# Patient Record
Sex: FEMALE | Race: WHITE | Employment: FULL TIME | URBAN - METROPOLITAN AREA
[De-identification: names, ages, dates, MRNs, and addresses within clinical notes are randomized per-mention and may not be internally consistent; named-entity substitution may affect disease eponyms.]

---

## 2019-05-11 ENCOUNTER — APPOINTMENT (OUTPATIENT)
Dept: GENERAL RADIOLOGY | Age: 64
DRG: 174 | End: 2019-05-11
Payer: MEDICAID

## 2019-05-11 ENCOUNTER — APPOINTMENT (OUTPATIENT)
Dept: CT IMAGING | Age: 64
DRG: 174 | End: 2019-05-11
Payer: MEDICAID

## 2019-05-11 ENCOUNTER — HOSPITAL ENCOUNTER (INPATIENT)
Age: 64
LOS: 3 days | Discharge: HOME | DRG: 174 | End: 2019-05-14
Admitting: INTERNAL MEDICINE
Payer: MEDICAID

## 2019-05-11 DIAGNOSIS — I21.02 ST ELEVATION MYOCARDIAL INFARCTION INVOLVING LEFT ANTERIOR DESCENDING (LAD) CORONARY ARTERY (HCC): Primary | ICD-10-CM

## 2019-05-11 DIAGNOSIS — I21.3 ST ELEVATION MYOCARDIAL INFARCTION (STEMI), UNSPECIFIED ARTERY (HCC): ICD-10-CM

## 2019-05-11 DIAGNOSIS — R07.9 ACUTE CHEST PAIN: ICD-10-CM

## 2019-05-11 PROBLEM — I10 ESSENTIAL (PRIMARY) HYPERTENSION: Status: ACTIVE | Noted: 2019-05-11

## 2019-05-11 PROBLEM — I47.29 NSVT (NONSUSTAINED VENTRICULAR TACHYCARDIA) (HCC): Status: ACTIVE | Noted: 2019-05-11

## 2019-05-11 PROBLEM — E66.01 MORBID OBESITY WITH BMI OF 50.0-59.9, ADULT (HCC): Status: ACTIVE | Noted: 2019-05-11

## 2019-05-11 PROBLEM — J81.0 FLASH PULMONARY EDEMA (HCC): Status: ACTIVE | Noted: 2019-05-11

## 2019-05-11 PROBLEM — K92.1 MELENA: Status: ACTIVE | Noted: 2019-05-11

## 2019-05-11 LAB
-: ABNORMAL
ABO/RH: NORMAL
ABSOLUTE EOS #: 0.23 K/UL (ref 0–0.44)
ABSOLUTE IMMATURE GRANULOCYTE: 0.11 K/UL (ref 0–0.3)
ABSOLUTE LYMPH #: 1.96 K/UL (ref 1.1–3.7)
ABSOLUTE MONO #: 1.03 K/UL (ref 0.1–1.2)
AMORPHOUS: ABNORMAL
ANION GAP SERPL CALCULATED.3IONS-SCNC: 14 MMOL/L (ref 9–17)
ANION GAP SERPL CALCULATED.3IONS-SCNC: 16 MMOL/L (ref 9–17)
ANTIBODY SCREEN: NEGATIVE
ARM BAND NUMBER: NORMAL
BACTERIA: ABNORMAL
BASOPHILS # BLD: 0 % (ref 0–2)
BASOPHILS ABSOLUTE: 0.05 K/UL (ref 0–0.2)
BILIRUBIN URINE: NEGATIVE
BUN BLDV-MCNC: 20 MG/DL (ref 8–23)
BUN BLDV-MCNC: 21 MG/DL (ref 8–23)
BUN/CREAT BLD: 20 (ref 9–20)
BUN/CREAT BLD: 21 (ref 9–20)
CALCIUM SERPL-MCNC: 8.5 MG/DL (ref 8.6–10.4)
CALCIUM SERPL-MCNC: 9 MG/DL (ref 8.6–10.4)
CASTS UA: ABNORMAL /LPF
CASTS UA: ABNORMAL /LPF
CHLORIDE BLD-SCNC: 101 MMOL/L (ref 98–107)
CHLORIDE BLD-SCNC: 102 MMOL/L (ref 98–107)
CO2: 21 MMOL/L (ref 20–31)
CO2: 23 MMOL/L (ref 20–31)
COLOR: YELLOW
COMMENT UA: ABNORMAL
CREAT SERPL-MCNC: 0.98 MG/DL (ref 0.5–0.9)
CREAT SERPL-MCNC: 1 MG/DL (ref 0.5–0.9)
CRYSTALS, UA: ABNORMAL /HPF
DIFFERENTIAL TYPE: ABNORMAL
EOSINOPHILS RELATIVE PERCENT: 2 % (ref 1–4)
EPITHELIAL CELLS UA: ABNORMAL /HPF (ref 0–5)
EXPIRATION DATE: NORMAL
GFR AFRICAN AMERICAN: >60 ML/MIN
GFR AFRICAN AMERICAN: >60 ML/MIN
GFR NON-AFRICAN AMERICAN: 56 ML/MIN
GFR NON-AFRICAN AMERICAN: 58 ML/MIN
GFR SERPL CREATININE-BSD FRML MDRD: ABNORMAL ML/MIN/{1.73_M2}
GLUCOSE BLD-MCNC: 144 MG/DL (ref 70–99)
GLUCOSE BLD-MCNC: 159 MG/DL (ref 70–99)
GLUCOSE URINE: NEGATIVE
HCT VFR BLD CALC: 43 % (ref 36.3–47.1)
HEMOGLOBIN: 13.8 G/DL (ref 11.9–15.1)
IMMATURE GRANULOCYTES: 1 %
KETONES, URINE: ABNORMAL
LEUKOCYTE ESTERASE, URINE: NEGATIVE
LYMPHOCYTES # BLD: 15 % (ref 24–43)
MAGNESIUM: 2.2 MG/DL (ref 1.6–2.6)
MCH RBC QN AUTO: 29.2 PG (ref 25.2–33.5)
MCHC RBC AUTO-ENTMCNC: 32.1 G/DL (ref 28.4–34.8)
MCV RBC AUTO: 90.9 FL (ref 82.6–102.9)
MONOCYTES # BLD: 8 % (ref 3–12)
MUCUS: ABNORMAL
MYOGLOBIN: 38 NG/ML (ref 25–58)
NITRITE, URINE: NEGATIVE
NRBC AUTOMATED: 0 PER 100 WBC
OTHER OBSERVATIONS UA: ABNORMAL
PDW BLD-RTO: 13.2 % (ref 11.8–14.4)
PH UA: 6.5 (ref 5–8)
PLATELET # BLD: 287 K/UL (ref 138–453)
PLATELET ESTIMATE: ABNORMAL
PMV BLD AUTO: 10.6 FL (ref 8.1–13.5)
POTASSIUM SERPL-SCNC: 3.7 MMOL/L (ref 3.7–5.3)
POTASSIUM SERPL-SCNC: 4 MMOL/L (ref 3.7–5.3)
PROTEIN UA: NEGATIVE
RBC # BLD: 4.73 M/UL (ref 3.95–5.11)
RBC # BLD: ABNORMAL 10*6/UL
RBC UA: ABNORMAL /HPF (ref 0–2)
RENAL EPITHELIAL, UA: ABNORMAL /HPF
SEG NEUTROPHILS: 74 % (ref 36–65)
SEGMENTED NEUTROPHILS ABSOLUTE COUNT: 10.06 K/UL (ref 1.5–8.1)
SODIUM BLD-SCNC: 138 MMOL/L (ref 135–144)
SODIUM BLD-SCNC: 139 MMOL/L (ref 135–144)
SPECIFIC GRAVITY UA: 1.02 (ref 1–1.03)
TRICHOMONAS: ABNORMAL
TROPONIN INTERP: ABNORMAL
TROPONIN INTERP: ABNORMAL
TROPONIN T: ABNORMAL NG/ML
TROPONIN T: ABNORMAL NG/ML
TROPONIN, HIGH SENSITIVITY: 1270 NG/L (ref 0–14)
TROPONIN, HIGH SENSITIVITY: 17 NG/L (ref 0–14)
TURBIDITY: CLEAR
URINE HGB: ABNORMAL
UROBILINOGEN, URINE: NORMAL
WBC # BLD: 13.4 K/UL (ref 3.5–11.3)
WBC # BLD: ABNORMAL 10*3/UL
WBC UA: ABNORMAL /HPF (ref 0–5)
YEAST: ABNORMAL

## 2019-05-11 PROCEDURE — 86850 RBC ANTIBODY SCREEN: CPT

## 2019-05-11 PROCEDURE — 2580000003 HC RX 258: Performed by: INTERNAL MEDICINE

## 2019-05-11 PROCEDURE — 2580000003 HC RX 258

## 2019-05-11 PROCEDURE — 36415 COLL VENOUS BLD VENIPUNCTURE: CPT

## 2019-05-11 PROCEDURE — 96365 THER/PROPH/DIAG IV INF INIT: CPT

## 2019-05-11 PROCEDURE — 83874 ASSAY OF MYOGLOBIN: CPT

## 2019-05-11 PROCEDURE — 027034Z DILATION OF CORONARY ARTERY, ONE ARTERY WITH DRUG-ELUTING INTRALUMINAL DEVICE, PERCUTANEOUS APPROACH: ICD-10-PCS | Performed by: INTERNAL MEDICINE

## 2019-05-11 PROCEDURE — 86901 BLOOD TYPING SEROLOGIC RH(D): CPT

## 2019-05-11 PROCEDURE — 6360000002 HC RX W HCPCS

## 2019-05-11 PROCEDURE — 6360000002 HC RX W HCPCS: Performed by: INTERNAL MEDICINE

## 2019-05-11 PROCEDURE — 83735 ASSAY OF MAGNESIUM: CPT

## 2019-05-11 PROCEDURE — 71250 CT THORAX DX C-: CPT

## 2019-05-11 PROCEDURE — 6370000000 HC RX 637 (ALT 250 FOR IP): Performed by: INTERNAL MEDICINE

## 2019-05-11 PROCEDURE — 94660 CPAP INITIATION&MGMT: CPT

## 2019-05-11 PROCEDURE — 99285 EMERGENCY DEPT VISIT HI MDM: CPT

## 2019-05-11 PROCEDURE — 96375 TX/PRO/DX INJ NEW DRUG ADDON: CPT

## 2019-05-11 PROCEDURE — 93458 L HRT ARTERY/VENTRICLE ANGIO: CPT | Performed by: INTERNAL MEDICINE

## 2019-05-11 PROCEDURE — 81001 URINALYSIS AUTO W/SCOPE: CPT

## 2019-05-11 PROCEDURE — 6360000004 HC RX CONTRAST MEDICATION

## 2019-05-11 PROCEDURE — B2111ZZ FLUOROSCOPY OF MULTIPLE CORONARY ARTERIES USING LOW OSMOLAR CONTRAST: ICD-10-PCS | Performed by: INTERNAL MEDICINE

## 2019-05-11 PROCEDURE — 86900 BLOOD TYPING SEROLOGIC ABO: CPT

## 2019-05-11 PROCEDURE — C9113 INJ PANTOPRAZOLE SODIUM, VIA: HCPCS | Performed by: INTERNAL MEDICINE

## 2019-05-11 PROCEDURE — 2500000003 HC RX 250 WO HCPCS

## 2019-05-11 PROCEDURE — 6370000000 HC RX 637 (ALT 250 FOR IP)

## 2019-05-11 PROCEDURE — 99223 1ST HOSP IP/OBS HIGH 75: CPT | Performed by: INTERNAL MEDICINE

## 2019-05-11 PROCEDURE — 94761 N-INVAS EAR/PLS OXIMETRY MLT: CPT

## 2019-05-11 PROCEDURE — 84484 ASSAY OF TROPONIN QUANT: CPT

## 2019-05-11 PROCEDURE — 51702 INSERT TEMP BLADDER CATH: CPT

## 2019-05-11 PROCEDURE — 2000000000 HC ICU R&B

## 2019-05-11 PROCEDURE — 83036 HEMOGLOBIN GLYCOSYLATED A1C: CPT

## 2019-05-11 PROCEDURE — 71045 X-RAY EXAM CHEST 1 VIEW: CPT

## 2019-05-11 PROCEDURE — 80048 BASIC METABOLIC PNL TOTAL CA: CPT

## 2019-05-11 PROCEDURE — C9113 INJ PANTOPRAZOLE SODIUM, VIA: HCPCS

## 2019-05-11 PROCEDURE — 85025 COMPLETE CBC W/AUTO DIFF WBC: CPT

## 2019-05-11 PROCEDURE — 96366 THER/PROPH/DIAG IV INF ADDON: CPT

## 2019-05-11 PROCEDURE — 96368 THER/DIAG CONCURRENT INF: CPT

## 2019-05-11 PROCEDURE — 92941 PRQ TRLML REVSC TOT OCCL AMI: CPT | Performed by: INTERNAL MEDICINE

## 2019-05-11 PROCEDURE — 93005 ELECTROCARDIOGRAM TRACING: CPT

## 2019-05-11 PROCEDURE — B2151ZZ FLUOROSCOPY OF LEFT HEART USING LOW OSMOLAR CONTRAST: ICD-10-PCS | Performed by: INTERNAL MEDICINE

## 2019-05-11 RX ORDER — FUROSEMIDE 10 MG/ML
40 INJECTION INTRAMUSCULAR; INTRAVENOUS 2 TIMES DAILY
Status: DISCONTINUED | OUTPATIENT
Start: 2019-05-11 | End: 2019-05-11

## 2019-05-11 RX ORDER — SODIUM CHLORIDE 0.9 % (FLUSH) 0.9 %
10 SYRINGE (ML) INJECTION PRN
Status: DISCONTINUED | OUTPATIENT
Start: 2019-05-11 | End: 2019-05-11 | Stop reason: SDUPTHER

## 2019-05-11 RX ORDER — ATORVASTATIN CALCIUM 40 MG/1
40 TABLET, FILM COATED ORAL NIGHTLY
Status: DISCONTINUED | OUTPATIENT
Start: 2019-05-11 | End: 2019-05-11 | Stop reason: SDUPTHER

## 2019-05-11 RX ORDER — POTASSIUM CHLORIDE 7.45 MG/ML
10 INJECTION INTRAVENOUS PRN
Status: DISCONTINUED | OUTPATIENT
Start: 2019-05-11 | End: 2019-05-14 | Stop reason: HOSPADM

## 2019-05-11 RX ORDER — SODIUM CHLORIDE 0.9 % (FLUSH) 0.9 %
10 SYRINGE (ML) INJECTION EVERY 12 HOURS SCHEDULED
Status: DISCONTINUED | OUTPATIENT
Start: 2019-05-11 | End: 2019-05-11 | Stop reason: SDUPTHER

## 2019-05-11 RX ORDER — SODIUM CHLORIDE 0.9 % (FLUSH) 0.9 %
10 SYRINGE (ML) INJECTION EVERY 12 HOURS SCHEDULED
Status: DISCONTINUED | OUTPATIENT
Start: 2019-05-11 | End: 2019-05-14 | Stop reason: HOSPADM

## 2019-05-11 RX ORDER — METOPROLOL SUCCINATE 25 MG/1
25 TABLET, EXTENDED RELEASE ORAL ONCE
Status: COMPLETED | OUTPATIENT
Start: 2019-05-11 | End: 2019-05-11

## 2019-05-11 RX ORDER — METOPROLOL SUCCINATE 25 MG/1
25 TABLET, EXTENDED RELEASE ORAL DAILY
Status: DISCONTINUED | OUTPATIENT
Start: 2019-05-11 | End: 2019-05-12

## 2019-05-11 RX ORDER — ONDANSETRON 2 MG/ML
4 INJECTION INTRAMUSCULAR; INTRAVENOUS EVERY 6 HOURS PRN
Status: DISCONTINUED | OUTPATIENT
Start: 2019-05-11 | End: 2019-05-11

## 2019-05-11 RX ORDER — ONDANSETRON 2 MG/ML
4 INJECTION INTRAMUSCULAR; INTRAVENOUS ONCE
Status: COMPLETED | OUTPATIENT
Start: 2019-05-11 | End: 2019-05-11

## 2019-05-11 RX ORDER — POTASSIUM CHLORIDE 20 MEQ/1
40 TABLET, EXTENDED RELEASE ORAL PRN
Status: DISCONTINUED | OUTPATIENT
Start: 2019-05-11 | End: 2019-05-14 | Stop reason: HOSPADM

## 2019-05-11 RX ORDER — NITROGLYCERIN 20 MG/100ML
5 INJECTION INTRAVENOUS CONTINUOUS
Status: DISCONTINUED | OUTPATIENT
Start: 2019-05-11 | End: 2019-05-14 | Stop reason: HOSPADM

## 2019-05-11 RX ORDER — ASPIRIN 81 MG/1
81 TABLET ORAL DAILY
Status: DISCONTINUED | OUTPATIENT
Start: 2019-05-12 | End: 2019-05-14 | Stop reason: HOSPADM

## 2019-05-11 RX ORDER — ASPIRIN 81 MG/1
81 TABLET, CHEWABLE ORAL DAILY
Status: DISCONTINUED | OUTPATIENT
Start: 2019-05-12 | End: 2019-05-11 | Stop reason: SDUPTHER

## 2019-05-11 RX ORDER — MORPHINE SULFATE 4 MG/ML
4 INJECTION, SOLUTION INTRAMUSCULAR; INTRAVENOUS ONCE
Status: COMPLETED | OUTPATIENT
Start: 2019-05-11 | End: 2019-05-11

## 2019-05-11 RX ORDER — ACETAMINOPHEN 325 MG/1
650 TABLET ORAL EVERY 4 HOURS PRN
Status: DISCONTINUED | OUTPATIENT
Start: 2019-05-11 | End: 2019-05-14 | Stop reason: HOSPADM

## 2019-05-11 RX ORDER — ASPIRIN 81 MG/1
324 TABLET, CHEWABLE ORAL ONCE
Status: COMPLETED | OUTPATIENT
Start: 2019-05-11 | End: 2019-05-11

## 2019-05-11 RX ORDER — METOPROLOL SUCCINATE 50 MG/1
50 TABLET, EXTENDED RELEASE ORAL DAILY
Status: DISCONTINUED | OUTPATIENT
Start: 2019-05-12 | End: 2019-05-12

## 2019-05-11 RX ORDER — ATORVASTATIN CALCIUM 80 MG/1
80 TABLET, FILM COATED ORAL NIGHTLY
Status: DISCONTINUED | OUTPATIENT
Start: 2019-05-11 | End: 2019-05-14 | Stop reason: HOSPADM

## 2019-05-11 RX ORDER — MAGNESIUM SULFATE 1 G/100ML
1 INJECTION INTRAVENOUS PRN
Status: DISCONTINUED | OUTPATIENT
Start: 2019-05-11 | End: 2019-05-14 | Stop reason: HOSPADM

## 2019-05-11 RX ORDER — SODIUM CHLORIDE 0.9 % (FLUSH) 0.9 %
10 SYRINGE (ML) INJECTION PRN
Status: DISCONTINUED | OUTPATIENT
Start: 2019-05-11 | End: 2019-05-14 | Stop reason: HOSPADM

## 2019-05-11 RX ORDER — ONDANSETRON 2 MG/ML
INJECTION INTRAMUSCULAR; INTRAVENOUS
Status: COMPLETED
Start: 2019-05-11 | End: 2019-05-11

## 2019-05-11 RX ORDER — FUROSEMIDE 10 MG/ML
40 INJECTION INTRAMUSCULAR; INTRAVENOUS ONCE
Status: COMPLETED | OUTPATIENT
Start: 2019-05-11 | End: 2019-05-11

## 2019-05-11 RX ORDER — ONDANSETRON 4 MG/1
4 TABLET, ORALLY DISINTEGRATING ORAL EVERY 6 HOURS PRN
Status: DISCONTINUED | OUTPATIENT
Start: 2019-05-11 | End: 2019-05-14 | Stop reason: HOSPADM

## 2019-05-11 RX ORDER — FUROSEMIDE 10 MG/ML
40 INJECTION INTRAMUSCULAR; INTRAVENOUS 2 TIMES DAILY
Status: DISCONTINUED | OUTPATIENT
Start: 2019-05-12 | End: 2019-05-12

## 2019-05-11 RX ORDER — RANITIDINE 150 MG/1
150 TABLET ORAL EVERY MORNING
Status: ON HOLD | COMMUNITY
End: 2019-05-14 | Stop reason: HOSPADM

## 2019-05-11 RX ORDER — 0.9 % SODIUM CHLORIDE 0.9 %
10 VIAL (ML) INJECTION DAILY
Status: DISCONTINUED | OUTPATIENT
Start: 2019-05-11 | End: 2019-05-13 | Stop reason: CLARIF

## 2019-05-11 RX ORDER — PANTOPRAZOLE SODIUM 40 MG/10ML
40 INJECTION, POWDER, LYOPHILIZED, FOR SOLUTION INTRAVENOUS 2 TIMES DAILY
Status: DISCONTINUED | OUTPATIENT
Start: 2019-05-11 | End: 2019-05-13 | Stop reason: CLARIF

## 2019-05-11 RX ORDER — ONDANSETRON 2 MG/ML
4 INJECTION INTRAMUSCULAR; INTRAVENOUS EVERY 6 HOURS PRN
Status: DISCONTINUED | OUTPATIENT
Start: 2019-05-11 | End: 2019-05-14 | Stop reason: HOSPADM

## 2019-05-11 RX ADMIN — SODIUM CHLORIDE 8 MG/HR: 9 INJECTION, SOLUTION INTRAVENOUS at 13:59

## 2019-05-11 RX ADMIN — ONDANSETRON HYDROCHLORIDE 4 MG: 2 INJECTION, SOLUTION INTRAVENOUS at 20:52

## 2019-05-11 RX ADMIN — METOPROLOL SUCCINATE 25 MG: 25 TABLET, FILM COATED, EXTENDED RELEASE ORAL at 18:31

## 2019-05-11 RX ADMIN — ATORVASTATIN CALCIUM 80 MG: 80 TABLET, FILM COATED ORAL at 20:10

## 2019-05-11 RX ADMIN — Medication 10 ML: at 20:53

## 2019-05-11 RX ADMIN — MORPHINE SULFATE 4 MG: 4 INJECTION INTRAVENOUS at 13:38

## 2019-05-11 RX ADMIN — ASPIRIN 81 MG 324 MG: 81 TABLET ORAL at 13:36

## 2019-05-11 RX ADMIN — DEXTROSE MONOHYDRATE: 50 INJECTION, SOLUTION INTRAVENOUS at 20:09

## 2019-05-11 RX ADMIN — FUROSEMIDE 40 MG: 10 INJECTION, SOLUTION INTRAMUSCULAR; INTRAVENOUS at 22:50

## 2019-05-11 RX ADMIN — NITROGLYCERIN 5 MCG/MIN: 20 INJECTION INTRAVENOUS at 13:37

## 2019-05-11 RX ADMIN — METOPROLOL SUCCINATE 25 MG: 25 TABLET, FILM COATED, EXTENDED RELEASE ORAL at 17:17

## 2019-05-11 RX ADMIN — AMIODARONE HYDROCHLORIDE 1 MG/MIN: 50 INJECTION, SOLUTION INTRAVENOUS at 20:16

## 2019-05-11 RX ADMIN — ONDANSETRON 4 MG: 2 INJECTION INTRAMUSCULAR; INTRAVENOUS at 13:50

## 2019-05-11 RX ADMIN — SODIUM CHLORIDE 80 MG: 9 INJECTION, SOLUTION INTRAVENOUS at 13:49

## 2019-05-11 RX ADMIN — FUROSEMIDE 40 MG: 10 INJECTION, SOLUTION INTRAMUSCULAR; INTRAVENOUS at 20:10

## 2019-05-11 RX ADMIN — PANTOPRAZOLE SODIUM 40 MG: 40 INJECTION, POWDER, FOR SOLUTION INTRAVENOUS at 20:10

## 2019-05-11 RX ADMIN — TICAGRELOR 90 MG: 90 TABLET ORAL at 20:11

## 2019-05-11 SDOH — HEALTH STABILITY: MENTAL HEALTH: HOW OFTEN DO YOU HAVE A DRINK CONTAINING ALCOHOL?: NEVER

## 2019-05-11 ASSESSMENT — PAIN DESCRIPTION - FREQUENCY
FREQUENCY: CONTINUOUS
FREQUENCY: CONTINUOUS

## 2019-05-11 ASSESSMENT — PAIN DESCRIPTION - LOCATION
LOCATION: BACK
LOCATION: BACK

## 2019-05-11 ASSESSMENT — ENCOUNTER SYMPTOMS
VOMITING: 1
CHEST TIGHTNESS: 1
ABDOMINAL DISTENTION: 1
SHORTNESS OF BREATH: 1
NAUSEA: 1
PHOTOPHOBIA: 0
BACK PAIN: 1

## 2019-05-11 ASSESSMENT — PAIN DESCRIPTION - DESCRIPTORS
DESCRIPTORS: ACHING
DESCRIPTORS: ACHING

## 2019-05-11 ASSESSMENT — PAIN SCALES - GENERAL
PAINLEVEL_OUTOF10: 5
PAINLEVEL_OUTOF10: 0
PAINLEVEL_OUTOF10: 5

## 2019-05-11 ASSESSMENT — PAIN DESCRIPTION - PAIN TYPE
TYPE: ACUTE PAIN
TYPE: ACUTE PAIN

## 2019-05-11 NOTE — H&P
[facility]    HISTORY AND PHYSICAL EXAMINATION            Date:   5/11/2019  Patient name:  Florentin Oakley  Date of admission:  5/11/2019  1:22 PM  MRN:   [id number]  Account:  [de-identified]  YOB: 1955  PCP:    No primary care provider on file. Room:   2027/2027-01  Code Status:    Full Code    Chief Complaint:     Chief Complaint   Patient presents with    Chest Pain    Back Pain       History Obtained From:     patient, electronic medical record, on bipap so history limited    History of Present Illness: The patient is a 60 y.o.   female who presents with Chest Pain and Back Pain   and she is admitted to the hospital for the management of  Stemi. She developed centralized cp with radiation to back along with n/v/diaphoresis. No sob. However, she is now on bipap for pulm edema and is having runs of nsvt        Past Medical History:     Past Medical History:   Diagnosis Date    Hypertension         Past Surgical History:     Past Surgical History:   Procedure Laterality Date    BLADDER SUSPENSION          Medications Prior to Admission:     Prior to Admission medications    Not on File        Allergies:     Patient has no known allergies. Social History:     Tobacco:    reports that she has quit smoking. She has never used smokeless tobacco.  Alcohol:      reports that she does not drink alcohol. Drug Use:  reports that she does not use drugs. Family History:     Family History   Problem Relation Age of Onset    Elevated Lipids Mother        Review of Systems:     Limited except as above    Physical Exam:   BP (!) 129/91   Pulse 94   Resp 20   Ht 5' 3\" (1.6 m)   Wt 300 lb (136.1 kg)   SpO2 99%   BMI 53.14 kg/m²   No data recorded. No results for input(s): POCGLU in the last 72 hours.   No intake or output data in the 24 hours ending 05/11/19 1821    General Appearance:  alert, well appearing, and in no RARE (A) None    Mucus, UA NOT REPORTED None    Trichomonas, UA NOT REPORTED None    Amorphous, UA NOT REPORTED None    Other Observations UA NOT REPORTED NOT REQ. Yeast, UA NOT REPORTED None   EKG 12 Lead    Collection Time: 05/11/19  4:59 PM   Result Value Ref Range    Ventricular Rate 91 BPM    Atrial Rate 91 BPM    P-R Interval 166 ms    QRS Duration 82 ms    Q-T Interval 380 ms    QTc Calculation (Bazett) 467 ms    P Axis 55 degrees    T Axis 57 degrees   Troponin    Collection Time: 05/11/19  5:16 PM   Result Value Ref Range    Troponin, High Sensitivity 1,270 (HH) 0 - 14 ng/L    Troponin T NOT REPORTED <0.03 ng/mL    Troponin Interp NOT REPORTED    Basic Metabolic Panel    Collection Time: 05/11/19  5:16 PM   Result Value Ref Range    Glucose 144 (H) 70 - 99 mg/dL    BUN 20 8 - 23 mg/dL    CREATININE 1.00 (H) 0.50 - 0.90 mg/dL    Bun/Cre Ratio 20 9 - 20    Calcium 8.5 (L) 8.6 - 10.4 mg/dL    Sodium 139 135 - 144 mmol/L    Potassium 4.0 3.7 - 5.3 mmol/L    Chloride 102 98 - 107 mmol/L    CO2 23 20 - 31 mmol/L    Anion Gap 14 9 - 17 mmol/L    GFR Non-African American 56 (L) >60 mL/min    GFR African American >60 >60 mL/min    GFR Comment          GFR Staging NOT REPORTED        Imaging/Diagnostics:    Cxr:  Impression   No acute cardiopulmonary process.          Assessment :      Primary Problem  STEMI (ST elevation myocardial infarction) [facility])    Active Hospital Problems    Diagnosis Date Noted    STEMI (ST elevation myocardial infarction) ([facility] [I21.3] 05/11/2019    Essential (primary) hypertension [I10] 05/11/2019    Morbid obesity with BMI of 50.0-59.9, adult ([facility] [E66.01, Z68.43] 05/11/2019    Melena [K92.1] 05/11/2019    Flash pulmonary edema ([facility] [J81.0] 05/11/2019    NSVT (nonsustained ventricular tachycardia) ([facility] [I47.2] 05/11/2019       Plan:     Patient status Admit as inpatient in the  Cardiovascular ICU    Urgent cardiac cath-stent to LAD done (100% stenosis->0%)  protonix drip for melena will be switched to ivp  Consider amio for nsvt  Check stool ob  Replace mg and k as needed  Check mg level  Diuresis  Check echo-make sure no valvular pathology that caused flash pulm edema  Low ef currently; will need repeat echo in future  Aspirin/brilinta: these are mandatory, despite the melena  D/w niece and nephew    Consultations:   IP CONSULT TO INTERNAL MEDICINE  IP CONSULT TO CARDIOLOGY  IP CONSULT  [address]     Patient is admitted as inpatient status because of co-morbidities listed above, severity of signs and symptoms as outlined, requirement for current medical therapies and most importantly because of direct risk to patient if care not provided in a hospital setting. Dina Bourgeois, DO  5/11/2019  6:21 PM    Copy sent to Dr. Song primary care provider on file.

## 2019-05-11 NOTE — CONSULTS
[facility] Cardiology Cardiology    Consult                        Today's Date: 5/11/2019  Patient Name: Tawanda Gandara  Date of admission: 5/11/2019  1:22 PM  Patient's age: 60 y.o., 1955  Admission Dx: No admission diagnoses are documented for this encounter. Reason for Consult:  Anterior STEMI    Requesting Physician: No admitting provider for patient encounter. CHIEF COMPLAINT:  Chest pain    History Obtained From:  patient    HISTORY OF PRESENT ILLNESS:      The patient is a 60 y.o.  female who is admitted to the hospital for chest pain  The patient presented with chest pain, radiating to her back, associated with SOB, started after she left her work, no dizziness or syncope. Past Medical History:   has a past medical history of Hypertension. Past Surgical History:   has no past surgical history on file. Home Medications:    Prior to Admission medications    Not on File       Allergies:  Patient has no known allergies. Social History:   reports that she has quit smoking. She has never used smokeless tobacco. She reports that she does not drink alcohol or use drugs. Family History: family history is not on file. No h/o sudden cardiac death. No for premature CAD    REVIEW OF SYSTEMS:    · Constitutional: there has been no unanticipated weight loss. There's been Yes change in energy level, Yes change in activity level. · Eyes: No visual changes or diplopia. No scleral icterus. · ENT: No Headaches, hearing loss or vertigo. No mouth sores or sore throat. · Cardiovascular: Yes chest pain, Yes dyspnea on exertion, No palpitations or No loss of consciousness. No cough, hemoptysis, No pleuritic pain, or phlebitis. · Respiratory: No cough or wheezing, no sputum production. No hematemesis. · Gastrointestinal: No abdominal pain, appetite loss, blood in stools. No change in bowel or bladder habits. · Genitourinary: No dysuria, trouble voiding, or hematuria.   · Musculoskeletal:  No gait disturbance, No weakness or joint complaints. · Integumentary: No rash or pruritis. · Neurological: No headache, diplopia, change in muscle strength, numbness or tingling. No change in gait, balance, coordination, mood, affect, memory, mentation, behavior. · Psychiatric: No anxiety, or depression. · Endocrine: No temperature intolerance. No excessive thirst, fluid intake, or urination. No tremor. · Hematologic/Lymphatic: No abnormal bruising or bleeding, blood clots or swollen lymph nodes. · Allergic/Immunologic: No nasal congestion or hives. PHYSICAL EXAM:      /68   Pulse 85   Resp 24   Ht 5' 3\" (1.6 m)   Wt 300 lb (136.1 kg)   SpO2 99%   BMI 53.14 kg/m²    Constitutional and General Appearance: alert, cooperative, no distress and appears stated age  HEENT: PERRL, no cervical lymphadenopathy. No masses palpable. Normal oral mucosa  Respiratory:  · Normal excursion and expansion without use of accessory muscles  · Resp Auscultation: Good respiratory effort. No for increased work of breathing. On auscultation: clear to auscultation bilaterally  Cardiovascular:  · The apical impulse is not displaced  · Heart tones are crisp and normal. regular S1 and S2.  · Jugular venous pulsation Normal  · The carotid upstroke is normal in amplitude and contour without delay or bruit  · Peripheral pulses are symmetrical and full  Abdomen:  · No masses or tenderness  · Bowel sounds present  Extremities:  ·  No Cyanosis or Clubbing  ·  Lower extremity edema: No  · Skin: Warm and dry  Neurological:  · Alert and oriented.   · Moves all extremities well  · No abnormalities of mood, affect, memory, mentation, or behavior are noted    DATA:    Diagnostics:    EKG: NSR, anterolateral STEMILabs:     CBC:   Recent Labs     05/11/19  1333   WBC 13.4*   HGB 13.8   HCT 43.0        BMP:   Recent Labs     05/11/19  1333      K 3.7   CO2 21   BUN 21   CREATININE 0.98*   LABGLOM 58*   GLUCOSE 159*     BNP: No

## 2019-05-11 NOTE — ED PROVIDER NOTES
[address] [place] [address] ED  eMERGENCY dEPARTMENT eNCOUnter      Pt Name: Tony Shannon  MRN: [id number]  [place] 1955  Date of evaluation: 5/11/2019  Provider: Riley Aldrich MD    [address] [place] [address]       Chief Complaint   Patient presents with    Chest Pain    Back Pain         HISTORY OF PRESENT ILLNESS  (Location/Symptom, Timing/Onset, Context/Setting, Quality, Duration, Modifying Factors, Severity.)   Tony Shannon is a 60 y.o. female who presents to the emergency department presents via life squad with the chief complaint of chest pain back pain shortness of breath with nausea. Patient states the symptoms began shortly after she got to work today. She states that the pains a 10 on a scale of 1-10 associated with sweating. She does give a history of dark tarry stools over the past several months. Has a history of hypertension for which she takes an unknown water pill and blood pressure med. She does not smoke or drink alcohol or do street drugs. Nursing Notes were reviewed. ALLERGIES     Patient has no known allergies. CURRENT MEDICATIONS       Previous Medications    No medications on file       PAST MEDICAL HISTORY         Diagnosis Date    Hypertension        SURGICAL HISTORY     No past surgical history on file. FAMILY HISTORY     No family history on file. No family status information on file. SOCIAL HISTORY      reports that she has quit smoking. She has never used smokeless tobacco. She reports that she does not drink alcohol or use drugs. REVIEW OF SYSTEMS    (2-9 systems for level 4, 10 or more for level 5)     Review of Systems   Constitutional: Positive for activity change, diaphoresis and fatigue. HENT: Positive for congestion. Eyes: Negative for photophobia and visual disturbance. Respiratory: Positive for chest tightness and shortness of breath. Cardiovascular: Positive for chest pain. Negative for palpitations and leg swelling.    Gastrointestinal: Positive for abdominal distention, nausea and vomiting. Genitourinary: Negative for frequency, pelvic pain and urgency. Musculoskeletal: Positive for back pain and myalgias. Skin: Positive for pallor. Neurological: Positive for dizziness, weakness and light-headedness. Psychiatric/Behavioral: Positive for agitation. Except as noted above the remainder of the review of systems was reviewed and negative. PHYSICAL EXAM    (up to 7 for level 4, 8 or more for level 5)     ED Triage Vitals   BP Temp Temp src Pulse Resp SpO2 Height Weight   05/11/19 1329 -- -- 05/11/19 1329 05/11/19 1330 05/11/19 1329 05/11/19 1329 05/11/19 1329   139/81   82 30 100 % 5' 3\" (1.6 m) 300 lb (136.1 kg)       Physical Exam   Constitutional: She is oriented to person, place, and time. She appears distressed. HENT:   Head: Normocephalic. Eyes: Pupils are equal, round, and reactive to light. Right eye exhibits no discharge. Left eye exhibits no discharge. No scleral icterus. Neck: Normal range of motion. Neck supple. Cardiovascular: Normal rate and regular rhythm. Exam reveals no friction rub. No murmur heard. Patient has diminished dorsal pedis pulse on the right radial pulses are symmetrical bilaterally. Pulmonary/Chest: No stridor. She is in respiratory distress. She has no wheezes. She has no rales. Abdominal: She exhibits distension. She exhibits no mass. There is no tenderness. There is no rebound and no guarding. Musculoskeletal: Normal range of motion. Neurological: She is alert and oriented to person, place, and time. She displays normal reflexes. No cranial nerve deficit. Coordination normal.   Skin: Skin is warm. She is diaphoretic. Psychiatric: She has a normal mood and affect. Her behavior is normal.   Nursing note and vitals reviewed.           DIAGNOSTIC RESULTS     EKG: All EKG's are interpreted by the Emergency Department Physician who either signs or Co-signs this chart in the absence of a cardiologist.    Urged EKG taken at the scene which we were not privy to until the patient arrived I show an acute anterior MI with reciprocal changes. The EKG at the subsequent to given nitroglycerin does show very minimal ST elevation in V23 and 4. RADIOLOGY:   Non-plain film images such as CT, Ultrasound and MRI are read by the radiologist. Plain radiographic images are visualized and preliminarily interpreted by the emergency physician with the below findings:      Xr Chest Portable    Result Date: 5/11/2019  EXAMINATION: ONE XRAY VIEW OF THE CHEST 5/11/2019 1:40 pm COMPARISON: None. HISTORY: ORDERING SYSTEM PROVIDED HISTORY: Chest Pain TECHNOLOGIST PROVIDED HISTORY: Chest Pain Ordering Physician Provided Reason for Exam: Port upright AP CXR - chest pain Acuity: Unknown Type of Exam: Unknown FINDINGS: AP portable view of the chest time stamped at 1332 hours demonstrates overlying cardiac monitoring electrodes. No cardiomegaly, vascular congestion, focal consolidation, effusion, or pneumothorax is noted. Osseous and mediastinal structures are age-appropriate. A granuloma is present in the right lower lung field. No acute cardiopulmonary process. Interpretation per the Radiologist below, if available at the time of this note:    XR CHEST PORTABLE   Final Result   No acute cardiopulmonary process.          CT ABDOMEN PELVIS WO CONTRAST    (Results Pending)   CT CHEST ABDOMEN PELVIS WO CONTRAST    (Results Pending)         ED BEDSIDE ULTRASOUND:   Performed by ED Physician - none    LABS:  Labs Reviewed   CBC WITH AUTO DIFFERENTIAL - Abnormal; Notable for the following components:       Result Value    WBC 13.4 (*)     Seg Neutrophils 74 (*)     Lymphocytes 15 (*)     Immature Granulocytes 1 (*)     Segs Absolute 10.06 (*)     All other components within normal limits   TROP/MYOGLOBIN - Abnormal; Notable for the following components:    Troponin, High Sensitivity 17 (*)     All other components within normal limits   BASIC METABOLIC PANEL - Abnormal; Notable for the following components:    Glucose 159 (*)     CREATININE 0.98 (*)     Bun/Cre Ratio 21 (*)     GFR Non- 58 (*)     All other components within normal limits   TYPE AND SCREEN       All other labs were within normal range or not returned as of this dictation. EMERGENCY DEPARTMENT COURSE and DIFFERENTIAL DIAGNOSIS/MDM:   Vitals:    Vitals:    05/11/19 1329 05/11/19 1330 05/11/19 1345   BP: 139/81 139/81 120/68   Pulse: 82 85 85   Resp:  30 24   SpO2: 100% 100% 99%   Weight: 300 lb (136.1 kg)     Height: 5' 3\" (1.6 m)       Patient is transferred to the cath lab but after CT of the chest abdomen and pelvis to rule out dissection of the aorta. This diminished in the time prior to the patient getting to the cath lab. The critical care time is 35 minutes case discussed with Dr. bland prior to getting the CT scan. She is currently on a nitroglycerin drip has been bolused with Protonix protonic strep and is getting morphine p.r.n. for pain. CONSULTS:  None    PROCEDURES:      FINAL IMPRESSION      1. Acute chest pain    2. ST elevation myocardial infarction (STEMI), unspecified artery ([facility] [address] [facility]          DISPOSITION/PLAN   DISPOSITION Decision To Admit 05/11/2019 02:16:08 PM      PATIENT REFERRED TO:   No follow-up provider specified. DISCHARGE MEDICATIONS:     New Prescriptions    No medications on file           (Please note that portions of this note were completed with a voice recognition program.  Efforts were made to edit the dictations but occasionally words are mis-transcribed.)    Dayan Cruz MD  Attending Emergency Physician          Dayan Cruz MD  05/11/19 3025  Addendum: Dr. Phani Miller informed me that the patient has developed pulmonary edema due to left ventricular dysfunction. She was placed on BiPAP and given 80 of Lasix. A critical-care consult is instituted Dr. Nini Urias is paged.   I did notify  Ti Santos covering for Dr. moscoso who I admitted the patient to.   Additional critical care time 30 minutes     Jairo Gould MD  05/11/19 0367

## 2019-05-11 NOTE — CONSULTS
Current Facility-Administered Medications   Medication Dose Route Frequency Provider Last Rate Last Dose    pantoprazole (PROTONIX) 80 mg in sodium chloride 0.9 % 100 mL infusion  8 mg/hr Intravenous Continuous Peña P Blood, DO 10 mL/hr at 05/11/19 1359 8 mg/hr at 05/11/19 1359    nitroGLYCERIN 50 mg in dextrose 5% 250 mL infusion  5 mcg/min Intravenous Continuous Peña P Blood, DO 3 mL/hr at 05/11/19 1410 10 mcg/min at 05/11/19 1410    ondansetron (ZOFRAN) injection 4 mg  4 mg Intravenous Once Tennille Gayle MD        sodium chloride flush 0.9 % injection 10 mL  10 mL Intravenous 2 times per day Kavita Zavala MD        sodium chloride flush 0.9 % injection 10 mL  10 mL Intravenous PRN Kavita Zavala MD        acetaminophen (TYLENOL) tablet 650 mg  650 mg Oral Q4H PRN Kavita Zavala MD        magnesium hydroxide (MILK OF MAGNESIA) 400 MG/5ML suspension 30 mL  30 mL Oral Daily PRN MD Chidi Ames Mittie More ON 5/12/2019] aspirin EC tablet 81 mg  81 mg Oral Daily Kavita Zavala MD        metoprolol succinate (TOPROL XL) extended release tablet 25 mg  25 mg Oral Daily Kavita Zavala MD        atorvastatin (LIPITOR) tablet 80 mg  80 mg Oral Nightly Kavita Zavala MD        ticagrelor (BRILINTA) tablet 90 mg  90 mg Oral BID Kavita Zavala MD        furosemide (LASIX) injection 40 mg  40 mg Intravenous Once MD Chidi Ames [START ON 5/12/2019] furosemide (LASIX) injection 40 mg  40 mg Intravenous BID Kavita Zavala MD        ondansetron (ZOFRAN-ODT) disintegrating tablet 4 mg  4 mg Oral Q6H PRN Kaitlynn Pichardo MD        Or    ondansetron [facility]) injection 4 mg  4 mg Intravenous Q6H PRN Kaitlynn Pichardo MD          PULMONARY  CONSULT NOTE      Date of Admission: 5/11/2019  1:22 PM    Reason for Consult: Acute Hypoxic resp failure, Pulm edema    Referring Physician: Dr Bourgeois  PCP: No primary care provider on file. History of Present Illness: Angeles Sportsman is a 60 y.o. White   female who presents with acute chest pain, noted to have STEMI; underwent cath and stent placement; while in cath developed hypoxia and noted to develop acute pulm edema, currently on BIPAP 14/10, FIO2 0.6- being weaned down    Problem:  Principal Problem:     PMH:   Past Medical History:   Diagnosis Date    Hypertension        PSH: No past surgical history on file. Allergies: No Known Allergies    Home Meds:  No medications prior to admission. Social History:   Social History     Socioeconomic History    Marital status: Not on file     Spouse name: Not on file    Number of children: Not on file    Years of education: Not on file    Highest education level: Not on file   Occupational History    Not on file   Social Needs    Financial resource strain: Not on file    Food insecurity:     Worry: Not on file     Inability: Not on file    Transportation needs:     Medical: Not on file     Non-medical: Not on file   Tobacco Use    Smoking status: Former Smoker    Smokeless tobacco: Never Used   Substance and Sexual Activity    Alcohol use: Never     Frequency: Never    Drug use: Never    Sexual activity: Not on file   Lifestyle    Physical activity:     Days per week: Not on file     Minutes per session: Not on file    Stress: Not on file   Relationships    Social connections:     Talks on phone: Not on file     Gets together: Not on file     Attends [employer] service: Not on file     Active member of club or organization: Not on file     Attends meetings of clubs or organizations: Not on file     Relationship status: Not on file    Intimate partner violence:     Fear of current or ex partner: Not on file     Emotionally abused: Not on file     Physically abused: Not on file     Forced sexual activity: Not on file   Other Topics Concern    Not on file   Social History Narrative    Not on file       Family History: No family history on file.     Review of Systems  Fever/ chills - no  Chest pain - ++, better  Cough - no  Expectoration / hemoptysis - no  shortness of breath - ++, better  Headache - no  Sinus drainage/ sore throat - no  abdominal pain - no  Swelling feet - no  Nausea/ vomiting/ diarrhea/ constipation - no    Physical Exam  Vital Signs: /68   Pulse 85   Resp 21   Ht 5' 3\" (1.6 m)   Wt 300 lb (136.1 kg)   SpO2 100%   BMI 53.14 kg/m²       Admission Weight: Weight: 300 lb (136.1 kg)    General Appearance: Healthy, alert, active, cooperative, on BIPAP  Head: Normocephalic, without obvious abnormality, atraumatic  Neck: no adenopathy, no JVD, supple, symmetrical, trachea midline\"thyroid not enlarged, symmetric, no tenderness/mass/nodule  Lungs: fair air entry bilaterally; breath sounds- vesicular; rhonchi- absent; rales/ crackles- absent  Heart: : regular rate and rhythm, S1, S2 normal, no murmur, click, rub or gallop  Abdomen: soft, non-tender; bowel sounds normal; no masses,  no organomegaly  Extremities: extremities normal, atraumatic, no cyanosis or edema  Skin: skin color, texture, turgor normal. No rashes or lesions  Neurologic: Grossly normal    [unfilled]    Recent labs, Imaging studies reviewed      Data ReviewCBC:   Recent Labs     05/11/19  1333   WBC 13.4*   RBC 4.73   HGB 13.8   HCT 43.0        BMP:   Recent Labs     05/11/19  1333   GLUCOSE 159*      K 3.7   BUN 21   CREATININE 0.98*   CALCIUM 9.0     ABGs: No results for input(s): PHART, PO2ART, NAH3GVZ, IWN8YLE, BEART, P8BHGNJN, RXE6FLT in the last 72 hours.    PT/INR:  No results found for: PTINR      ASSESSMENT / PLAN:    Acute hypoxic resp failure - NIPPV; wean O2, break from BPAP  Pulm edema - diuresis, CXR in AM  STEMI s/p cath - s/p cath and stent 5/11/19  HTN    Electronically signed by Altagracia Coelho on 05/11/19

## 2019-05-11 NOTE — ED NOTES
Patient brought in by EMS from work with heavy pressure in her chest rating pain a 5/10 on pain scale. Patient verbalizing pain radiating through to her back. Patient also exhibiting diaphoresis and SOB upon arrival. Patient states pain started approximately 45 min  Ago while at work. During examination patient became nauseous and vomited a large amount. Patient not familiar with her meds and states 3 med for high blood pressure and water pill. Patient was not given aspirin, due to patient had verbalized that she has been having dark tary stools. Dr Avtar Joshua at bedside and decision to give aspirin made all orders received.       Ronn Thomas, RN  05/11/19 3487

## 2019-05-12 ENCOUNTER — APPOINTMENT (OUTPATIENT)
Dept: GENERAL RADIOLOGY | Age: 64
DRG: 174 | End: 2019-05-12
Payer: MEDICAID

## 2019-05-12 LAB
ANION GAP SERPL CALCULATED.3IONS-SCNC: 12 MMOL/L (ref 9–17)
BUN BLDV-MCNC: 20 MG/DL (ref 8–23)
BUN/CREAT BLD: 17 (ref 9–20)
CALCIUM SERPL-MCNC: 8.4 MG/DL (ref 8.6–10.4)
CHLORIDE BLD-SCNC: 101 MMOL/L (ref 98–107)
CHOLESTEROL/HDL RATIO: 6.2
CHOLESTEROL: 248 MG/DL
CO2: 26 MMOL/L (ref 20–31)
CREAT SERPL-MCNC: 1.15 MG/DL (ref 0.5–0.9)
ESTIMATED AVERAGE GLUCOSE: 126 MG/DL
GFR AFRICAN AMERICAN: 58 ML/MIN
GFR NON-AFRICAN AMERICAN: 48 ML/MIN
GFR SERPL CREATININE-BSD FRML MDRD: ABNORMAL ML/MIN/{1.73_M2}
GFR SERPL CREATININE-BSD FRML MDRD: ABNORMAL ML/MIN/{1.73_M2}
GLUCOSE BLD-MCNC: 131 MG/DL (ref 70–99)
HBA1C MFR BLD: 6 % (ref 4–6)
HCT VFR BLD CALC: 41.4 % (ref 36.3–47.1)
HDLC SERPL-MCNC: 40 MG/DL
HEMOGLOBIN: 13.3 G/DL (ref 11.9–15.1)
LDL CHOLESTEROL: 187 MG/DL (ref 0–130)
MAGNESIUM: 2.3 MG/DL (ref 1.6–2.6)
MCH RBC QN AUTO: 29.3 PG (ref 25.2–33.5)
MCHC RBC AUTO-ENTMCNC: 32.1 G/DL (ref 28.4–34.8)
MCV RBC AUTO: 91.2 FL (ref 82.6–102.9)
NRBC AUTOMATED: 0 PER 100 WBC
PDW BLD-RTO: 13.4 % (ref 11.8–14.4)
PLATELET # BLD: 309 K/UL (ref 138–453)
PMV BLD AUTO: 10.6 FL (ref 8.1–13.5)
POTASSIUM SERPL-SCNC: 4 MMOL/L (ref 3.7–5.3)
RBC # BLD: 4.54 M/UL (ref 3.95–5.11)
SODIUM BLD-SCNC: 139 MMOL/L (ref 135–144)
TRIGL SERPL-MCNC: 104 MG/DL
VLDLC SERPL CALC-MCNC: ABNORMAL MG/DL (ref 1–30)
WBC # BLD: 13.6 K/UL (ref 3.5–11.3)

## 2019-05-12 PROCEDURE — 6370000000 HC RX 637 (ALT 250 FOR IP): Performed by: INTERNAL MEDICINE

## 2019-05-12 PROCEDURE — 6360000002 HC RX W HCPCS: Performed by: INTERNAL MEDICINE

## 2019-05-12 PROCEDURE — 94761 N-INVAS EAR/PLS OXIMETRY MLT: CPT

## 2019-05-12 PROCEDURE — 2000000000 HC ICU R&B

## 2019-05-12 PROCEDURE — 2580000003 HC RX 258: Performed by: INTERNAL MEDICINE

## 2019-05-12 PROCEDURE — 93005 ELECTROCARDIOGRAM TRACING: CPT

## 2019-05-12 PROCEDURE — C9113 INJ PANTOPRAZOLE SODIUM, VIA: HCPCS | Performed by: INTERNAL MEDICINE

## 2019-05-12 PROCEDURE — 36415 COLL VENOUS BLD VENIPUNCTURE: CPT

## 2019-05-12 PROCEDURE — 94660 CPAP INITIATION&MGMT: CPT

## 2019-05-12 PROCEDURE — 71045 X-RAY EXAM CHEST 1 VIEW: CPT

## 2019-05-12 PROCEDURE — 83735 ASSAY OF MAGNESIUM: CPT

## 2019-05-12 PROCEDURE — 80061 LIPID PANEL: CPT

## 2019-05-12 PROCEDURE — 85027 COMPLETE CBC AUTOMATED: CPT

## 2019-05-12 PROCEDURE — 99232 SBSQ HOSP IP/OBS MODERATE 35: CPT | Performed by: INTERNAL MEDICINE

## 2019-05-12 PROCEDURE — 2700000000 HC OXYGEN THERAPY PER DAY

## 2019-05-12 PROCEDURE — 80048 BASIC METABOLIC PNL TOTAL CA: CPT

## 2019-05-12 PROCEDURE — 51702 INSERT TEMP BLADDER CATH: CPT

## 2019-05-12 RX ORDER — CLOPIDOGREL BISULFATE 75 MG/1
75 TABLET ORAL DAILY
Status: DISCONTINUED | OUTPATIENT
Start: 2019-05-13 | End: 2019-05-14 | Stop reason: HOSPADM

## 2019-05-12 RX ORDER — FUROSEMIDE 10 MG/ML
40 INJECTION INTRAMUSCULAR; INTRAVENOUS DAILY
Status: DISCONTINUED | OUTPATIENT
Start: 2019-05-12 | End: 2019-05-13

## 2019-05-12 RX ORDER — CLOPIDOGREL BISULFATE 75 MG/1
300 TABLET ORAL DAILY
Status: DISCONTINUED | OUTPATIENT
Start: 2019-05-13 | End: 2019-05-12

## 2019-05-12 RX ORDER — METOPROLOL SUCCINATE 50 MG/1
100 TABLET, EXTENDED RELEASE ORAL DAILY
Status: DISCONTINUED | OUTPATIENT
Start: 2019-05-12 | End: 2019-05-14 | Stop reason: HOSPADM

## 2019-05-12 RX ORDER — CLOPIDOGREL BISULFATE 75 MG/1
300 TABLET ORAL ONCE
Status: COMPLETED | OUTPATIENT
Start: 2019-05-12 | End: 2019-05-12

## 2019-05-12 RX ORDER — CLOPIDOGREL BISULFATE 75 MG/1
75 TABLET ORAL DAILY
Status: DISCONTINUED | OUTPATIENT
Start: 2019-05-13 | End: 2019-05-12

## 2019-05-12 RX ORDER — METOPROLOL SUCCINATE 50 MG/1
100 TABLET, EXTENDED RELEASE ORAL DAILY
Status: DISCONTINUED | OUTPATIENT
Start: 2019-05-13 | End: 2019-05-12

## 2019-05-12 RX ADMIN — CLOPIDOGREL BISULFATE 300 MG: 75 TABLET ORAL at 10:12

## 2019-05-12 RX ADMIN — ATORVASTATIN CALCIUM 80 MG: 80 TABLET, FILM COATED ORAL at 20:07

## 2019-05-12 RX ADMIN — PANTOPRAZOLE SODIUM 40 MG: 40 INJECTION, POWDER, FOR SOLUTION INTRAVENOUS at 10:11

## 2019-05-12 RX ADMIN — METOPROLOL SUCCINATE 100 MG: 50 TABLET, FILM COATED, EXTENDED RELEASE ORAL at 10:12

## 2019-05-12 RX ADMIN — ASPIRIN 81 MG: 81 TABLET, COATED ORAL at 10:12

## 2019-05-12 RX ADMIN — Medication 10 ML: at 10:25

## 2019-05-12 RX ADMIN — Medication 10 ML: at 20:07

## 2019-05-12 RX ADMIN — PANTOPRAZOLE SODIUM 40 MG: 40 INJECTION, POWDER, FOR SOLUTION INTRAVENOUS at 20:08

## 2019-05-12 RX ADMIN — FUROSEMIDE 40 MG: 10 INJECTION, SOLUTION INTRAMUSCULAR; INTRAVENOUS at 10:12

## 2019-05-12 RX ADMIN — AMIODARONE HYDROCHLORIDE 0.5 MG/MIN: 50 INJECTION, SOLUTION INTRAVENOUS at 19:44

## 2019-05-12 RX ADMIN — AMIODARONE HYDROCHLORIDE 0.5 MG/MIN: 50 INJECTION, SOLUTION INTRAVENOUS at 04:00

## 2019-05-12 ASSESSMENT — PAIN SCALES - GENERAL
PAINLEVEL_OUTOF10: 0

## 2019-05-12 ASSESSMENT — PAIN - FUNCTIONAL ASSESSMENT
PAIN_FUNCTIONAL_ASSESSMENT: ACTIVITIES ARE NOT PREVENTED
PAIN_FUNCTIONAL_ASSESSMENT: ACTIVITIES ARE NOT PREVENTED

## 2019-05-12 ASSESSMENT — PAIN DESCRIPTION - PROGRESSION
CLINICAL_PROGRESSION: OTHER (COMMENT)
CLINICAL_PROGRESSION: RAPIDLY WORSENING
CLINICAL_PROGRESSION: OTHER (COMMENT)
CLINICAL_PROGRESSION: OTHER (COMMENT)

## 2019-05-12 ASSESSMENT — PAIN DESCRIPTION - LOCATION
LOCATION: BACK

## 2019-05-12 ASSESSMENT — PAIN DESCRIPTION - DESCRIPTORS
DESCRIPTORS: ACHING

## 2019-05-12 ASSESSMENT — PAIN DESCRIPTION - FREQUENCY
FREQUENCY: CONTINUOUS

## 2019-05-12 ASSESSMENT — PAIN DESCRIPTION - PAIN TYPE
TYPE: ACUTE PAIN
TYPE: ACUTE PAIN;SURGICAL PAIN

## 2019-05-12 NOTE — FLOWSHEET NOTE
05/11/19 1811   Provider Notification   Reason for Communication Critical Value (comment); Review case  (21 beat run VT)   Provider Name Dr Becky Garcia   Provider Notification Physician   Method of Communication Secure Message   Response See orders   Notification Time 1811   s/w Dr Becky Garcia re: 21 beat run VT. Pt asymptomatic & resting comfortably. Updated pt has received 25 mg PO toprol as ordered post cath. Order received to repeat that toprol dose and to call Dr David Mcmillan if ectopy continues.

## 2019-05-12 NOTE — PLAN OF CARE
[address]    Second Visit Note  For more detailed information please refer to the progress note of the day      5/12/2019    6:38 PM    Name:   Brenton Martinez  MRN:     [id number]     Yoana:      [de-identified]   Room:   2027/2027-01  IP Day:  1  Admit Date:  5/11/2019  1:22 PM    PCP:   No primary care provider on file. Code Status:  Full Code        Pt vitals were reviewed   New labs were reviewed   Patient was seen    Updated plan :     1. Off o2 and at rest o2 sats 92-93%  2. Feels better  3.  Cont diuresis        Nkechi Centeno Blood, DO  5/12/2019  6:38 PM

## 2019-05-12 NOTE — PROGRESS NOTES
PULMONARY PROGRESS NOTE:    Interval History: Acute Hypoxic resp failure, Pulm edema    Shortness of Breath: better  Cough: no  Sputum: no  Hemoptysis: no  Chest Pain: denies  Fever:   Swelling Feet: no  Headache: no  Nausea, Emesis, Abdominal Pain: no  Diarrhea:   Constipation:    Events since last visit: long runs V tach overnight - started on amiodarone gtt    PAST MEDICAL HISTORY:    Smoking:     PHYSICAL EXAMINATION:  afebrile  Amiodarone gtt  General : Awake, alert, oriented to time, place, and person  Neck - supple, no lymphadenopathy, JVD not raised  Heart - still with occ runs of v tach   Lungs - Air Entry- fair bilaterally; breath sounds : vesicular, 93% on 1.5 l nc  Abdomen - soft, no tenderness  Upper Extremities  - no cyanosis, mottling; edema : absent  Lower Extremities: no cyanosis, mottling; edema : absent    Current Laboratory, Radiologic, Microbiologic, and Diagnostic studies reviewed    ASSESSMENT / PLAN:  Acute hypoxic resp failure -  wean O2 as tolerated  BIPAP PRN  Pulm edema - diuresis   STEMI s/p cath - s/p cath and JOSH to LAD placed 5/11/19  HTN  CXR - mild interstitial edema    Plan of care discussed with Dr Adam Harrison, APRN - CNP     REASON FOR VISIT: Pulm edema, resp failure  Wean O2/ NIPPV prn  I examined the patient myself  The assessment and Plan in the note per my discussion with NP    Electronically signed by Aneta Parra on 05/12/19

## 2019-05-12 NOTE — PLAN OF CARE
Problem: Falls - Risk of:  Goal: Will remain free from falls  Description  Will remain free from falls  Outcome: Ongoing  Goal: Absence of physical injury  Description  Absence of physical injury  Outcome: Ongoing     Problem: Cardiac Output - Decreased:  Goal: Hemodynamic stability will improve  Description  Hemodynamic stability will improve  Outcome: Ongoing

## 2019-05-12 NOTE — PROGRESS NOTES
tenderness/mass/nodules  Lungs: clear to auscultation bilaterally  Heart: regular rate and rhythm, S1, S2 normal, no murmur, click, rub or gallop  Abdomen: soft, non-tender; bowel sounds normal; no masses,  no organomegaly  Extremities: extremities normal, atraumatic, no cyanosis or edema  Neurologic: Mental status: Alert, oriented, thought content appropriate      Cardiac Angiography:   Results for orders placed or performed during the hospital encounter of 05/11/19   Cardiac Catheterization    Procedure Summary      100% proximal LAD stenosis reduced to 0% JOSH. Mild disease of other arteries   Moderately reduced LV systolic function. LVEDP 37 mm Hg      Recommendations      DAPT. Risk factor modifications. BIPAP. IV lasix. Pulmonary consult. Assessment / Acute Cardiac Problems:   Patient Active Problem List:     STEMI (ST elevation myocardial infarction) ([facility] [address]     Essential (primary) hypertension     Morbid obesity with BMI of 50.0-59.9, adult ([facility]     Melena     Flash pulmonary edema (HCC)     NSVT (nonsustained ventricular tachycardia) ([facility])    - Anterolateral STEMI- s/p PCI to 100% LAD with JOSH  - Acute Sys HF, EF 35%  - Frequent NSVT- ? Due to reperfusion, improving with IV amio  - Pulm Edema  - HTN  - Mild MYRA with diuresis  - ? GI bleed/dark stools    Plan of Treatment:   - has no insurance  - will switch to plavix 300 mg po now, then 75 mg qd  - continue amio drip as she is still having NSVT on tele- will try to d/c tomorrow  - continue ASA, lipitor, toprol,  - change lasix to 40mg IV qd  - will add ACEi prior to d/c  - on PPI IV bid- per PCP    D/w patient and nurse    Thank you for allowing me to participate in the care of this patient, please do not hesitate to call if you have any questions. Everitt Holter, DO, Roberta Schilder, [address] [facility]  [facility]. [facility]  [phone]

## 2019-05-12 NOTE — FLOWSHEET NOTE
Patient was several long runs of VT, patient was asymptomatic and BP was stable. Patient denies chest pain or SOB at this time. Will start an Amiodorone gtt as per orders. Will continue too monitor the patient closely.

## 2019-05-12 NOTE — PROGRESS NOTES
Patient does not have insurance and goes to a free clinic on Thursday's if she needs anything. Patient is on a \"BP medication and Water Pill, but does not know the name of them.  has been consulted. Will continue too monitor.

## 2019-05-12 NOTE — PROGRESS NOTES
[facility]    Progress Note    5/12/2019    12:57 PM    Name:   Mildred Perez  MRN:     [id number]     [place]:      [de-identified]   Room:   2027/2027-01  IP Day:  1  Admit Date:  5/11/2019  1:22 PM    PCP:   No primary care provider on file. Code Status:  Full Code    Subjective:     C/C:   Chief Complaint   Patient presents with    Chest Pain    Back Pain     Interval History Status: improved. Off bipap since around 230 am  Denies cp/n/v  Reduced sob    Still having runs of nsvt    Brief History:     The patient is a 60 y.o.   female who presents with Chest Pain and Back Pain   and she is admitted to the hospital for the management of  Stemi. She developed centralized cp with radiation to back along with n/v/diaphoresis. No sob. However, she is now on bipap for pulm edema and is having runs of nsvt        Review of Systems:     Constitutional:  negative for chills, fevers, sweats  Respiratory:  negative for cough wheezing  Cardiovascular:  negative for chest pain, chest pressure/discomfort, palpitations  Gastrointestinal:  negative for abdominal pain, constipation, diarrhea, nausea, vomiting  Neurological:  negative for dizziness, headache    Medications:      Allergies:  No Known Allergies    Current Meds:   Scheduled Meds:    furosemide  40 mg Intravenous Daily    metoprolol succinate  100 mg Oral Daily    [START ON 5/13/2019] clopidogrel  75 mg Oral Daily    sodium chloride flush  10 mL Intravenous 2 times per day    aspirin  81 mg Oral Daily    atorvastatin  80 mg Oral Nightly    pantoprazole  40 mg Intravenous BID    And    sodium chloride (PF)  10 mL Intravenous Daily     Continuous Infusions:    nitroGLYCERIN 10 mcg/min (05/12/19 0600)    amiodarone 0.5 mg/min (05/12/19 0600)     PRN Meds: sodium chloride flush, acetaminophen, magnesium hydroxide, ondansetron **OR** ondansetron, magnesium sulfate, potassium chloride **OR** potassium alternative oral replacement **OR** potassium chloride    Data:     Past Medical History:   has a past medical history of Hypertension. Social History:   reports that she has quit smoking. She has never used smokeless tobacco. She reports that she does not drink alcohol or use drugs. Family History:   Family History   Problem Relation Age of Onset    Elevated Lipids Mother        Vitals:  /60   Pulse 67   Temp 96.8 °F (36 °C) (Temporal)   Resp 19   Ht 5' 3\" (1.6 m)   Wt 294 lb 1 oz (133.4 kg)   SpO2 98%   BMI 52.09 kg/m²   Temp (24hrs), Av.9 °F (36.6 °C), Min:96.8 °F (36 °C), Max:98.5 °F (36.9 °C)    No results for input(s): POCGLU in the last 72 hours. I/O (24Hr):     Intake/Output Summary (Last 24 hours) at 2019 1257  Last data filed at 2019 0600  Gross per 24 hour   Intake 698.55 ml   Output 3800 ml   Net -3101.45 ml       Labs:    Hematology:  Recent Labs     19   WBC 13.4* 13.6*   RBC 4.73 4.54   HGB 13.8 13.3   HCT 43.0 41.4   MCV 90.9 91.2   MCH 29.2 29.3   MCHC 32.1 32.1   RDW 13.2 13.4    309   MPV 10.6 10.6     Chemistry:  Recent Labs     19  1333 19  1716 199    139 139   K 3.7 4.0 4.0    102 101   CO2 21 23 26   GLUCOSE 159* 144* 131*   BUN 21 20 20   CREATININE 0.98* 1.00* 1.15*   MG  --  2.2 2.3   ANIONGAP 16 14 12   LABGLOM 58* 56* 48*   GFRAA >60 >60 58*   CALCIUM 9.0 8.5* 8.4*   TROPHS 17* 1,270*  --    MYOGLOBIN 38  --   --      Recent Labs     19  1333 19   LABA1C 6.0  --    CHOL  --  248*   HDL  --  40*   LDLCHOLESTEROL  --  187*   CHOLHDLRATIO  --  6.2*   TRIG  --  104   VLDL  --  NOT REPORTED         No results found for: SPECIAL  No results found for: CULTURE    No results found for: POCPH, PHART, PH, POCPCO2, PTV8NTQ, PCO2, POCPO2, PO2ART, PO2, POCHCO3, YAJ5KNQ, HCO3, NBEA, PBEA, BEART, BE, THGBART, THB, CMV7RNR, VSYB1FCS, G8EBUCAH, O2SAT, FIO2    Radiology:    Cxr:  Impression   Mild interstitial edema.          Physical Examination:        General appearance:  alert, cooperative and no distress  Mental Status:  oriented to person, place and time and normal affect  Lungs:  crackles bilaterally, normal effort  Heart:  regular rate and rhythm, no murmur  Abdomen:  soft, nontender, nondistended, normal bowel sounds, no masses, hepatomegaly, splenomegaly; obese  Extremities:  no redness, tenderness in the calves; positive edema  Skin:  no gross lesions, rashes, induration    Assessment:        Primary Problem  STEMI (ST elevation myocardial infarction) [facility])    Active Hospital Problems    Diagnosis Date Noted    STEMI (ST elevation myocardial infarction) ([facility] [I21.3] 05/11/2019    Essential (primary) hypertension [I10] 05/11/2019    Morbid obesity with BMI of 50.0-59.9, adult ([facility] [E66.01, Z68.43] 05/11/2019    Melena [K92.1] 05/11/2019    Flash pulmonary edema ([facility] [J81.0] 05/11/2019    NSVT (nonsustained ventricular tachycardia) ([facility]) [I47.2] 05/11/2019       Plan:        Cont amio drip  Cont diuresis  bipap prn-currently off and has good o2 sats    Peña Bourgeois, DO  5/12/2019  12:57 PM

## 2019-05-13 LAB
EKG ATRIAL RATE: 71 BPM
EKG ATRIAL RATE: 82 BPM
EKG ATRIAL RATE: 91 BPM
EKG P AXIS: 50 DEGREES
EKG P AXIS: 55 DEGREES
EKG P AXIS: 56 DEGREES
EKG P-R INTERVAL: 166 MS
EKG P-R INTERVAL: 180 MS
EKG P-R INTERVAL: 184 MS
EKG Q-T INTERVAL: 376 MS
EKG Q-T INTERVAL: 380 MS
EKG Q-T INTERVAL: 432 MS
EKG QRS DURATION: 76 MS
EKG QRS DURATION: 82 MS
EKG QRS DURATION: 94 MS
EKG QTC CALCULATION (BAZETT): 439 MS
EKG QTC CALCULATION (BAZETT): 467 MS
EKG QTC CALCULATION (BAZETT): 469 MS
EKG R AXIS: -9 DEGREES
EKG R AXIS: 9 DEGREES
EKG T AXIS: 47 DEGREES
EKG T AXIS: 48 DEGREES
EKG T AXIS: 57 DEGREES
EKG VENTRICULAR RATE: 71 BPM
EKG VENTRICULAR RATE: 82 BPM
EKG VENTRICULAR RATE: 91 BPM
HCT VFR BLD CALC: 38.7 % (ref 36.3–47.1)
HEMOGLOBIN: 12.3 G/DL (ref 11.9–15.1)
LV EF: 65 %
LVEF MODALITY: NORMAL
MCH RBC QN AUTO: 29.1 PG (ref 25.2–33.5)
MCHC RBC AUTO-ENTMCNC: 31.8 G/DL (ref 28.4–34.8)
MCV RBC AUTO: 91.5 FL (ref 82.6–102.9)
NRBC AUTOMATED: 0 PER 100 WBC
PDW BLD-RTO: 13.6 % (ref 11.8–14.4)
PLATELET # BLD: 255 K/UL (ref 138–453)
PMV BLD AUTO: 11.1 FL (ref 8.1–13.5)
RBC # BLD: 4.23 M/UL (ref 3.95–5.11)
WBC # BLD: 13 K/UL (ref 3.5–11.3)

## 2019-05-13 PROCEDURE — 2060000000 HC ICU INTERMEDIATE R&B

## 2019-05-13 PROCEDURE — C9113 INJ PANTOPRAZOLE SODIUM, VIA: HCPCS | Performed by: INTERNAL MEDICINE

## 2019-05-13 PROCEDURE — 36415 COLL VENOUS BLD VENIPUNCTURE: CPT

## 2019-05-13 PROCEDURE — 93306 TTE W/DOPPLER COMPLETE: CPT

## 2019-05-13 PROCEDURE — 85027 COMPLETE CBC AUTOMATED: CPT

## 2019-05-13 PROCEDURE — 6370000000 HC RX 637 (ALT 250 FOR IP): Performed by: INTERNAL MEDICINE

## 2019-05-13 PROCEDURE — 6360000002 HC RX W HCPCS: Performed by: INTERNAL MEDICINE

## 2019-05-13 PROCEDURE — 93005 ELECTROCARDIOGRAM TRACING: CPT

## 2019-05-13 PROCEDURE — 2580000003 HC RX 258: Performed by: INTERNAL MEDICINE

## 2019-05-13 PROCEDURE — 99232 SBSQ HOSP IP/OBS MODERATE 35: CPT | Performed by: INTERNAL MEDICINE

## 2019-05-13 RX ORDER — FUROSEMIDE 40 MG/1
40 TABLET ORAL DAILY
Status: DISCONTINUED | OUTPATIENT
Start: 2019-05-13 | End: 2019-05-14

## 2019-05-13 RX ORDER — LISINOPRIL 5 MG/1
5 TABLET ORAL DAILY
Status: DISCONTINUED | OUTPATIENT
Start: 2019-05-13 | End: 2019-05-14 | Stop reason: HOSPADM

## 2019-05-13 RX ORDER — PANTOPRAZOLE SODIUM 40 MG/1
40 TABLET, DELAYED RELEASE ORAL 2 TIMES DAILY
Status: DISCONTINUED | OUTPATIENT
Start: 2019-05-13 | End: 2019-05-14 | Stop reason: HOSPADM

## 2019-05-13 RX ORDER — AMIODARONE HYDROCHLORIDE 200 MG/1
200 TABLET ORAL 2 TIMES DAILY
Status: DISCONTINUED | OUTPATIENT
Start: 2019-05-13 | End: 2019-05-14

## 2019-05-13 RX ADMIN — Medication 10 ML: at 08:05

## 2019-05-13 RX ADMIN — ASPIRIN 81 MG: 81 TABLET, COATED ORAL at 08:05

## 2019-05-13 RX ADMIN — METOPROLOL SUCCINATE 100 MG: 50 TABLET, FILM COATED, EXTENDED RELEASE ORAL at 08:04

## 2019-05-13 RX ADMIN — FUROSEMIDE 40 MG: 10 INJECTION, SOLUTION INTRAMUSCULAR; INTRAVENOUS at 08:05

## 2019-05-13 RX ADMIN — LISINOPRIL 5 MG: 5 TABLET ORAL at 10:31

## 2019-05-13 RX ADMIN — CLOPIDOGREL BISULFATE 75 MG: 75 TABLET ORAL at 08:05

## 2019-05-13 RX ADMIN — AMIODARONE HYDROCHLORIDE 200 MG: 200 TABLET ORAL at 10:31

## 2019-05-13 RX ADMIN — ATORVASTATIN CALCIUM 80 MG: 80 TABLET, FILM COATED ORAL at 20:27

## 2019-05-13 RX ADMIN — Medication 10 ML: at 20:28

## 2019-05-13 RX ADMIN — PANTOPRAZOLE SODIUM 40 MG: 40 TABLET, DELAYED RELEASE ORAL at 20:27

## 2019-05-13 RX ADMIN — PANTOPRAZOLE SODIUM 40 MG: 40 INJECTION, POWDER, FOR SOLUTION INTRAVENOUS at 08:05

## 2019-05-13 RX ADMIN — AMIODARONE HYDROCHLORIDE 200 MG: 200 TABLET ORAL at 20:26

## 2019-05-13 RX ADMIN — Medication 10 ML: at 08:07

## 2019-05-13 ASSESSMENT — PAIN DESCRIPTION - PROGRESSION
CLINICAL_PROGRESSION: OTHER (COMMENT)

## 2019-05-13 ASSESSMENT — PAIN SCALES - GENERAL
PAINLEVEL_OUTOF10: 0

## 2019-05-13 NOTE — PROGRESS NOTES
[facility]   Progress Note                   Date:   5/13/2019  Patient name: Delores Thomas  Date of admission:  5/11/2019  1:22 PM  MRN:   [id number]  YOB: 1955  PCP: No primary care provider on file. Reason for Admission:  STEMI    Subjective:       Clinical Changes / Abnormalities: Feels good, no chest pain or SOB. Medications:   Scheduled Meds:   furosemide  40 mg Intravenous Daily    metoprolol succinate  100 mg Oral Daily    clopidogrel  75 mg Oral Daily    sodium chloride flush  10 mL Intravenous 2 times per day    aspirin  81 mg Oral Daily    atorvastatin  80 mg Oral Nightly    pantoprazole  40 mg Intravenous BID    And    sodium chloride (PF)  10 mL Intravenous Daily     Continuous Infusions:   nitroGLYCERIN 10 mcg/min (05/12/19 0600)    amiodarone 0.5 mg/min (05/13/19 0021)     CBC:   Recent Labs     05/11/19  1333 05/12/19  0449 05/13/19  0454   WBC 13.4* 13.6* 13.0*   HGB 13.8 13.3 12.3    309 255     BMP:    Recent Labs     05/11/19  1333 05/11/19  1716 05/12/19  0449    139 139   K 3.7 4.0 4.0    102 101   CO2 21 23 26   BUN 21 20 20   CREATININE 0.98* 1.00* 1.15*   GLUCOSE 159* 144* 131*     Hepatic: No results for input(s): AST, ALT, ALB, BILITOT, ALKPHOS in the last 72 hours. Troponin: No results for input(s): TROPONINI in the last 72 hours. BNP: No results for input(s): BNP in the last 72 hours. Lipids:   Recent Labs     05/12/19  0449   CHOL 248*   HDL 40*     INR: No results for input(s): INR in the last 72 hours. Objective:   Vitals: /75   Pulse 69   Temp 98 °F (36.7 °C) (Temporal)   Resp 25   Ht 5' 3\" (1.6 m)   Wt (!) 300 lb 3.2 oz (136.2 kg)   SpO2 95%   BMI 53.18 kg/m²   General appearance: alert and cooperative with exam  HEENT: Head: Normocephalic, no lesions, without obvious abnormality.   Neck: no adenopathy, no carotid bruit, no JVD, supple, symmetrical, trachea midline and thyroid not enlarged, symmetric, no tenderness/mass/nodules  Lungs: clear to auscultation bilaterally  Heart: regular rate and rhythm, S1, S2 normal, no murmur, click, rub or gallop  Abdomen: soft, non-tender; bowel sounds normal; no masses,  no organomegaly  Extremities: extremities normal, atraumatic, no cyanosis or edema    Cath 5/11/2019  The patient presented with anterior STEMI, referred for emergency PCI. LMCA mild disease  % proximal occlusion reduced to 0% JOSH. LCX small with mild disease  RCA large Dominant with mild disease. LVEF 35% with anteroapical hypokinesia   LVEDP 37 mm Hg. The patient was short of breath with picture of pulmonary edema. Placed on BIPAP with improvement in symptoms and saturation, received IV lasix and feels better. Will continue IV diuresis, close follow on I/O and chart and electrolytes, DAPT. ACEI and BB when blood pressure tolerates. Assessment / Acute Cardiac Problems/Plan   1. STEMI s/p JOSH of LAD. Feels good continue current medications. 2. Short runs of nonsustained VT,Probable reperfusion. Will Change to PO amiodarone. 3. Ischemic Cardiomyopathy with pulmonary edema. Feels much better. Will change to PO lasix and will add Lisinopril. Echo today with possible Life vest on discharge if EF below 35%. Ok to move to step down.        Erick Munguia MD  [facility]  [phone]
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
## 2019-05-13 NOTE — PROGRESS NOTES
ondansetron, magnesium sulfate, potassium chloride **OR** potassium alternative oral replacement **OR** potassium chloride    Data:     Past Medical History:   has a past medical history of Hypertension. Social History:   reports that she has quit smoking. She has never used smokeless tobacco. She reports that she does not drink alcohol or use drugs. Family History:   Family History   Problem Relation Age of Onset    Elevated Lipids Mother        Vitals:  /65   Pulse 65   Temp 97.9 °F (36.6 °C) (Temporal)   Resp 20   Ht 5' 3\" (1.6 m)   Wt (!) 300 lb 3.2 oz (136.2 kg)   SpO2 97%   BMI 53.18 kg/m²   Temp (24hrs), Av.4 °F (36.9 °C), Min:97.9 °F (36.6 °C), Max:98.8 °F (37.1 °C)    No results for input(s): POCGLU in the last 72 hours. I/O (24Hr):     Intake/Output Summary (Last 24 hours) at 2019 0833  Last data filed at 2019 0600  Gross per 24 hour   Intake 431 ml   Output 2075 ml   Net -1644 ml       Labs:    Hematology:  Recent Labs     19  1333 19  0449 19  0454   WBC 13.4* 13.6* 13.0*   RBC 4.73 4.54 4.23   HGB 13.8 13.3 12.3   HCT 43.0 41.4 38.7   MCV 90.9 91.2 91.5   MCH 29.2 29.3 29.1   MCHC 32.1 32.1 31.8   RDW 13.2 13.4 13.6    309 255   MPV 10.6 10.6 11.1     Chemistry:  Recent Labs     19  1333 19  1716 19  0449    139 139   K 3.7 4.0 4.0    102 101   CO2 21 23 26   GLUCOSE 159* 144* 131*   BUN 21 20 20   CREATININE 0.98* 1.00* 1.15*   MG  --  2.2 2.3   ANIONGAP 16 14 12   LABGLOM 58* 56* 48*   GFRAA >60 >60 58*   CALCIUM 9.0 8.5* 8.4*   TROPHS 17* 1,270*  --    MYOGLOBIN 38  --   --      Recent Labs     19  1333 19  0449   LABA1C 6.0  --    CHOL  --  248*   HDL  --  40*   LDLCHOLESTEROL  --  187*   CHOLHDLRATIO  --  6.2*   TRIG  --  104   VLDL  --  NOT REPORTED         No results found for: SPECIAL  No results found for: CULTURE    No results found for: POCPH, PHART, PH, POCPCO2, PKL6KFB, PCO2, POCPO2, PO2ART, PO2, POCHCO3, WJN5TDV, HCO3, NBEA, PBEA, BEART, BE, THGBART, THB, XOV8EFA, POXL3SFR, L3SETJCX, O2SAT, FIO2    Radiology:    No new radiology reports    Physical Examination:        General appearance:  alert, cooperative and no distress  Mental Status:  oriented to person, place and time and normal affect  Lungs:  clear to auscultation bilaterally, normal effort  Heart:  regular rate and rhythm, no murmur  Abdomen:  soft, nontender, nondistended, normal bowel sounds, no masses, hepatomegaly, splenomegaly  Extremities:  no edema, redness, tenderness in the calves  Skin:  no gross lesions, rashes, induration    Assessment:        Primary Problem  STEMI (ST elevation myocardial infarction) [facility])    Active Hospital Problems    Diagnosis Date Noted    STEMI (ST elevation myocardial infarction) ([facility] [I21.3] 05/11/2019    Essential (primary) hypertension [I10] 05/11/2019    Morbid obesity with BMI of 50.0-59.9, adult ([facility] [E66.01, Z68.43] 05/11/2019    Melena [K92.1] 05/11/2019    Flash pulmonary edema ([facility] [J81.0] 05/11/2019    NSVT (nonsustained ventricular tachycardia) ([facility] [I47.2] 05/11/2019       Plan:        Amiodarone drip per cardiology  Continue Plavix as ordered  Statin and beta blocker as ordered  GI and DVT prophylaxis  Increase activity  Remove Bae  Diuretics as ordered, follow labs    Alicia Benito DO  5/13/2019  8:33 AM

## 2019-05-13 NOTE — PROGRESS NOTES
Pulmonary Critical Care Progress Note    Patient seen for the follow up of STEMI (ST elevation myocardial infarction) ([address]     Subjective:    She denies chest pain. Mild occasional cough, mostly dry. Shortness of breath improved. She has good appetite      Examination:    Vitals: /68   Pulse 71   Temp 97.2 °F (36.2 °C) (Temporal)   Resp 23   Ht 5' 3\" (1.6 m)   Wt (!) 300 lb 3.2 oz (136.2 kg)   SpO2 91%   BMI 53.18 kg/m²   SpO2  Av.9 %  Min: 86 %  Max: 99 %  General appearance: alert and cooperative with exam  Neck: No JVD  Lungs: clear to auscultation bilaterally and diminished breath sounds bibasilar mil dcrackles  Heart: regular rate and rhythm, S1, S2 normal, no gallop  Abdomen: Soft, non tender, + BS  Extremities: no cyanosis or clubbing. 1 + edema    LABs:    CBC:   Recent Labs     19  0449 19  0454   WBC 13.6* 13.0*   HGB 13.3 12.3   HCT 41.4 38.7    255     BMP:   Recent Labs     19  1716 19  0449    139   K 4.0 4.0   CO2 23 26   BUN 20 20   CREATININE 1.00* 1.15*   LABGLOM 56* 48*   GLUCOSE 144* 131*     Radiology:    CXR   Mild interstitial edema.         Impression:  · Acute respiratory failure  · Pulmonary edema  · TASHA  · STEMI s/p Cath     Recommendations:  · O2 NC  · Home O2 evaluation   · Duoneb   · BIPAP PRN and for sleep  · Lasix PO   · Ouptatient sleep evaluation  · DVT prophylaxis    Nicole Wilkinson MD, [facility]  Pulmonary Critical Care and Sleep Medicine,  [facility]  Cell: [phone]  Office: [phone]

## 2019-05-13 NOTE — PROGRESS NOTES
PROTONIX 40 MG IV BID  changed from IV to PO per [facility] approved policy. Basic Criteria (please refer to hospital policy for details):  1. functioning GI tract  2. tolerating PO/NG routine medications  2. Antibiotics: Received at least 24 hours of IV, clinical stabilization, afebrile, normalizing WBC)    Thank you.   Yasmin Palmer 5/13/2019 1:46 PM

## 2019-05-13 NOTE — PLAN OF CARE
Problem: Falls - Risk of:  Goal: Will remain free from falls  Description  Will remain free from falls  5/13/2019 0112 by Kunal Henry RN  Outcome: Ongoing  5/12/2019 2136 by Pricilla Abrams RN  Outcome: Ongoing  Goal: Absence of physical injury  Description  Absence of physical injury  5/13/2019 0112 by Kunal Henry RN  Outcome: Ongoing  5/12/2019 2136 by Pricilla Abrams RN  Outcome: Ongoing     Problem: Cardiac Output - Decreased:  Goal: Hemodynamic stability will improve  Description  Hemodynamic stability will improve  5/13/2019 0112 by Kunal Henry RN  Outcome: Ongoing  5/12/2019 2136 by Pricilla Abrams RN  Outcome: Ongoing

## 2019-05-14 VITALS
BODY MASS INDEX: 51.91 KG/M2 | TEMPERATURE: 98.4 F | WEIGHT: 293 LBS | DIASTOLIC BLOOD PRESSURE: 51 MMHG | SYSTOLIC BLOOD PRESSURE: 107 MMHG | HEART RATE: 68 BPM | OXYGEN SATURATION: 96 % | HEIGHT: 63 IN | RESPIRATION RATE: 16 BRPM

## 2019-05-14 LAB
HCT VFR BLD CALC: 38.1 % (ref 36.3–47.1)
HEMOGLOBIN: 12.2 G/DL (ref 11.9–15.1)
MCH RBC QN AUTO: 29.4 PG (ref 25.2–33.5)
MCHC RBC AUTO-ENTMCNC: 32 G/DL (ref 28.4–34.8)
MCV RBC AUTO: 91.8 FL (ref 82.6–102.9)
NRBC AUTOMATED: 0 PER 100 WBC
PDW BLD-RTO: 13.5 % (ref 11.8–14.4)
PLATELET # BLD: 235 K/UL (ref 138–453)
PMV BLD AUTO: 10.7 FL (ref 8.1–13.5)
RBC # BLD: 4.15 M/UL (ref 3.95–5.11)
WBC # BLD: 12.6 K/UL (ref 3.5–11.3)

## 2019-05-14 PROCEDURE — 99232 SBSQ HOSP IP/OBS MODERATE 35: CPT | Performed by: INTERNAL MEDICINE

## 2019-05-14 PROCEDURE — 85027 COMPLETE CBC AUTOMATED: CPT

## 2019-05-14 PROCEDURE — 36415 COLL VENOUS BLD VENIPUNCTURE: CPT

## 2019-05-14 PROCEDURE — 2709999900 HC NON-CHARGEABLE SUPPLY

## 2019-05-14 PROCEDURE — 94618 PULMONARY STRESS TESTING: CPT

## 2019-05-14 PROCEDURE — C1725 CATH, TRANSLUMIN NON-LASER: HCPCS

## 2019-05-14 PROCEDURE — C1874 STENT, COATED/COV W/DEL SYS: HCPCS

## 2019-05-14 PROCEDURE — 6370000000 HC RX 637 (ALT 250 FOR IP): Performed by: INTERNAL MEDICINE

## 2019-05-14 PROCEDURE — C1769 GUIDE WIRE: HCPCS

## 2019-05-14 PROCEDURE — C1894 INTRO/SHEATH, NON-LASER: HCPCS

## 2019-05-14 PROCEDURE — C1887 CATHETER, GUIDING: HCPCS

## 2019-05-14 PROCEDURE — 2580000003 HC RX 258: Performed by: INTERNAL MEDICINE

## 2019-05-14 RX ORDER — LISINOPRIL 5 MG/1
5 TABLET ORAL DAILY
Qty: 30 TABLET | Refills: 3 | Status: SHIPPED | OUTPATIENT
Start: 2019-05-15 | End: 2019-10-28 | Stop reason: ALTCHOICE

## 2019-05-14 RX ORDER — FUROSEMIDE 20 MG/1
20 TABLET ORAL DAILY
Qty: 60 TABLET | Refills: 3 | Status: SHIPPED | OUTPATIENT
Start: 2019-05-15 | End: 2022-04-19

## 2019-05-14 RX ORDER — FUROSEMIDE 40 MG/1
40 TABLET ORAL DAILY
Qty: 60 TABLET | Refills: 3 | Status: SHIPPED | OUTPATIENT
Start: 2019-05-15 | End: 2019-05-14 | Stop reason: HOSPADM

## 2019-05-14 RX ORDER — AMIODARONE HYDROCHLORIDE 200 MG/1
200 TABLET ORAL 2 TIMES DAILY
Qty: 30 TABLET | Refills: 3 | Status: SHIPPED | OUTPATIENT
Start: 2019-05-14 | End: 2019-05-14 | Stop reason: HOSPADM

## 2019-05-14 RX ORDER — PANTOPRAZOLE SODIUM 40 MG/1
40 TABLET, DELAYED RELEASE ORAL 2 TIMES DAILY
Qty: 30 TABLET | Refills: 3 | Status: ON HOLD | OUTPATIENT
Start: 2019-05-14 | End: 2021-12-25 | Stop reason: SDUPTHER

## 2019-05-14 RX ORDER — AMIODARONE HYDROCHLORIDE 200 MG/1
200 TABLET ORAL DAILY
Qty: 30 TABLET | Refills: 3 | Status: SHIPPED | OUTPATIENT
Start: 2019-05-15 | End: 2021-05-09

## 2019-05-14 RX ORDER — METOPROLOL SUCCINATE 100 MG/1
100 TABLET, EXTENDED RELEASE ORAL DAILY
Qty: 30 TABLET | Refills: 3 | Status: SHIPPED | OUTPATIENT
Start: 2019-05-15

## 2019-05-14 RX ORDER — AMIODARONE HYDROCHLORIDE 200 MG/1
200 TABLET ORAL DAILY
Status: DISCONTINUED | OUTPATIENT
Start: 2019-05-15 | End: 2019-05-14 | Stop reason: HOSPADM

## 2019-05-14 RX ORDER — ASPIRIN 81 MG/1
81 TABLET ORAL DAILY
Qty: 30 TABLET | Refills: 3 | Status: SHIPPED | OUTPATIENT
Start: 2019-05-15

## 2019-05-14 RX ORDER — ATORVASTATIN CALCIUM 80 MG/1
80 TABLET, FILM COATED ORAL NIGHTLY
Qty: 30 TABLET | Refills: 3 | Status: SHIPPED | OUTPATIENT
Start: 2019-05-14

## 2019-05-14 RX ORDER — FUROSEMIDE 20 MG/1
20 TABLET ORAL DAILY
Status: DISCONTINUED | OUTPATIENT
Start: 2019-05-15 | End: 2019-05-14 | Stop reason: HOSPADM

## 2019-05-14 RX ORDER — CLOPIDOGREL BISULFATE 75 MG/1
75 TABLET ORAL DAILY
Qty: 30 TABLET | Refills: 3 | Status: ON HOLD | OUTPATIENT
Start: 2019-05-15 | End: 2021-12-25 | Stop reason: HOSPADM

## 2019-05-14 RX ADMIN — METOPROLOL SUCCINATE 100 MG: 50 TABLET, FILM COATED, EXTENDED RELEASE ORAL at 09:06

## 2019-05-14 RX ADMIN — LISINOPRIL 5 MG: 5 TABLET ORAL at 09:06

## 2019-05-14 RX ADMIN — AMIODARONE HYDROCHLORIDE 200 MG: 200 TABLET ORAL at 09:06

## 2019-05-14 RX ADMIN — ASPIRIN 81 MG: 81 TABLET, COATED ORAL at 09:06

## 2019-05-14 RX ADMIN — PANTOPRAZOLE SODIUM 40 MG: 40 TABLET, DELAYED RELEASE ORAL at 09:07

## 2019-05-14 RX ADMIN — FUROSEMIDE 40 MG: 40 TABLET ORAL at 09:07

## 2019-05-14 RX ADMIN — CLOPIDOGREL BISULFATE 75 MG: 75 TABLET ORAL at 09:06

## 2019-05-14 RX ADMIN — Medication 10 ML: at 09:09

## 2019-05-14 ASSESSMENT — PAIN SCALES - GENERAL
PAINLEVEL_OUTOF10: 0

## 2019-05-14 NOTE — PROGRESS NOTES
Pulmonary Critical Care Progress Note    Patient seen for the follow up of STEMI (ST elevation myocardial infarction) ([address]     Subjective:    She denies chest pain. Mild occasional cough, mostly dry. Shortness of breath improved. She has good appetite  . She is on RA. She passed O2 evaluation on ambulation     Examination:    Vitals: BP (!) 107/51   Pulse 68   Temp 98.4 °F (36.9 °C) (Temporal)   Resp 16   Ht 5' 3\" (1.6 m)   Wt 299 lb 11.2 oz (135.9 kg)   SpO2 96%   BMI 53.09 kg/m²   SpO2  Av.3 %  Min: 92 %  Max: 99 %  General appearance: alert and cooperative with exam  Neck: No JVD  Lungs: clear to auscultation bilaterally and diminished breath sounds bibasilar no crackles  Heart: regular rate and rhythm, S1, S2 normal, no gallop  Abdomen: Soft, non tender, + BS  Extremities: no cyanosis or clubbing. 1 + edema    LABs:    CBC:   Recent Labs     19  0454 19  0411   WBC 13.0* 12.6*   HGB 12.3 12.2   HCT 38.7 38.1    235     BMP:   Recent Labs     19  1716 19  0449    139   K 4.0 4.0   CO2 23 26   BUN 20 20   CREATININE 1.00* 1.15*   LABGLOM 56* 48*   GLUCOSE 144* 131*     Radiology:    CXR   Mild interstitial edema.         Impression:    · S/p Acute respiratory failure  · S/p Pulmonary edema  · TASHA  · STEMI s/p Cath     Recommendations:    · Discontinue O2 NC  · Duoneb   · BIPAP PRN and for sleep  · Lasix PO   · Ouptatient sleep evaluation  · Ok to discharge follow up outpatient     Henrik Arroyo MD, [facility]  Pulmonary Critical Care and Sleep Medicine,  [facility]  Cell: [phone]  Office: [phone]

## 2019-05-14 NOTE — PLAN OF CARE
Problem: Falls - Risk of:  Goal: Will remain free from falls  Description  Will remain free from falls  Outcome: Ongoing     Problem: Falls - Risk of:  Goal: Absence of physical injury  Description  Absence of physical injury  Outcome: Ongoing     Problem: Cardiac Output - Decreased:  Goal: Hemodynamic stability will improve  Description  Hemodynamic stability will improve  Outcome: Ongoing

## 2019-05-14 NOTE — CARE COORDINATION
New PCP appt scheduled with Dr. Kerrie Barry 5/20 at 12:20 pm.  Pt informed and added to dc instructions.
rehab.   Order entered for above.            Electronically signed by Bob Higgins RN on 5/13/19 at 5:25 PM

## 2019-05-14 NOTE — PROGRESS NOTES
Patient given discharge instructions verbal and written. Red folder includes discharge instructions with new medications and side effect information. Patient has all new medications per OP pharmacy meds to beds program. Patient verbalizes understanding. Patient discharged with all personal belongings with sister to home. See discharge event for time of discharge.

## 2019-05-14 NOTE — PROGRESS NOTES
[facility]   Progress Note                   Date:   5/14/2019  Patient name: Yulisa Cote  Date of admission:  5/11/2019  1:22 PM  MRN:   [id number]  YOB: 1955  PCP: No primary care provider on file. Reason for Admission: Anterolateral STEMI     Subjective:       Clinical Changes / Abnormalities: feeling much better, No cp, Breathing much improved. Tolerating ambulation. Medications:   Scheduled Meds:   amiodarone  200 mg Oral BID    furosemide  40 mg Oral Daily    lisinopril  5 mg Oral Daily    pantoprazole  40 mg Oral BID    metoprolol succinate  100 mg Oral Daily    clopidogrel  75 mg Oral Daily    sodium chloride flush  10 mL Intravenous 2 times per day    aspirin  81 mg Oral Daily    atorvastatin  80 mg Oral Nightly     Continuous Infusions:   nitroGLYCERIN 10 mcg/min (05/12/19 0600)     CBC:   Recent Labs     05/12/19  0449 05/13/19  0454 05/14/19  0411   WBC 13.6* 13.0* 12.6*   HGB 13.3 12.3 12.2    255 235     BMP:    Recent Labs     05/11/19  1333 05/11/19  1716 05/12/19  0449    139 139   K 3.7 4.0 4.0    102 101   CO2 21 23 26   BUN 21 20 20   CREATININE 0.98* 1.00* 1.15*   GLUCOSE 159* 144* 131*     Hepatic: No results for input(s): AST, ALT, ALB, BILITOT, ALKPHOS in the last 72 hours. Troponin:   Recent Labs     05/11/19  1333 05/11/19  1716   TROPHS 17* 1,270*     BNP: No results for input(s): BNP in the last 72 hours. Lipids:   Recent Labs     05/12/19  0449   CHOL 248*   HDL 40*     INR: No results for input(s): INR in the last 72 hours. Objective:   Vitals: BP (!) 110/59   Pulse 60   Temp 97.7 °F (36.5 °C) (Temporal)   Resp 16   Ht 5' 3\" (1.6 m)   Wt 299 lb 11.2 oz (135.9 kg)   SpO2 96%   BMI 53.09 kg/m²   General appearance: alert and cooperative with exam  HEENT: Head: Normocephalic, no lesions, without obvious abnormality.   Neck: no adenopathy, no carotid bruit, no JVD, supple, symmetrical, trachea midline and thyroid not enlarged, symmetric, no tenderness/mass/nodules  Lungs: clear to auscultation bilaterally  Heart: regular rate and rhythm, S1, S2 normal, no murmur, click, rub or gallop  Abdomen: soft, non-tender; bowel sounds normal; no masses,  no organomegaly  Extremities: extremities normal, atraumatic, no cyanosis or edema  Neurologic: Mental status: Alert, oriented, thought content appropriate      Cardiac Angiography:   Results for orders placed or performed during the hospital encounter of 05/11/19   Cardiac Catheterization    Procedure Summary      100% proximal LAD stenosis reduced to 0% JOSH. Mild disease of other arteries   Moderately reduced LV systolic function. LVEDP 37 mm Hg      Recommendations      DAPT. Risk factor modifications. BIPAP. IV lasix. Pulmonary consult. Echocardiogram 5/13/2019  Left ventricle is normal in size. Moderate left ventricular hypertrophy. Global left ventricular systolic function is hyperdynamic with an estimated  ejection fraction of > 65 % . No obvious wall motion abnormality seen. No significant valvular regurgitation or stenosis seen. No significant pericardial effusion is seen. Assessment / Acute Cardiac Problems:   Patient Active Problem List:     STEMI (ST elevation myocardial infarction) ([facility]     Essential (primary) hypertension     Morbid obesity with BMI of 50.0-59.9, adult ([facility]     Melena     Flash pulmonary edema (HCC)     NSVT (nonsustained ventricular tachycardia) ([facility])    - Anterolateral STEMI- s/p PCI to 100% LAD with JOSH  - Acute Sys HF, EF 35% on cath, echo shows hyperdynamic LV function with EF > 65%   - Frequent NSVT, likely reperfusion, improved with amiodarone   - HTN  - Mild MYRA with diuresis      Plan of Treatment:   Cont asa, plavix, lipitor, toprol and lisinopril  Decrease amio to 200 mg qd  Ok to d/c home today with OP follow up in 2-3 weeks.  Patient was counseled in detail regarding importance of medication adherence, diet, routine exercise and symptoms of CHF.      Kartik Esqueda MD   [facility]

## 2019-05-14 NOTE — PROGRESS NOTES
[facility]    Progress Note    5/14/2019    9:58 AM    Name:   Rufus Cleaning  MRN:     [id number]     [place]:      [de-identified]   Room:   2032/2032-01   Day:  3  Admit Date:  5/11/2019  1:22 PM    PCP:   No primary care provider on file. Code Status:  Full Code    Subjective:     C/C:   Chief Complaint   Patient presents with    Chest Pain    Back Pain     Interval History Status: improved. Patient resting comfortably. Denies any chest pain, shortness of breath, nausea or vomiting. Off of all drips and anxious for discharge. Brief History: This is a 70-year-old white female who presents with a complaint of chest and back pain. She is found have evidence of a STEMI and underwent emergent heart catheterization with stent to the LAD. She's been in the cardiac unit for postprocedure care, remains on amiodarone drips due to refractory V. tach    Review of Systems:     Constitutional:  negative for chills, fevers, sweats  Respiratory:  negative for cough, dyspnea on exertion, shortness of breath, wheezing  Cardiovascular:  negative for chest pain, chest pressure/discomfort, lower extremity edema, palpitations  Gastrointestinal:  negative for abdominal pain, constipation, diarrhea, nausea, vomiting  Neurological:  negative for dizziness, headache    Medications:      Allergies:  No Known Allergies    Current Meds:   Scheduled Meds:    amiodarone  200 mg Oral BID    furosemide  40 mg Oral Daily    lisinopril  5 mg Oral Daily    pantoprazole  40 mg Oral BID    metoprolol succinate  100 mg Oral Daily    clopidogrel  75 mg Oral Daily    sodium chloride flush  10 mL Intravenous 2 times per day    aspirin  81 mg Oral Daily    atorvastatin  80 mg Oral Nightly     Continuous Infusions:    nitroGLYCERIN 10 mcg/min (05/12/19 0600)     PRN Meds: sodium chloride flush, acetaminophen, magnesium hydroxide, ondansetron **OR** ondansetron, magnesium sulfate, potassium chloride **OR** potassium alternative oral replacement **OR** potassium chloride    Data:     Past Medical History:   has a past medical history of Hypertension. Social History:   reports that she has quit smoking. She has never used smokeless tobacco. She reports that she does not drink alcohol or use drugs. Family History:   Family History   Problem Relation Age of Onset    Elevated Lipids Mother        Vitals:  BP (!) 110/59   Pulse 60   Temp 97.7 °F (36.5 °C) (Temporal)   Resp 16   Ht 5' 3\" (1.6 m)   Wt 299 lb 11.2 oz (135.9 kg)   SpO2 96%   BMI 53.09 kg/m²   Temp (24hrs), Av.7 °F (36.5 °C), Min:97.2 °F (36.2 °C), Max:98.3 °F (36.8 °C)    No results for input(s): POCGLU in the last 72 hours. I/O (24Hr):     Intake/Output Summary (Last 24 hours) at 2019 09  Last data filed at 2019  Gross per 24 hour   Intake 20 ml   Output 1475 ml   Net -1455 ml       Labs:    Hematology:  Recent Labs     19  0449 19  0454 19  0411   WBC 13.6* 13.0* 12.6*   RBC 4.54 4.23 4.15   HGB 13.3 12.3 12.2   HCT 41.4 38.7 38.1   MCV 91.2 91.5 91.8   MCH 29.3 29.1 29.4   MCHC 32.1 31.8 32.0   RDW 13.4 13.6 13.5    255 235   MPV 10.6 11.1 10.7     Chemistry:  Recent Labs     19  1333 19  1716 19  0449    139 139   K 3.7 4.0 4.0    102 101   CO2 21 23 26   GLUCOSE 159* 144* 131*   BUN 21 20 20   CREATININE 0.98* 1.00* 1.15*   MG  --  2.2 2.3   ANIONGAP 16 14 12   LABGLOM 58* 56* 48*   GFRAA >60 >60 58*   CALCIUM 9.0 8.5* 8.4*   TROPHS 17* 1,270*  --    MYOGLOBIN 38  --   --      Recent Labs     19  1333 19  0449   LABA1C 6.0  --    CHOL  --  248*   HDL  --  40*   LDLCHOLESTEROL  --  187*   CHOLHDLRATIO  --  6.2*   TRIG  --  104   VLDL  --  NOT REPORTED         No results found for: SPECIAL  No results found for: CULTURE    No results found for: POCPH, PHART, PH, POCPCO2, ZLP9CWJ, PCO2, POCPO2, PO2ART, PO2, POCHCO3, CUB4YNF, HCO3, NBEA, PBEA, BEART, BE, THGBART, THB, CUP8FNY, PXQS4FCI, V9RGTJBS, O2SAT, FIO2    Radiology:    No new radiology report    Physical Examination:        General appearance:  alert, cooperative and no distress  Mental Status:  oriented to person, place and time and normal affect  Lungs:  clear to auscultation bilaterally, normal effort  Heart:  regular rate and rhythm, no murmur  Abdomen:  soft, nontender, nondistended, normal bowel sounds, no masses, hepatomegaly, splenomegaly  Extremities:  no edema, redness, tenderness in the calves  Skin:  no gross lesions, rashes, induration    Assessment:        Primary Problem  STEMI (ST elevation myocardial infarction) [facility])    Active Hospital Problems    Diagnosis Date Noted    [facility] [I21.3] 05/11/2019    Essential (primary) hypertension [I10] 05/11/2019    Morbid obesity with BMI of 50.0-59.9, adult ([facility] [E66.01, Z68.43] 05/11/2019    Melena [K92.1] 05/11/2019    Flash pulmonary edema ([facility] [J81.0] 05/11/2019    NSVT (nonsustained ventricular tachycardia) ([facility] [I47.2] 05/11/2019       Plan:        Continue present cardiac medications  Monitor and control blood pressure  Activity as tolerated  GI and DVT prophylaxis  Discharge home  Outpatient cardiac follow-up    Yissel Marquis DO  5/14/2019  9:58 AM

## 2019-05-14 NOTE — PROGRESS NOTES
CLINICAL PHARMACY NOTE: MEDS TO [address] Select Patient?: No  Total # of Prescriptions Filled: 8   The following medications were delivered to the patient:  · METOPROLOL ER 100MG  · CLOPIDOGREL 75MG  · ASPIRIN 81MG  · LISINOPRIL 5MG  · ATORVASTATIN 80MG  · AMIODARONE 200MG  · PANTOPRAZOLE 40MG  · FUROSEMIDE 40MG  Total # of Interventions Completed: 2  Time Spent (min): 45    Additional Documentation:    PT IS PART OF THE CATRACHITO PROGRAM. NO ISSUES FILLING MEDICATIONS.  TOTAL CO-PAY CAME OUT TO $40. PT PAID WITH CARD

## 2019-05-14 NOTE — PROGRESS NOTES
Dr. Mac Meyer rounded, updated him that patient does not qualify for home O2 per home O2 eli PANDA states Ok to discharge from pulmonary standpoint.

## 2019-05-14 NOTE — FLOWSHEET NOTE
05/14/19 1030   Provider Notification   Reason for Communication Review case   Provider Name Dr. Lefty Barber   Provider Notification Physician   Method of Communication Face to face   Response See orders   Notification Time 21    MD rounded OK to discharge from cardiology standpoint.  Med rec completed per MD.

## 2019-05-14 NOTE — PROGRESS NOTES
Home Oxygen Evaluation    Home Oxygen Evaluation completed. Resting SpO2 on room air = 95%    SpO2 on room air with exercise = 95%      Nocturnal Oximetry with patient on room air is recommended is SpO2 is between 89% and 95% (requires additional order).     Aishwarya Menon  2:20 PM

## 2019-05-14 NOTE — DISCHARGE SUMMARY
[facility]    Discharge Summary     Patient ID: Yulisa Cote  :  1955   MRN: [id number]     ACCOUNT:  [de-identified]   Patient's PCP: No primary care provider on file. Admit Date: 2019   Discharge Date: 2019     Length of Stay: 3  Code Status:  Full Code  Admitting Physician: Whit Peralta MD  Discharge Physician: Corby Gibson DO     Active Discharge Diagnoses:     Hospital Problem Lists:  Principal Problem:    STEMI (ST elevation myocardial infarction) ([facility]  Active Problems:    Essential (primary) hypertension    Morbid obesity with BMI of 50.0-59.9, adult ([facility]    Melena    Flash pulmonary edema (HCC)    NSVT (nonsustained ventricular tachycardia) ([facility]  Resolved Problems:    * No resolved hospital problems. *      Admission Condition:  fair     Discharged Condition: stable    Hospital Stay:     Hospital Course:  Yulisa Cote is a 60 y.o. female who was admitted for the management of   STEMI (ST elevation myocardial infarction) ([facility] , presented to ER with Chest Pain and Back Pain    This is a 60-year-old white female who presents with a complaint of chest pain as found have findings of a STEMI. She was seen urgently a heart cath and underwent stenting to the LAD. Post procedurally she had fasting ventricular tachycardia was placed on an amiodarone drip. Ultimately she was transitioned to oral amiodarone with control of her heart rate. She was started on statin therapy, Plavix, aspirin, metoprolol and lisinopril for her heart disease.       Significant therapeutic interventions: As above    Significant Diagnostic Studies:   Labs / Micro:  CBC:   Lab Results   Component Value Date    WBC 12.6 2019    RBC 4.15 2019    HGB 12.2 2019    HCT 38.1 2019    MCV 91.8 2019    MCH 29.4 2019    MCHC 32.0 2019    RDW 13.5 2019     2019     BMP:    Lab Results   Component Value Date    GLUCOSE 131 05/12/2019     05/12/2019    K 4.0 05/12/2019     05/12/2019    CO2 26 05/12/2019    ANIONGAP 12 05/12/2019    BUN 20 05/12/2019    CREATININE 1.15 05/12/2019    BUNCRER 17 05/12/2019    CALCIUM 8.4 05/12/2019    LABGLOM 48 05/12/2019    GFRAA 58 05/12/2019    GFR      05/12/2019    GFR NOT REPORTED 05/12/2019          Radiology:  Xr Chest Portable    Result Date: 5/12/2019  Mild interstitial edema. Xr Chest Portable    Result Date: 5/11/2019  No acute cardiopulmonary process. Ct Chest Abdomen Pelvis Wo Contrast    Result Date: 5/11/2019  1. Bilateral interstitial and ground-glass opacities in the lungs are noted. This may represent edema however an inflammatory process or atypical infection are also considerations. 2.  No acute abnormality identified in the abdomen or pelvis. Cholelithiasis. 3.  8.0 mm solid pulmonary nodule. Recommend a non-contrast Chest CT at 6-12 months, then consider an additional non-contrast Chest CT at 18-24 months. Consultations:    Consults:     Final Specialist Recommendations/Findings:   IP CONSULT TO INTERNAL MEDICINE  IP CONSULT TO CARDIOLOGY  IP CONSULT TO CRITICAL CARE  IP CONSULT TO CARDIAC REHAB  IP CONSULT TO CARDIOLOGY  IP CONSULT TO SOCIAL WORK  IP CONSULT TO SOCIAL WORK      The patient was seen and examined on day of discharge and this discharge summary is in conjunction with any daily progress note from day of discharge.     Discharge plan:     Disposition: Home    Physician Follow Up:   [facility]  [place] [address]  [phone]  Schedule an appointment as soon as possible for a visit  Hospital follow up    Robert Hopson MD  [address] [facility] [phone]    Go on 5/30/2019  Hospital follow up at 1:00pm       Requiring Further Evaluation/Follow Up POST HOSPITALIZATION/Incidental Findings: Outpatient sleep study at discretion of pulmonary    Diet: cardiac diet    Activity: As tolerated    Instructions to Patient: Take medications as prescribed. Follow up with cardiology as directed on May 30. Establish with a primary care provider choice in the next 30 days    Discharge Medications:      Medication List      START taking these medications    amiodarone 200 MG tablet  Commonly known as:  CORDARONE  Take 1 tablet by mouth 2 times daily     aspirin 81 MG EC tablet  Take 1 tablet by mouth daily  Start taking on:  5/15/2019     atorvastatin 80 MG tablet  Commonly known as:  LIPITOR  Take 1 tablet by mouth nightly     clopidogrel 75 MG tablet  Commonly known as:  PLAVIX  Take 1 tablet by mouth daily  Start taking on:  5/15/2019     furosemide 40 MG tablet  Commonly known as:  LASIX  Take 1 tablet by mouth daily  Start taking on:  5/15/2019     lisinopril 5 MG tablet  Commonly known as:  PRINIVIL;ZESTRIL  Take 1 tablet by mouth daily  Start taking on:  5/15/2019     metoprolol succinate 100 MG extended release tablet  Commonly known as:  TOPROL XL  Take 1 tablet by mouth daily  Start taking on:  5/15/2019     pantoprazole 40 MG tablet  Commonly known as:  PROTONIX  Take 1 tablet by mouth 2 times daily        STOP taking these medications    ZANTAC 150 MG tablet  Generic drug:  ranitidine           Where to Get Your Medications      Information about where to get these medications is not yet available    Ask your nurse or doctor about these medications  amiodarone 200 MG tablet  aspirin 81 MG EC tablet  atorvastatin 80 MG tablet  clopidogrel 75 MG tablet  furosemide 40 MG tablet  lisinopril 5 MG tablet  metoprolol succinate 100 MG extended release tablet  pantoprazole 40 MG tablet         Time Spent on discharge is  28 minutes in patient examination, evaluation, counseling as well as medication reconciliation, prescriptions for required medications, discharge plan and follow up.     Electronically signed by   Kavin Ma DO  5/14/2019  10:21 AM      Thank you Dr. Haritha Huggins primary care provider on file. for the opportunity to be involved in this patient's care.

## 2019-05-15 NOTE — PROGRESS NOTES
A referral to cardiac rehab has been sent with patient information.  All pertinent patient information has been sent to selected cardiac rehab program. Patient informed about the cardiac rehab program, and that they will be contacted by the referred program.

## 2019-05-22 ENCOUNTER — OFFICE VISIT (OUTPATIENT)
Dept: PRIMARY CARE CLINIC | Age: 64
End: 2019-05-22
Payer: MEDICAID

## 2019-05-22 VITALS
DIASTOLIC BLOOD PRESSURE: 70 MMHG | OXYGEN SATURATION: 98 % | WEIGHT: 291 LBS | HEART RATE: 55 BPM | HEIGHT: 64 IN | BODY MASS INDEX: 49.68 KG/M2 | SYSTOLIC BLOOD PRESSURE: 120 MMHG

## 2019-05-22 DIAGNOSIS — R73.03 PREDIABETES: ICD-10-CM

## 2019-05-22 DIAGNOSIS — E66.01 CLASS 3 SEVERE OBESITY WITH SERIOUS COMORBIDITY AND BODY MASS INDEX (BMI) OF 45.0 TO 49.9 IN ADULT, UNSPECIFIED OBESITY TYPE (HCC): ICD-10-CM

## 2019-05-22 DIAGNOSIS — Z95.820 S/P ANGIOPLASTY WITH STENT: ICD-10-CM

## 2019-05-22 DIAGNOSIS — I10 ESSENTIAL (PRIMARY) HYPERTENSION: ICD-10-CM

## 2019-05-22 DIAGNOSIS — Z76.89 ENCOUNTER TO ESTABLISH CARE WITH NEW DOCTOR: Primary | ICD-10-CM

## 2019-05-22 PROCEDURE — 99203 OFFICE O/P NEW LOW 30 MIN: CPT | Performed by: INTERNAL MEDICINE

## 2019-05-22 ASSESSMENT — PATIENT HEALTH QUESTIONNAIRE - PHQ9
SUM OF ALL RESPONSES TO PHQ QUESTIONS 1-9: 1
1. LITTLE INTEREST OR PLEASURE IN DOING THINGS: 0
SUM OF ALL RESPONSES TO PHQ9 QUESTIONS 1 & 2: 1
SUM OF ALL RESPONSES TO PHQ QUESTIONS 1-9: 1
2. FEELING DOWN, DEPRESSED OR HOPELESS: 1

## 2019-05-22 ASSESSMENT — ENCOUNTER SYMPTOMS
NAUSEA: 0
CONSTIPATION: 0
SHORTNESS OF BREATH: 0
ABDOMINAL PAIN: 0
WHEEZING: 0
COUGH: 0
VOMITING: 0
SINUS PRESSURE: 0
BACK PAIN: 0
SINUS PAIN: 0
DIARRHEA: 0
ABDOMINAL DISTENTION: 0

## 2019-05-22 NOTE — PROGRESS NOTES
[facility]  [address] [place] [address] Dr Slime Grover [address] [facility] [address]  Dept: [phone]  Dept Fax: [phone]    Mildred Perez is a 60 y.o. female who presents today for her medical conditions/complaints as noted below. Chief Complaint   Patient presents with    Establish Care       HPI:     This is a 71-year-old female who sits [facility]. She was at [address] [place] [address]. Yulisa's or NSTEMI and she had a stent placement. Next line she has not been on any medications since  after she did not have health insurance. She used to be on Lipitor and aspirin previously. She has history of essential hypertension but did not take medications for many years. Reviewed all the medications with the patient and currently her blood pressures at 120/70 and pulse of 55.     Health maintenance labs are pending and she won't be able to afford it as she does not have insurance at this time and she is Medicaid pending      Hemoglobin A1C (%)   Date Value   2019 6.0             ( goal A1C is < 7)   No results found for: LABMICR  LDL Cholesterol (mg/dL)   Date Value   2019 187 (H)       (goal LDL is <100)   BUN (mg/dL)   Date Value   2019 20     BP Readings from Last 3 Encounters:   19 120/70   19 (!) 107/51          (goal 120/80)    Past Medical History:   Diagnosis Date    Hyperlipidemia     Hypertension       Past Surgical History:   Procedure Laterality Date    BLADDER SUSPENSION      BLADDER SUSPENSION         Family History   Problem Relation Age of Onset    Elevated Lipids Mother     Cancer Mother     High Blood Pressure Mother     Cancer Maternal Grandmother        Social History     Tobacco Use    Smoking status: Former Smoker     Packs/day: 1.00     Years: 10.00     Pack years: 10.00     Types: Cigarettes     Last attempt to quit: 1989     Years since quittin.0    Smokeless tobacco: Never Used   Substance Use Topics    Alcohol use: Never     Frequency: Never      Current Outpatient Medications   Medication Sig Dispense Refill    aspirin 81 MG EC tablet Take 1 tablet by mouth daily 30 tablet 3    atorvastatin (LIPITOR) 80 MG tablet Take 1 tablet by mouth nightly 30 tablet 3    lisinopril (PRINIVIL;ZESTRIL) 5 MG tablet Take 1 tablet by mouth daily 30 tablet 3    metoprolol succinate (TOPROL XL) 100 MG extended release tablet Take 1 tablet by mouth daily 30 tablet 3    clopidogrel (PLAVIX) 75 MG tablet Take 1 tablet by mouth daily 30 tablet 3    pantoprazole (PROTONIX) 40 MG tablet Take 1 tablet by mouth 2 times daily 30 tablet 3    amiodarone (CORDARONE) 200 MG tablet Take 1 tablet by mouth daily 30 tablet 3    furosemide (LASIX) 20 MG tablet Take 1 tablet by mouth daily 60 tablet 3     No current facility-administered medications for this visit. No Known Allergies    Health Maintenance   Topic Date Due    TSH testing  1955    Hepatitis C screen  1955    HIV screen  02/24/1972    DTaP/Tdap/Td vaccine (1 - Tdap) 02/24/1976    Cervical cancer screen  02/24/1978    Breast cancer screen  02/24/2007    Shingles Vaccine (1 of 2) 02/24/2007    Colon cancer screen colonoscopy  02/24/2007    Flu vaccine (Season Ended) 09/01/2019    A1C test (Diabetic or Prediabetic)  05/11/2020    Potassium monitoring  05/12/2020    Creatinine monitoring  05/12/2020    Lipid screen  05/12/2024    Pneumococcal 0-64 years Vaccine  Aged Out       Subjective:      Review of Systems   Constitutional: Negative for activity change, appetite change, chills, fatigue and fever. HENT: Negative for congestion, ear pain, hearing loss, nosebleeds, sinus pressure, sinus pain and sneezing. Eyes: Negative for visual disturbance. Respiratory: Negative for cough, shortness of breath and wheezing. Cardiovascular: Negative for chest pain and palpitations.    Gastrointestinal: Negative for abdominal distention, abdominal pain, constipation, diarrhea, nausea and vomiting. Endocrine: Negative for cold intolerance, heat intolerance, polydipsia, polyphagia and polyuria. Genitourinary: Negative for difficulty urinating, menstrual problem, vaginal bleeding and vaginal discharge. Musculoskeletal: Negative for back pain and joint swelling. Skin: Negative for rash. Neurological: Negative for numbness and headaches. Psychiatric/Behavioral: Negative for sleep disturbance. The patient is not nervous/anxious. All other systems reviewed and are negative. Objective:     Physical Exam   Constitutional: She is oriented to person, place, and time. Vital signs are normal. She appears well-developed and well-nourished. She is active. No distress. HENT:   Head: Normocephalic and atraumatic. Right Ear: Hearing normal.   Left Ear: Hearing normal.   Mouth/Throat: Uvula is midline, oropharynx is clear and moist and mucous membranes are normal.   Eyes: Pupils are equal, round, and reactive to light. Conjunctivae are normal. No scleral icterus. Neck: Normal range of motion, full passive range of motion without pain and phonation normal. Neck supple. No JVD present. No thyroid mass and no thyromegaly present. Cardiovascular: Normal rate, regular rhythm, normal heart sounds and intact distal pulses. Exam reveals no decreased pulses. No murmur heard. Pulses:       Carotid pulses are 2+ on the right side, and 2+ on the left side. Radial pulses are 2+ on the right side, and 2+ on the left side. Pulmonary/Chest: Effort normal and breath sounds normal. No accessory muscle usage. No apnea. No respiratory distress. She has no wheezes. She has no rales. Abdominal: Soft. Bowel sounds are normal. She exhibits no distension. There is no tenderness. Musculoskeletal: Normal range of motion. She exhibits no edema or deformity. Lymphadenopathy:     She has no cervical adenopathy.    Neurological: She is alert and oriented to person, place, and time. She displays normal reflexes. Skin: Skin is warm and intact. Capillary refill takes less than 2 seconds. No rash noted. She is not diaphoretic. Psychiatric: She has a normal mood and affect. Her behavior is normal. Cognition and memory are normal.   Nursing note and vitals reviewed. /70   Pulse 55   Ht 5' 4\" (1.626 m)   Wt 291 lb (132 kg)   SpO2 98%   BMI 49.95 kg/m²     Assessment:          1. Encounter to establish care with new doctor    2. Essential (primary) hypertension  Blood pressure well controlled. Reviewed    3. S/P angioplasty with stent  To see Dr. Johnny Snow on the 30th of this month    4. Class 3 severe obesity with serious comorbidity and body mass index (BMI) of 45.0 to 49.9 in adult, unspecified obesity type ([address]  Advised to diet and exercise she is prediabetic as well with A1c of 6.    5.  Prediabetes  Advised to cut down carbs and exercise regularly        Diagnosis Orders   1. Encounter to establish care with new doctor     2. Essential (primary) hypertension     3. S/P angioplasty with stent     4. Class 3 severe obesity with serious comorbidity and body mass index (BMI) of 45.0 to 49.9 in adult, unspecified obesity type ([address]     5. Prediabetes             Plan:      Return for Routine follow-up. No orders of the defined types were placed in this encounter. No orders of the defined types were placed in this encounter. Patient given educational materials - see patient instructions. Discussed use, benefit, and side effects of prescribedmedications. All patient questions answered. Pt voiced understanding. Reviewed health maintenance. Instructed to continue current medications, diet and exercise. Patient agreed with treatment plan. Follow up as directed.     Of the given duration appointment visit, Dr. Alex Camacho MD  spent at least 50% of the face-to-face time in counseling, explanation of diagnosis, planning of further management, and answering all questions. Electronically signed by Ari Mao MD on 5/22/2019 at 2:47 PM      Please note that this chart was generated using voice recognition Dragon dictation software. Although every effort was made to ensure the accuracy of this automatedtranscription, some errors in transcription may have occurred.

## 2019-07-19 ENCOUNTER — TELEPHONE (OUTPATIENT)
Dept: PRIMARY CARE CLINIC | Age: 64
End: 2019-07-19

## 2019-08-16 ENCOUNTER — TELEPHONE (OUTPATIENT)
Dept: SURGERY | Age: 64
End: 2019-08-16

## 2019-08-19 NOTE — TELEPHONE ENCOUNTER
Attemped to reach patient to schedule screening colonoscopy visit with provider. Advised to contact the office to schedule at [phone]. Attempt 2.

## 2019-08-23 NOTE — TELEPHONE ENCOUNTER
Attempted to reach patient to schedule screening colonoscopy visit with provider. Advised to contact the office to schedule at [phone]. Attempt 3.

## 2019-10-28 ENCOUNTER — OFFICE VISIT (OUTPATIENT)
Dept: PRIMARY CARE CLINIC | Age: 64
End: 2019-10-28
Payer: MEDICARE

## 2019-10-28 VITALS
SYSTOLIC BLOOD PRESSURE: 128 MMHG | DIASTOLIC BLOOD PRESSURE: 78 MMHG | OXYGEN SATURATION: 99 % | RESPIRATION RATE: 16 BRPM | HEART RATE: 51 BPM | WEIGHT: 288.2 LBS | BODY MASS INDEX: 49.47 KG/M2

## 2019-10-28 DIAGNOSIS — Z12.31 ENCOUNTER FOR SCREENING MAMMOGRAM FOR BREAST CANCER: ICD-10-CM

## 2019-10-28 DIAGNOSIS — I10 ESSENTIAL (PRIMARY) HYPERTENSION: ICD-10-CM

## 2019-10-28 DIAGNOSIS — E66.01 MORBID OBESITY WITH BMI OF 50.0-59.9, ADULT (HCC): ICD-10-CM

## 2019-10-28 DIAGNOSIS — I89.0 LYMPHEDEMA IN ADULT PATIENT: ICD-10-CM

## 2019-10-28 DIAGNOSIS — Z79.899 ON AMIODARONE THERAPY: ICD-10-CM

## 2019-10-28 DIAGNOSIS — L03.115 CELLULITIS OF RIGHT LOWER EXTREMITY: Primary | ICD-10-CM

## 2019-10-28 DIAGNOSIS — Z12.11 COLON CANCER SCREENING: ICD-10-CM

## 2019-10-28 DIAGNOSIS — Z23 NEED FOR INFLUENZA VACCINATION: ICD-10-CM

## 2019-10-28 PROCEDURE — G8417 CALC BMI ABV UP PARAM F/U: HCPCS | Performed by: INTERNAL MEDICINE

## 2019-10-28 PROCEDURE — 3017F COLORECTAL CA SCREEN DOC REV: CPT | Performed by: INTERNAL MEDICINE

## 2019-10-28 PROCEDURE — 1036F TOBACCO NON-USER: CPT | Performed by: INTERNAL MEDICINE

## 2019-10-28 PROCEDURE — G8482 FLU IMMUNIZE ORDER/ADMIN: HCPCS | Performed by: INTERNAL MEDICINE

## 2019-10-28 PROCEDURE — G8427 DOCREV CUR MEDS BY ELIG CLIN: HCPCS | Performed by: INTERNAL MEDICINE

## 2019-10-28 PROCEDURE — 99214 OFFICE O/P EST MOD 30 MIN: CPT | Performed by: INTERNAL MEDICINE

## 2019-10-28 PROCEDURE — 90471 IMMUNIZATION ADMIN: CPT | Performed by: INTERNAL MEDICINE

## 2019-10-28 PROCEDURE — G8598 ASA/ANTIPLAT THER USED: HCPCS | Performed by: INTERNAL MEDICINE

## 2019-10-28 PROCEDURE — 90686 IIV4 VACC NO PRSV 0.5 ML IM: CPT | Performed by: INTERNAL MEDICINE

## 2019-10-28 RX ORDER — LOSARTAN POTASSIUM 25 MG/1
50 TABLET ORAL DAILY
COMMUNITY
End: 2022-01-03

## 2019-10-28 RX ORDER — CEPHALEXIN 500 MG/1
500 CAPSULE ORAL 3 TIMES DAILY
Qty: 30 CAPSULE | Refills: 0 | Status: SHIPPED | OUTPATIENT
Start: 2019-10-28 | End: 2019-11-07

## 2019-10-30 ASSESSMENT — ENCOUNTER SYMPTOMS
ABDOMINAL PAIN: 0
CONSTIPATION: 0
SHORTNESS OF BREATH: 0
COUGH: 0
SINUS PRESSURE: 0
VOMITING: 0
ABDOMINAL DISTENTION: 0
BACK PAIN: 0
NAUSEA: 0
SINUS PAIN: 0
DIARRHEA: 0
WHEEZING: 0

## 2020-02-19 ENCOUNTER — OFFICE VISIT (OUTPATIENT)
Dept: PRIMARY CARE CLINIC | Age: 65
End: 2020-02-19
Payer: MEDICAID

## 2020-02-19 VITALS
HEART RATE: 59 BPM | BODY MASS INDEX: 50.88 KG/M2 | RESPIRATION RATE: 16 BRPM | DIASTOLIC BLOOD PRESSURE: 78 MMHG | WEIGHT: 293 LBS | OXYGEN SATURATION: 99 % | SYSTOLIC BLOOD PRESSURE: 138 MMHG

## 2020-02-19 PROBLEM — Z95.5 S/P DRUG ELUTING CORONARY STENT PLACEMENT: Status: ACTIVE | Noted: 2020-02-19

## 2020-02-19 PROCEDURE — G8482 FLU IMMUNIZE ORDER/ADMIN: HCPCS | Performed by: INTERNAL MEDICINE

## 2020-02-19 PROCEDURE — 99396 PREV VISIT EST AGE 40-64: CPT | Performed by: INTERNAL MEDICINE

## 2020-02-19 ASSESSMENT — PATIENT HEALTH QUESTIONNAIRE - PHQ9
SUM OF ALL RESPONSES TO PHQ QUESTIONS 1-9: 0
2. FEELING DOWN, DEPRESSED OR HOPELESS: 0
SUM OF ALL RESPONSES TO PHQ QUESTIONS 1-9: 0
SUM OF ALL RESPONSES TO PHQ9 QUESTIONS 1 & 2: 0
1. LITTLE INTEREST OR PLEASURE IN DOING THINGS: 0

## 2020-02-24 ASSESSMENT — ENCOUNTER SYMPTOMS
VOMITING: 0
BACK PAIN: 0
ABDOMINAL DISTENTION: 0
DIARRHEA: 0
NAUSEA: 0
CONSTIPATION: 0
COUGH: 0
SINUS PAIN: 0
WHEEZING: 0
SINUS PRESSURE: 0
ABDOMINAL PAIN: 0
SHORTNESS OF BREATH: 0

## 2020-02-24 NOTE — PROGRESS NOTES
Used   Substance Use Topics    Alcohol use: Never     Frequency: Never      Current Outpatient Medications   Medication Sig Dispense Refill    losartan (COZAAR) 25 MG tablet Take 25 mg by mouth daily      aspirin 81 MG EC tablet Take 1 tablet by mouth daily 30 tablet 3    atorvastatin (LIPITOR) 80 MG tablet Take 1 tablet by mouth nightly 30 tablet 3    metoprolol succinate (TOPROL XL) 100 MG extended release tablet Take 1 tablet by mouth daily 30 tablet 3    clopidogrel (PLAVIX) 75 MG tablet Take 1 tablet by mouth daily 30 tablet 3    pantoprazole (PROTONIX) 40 MG tablet Take 1 tablet by mouth 2 times daily 30 tablet 3    amiodarone (CORDARONE) 200 MG tablet Take 1 tablet by mouth daily 30 tablet 3    furosemide (LASIX) 20 MG tablet Take 1 tablet by mouth daily (Patient taking differently: Take 40 mg by mouth daily ) 60 tablet 3     No current facility-administered medications for this visit. No Known Allergies    Health Maintenance   Topic Date Due    TSH testing  1955    Hepatitis C screen  1955    HIV screen  02/24/1972    Cervical cancer screen  02/24/1978    Breast cancer screen  02/24/2007    Shingles Vaccine (1 of 2) 02/24/2007    Colon cancer screen colonoscopy  02/24/2007    DTaP/Tdap/Td vaccine (1 - Tdap) 10/28/2020 (Originally 2/24/1968)    Pneumococcal 0-64 years Vaccine (1 of 1 - PPSV23) 10/28/2020 (Originally 2/24/1963)    A1C test (Diabetic or Prediabetic)  05/11/2020    Lipid screen  05/12/2020    Potassium monitoring  05/12/2020    Creatinine monitoring  05/12/2020    Flu vaccine  Completed    Hepatitis A vaccine  Aged Out    Hepatitis B vaccine  Aged Out    Hib vaccine  Aged Out    Meningococcal (ACWY) vaccine  Aged Out       Subjective:      Review of Systems   Constitutional: Negative for activity change, appetite change, chills, fatigue and fever.    HENT: Negative for congestion, ear pain, hearing loss, nosebleeds, sinus pressure, sinus pain and sneezing. Eyes: Negative for visual disturbance. Respiratory: Negative for cough, shortness of breath and wheezing. Cardiovascular: Negative for chest pain and palpitations. Gastrointestinal: Negative for abdominal distention, abdominal pain, constipation, diarrhea, nausea and vomiting. Endocrine: Negative for cold intolerance, heat intolerance, polydipsia, polyphagia and polyuria. Genitourinary: Negative for difficulty urinating, menstrual problem, vaginal bleeding and vaginal discharge. Musculoskeletal: Negative for back pain and joint swelling. Skin: Negative for rash. Neurological: Negative for numbness and headaches. Psychiatric/Behavioral: Negative for sleep disturbance. The patient is not nervous/anxious. All other systems reviewed and are negative. Objective:     Physical Exam  Vitals signs and nursing note reviewed. Constitutional:       General: She is not in acute distress. Appearance: She is well-developed. She is not diaphoretic. HENT:      Head: Normocephalic and atraumatic. Right Ear: Hearing normal.      Left Ear: Hearing normal.      Mouth/Throat:      Pharynx: Uvula midline. Eyes:      General: No scleral icterus. Conjunctiva/sclera: Conjunctivae normal.      Pupils: Pupils are equal, round, and reactive to light. Neck:      Musculoskeletal: Full passive range of motion without pain, normal range of motion and neck supple. Thyroid: No thyroid mass or thyromegaly. Vascular: No JVD. Trachea: Phonation normal.   Cardiovascular:      Rate and Rhythm: Normal rate and regular rhythm. Pulses: No decreased pulses. Carotid pulses are 2+ on the right side and 2+ on the left side. Radial pulses are 2+ on the right side and 2+ on the left side. Heart sounds: Normal heart sounds. No murmur. Pulmonary:      Effort: Pulmonary effort is normal. No accessory muscle usage or respiratory distress.       Breath sounds: Normal breath sounds. No wheezing or rales. Abdominal:      General: Bowel sounds are normal. There is no distension. Palpations: Abdomen is soft. Tenderness: There is no abdominal tenderness. Musculoskeletal: Normal range of motion. General: No deformity. Lymphadenopathy:      Cervical: No cervical adenopathy. Skin:     General: Skin is warm. Capillary Refill: Capillary refill takes less than 2 seconds. Findings: No rash. Neurological:      Mental Status: She is alert and oriented to person, place, and time. Deep Tendon Reflexes: Reflexes normal.   Psychiatric:         Behavior: Behavior normal.       /78   Pulse 59   Resp 16   Wt 296 lb 6.4 oz (134.4 kg)   SpO2 99%   BMI 50.88 kg/m²     Assessment:          1. Annual physical exam    - CBC Auto Differential; Future  - TSH with Reflex; Future  - Lipid Panel; Future  - Comprehensive Metabolic Panel; Future  - Vitamin B12 & Folate; Future  - Vitamin D 25 Hydroxy; Future  - Hemoglobin A1C; Future    2. Essential (primary) hypertension  Well-controlled    3. NSVT (nonsustained ventricular tachycardia) (HCC)  On amiodarone  Follow-up with cardiologist    4. S/P drug eluting coronary stent placement  Aspirin, beta-blocker and Plavix    5. Morbid obesity with BMI of 50.0-59.9, adult (HCC)  Diet and exercise advised    6. Colon cancer screening    - Abbi Christie MD[facility]            Diagnosis Orders   1. Annual physical exam  CBC Auto Differential    TSH with Reflex    Lipid Panel    Comprehensive Metabolic Panel    Vitamin B12 & Folate    Vitamin D 25 Hydroxy    Hemoglobin A1C   2. Essential (primary) hypertension     3. NSVT (nonsustained ventricular tachycardia) ([facility]     4. S/P drug eluting coronary stent placement     5. Morbid obesity with BMI of 50.0-59.9, adult ([facility]     6.  Colon cancer screening  Abbi Christie MD, [facility]           Plan:      Return in about 3

## 2020-02-26 ENCOUNTER — HOSPITAL ENCOUNTER (OUTPATIENT)
Age: 65
Discharge: HOME | End: 2020-02-26
Payer: MEDICAID

## 2020-02-26 ENCOUNTER — HOSPITAL ENCOUNTER (OUTPATIENT)
Dept: MAMMOGRAPHY | Age: 65
Discharge: HOME | End: 2020-02-28
Payer: MEDICAID

## 2020-02-26 LAB
ABSOLUTE EOS #: 0.15 K/UL (ref 0–0.44)
ABSOLUTE IMMATURE GRANULOCYTE: <0.03 K/UL (ref 0–0.3)
ABSOLUTE LYMPH #: 1.42 K/UL (ref 1.1–3.7)
ABSOLUTE MONO #: 0.6 K/UL (ref 0.1–1.2)
ALBUMIN SERPL-MCNC: 3.9 G/DL (ref 3.5–5.2)
ALBUMIN/GLOBULIN RATIO: 1.1 (ref 1–2.5)
ALP BLD-CCNC: 136 U/L (ref 35–104)
ALT SERPL-CCNC: 17 U/L (ref 5–33)
ANION GAP SERPL CALCULATED.3IONS-SCNC: 15 MMOL/L (ref 9–17)
AST SERPL-CCNC: 14 U/L
BASOPHILS # BLD: 1 % (ref 0–2)
BASOPHILS ABSOLUTE: 0.03 K/UL (ref 0–0.2)
BILIRUB SERPL-MCNC: 0.46 MG/DL (ref 0.3–1.2)
BUN BLDV-MCNC: 24 MG/DL (ref 8–23)
BUN/CREAT BLD: ABNORMAL (ref 9–20)
CALCIUM SERPL-MCNC: 8.8 MG/DL (ref 8.6–10.4)
CHLORIDE BLD-SCNC: 105 MMOL/L (ref 98–107)
CHOLESTEROL/HDL RATIO: 2.9
CHOLESTEROL: 119 MG/DL
CO2: 25 MMOL/L (ref 20–31)
CREAT SERPL-MCNC: 1.17 MG/DL (ref 0.5–0.9)
DIFFERENTIAL TYPE: ABNORMAL
EOSINOPHILS RELATIVE PERCENT: 2 % (ref 1–4)
ESTIMATED AVERAGE GLUCOSE: 137 MG/DL
FOLATE: 8.9 NG/ML
GFR AFRICAN AMERICAN: 57 ML/MIN
GFR NON-AFRICAN AMERICAN: 47 ML/MIN
GFR SERPL CREATININE-BSD FRML MDRD: ABNORMAL ML/MIN/{1.73_M2}
GFR SERPL CREATININE-BSD FRML MDRD: ABNORMAL ML/MIN/{1.73_M2}
GLUCOSE BLD-MCNC: 95 MG/DL (ref 70–99)
HBA1C MFR BLD: 6.4 % (ref 4–6)
HCT VFR BLD CALC: 40.5 % (ref 36.3–47.1)
HDLC SERPL-MCNC: 41 MG/DL
HEMOGLOBIN: 12.4 G/DL (ref 11.9–15.1)
IMMATURE GRANULOCYTES: 0 %
LDL CHOLESTEROL: 59 MG/DL (ref 0–130)
LYMPHOCYTES # BLD: 22 % (ref 24–43)
MCH RBC QN AUTO: 30.1 PG (ref 25.2–33.5)
MCHC RBC AUTO-ENTMCNC: 30.6 G/DL (ref 28.4–34.8)
MCV RBC AUTO: 98.3 FL (ref 82.6–102.9)
MONOCYTES # BLD: 9 % (ref 3–12)
NRBC AUTOMATED: 0 PER 100 WBC
PDW BLD-RTO: 13.9 % (ref 11.8–14.4)
PLATELET # BLD: 266 K/UL (ref 138–453)
PLATELET ESTIMATE: ABNORMAL
PMV BLD AUTO: 11.8 FL (ref 8.1–13.5)
POTASSIUM SERPL-SCNC: 4.3 MMOL/L (ref 3.7–5.3)
RBC # BLD: 4.12 M/UL (ref 3.95–5.11)
RBC # BLD: ABNORMAL 10*6/UL
SEG NEUTROPHILS: 66 % (ref 36–65)
SEGMENTED NEUTROPHILS ABSOLUTE COUNT: 4.3 K/UL (ref 1.5–8.1)
SODIUM BLD-SCNC: 145 MMOL/L (ref 135–144)
THYROXINE, FREE: 0.95 NG/DL (ref 0.93–1.7)
TOTAL PROTEIN: 7.4 G/DL (ref 6.4–8.3)
TRIGL SERPL-MCNC: 96 MG/DL
TSH SERPL DL<=0.05 MIU/L-ACNC: 20.5 MIU/L (ref 0.3–5)
VITAMIN B-12: 492 PG/ML (ref 232–1245)
VITAMIN D 25-HYDROXY: 16.2 NG/ML (ref 30–100)
VLDLC SERPL CALC-MCNC: NORMAL MG/DL (ref 1–30)
WBC # BLD: 6.5 K/UL (ref 3.5–11.3)
WBC # BLD: ABNORMAL 10*3/UL

## 2020-02-26 PROCEDURE — 82607 VITAMIN B-12: CPT

## 2020-02-26 PROCEDURE — 80053 COMPREHEN METABOLIC PANEL: CPT

## 2020-02-26 PROCEDURE — 84443 ASSAY THYROID STIM HORMONE: CPT

## 2020-02-26 PROCEDURE — 80061 LIPID PANEL: CPT

## 2020-02-26 PROCEDURE — 82746 ASSAY OF FOLIC ACID SERUM: CPT

## 2020-02-26 PROCEDURE — 84439 ASSAY OF FREE THYROXINE: CPT

## 2020-02-26 PROCEDURE — 82306 VITAMIN D 25 HYDROXY: CPT

## 2020-02-26 PROCEDURE — 85025 COMPLETE CBC W/AUTO DIFF WBC: CPT

## 2020-02-26 PROCEDURE — 83036 HEMOGLOBIN GLYCOSYLATED A1C: CPT

## 2020-02-26 PROCEDURE — 77063 BREAST TOMOSYNTHESIS BI: CPT

## 2020-02-26 PROCEDURE — 36415 COLL VENOUS BLD VENIPUNCTURE: CPT

## 2020-02-27 RX ORDER — LEVOTHYROXINE SODIUM 0.05 MG/1
50 TABLET ORAL DAILY
Qty: 60 TABLET | Refills: 0 | Status: SHIPPED | OUTPATIENT
Start: 2020-02-27 | End: 2020-05-05

## 2020-02-27 RX ORDER — METFORMIN HYDROCHLORIDE 500 MG/1
500 TABLET, EXTENDED RELEASE ORAL
Qty: 90 TABLET | Refills: 1 | Status: SHIPPED | OUTPATIENT
Start: 2020-02-27 | End: 2020-09-01

## 2020-02-27 RX ORDER — ERGOCALCIFEROL 1.25 MG/1
50000 CAPSULE ORAL WEEKLY
Qty: 12 CAPSULE | Refills: 1 | Status: SHIPPED | OUTPATIENT
Start: 2020-02-27 | End: 2020-06-01

## 2020-03-04 ENCOUNTER — OFFICE VISIT (OUTPATIENT)
Dept: SURGERY | Age: 65
End: 2020-03-04
Payer: MEDICAID

## 2020-03-04 VITALS
WEIGHT: 293 LBS | DIASTOLIC BLOOD PRESSURE: 79 MMHG | HEART RATE: 56 BPM | SYSTOLIC BLOOD PRESSURE: 145 MMHG | BODY MASS INDEX: 51.91 KG/M2 | HEIGHT: 63 IN | OXYGEN SATURATION: 97 %

## 2020-03-04 PROCEDURE — 99202 OFFICE O/P NEW SF 15 MIN: CPT | Performed by: SURGERY

## 2020-03-04 ASSESSMENT — ENCOUNTER SYMPTOMS
GASTROINTESTINAL NEGATIVE: 1
RESPIRATORY NEGATIVE: 1

## 2020-03-04 NOTE — H&P
[facility] [address] [facility] [address] [place], [address]        Casi Bah   63 y.o.  1955  [id number]     PCP: Kamran Del Valle MD  Referring Provider: Griffin Lombard, MD   Author:   Dr. Joanna Sanders MD  Reason for Visit:  Chief Complaint   Patient presents with    New Patient     Patient states she is here to discuss having a colonoscopy. HPI:  Casi Bah presents in evaluation for Screening Colonoscopy. Denies any colon-related problems. Review of Systems   Constitutional: Negative. Respiratory: Negative. Cardiovascular: Negative. Gastrointestinal: Negative. Musculoskeletal: Negative. Neurological: Negative.             Allergies:  No Known Allergies    Current Medications:  Current Outpatient Medications   Medication Sig Dispense Refill    losartan (COZAAR) 25 MG tablet Take 25 mg by mouth daily      aspirin 81 MG EC tablet Take 1 tablet by mouth daily 30 tablet 3    atorvastatin (LIPITOR) 80 MG tablet Take 1 tablet by mouth nightly 30 tablet 3    metoprolol succinate (TOPROL XL) 100 MG extended release tablet Take 1 tablet by mouth daily 30 tablet 3    clopidogrel (PLAVIX) 75 MG tablet Take 1 tablet by mouth daily 30 tablet 3    pantoprazole (PROTONIX) 40 MG tablet Take 1 tablet by mouth 2 times daily 30 tablet 3    amiodarone (CORDARONE) 200 MG tablet Take 1 tablet by mouth daily 30 tablet 3    furosemide (LASIX) 20 MG tablet Take 1 tablet by mouth daily (Patient taking differently: Take 40 mg by mouth daily ) 60 tablet 3    levothyroxine (SYNTHROID) 50 MCG tablet Take 1 tablet by mouth daily (Patient not taking: Reported on 3/4/2020) 60 tablet 0    vitamin D (ERGOCALCIFEROL) 1.25 MG (18093 UT) CAPS capsule Take 1 capsule by mouth once a week (Patient not taking: Reported on 3/4/2020) 12 capsule 1    metFORMIN (GLUCOPHAGE-XR) 500 MG extended release tablet Take 1 tablet by mouth daily (with breakfast) (Patient not taking: Reported on 3/4/2020) 90 tablet 1     No current facility-administered medications for this visit.         Problem List:  Patient Active Problem List   Diagnosis    STEMI (ST elevation myocardial infarction) ([facility]    Essential (primary) hypertension    Morbid obesity with BMI of 50.0-59.9, adult ([facility]    Melena    Flash pulmonary edema (HCC)    NSVT [facility]onsustained [facility] tachy[facility]) ([facility]    S/P drug eluting coronary stent placement        Histories:  Past Medical History:   Diagnosis Date    Hyperlipidemia     Hypertension       Family History   Problem Relation Age of Onset    Elevated Lipids Mother     Cancer Mother     High Blood Pressure Mother     Cancer Maternal Grandmother       Past Surgical History:   Procedure Laterality Date    BLADDER SUSPENSION      BLADDER SUSPENSION        Social History     Socioeconomic History    Marital status: Single     Spouse name: None    Number of children: None    Years of education: None    Highest education level: None   Occupational History    None   Social Needs    Financial resource strain: None    Food insecurity:     Worry: None     Inability: None    Transportation needs:     Medical: None     Non-medical: None   Tobacco Use    Smoking status: Former Smoker     Packs/day: 1.00     Years: 10.00     Pack years: 10.00     Types: Cigarettes     Last attempt to quit: 1989     Years since quittin.8    Smokeless tobacco: Never Used   Substance and Sexual Activity    Alcohol use: Never     Frequency: Never    Drug use: Never    Sexual activity: None   Lifestyle    Physical activity:     Days per week: None     Minutes per session: None    Stress: None   Relationships    Social connections:     Talks on phone: None     Gets together: None     Attends [employer] service: None     Active member of club or organization: None     Attends meetings of clubs or organizations: None     Relationship status: None    Intimate partner violence:     Fear of current or ex partner: None     Emotionally abused: None     Physically abused: None     Forced sexual activity: None   Other Topics Concern    None   Social History Narrative    None        Physical Examination:  BP (!) 145/79 (Site: Left Upper Arm, Position: Sitting, Cuff Size: Large Adult)   Pulse 56   Ht 5' 3\" (1.6 m)   Wt 295 lb (133.8 kg)   SpO2 97%   BMI 52.26 kg/m²     Neck: Supple  Respiratory:  Lungs are clear to auscultation  Cardiovascular:  Normal Rate and Rhythm  Gastrointestinal:    Abdomen:  Soft, non-tender, no masses   Rectum/Anus:  Deferred to procedure  Integumentary:  Warm and dry  Neurologic:  Alert     Impression and Plan:    Diagnoses:     Diagnosis Orders   1. Encounter for screening colonoscopy        Plan: Colonoscopy    Upon discussion of the indications and recommendations for screening colonoscopy, the patient at this time is unwilling to proceed. She may contact the office should she reconsider.     Electronically signed by Yokasta Hall MD on 3/4/20 at 1:30 PM

## 2020-03-10 ENCOUNTER — TELEPHONE (OUTPATIENT)
Dept: PRIMARY CARE CLINIC | Age: 65
End: 2020-03-10

## 2020-05-05 RX ORDER — LEVOTHYROXINE SODIUM 0.05 MG/1
TABLET ORAL
Qty: 60 TABLET | Refills: 0 | Status: SHIPPED | OUTPATIENT
Start: 2020-05-05 | End: 2020-05-28

## 2020-05-05 NOTE — TELEPHONE ENCOUNTER
LOV 2/19/2020     Next appt 5/20/2020    Health Maintenance   Topic Date Due    Hepatitis C screen  1955    HIV screen  02/24/1972    Cervical cancer screen  02/24/1978    Shingles Vaccine (1 of 2) 02/24/2007    Colon cancer screen colonoscopy  02/24/2007    DTaP/Tdap/Td vaccine (1 - Tdap) 10/28/2020 (Originally 2/24/1976)    Pneumococcal 0-64 years Vaccine (1 of 1 - PPSV23) 10/28/2020 (Originally 2/24/1963)    A1C test (Diabetic or Prediabetic)  02/26/2021    Lipid screen  02/26/2021    TSH testing  02/26/2021    Potassium monitoring  02/26/2021    Creatinine monitoring  02/26/2021    Breast cancer screen  02/26/2022    Flu vaccine  Completed    Hepatitis A vaccine  Aged Out    Hepatitis B vaccine  Aged Out    Hib vaccine  Aged Out    Meningococcal (ACWY) vaccine  Aged Out             (applicable per patient's age: Cancer Screenings, Depression Screening, Fall Risk Screening, Immunizations)    Hemoglobin A1C (%)   Date Value   02/26/2020 6.4 (H)   05/11/2019 6.0     LDL Cholesterol (mg/dL)   Date Value   02/26/2020 59     AST (U/L)   Date Value   02/26/2020 14     ALT (U/L)   Date Value   02/26/2020 17     BUN (mg/dL)   Date Value   02/26/2020 24 (H)      (goal A1C is < 7)   (goal LDL is <100) need 30-50% reduction from baseline     BP Readings from Last 3 Encounters:   03/04/20 (!) 145/79   02/19/20 138/78   10/28/19 128/78    (goal /80)      All Future Testing planned in [facility]:  Lab Frequency Next Occurrence   Cologuard (For External Results Only) Once 10/28/2020   TSH without Reflex Once 07/17/2020       Next Visit Date:  Future Appointments   Date Time Provider Martha Delgdao   5/20/2020  2:20 PM Jonny Donis MD [facility]            Patient Active Problem List:     STEMI (ST elevation myocardial infarction) ([facility]     Essential (primary) hypertension     Morbid obesity with BMI of 50.0-59.9, adult ([facility]     Melena     Flash pulmonary edema (HCC)     NSVT

## 2020-05-21 ENCOUNTER — TELEPHONE (OUTPATIENT)
Dept: PRIMARY CARE CLINIC | Age: 65
End: 2020-05-21

## 2020-05-28 RX ORDER — LEVOTHYROXINE SODIUM 0.05 MG/1
TABLET ORAL
Qty: 60 TABLET | Refills: 0 | Status: SHIPPED | OUTPATIENT
Start: 2020-05-28 | End: 2020-07-06

## 2020-06-01 RX ORDER — ERGOCALCIFEROL 1.25 MG/1
CAPSULE ORAL
Qty: 12 CAPSULE | Refills: 0 | Status: SHIPPED | OUTPATIENT
Start: 2020-06-01 | End: 2020-09-01

## 2020-07-06 RX ORDER — LEVOTHYROXINE SODIUM 0.05 MG/1
TABLET ORAL
Qty: 30 TABLET | Refills: 0 | Status: SHIPPED | OUTPATIENT
Start: 2020-07-06 | End: 2020-08-06

## 2020-08-06 RX ORDER — LEVOTHYROXINE SODIUM 0.05 MG/1
TABLET ORAL
Qty: 90 TABLET | Refills: 2 | Status: SHIPPED | OUTPATIENT
Start: 2020-08-06 | End: 2021-02-23

## 2020-08-06 NOTE — TELEPHONE ENCOUNTER
Last OV 2/19/2020      Health Maintenance   Topic Date Due    Hepatitis C screen  1955    HIV screen  02/24/1972    Cervical cancer screen  02/24/1978    Shingles Vaccine (1 of 2) 02/24/2007    Colon cancer screen colonoscopy  02/24/2007    DTaP/Tdap/Td vaccine (1 - Tdap) 10/28/2020 (Originally 2/24/1976)    Flu vaccine (1) 09/01/2020    A1C test (Diabetic or Prediabetic)  02/26/2021    Lipid screen  02/26/2021    TSH testing  02/26/2021    Potassium monitoring  02/26/2021    Creatinine monitoring  02/26/2021    Breast cancer screen  02/26/2022    Hepatitis A vaccine  Aged Out    Hepatitis B vaccine  Aged Out    Hib vaccine  Aged Out    Meningococcal (ACWY) vaccine  Aged Out    Pneumococcal 0-64 years Vaccine  Aged Out             (applicable per patient's age: Cancer Screenings, Depression Screening, Fall Risk Screening, Immunizations)    Hemoglobin A1C (%)   Date Value   02/26/2020 6.4 (H)   05/11/2019 6.0     LDL Cholesterol (mg/dL)   Date Value   02/26/2020 59     AST (U/L)   Date Value   02/26/2020 14     ALT (U/L)   Date Value   02/26/2020 17     BUN (mg/dL)   Date Value   02/26/2020 24 (H)      (goal A1C is < 7)   (goal LDL is <100) need 30-50% reduction from baseline     BP Readings from Last 3 Encounters:   03/04/20 (!) 145/79   02/19/20 138/78   10/28/19 128/78    (goal /80)      All Future Testing planned in [facility]:  Lab Frequency Next Occurrence   Cologuard (For External Results Only) Once 10/28/2020   TSH without Reflex Once 10/27/2020       Next Visit Date:  No future appointments.          Patient Active Problem List:     STEMI (ST elevation myocardial infarction) ([facility]     Essential (primary) hypertension     Morbid obesity with BMI of 50.0-59.9, adult ([facility]     Melena     Flash pulmonary edema (HCC)     NSVT (nonsustained ventricular tachycardia) (HCC)     S/P drug eluting coronary stent placement

## 2020-09-01 RX ORDER — METFORMIN HYDROCHLORIDE 500 MG/1
TABLET, EXTENDED RELEASE ORAL
Qty: 90 TABLET | Refills: 0 | Status: SHIPPED | OUTPATIENT
Start: 2020-09-01 | End: 2020-11-24

## 2020-09-01 RX ORDER — ERGOCALCIFEROL 1.25 MG/1
CAPSULE ORAL
Qty: 12 CAPSULE | Refills: 0 | Status: SHIPPED | OUTPATIENT
Start: 2020-09-01 | End: 2020-11-27

## 2020-09-28 ENCOUNTER — NURSE ONLY (OUTPATIENT)
Dept: PRIMARY CARE CLINIC | Age: 65
End: 2020-09-28
Payer: MEDICAID

## 2020-09-28 PROCEDURE — 90686 IIV4 VACC NO PRSV 0.5 ML IM: CPT | Performed by: INTERNAL MEDICINE

## 2020-09-28 PROCEDURE — 90471 IMMUNIZATION ADMIN: CPT | Performed by: INTERNAL MEDICINE

## 2020-09-28 NOTE — PROGRESS NOTES
Vaccine Information Sheet, \"Influenza - Inactivated\"  given to Rosemary Spatz, or parent/legal guardian of  Rosemary Spatz and verbalized understanding. Patient responses:    Have you ever had a reaction to a flu vaccine? No  Are you able to eat eggs without adverse effects? Yes  Do you have any current illness? No  Have you ever had Guillian [place] Syndrome? No    Flu vaccine given per order. Please see immunization tab.

## 2020-11-24 RX ORDER — METFORMIN HYDROCHLORIDE 500 MG/1
TABLET, EXTENDED RELEASE ORAL
Qty: 90 TABLET | Refills: 0 | Status: SHIPPED | OUTPATIENT
Start: 2020-11-24 | End: 2020-11-27

## 2020-11-27 RX ORDER — ERGOCALCIFEROL 1.25 MG/1
CAPSULE ORAL
Qty: 12 CAPSULE | Refills: 0 | Status: SHIPPED | OUTPATIENT
Start: 2020-11-27 | End: 2021-02-23

## 2020-11-27 RX ORDER — METFORMIN HYDROCHLORIDE 500 MG/1
TABLET, EXTENDED RELEASE ORAL
Qty: 90 TABLET | Refills: 0 | Status: SHIPPED | OUTPATIENT
Start: 2020-11-27 | End: 2021-03-30

## 2020-11-27 NOTE — TELEPHONE ENCOUNTER
Last OV 02/19/2020    Next OV LVM for pt to make appt    Health Maintenance   Topic Date Due    Hepatitis C screen  1955    HIV screen  02/24/1972    DTaP/Tdap/Td vaccine (1 - Tdap) 02/24/1976    Cervical cancer screen  02/24/1978    Shingles Vaccine (1 of 2) 02/24/2007    Colon cancer screen colonoscopy  02/24/2007    A1C test (Diabetic or Prediabetic)  02/26/2021    Lipid screen  02/26/2021    TSH testing  02/26/2021    Potassium monitoring  02/26/2021    Creatinine monitoring  02/26/2021    Breast cancer screen  02/26/2022    Flu vaccine  Completed    Hepatitis A vaccine  Aged Out    Hepatitis B vaccine  Aged Out    Hib vaccine  Aged Out    Meningococcal (ACWY) vaccine  Aged Out    Pneumococcal 0-64 years Vaccine  Aged Out             (applicable per patient's age: Cancer Screenings, Depression Screening, Fall Risk Screening, Immunizations)    Hemoglobin A1C (%)   Date Value   02/26/2020 6.4 (H)   05/11/2019 6.0     LDL Cholesterol (mg/dL)   Date Value   02/26/2020 59     AST (U/L)   Date Value   02/26/2020 14     ALT (U/L)   Date Value   02/26/2020 17     BUN (mg/dL)   Date Value   02/26/2020 24 (H)      (goal A1C is < 7)   (goal LDL is <100) need 30-50% reduction from baseline     BP Readings from Last 3 Encounters:   03/04/20 (!) 145/79   02/19/20 138/78   10/28/19 128/78    (goal /80)      All Future Testing planned in [facility]:  Lab Frequency Next Occurrence   TSH without Reflex Once 02/26/2021       Next Visit Date:  No future appointments.          Patient Active Problem List:     STEMI (ST elevation myocardial infarction) ([facility] [address]     Essential (primary) hypertension     Morbid obesity with BMI of 50.0-59.9, adult ([facility] [address]     Melena     Flash pulmonary edema (HCC)     NSVT (nonsustained ventricular tachycardia) (HCC)     S/P drug eluting coronary stent placement

## 2020-12-29 ENCOUNTER — OFFICE VISIT (OUTPATIENT)
Dept: PRIMARY CARE CLINIC | Age: 65
End: 2020-12-29
Payer: MEDICAID

## 2020-12-29 ENCOUNTER — HOSPITAL ENCOUNTER (OUTPATIENT)
Age: 65
Discharge: HOME | End: 2020-12-29
Payer: MEDICAID

## 2020-12-29 VITALS
TEMPERATURE: 97.4 F | HEIGHT: 63 IN | HEART RATE: 78 BPM | BODY MASS INDEX: 51.91 KG/M2 | DIASTOLIC BLOOD PRESSURE: 80 MMHG | OXYGEN SATURATION: 98 % | SYSTOLIC BLOOD PRESSURE: 128 MMHG | WEIGHT: 293 LBS

## 2020-12-29 DIAGNOSIS — I10 ESSENTIAL (PRIMARY) HYPERTENSION: ICD-10-CM

## 2020-12-29 DIAGNOSIS — E55.9 VITAMIN D DEFICIENCY: ICD-10-CM

## 2020-12-29 DIAGNOSIS — E03.8 HYPOTHYROIDISM DUE TO HASHIMOTO'S THYROIDITIS: ICD-10-CM

## 2020-12-29 DIAGNOSIS — E06.3 HYPOTHYROIDISM DUE TO HASHIMOTO'S THYROIDITIS: ICD-10-CM

## 2020-12-29 DIAGNOSIS — R73.03 PREDIABETES: ICD-10-CM

## 2020-12-29 DIAGNOSIS — I87.2 CHRONIC VENOUS INSUFFICIENCY: Primary | ICD-10-CM

## 2020-12-29 LAB
TSH SERPL DL<=0.05 MIU/L-ACNC: 3.29 MIU/L (ref 0.3–5)
VITAMIN D 25-HYDROXY: 20.3 NG/ML (ref 30–100)

## 2020-12-29 PROCEDURE — 82306 VITAMIN D 25 HYDROXY: CPT

## 2020-12-29 PROCEDURE — 36415 COLL VENOUS BLD VENIPUNCTURE: CPT

## 2020-12-29 PROCEDURE — 3017F COLORECTAL CA SCREEN DOC REV: CPT | Performed by: INTERNAL MEDICINE

## 2020-12-29 PROCEDURE — 1036F TOBACCO NON-USER: CPT | Performed by: INTERNAL MEDICINE

## 2020-12-29 PROCEDURE — 84443 ASSAY THYROID STIM HORMONE: CPT

## 2020-12-29 PROCEDURE — G8427 DOCREV CUR MEDS BY ELIG CLIN: HCPCS | Performed by: INTERNAL MEDICINE

## 2020-12-29 PROCEDURE — 83036 HEMOGLOBIN GLYCOSYLATED A1C: CPT

## 2020-12-29 PROCEDURE — G8482 FLU IMMUNIZE ORDER/ADMIN: HCPCS | Performed by: INTERNAL MEDICINE

## 2020-12-29 PROCEDURE — 99214 OFFICE O/P EST MOD 30 MIN: CPT | Performed by: INTERNAL MEDICINE

## 2020-12-29 PROCEDURE — G8417 CALC BMI ABV UP PARAM F/U: HCPCS | Performed by: INTERNAL MEDICINE

## 2020-12-30 LAB
ESTIMATED AVERAGE GLUCOSE: 134 MG/DL
HBA1C MFR BLD: 6.3 % (ref 4–6)

## 2021-01-02 ASSESSMENT — ENCOUNTER SYMPTOMS
SHORTNESS OF BREATH: 0
SINUS PRESSURE: 0
DIARRHEA: 0
VOMITING: 0
CONSTIPATION: 0
SINUS PAIN: 0
BACK PAIN: 0
NAUSEA: 0
COUGH: 0
ABDOMINAL DISTENTION: 0
ABDOMINAL PAIN: 0
WHEEZING: 0

## 2021-01-03 NOTE — PROGRESS NOTES
[facility] CARE  [facility] [address]  [address] [address]  Dept: [phone]  Dept Fax: [phone]    Laura Vela is a 63 y.o. female who presents today for her medical conditions/complaints as noted below. Chief Complaint   Patient presents with    Hypertension     f/u    Leg Swelling     both legs, worse on right, reddness & itching, \"water blisters\"       HPI:     This is a 72-year-old female who is here for regular follow-up. She has prediabetes, essential hypertension, coronary artery disease. Her blood pressure is well controlled with the current regimen and she has been having lower extremity swelling and she has been having water blisters. It has been more itchy recently. Her A1c today is at 6.3 which improved from 6.4. No other complaints or concerns.       Hemoglobin A1C (%)   Date Value   2020 6.3 (H)   2020 6.4 (H)   2019 6.0             ( goal A1C is < 7)   No results found for: LABMICR  LDL Cholesterol (mg/dL)   Date Value   2020 59   2019 187 (H)       (goal LDL is <100)   AST (U/L)   Date Value   2020 14     ALT (U/L)   Date Value   2020 17     BUN (mg/dL)   Date Value   2020 24 (H)     BP Readings from Last 3 Encounters:   20 128/80   20 (!) 145/79   20 138/78          (goal 120/80)    Past Medical History:   Diagnosis Date    Hyperlipidemia     Hypertension       Past Surgical History:   Procedure Laterality Date    BLADDER SUSPENSION      BLADDER SUSPENSION         Family History   Problem Relation Age of Onset    Elevated Lipids Mother     Cancer Mother     High Blood Pressure Mother     Cancer Maternal Grandmother        Social History     Tobacco Use    Smoking status: Former Smoker     Packs/day: 1.00     Years: 10.00     Pack years: 10.00     Types: Cigarettes     Quit date: 1989     Years since quittin.6    Smokeless tobacco: Never Used   Substance Use Topics    Alcohol use: Never     Frequency: Never      Current Outpatient Medications   Medication Sig Dispense Refill    diphenhydrAMINE HCl, TOPICAL, 2 % GEL Apply twice daily on affected area 57 g 0    vitamin D (ERGOCALCIFEROL) 1.25 MG (33781 UT) CAPS capsule TAKE 1 CAPSULE BY MOUTH ONE TIME A WEEK  12 capsule 0    metFORMIN (GLUCOPHAGE-XR) 500 MG extended release tablet TAKE 1 TABLET BY MOUTH ONE TIME A DAY with breakfast  90 tablet 0    levothyroxine (SYNTHROID) 50 MCG tablet TAKE 1 TABLET BY MOUTH ONE TIME A DAY  90 tablet 2    losartan (COZAAR) 25 MG tablet Take 25 mg by mouth daily      aspirin 81 MG EC tablet Take 1 tablet by mouth daily 30 tablet 3    atorvastatin (LIPITOR) 80 MG tablet Take 1 tablet by mouth nightly 30 tablet 3    metoprolol succinate (TOPROL XL) 100 MG extended release tablet Take 1 tablet by mouth daily 30 tablet 3    clopidogrel (PLAVIX) 75 MG tablet Take 1 tablet by mouth daily 30 tablet 3    pantoprazole (PROTONIX) 40 MG tablet Take 1 tablet by mouth 2 times daily 30 tablet 3    amiodarone (CORDARONE) 200 MG tablet Take 1 tablet by mouth daily 30 tablet 3    furosemide (LASIX) 20 MG tablet Take 1 tablet by mouth daily (Patient taking differently: Take 40 mg by mouth daily ) 60 tablet 3     No current facility-administered medications for this visit.       No Known Allergies    Health Maintenance   Topic Date Due    Hepatitis C screen  1955    Pneumococcal 0-64 years Vaccine (1 of 1 - PPSV23) 02/24/1963    HIV screen  02/24/1972    DTaP/Tdap/Td vaccine (1 - Tdap) 02/24/1976    Cervical cancer screen  02/24/1978    Shingles Vaccine (1 of 2) 02/24/2007    Colon cancer screen colonoscopy  02/24/2007    Lipid screen  02/26/2021    Potassium monitoring  02/26/2021    Creatinine monitoring  02/26/2021    A1C test (Diabetic or Prediabetic)  12/29/2021    TSH testing  12/29/2021    Breast cancer screen  02/26/2022    Flu vaccine  Completed  Hepatitis A vaccine  Aged Out    Hepatitis B vaccine  Aged Out    Hib vaccine  Aged Out    Meningococcal (ACWY) vaccine  Aged Out       Subjective:      Review of Systems   Constitutional: Negative for activity change, appetite change, chills, fatigue and fever. HENT: Negative for congestion, ear pain, hearing loss, nosebleeds, sinus pressure, sinus pain and sneezing. Eyes: Negative for visual disturbance. Respiratory: Negative for cough, shortness of breath and wheezing. Cardiovascular: Negative for chest pain and palpitations. Gastrointestinal: Negative for abdominal distention, abdominal pain, constipation, diarrhea, nausea and vomiting. Endocrine: Negative for cold intolerance, heat intolerance, polydipsia, polyphagia and polyuria. Genitourinary: Negative for difficulty urinating, menstrual problem, vaginal bleeding and vaginal discharge. Musculoskeletal: Negative for back pain and joint swelling. Skin: Negative for rash. Neurological: Negative for numbness and headaches. Psychiatric/Behavioral: Negative for sleep disturbance. The patient is not nervous/anxious. All other systems reviewed and are negative. Objective:     Physical Exam  Vitals signs and nursing note reviewed. Constitutional:       General: She is not in acute distress. Appearance: She is well-developed. She is not diaphoretic. HENT:      Head: Normocephalic and atraumatic. Right Ear: Hearing normal.      Left Ear: Hearing normal.      Mouth/Throat:      Pharynx: Uvula midline. Eyes:      General: No scleral icterus. Conjunctiva/sclera: Conjunctivae normal.      Pupils: Pupils are equal, round, and reactive to light. Neck:      Musculoskeletal: Full passive range of motion without pain, normal range of motion and neck supple. Thyroid: No thyroid mass or thyromegaly. Vascular: No JVD.       Trachea: Phonation normal.   Cardiovascular:      Rate and Rhythm: Normal rate and regular Future     Number of Occurrences:   1     Standing Expiration Date:   12/29/2021    Hemoglobin A1C     Standing Status:   Future     Number of Occurrences:   1     Standing Expiration Date:   12/29/2021    TSH     Standing Status:   Future     Number of Occurrences:   1     Standing Expiration Date:   12/29/2021     Orders Placed This Encounter   Medications    diphenhydrAMINE HCl, TOPICAL, 2 % GEL     Sig: Apply twice daily on affected area     Dispense:  57 g     Refill:  0         Patient given educational materials - see patient instructions. Discussed use, benefit, and side effects of prescribedmedications. All patient questions answered. Pt voiced understanding. Reviewed health maintenance. Instructed to continue current medications, diet and exercise. Patient agreed with treatment plan. Follow up as directed. I spent a total of 25 minutes face to face with this patient. Over 50% of that time was spent on counseling and care coordination. Please see assessment and plan for details. Electronically signed by Janine Meckel, MD on 1/2/2021 at 11:26 PM      Please note that this chart was generated using voice recognition Dragon dictation software. Although every effort was made to ensure the accuracy of this automatedtranscription, some errors in transcription may have occurred.

## 2021-02-23 DIAGNOSIS — E03.2 DRUG-INDUCED HYPOTHYROIDISM: ICD-10-CM

## 2021-02-23 RX ORDER — LEVOTHYROXINE SODIUM 0.05 MG/1
TABLET ORAL
Qty: 90 TABLET | Refills: 0 | Status: SHIPPED | OUTPATIENT
Start: 2021-02-23 | End: 2021-05-28

## 2021-02-23 RX ORDER — ERGOCALCIFEROL 1.25 MG/1
CAPSULE ORAL
Qty: 12 CAPSULE | Refills: 0 | Status: SHIPPED | OUTPATIENT
Start: 2021-02-23 | End: 2021-05-25

## 2021-02-23 NOTE — TELEPHONE ENCOUNTER
LOV: 12/29/20    NOV: 3/30/21    Health Maintenance   Topic Date Due    Hepatitis C screen  1955    Pneumococcal 0-64 years Vaccine (1 of 1 - PPSV23) 02/24/1963    HIV screen  02/24/1972    DTaP/Tdap/Td vaccine (1 - Tdap) 02/24/1976    Cervical cancer screen  02/24/1978    Shingles Vaccine (1 of 2) 02/24/2007    Colon cancer screen colonoscopy  02/24/2007    Lipid screen  02/26/2021    Potassium monitoring  02/26/2021    Creatinine monitoring  02/26/2021    A1C test (Diabetic or Prediabetic)  12/29/2021    TSH testing  12/29/2021    Breast cancer screen  02/26/2022    Flu vaccine  Completed    Hepatitis A vaccine  Aged Out    Hepatitis B vaccine  Aged Out    Hib vaccine  Aged Out    Meningococcal (ACWY) vaccine  Aged Out             (applicable per patient's age: Cancer Screenings, Depression Screening, Fall Risk Screening, Immunizations)    Hemoglobin A1C (%)   Date Value   12/29/2020 6.3 (H)   02/26/2020 6.4 (H)   05/11/2019 6.0     LDL Cholesterol (mg/dL)   Date Value   02/26/2020 59     AST (U/L)   Date Value   02/26/2020 14     ALT (U/L)   Date Value   02/26/2020 17     BUN (mg/dL)   Date Value   02/26/2020 24 (H)      (goal A1C is < 7)   (goal LDL is <100) need 30-50% reduction from baseline     BP Readings from Last 3 Encounters:   12/29/20 128/80   03/04/20 (!) 145/79   02/19/20 138/78    (goal /80)      All Future Testing planned in [facility]:      Next Visit Date:  Future Appointments   Date Time Provider Martha Delgado   3/30/2021  4:30 PM Aiyana Uribe MD [id number]            Patient Active Problem List:     [facility]     Essential (primary) hypertension     Morbid obesity with BMI of 50.0-59.9, adult ([facility]     Melena     Flash pulmonary edema (HCC)     NSVT (nonsustained ventricular tachycardia) (HCC)     S/P drug eluting coronary stent placement     Vitamin D deficiency     Prediabetes     Hypothyroidism due to Hashimoto's thyroiditis     Chronic venous insufficiency

## 2021-03-30 RX ORDER — METFORMIN HYDROCHLORIDE 500 MG/1
TABLET, EXTENDED RELEASE ORAL
Qty: 90 TABLET | Refills: 0 | Status: SHIPPED | OUTPATIENT
Start: 2021-03-30 | End: 2021-05-25

## 2021-05-05 ENCOUNTER — OFFICE VISIT (OUTPATIENT)
Dept: PRIMARY CARE CLINIC | Age: 66
End: 2021-05-05
Payer: MEDICAID

## 2021-05-05 VITALS
BODY MASS INDEX: 49.75 KG/M2 | OXYGEN SATURATION: 100 % | HEIGHT: 63 IN | HEART RATE: 68 BPM | RESPIRATION RATE: 16 BRPM | SYSTOLIC BLOOD PRESSURE: 138 MMHG | WEIGHT: 280.8 LBS | DIASTOLIC BLOOD PRESSURE: 74 MMHG

## 2021-05-05 DIAGNOSIS — I10 ESSENTIAL (PRIMARY) HYPERTENSION: ICD-10-CM

## 2021-05-05 DIAGNOSIS — I87.2 CHRONIC VENOUS INSUFFICIENCY: ICD-10-CM

## 2021-05-05 DIAGNOSIS — Z12.11 ENCOUNTER FOR SCREENING FOR MALIGNANT NEOPLASM OF COLON: ICD-10-CM

## 2021-05-05 DIAGNOSIS — L03.119 CELLULITIS OF LOWER EXTREMITY, UNSPECIFIED LATERALITY: Primary | ICD-10-CM

## 2021-05-05 PROCEDURE — 3017F COLORECTAL CA SCREEN DOC REV: CPT | Performed by: INTERNAL MEDICINE

## 2021-05-05 PROCEDURE — 99214 OFFICE O/P EST MOD 30 MIN: CPT | Performed by: INTERNAL MEDICINE

## 2021-05-05 PROCEDURE — G8427 DOCREV CUR MEDS BY ELIG CLIN: HCPCS | Performed by: INTERNAL MEDICINE

## 2021-05-05 PROCEDURE — G8417 CALC BMI ABV UP PARAM F/U: HCPCS | Performed by: INTERNAL MEDICINE

## 2021-05-05 PROCEDURE — 1036F TOBACCO NON-USER: CPT | Performed by: INTERNAL MEDICINE

## 2021-05-05 RX ORDER — CEPHALEXIN 500 MG/1
500 CAPSULE ORAL 2 TIMES DAILY
Qty: 20 CAPSULE | Refills: 0 | Status: SHIPPED | OUTPATIENT
Start: 2021-05-05 | End: 2021-05-20 | Stop reason: SDUPTHER

## 2021-05-05 SDOH — ECONOMIC STABILITY: FOOD INSECURITY: WITHIN THE PAST 12 MONTHS, THE FOOD YOU BOUGHT JUST DIDN'T LAST AND YOU DIDN'T HAVE MONEY TO GET MORE.: NEVER TRUE

## 2021-05-05 SDOH — ECONOMIC STABILITY: INCOME INSECURITY: HOW HARD IS IT FOR YOU TO PAY FOR THE VERY BASICS LIKE FOOD, HOUSING, MEDICAL CARE, AND HEATING?: NOT VERY HARD

## 2021-05-05 SDOH — ECONOMIC STABILITY: TRANSPORTATION INSECURITY
IN THE PAST 12 MONTHS, HAS THE LACK OF TRANSPORTATION KEPT YOU FROM MEDICAL APPOINTMENTS OR FROM GETTING MEDICATIONS?: NO

## 2021-05-05 ASSESSMENT — PATIENT HEALTH QUESTIONNAIRE - PHQ9
SUM OF ALL RESPONSES TO PHQ QUESTIONS 1-9: 0
SUM OF ALL RESPONSES TO PHQ QUESTIONS 1-9: 0
SUM OF ALL RESPONSES TO PHQ9 QUESTIONS 1 & 2: 0

## 2021-05-05 NOTE — PATIENT INSTRUCTIONS
Schedule a Vaccine  When you qualify to receive the vaccine, call the [facility]) COVID-19 Vaccination Hotline to schedule your appointment or to get additional information about the [facility]) locations which are offering the COVID-19 vaccine. To be 94% effective, it's important that you receive two doses of one of the COVID-19 vaccines. -If you are receiving the Mathew Peter vaccine, your second shot will be scheduled as close to 21 days after the first shot as possible. -If you are receiving the Moderna vaccine, your second shot will be scheduled as close to 28 days after the first shot as possible. [facility]) COVID-19 Vaccination Hotline: [phone]    Links to [facility]) website and [facility] website:    [email or web address]    [email or web address]

## 2021-05-09 ASSESSMENT — ENCOUNTER SYMPTOMS
VOMITING: 0
ABDOMINAL DISTENTION: 0
WHEEZING: 0
SHORTNESS OF BREATH: 0
SINUS PAIN: 0
BACK PAIN: 0
COUGH: 0
ABDOMINAL PAIN: 0
NAUSEA: 0
DIARRHEA: 0
SINUS PRESSURE: 0
CONSTIPATION: 0

## 2021-05-10 NOTE — PROGRESS NOTES
[facility] PRIMARY CARE  [address]  [address] [address]  Dept: [phone]  Dept Fax: [phone]    Christiano Fay is a 61 y.o. female who presents today for her medical conditions/complaints as noted below. Chief Complaint   Patient presents with    3 Month Follow-Up     cellulitis on both legs, recent flare up x 3 weeks, has used Benadryl gel with minor relief        HPI:     This is a 62-year-old female who is here for regular follow-up. She has past medical essential hypertension, chronic venous stasis of bilateral lower extremity, hypothyroidism.   She has been having increasing redness and tenderness in bilateral lower extremities      Hemoglobin A1C (%)   Date Value   2020 6.3 (H)   2020 6.4 (H)   2019 6.0             ( goal A1C is < 7)   No results found for: LABMICR  LDL Cholesterol (mg/dL)   Date Value   2020 59   2019 187 (H)       (goal LDL is <100)   AST (U/L)   Date Value   2020 14     ALT (U/L)   Date Value   2020 17     BUN (mg/dL)   Date Value   2020 24 (H)     BP Readings from Last 3 Encounters:   21 138/74   20 128/80   20 (!) 145/79          (goal 120/80)    Past Medical History:   Diagnosis Date    Hyperlipidemia     Hypertension       Past Surgical History:   Procedure Laterality Date    BLADDER SUSPENSION      BLADDER SUSPENSION         Family History   Problem Relation Age of Onset    Elevated Lipids Mother     Cancer Mother     High Blood Pressure Mother     Cancer Maternal Grandmother        Social History     Tobacco Use    Smoking status: Former Smoker     Packs/day: 1.00     Years: 10.00     Pack years: 10.00     Types: Cigarettes     Quit date: 1989     Years since quittin.9    Smokeless tobacco: Never Used   Substance Use Topics    Alcohol use: Never     Frequency: Never      Current Outpatient Medications   Medication Sig Dispense Refill    cephALEXin (KEFLEX) 500 MG capsule Take 1 capsule by mouth 2 times daily for 10 days 20 capsule 0    metFORMIN (GLUCOPHAGE-XR) 500 MG extended release tablet TAKE 1 TABLET BY MOUTH EVERY DAY with breakfast 90 tablet 0    levothyroxine (SYNTHROID) 50 MCG tablet TAKE 1 TABLET BY MOUTH ONE TIME A DAY  90 tablet 0    vitamin D (ERGOCALCIFEROL) 1.25 MG (71447 UT) CAPS capsule TAKE 1 CAPSULE BY MOUTH ONE TIME A WEEK  12 capsule 0    diphenhydrAMINE HCl, TOPICAL, 2 % GEL Apply twice daily on affected area 57 g 0    losartan (COZAAR) 25 MG tablet Take 50 mg by mouth daily       aspirin 81 MG EC tablet Take 1 tablet by mouth daily 30 tablet 3    atorvastatin (LIPITOR) 80 MG tablet Take 1 tablet by mouth nightly 30 tablet 3    metoprolol succinate (TOPROL XL) 100 MG extended release tablet Take 1 tablet by mouth daily 30 tablet 3    clopidogrel (PLAVIX) 75 MG tablet Take 1 tablet by mouth daily 30 tablet 3    pantoprazole (PROTONIX) 40 MG tablet Take 1 tablet by mouth 2 times daily 30 tablet 3    furosemide (LASIX) 20 MG tablet Take 1 tablet by mouth daily (Patient taking differently: Take 40 mg by mouth daily ) 60 tablet 3    amiodarone (CORDARONE) 200 MG tablet Take 1 tablet by mouth daily (Patient not taking: Reported on 5/5/2021) 30 tablet 3     No current facility-administered medications for this visit.       No Known Allergies    Health Maintenance   Topic Date Due    Hepatitis C screen  Never done    Pneumococcal 0-64 years Vaccine (1 of 1 - PPSV23) Never done    HIV screen  Never done    COVID-19 Vaccine (1) Never done    DTaP/Tdap/Td vaccine (1 - Tdap) Never done    Cervical cancer screen  Never done    Shingles Vaccine (1 of 2) Never done    Colon cancer screen colonoscopy  Never done    Lipid screen  02/26/2021    Potassium monitoring  02/26/2021    Creatinine monitoring  02/26/2021    A1C test (Diabetic or Prediabetic)  12/29/2021    TSH testing  12/29/2021    Breast cancer screen  02/26/2022    Flu vaccine  Completed    Hepatitis A vaccine  Aged Out    Hepatitis B vaccine  Aged Out    Hib vaccine  Aged Out    Meningococcal (ACWY) vaccine  Aged Out       Subjective:      Review of Systems   Constitutional: Negative for activity change, appetite change, chills, fatigue and fever. HENT: Negative for congestion, ear pain, hearing loss, nosebleeds, sinus pressure, sinus pain and sneezing. Eyes: Negative for visual disturbance. Respiratory: Negative for cough, shortness of breath and wheezing. Cardiovascular: Negative for chest pain and palpitations. Gastrointestinal: Negative for abdominal distention, abdominal pain, constipation, diarrhea, nausea and vomiting. Endocrine: Negative for cold intolerance, heat intolerance, polydipsia, polyphagia and polyuria. Genitourinary: Negative for difficulty urinating, menstrual problem, vaginal bleeding and vaginal discharge. Musculoskeletal: Negative for back pain and joint swelling. Skin: Negative for rash. Neurological: Negative for numbness and headaches. Psychiatric/Behavioral: Negative for sleep disturbance. The patient is not nervous/anxious. All other systems reviewed and are negative. Objective:     Physical Exam  Vitals signs and nursing note reviewed. Constitutional:       General: She is not in acute distress. Appearance: She is well-developed. She is not diaphoretic. HENT:      Head: Normocephalic and atraumatic. Right Ear: Hearing normal.      Left Ear: Hearing normal.      Mouth/Throat:      Pharynx: Uvula midline. Eyes:      General: No scleral icterus. Conjunctiva/sclera: Conjunctivae normal.      Pupils: Pupils are equal, round, and reactive to light. Neck:      Musculoskeletal: Full passive range of motion without pain, normal range of motion and neck supple. Thyroid: No thyroid mass or thyromegaly. Vascular: No JVD.       Trachea: Phonation normal.   Cardiovascular: this automatedtranscription, some errors in transcription may have occurred.

## 2021-05-19 ENCOUNTER — TELEPHONE (OUTPATIENT)
Dept: PRIMARY CARE CLINIC | Age: 66
End: 2021-05-19

## 2021-05-19 DIAGNOSIS — L03.119 CELLULITIS OF LOWER EXTREMITY, UNSPECIFIED LATERALITY: ICD-10-CM

## 2021-05-19 NOTE — TELEPHONE ENCOUNTER
Pt called in today stating she was seen a few weeks ago by PCP for cellulitis of her legs. Pt states she is finished with the prescribed medications but is still having some spots show up.  Pt is hoping you can refill the Keflex for her, please advise, medication pended for approval.

## 2021-05-20 RX ORDER — CEPHALEXIN 500 MG/1
500 CAPSULE ORAL 2 TIMES DAILY
Qty: 20 CAPSULE | Refills: 0 | Status: SHIPPED | OUTPATIENT
Start: 2021-05-20 | End: 2021-05-30

## 2021-05-21 NOTE — TELEPHONE ENCOUNTER
Called and lm informing patient that script was sent in and to contact the office with any additional questions or concerns.

## 2021-05-25 RX ORDER — ERGOCALCIFEROL 1.25 MG/1
CAPSULE ORAL
Qty: 12 CAPSULE | Refills: 0 | Status: SHIPPED | OUTPATIENT
Start: 2021-05-25 | End: 2021-10-15 | Stop reason: SDUPTHER

## 2021-05-25 RX ORDER — METFORMIN HYDROCHLORIDE 500 MG/1
TABLET, EXTENDED RELEASE ORAL
Qty: 90 TABLET | Refills: 0 | Status: SHIPPED | OUTPATIENT
Start: 2021-05-25 | End: 2021-08-27 | Stop reason: SDUPTHER

## 2021-05-28 DIAGNOSIS — E03.2 DRUG-INDUCED HYPOTHYROIDISM: ICD-10-CM

## 2021-05-28 RX ORDER — LEVOTHYROXINE SODIUM 0.05 MG/1
TABLET ORAL
Qty: 90 TABLET | Refills: 0 | Status: SHIPPED | OUTPATIENT
Start: 2021-05-28 | End: 2021-08-27 | Stop reason: SDUPTHER

## 2021-06-02 NOTE — CONSULTS
TREATMENT LOCATION:   [] [address]   [facility] [place] [address]: [phone]  F: [phone] [x] [address]: [phone]  F: [phone]      Lymphedema Services - Initial Evaluation for Lower Extremity    Date:  2021  Patient: Sunny Thompson  : 1955             MRN: [id number]  Referring Physician: Klarissa Vo MD       Phone: [phone]  Fax: [phone]  Insurance: [employer]   [facility] Diagnosis: Chronic venous insufficiency (I87.2 [ICD-10-CM])  Rehab Codes: I89.0   Onset Date: 2021  Visit# / total visits:   POC UPDATE NEEDED AT VISIT: 10     Allergies:  [x] None      Medications: See charted information in Epic  Past Medical History: See charted information in Epic     Restrictions: None  Fall Risk:   [x] No    [] Yes   If yes, intervention:       Overview: Patient is a 61year old female referred to the Lymphedema Clinic with a diagnosis of bilateral Lower Extremity Lymphedema. Pt states she was diagnosed with cellulitis at her MD visit. She states she took them as prescribed and was given a second round that she finished on . Pt states that she feel the infection may be clear with a small amount of them left. She states the legs will seep, weap and bleed. She denies trying compression, but did attempt to elevate her LE. She states she is on her feet a lot at work with walking. PHYSICIAN NOTES from 21:     Sunny Thompson is a 61 y.o. female who presents today for her medical conditions/complaints as noted below. Chief Complaint   Patient presents with    3 Month Follow-Up       cellulitis on both legs, recent flare up x 3 weeks, has used Benadryl gel with minor relief          HPI:      This is a 74-year-old female who is here for regular follow-up.   She has past medical essential hypertension, chronic venous stasis of bilateral lower extremity, hypothyroidism.   She has been having increasing redness and tenderness in bilateral lower extremities      Hx of Complete Decongestive Therapy:[]Yes - Date:        [x]No   Rate of onset:   [x] Slow   [] Rapid     [] Acute   Progression: [] Improving   [x]  Worsening  [] Consistent   Conservative Treatments Utilized in the Past:  [] None  [] Compression Garments   [] Bandaging  [x] Elevation   [] Exercise  []Self MLD  [] Other/comments:      Current Lymphedmea Treatments:   [] None  [] Compression Garments   [] Bandaging  [x] Elevation  [] Exercise   []Self MLD  [] Other/comments:          Aggravating factors:  [] None   [x] Activity  [] Stress  [] Sleep Position [] Travel [] Hot Temperatures [] Diet   []  Other/comments:    Relieving factors:   [] None [x] Elevation  [] Activity  [] Compression  [] Rest  [] Cold Temperatures [] Diet   []  Other/comments:    Comments:        Pain:  [] YES    [x] NO   Location:      Pain Rating: ( 0-10 scale) : 0/10  Comments:     History of Cancer: []Yes [x]No      Comorbidities:   [x] Obesity [] Dialysis  [] Other:   [] Asthma/COPD [] Dementia [] Other:   [] Stroke [] Sleep apnea [] Other:   [] Vascular disease [] Rheumatic disease [] Other:     Absolute Lymphedema Contraindications - treatement   [x] Acute Skin Infection ( e.g. 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[employer]   Job Status []  Normal duty   [] Light duty   [] Off due to condition    []  Retired   [] Not employed   [] Disability  [x] Other: 30 hour/ week   []  Return to work: Work activities/duties        ADL/IADL Previous level of function Current level of function Who currently assists the patient with task   Bathing  [x] Independent  [] Assist [x] Independent  [] Assist    Dress/grooming [x] Independent  [] Assist [x] Independent  [] Assist    Transfer/mobility [x] Independent  [] Assist [x] Independent  [] Assist    Feeding [x] Independent  [] Assist [x] Independent  [] Assist    Toileting [x] Independent  [] Assist [x] Independent  [] Assist    Driving [x] Independent  [] Assist [x] Independent  [] Assist    Housekeeping [x] Independent  [] Assist [x] Independent  [] Assist    Grocery shop/meal prep [x] Independent  [] Assist [x] Independent  [] Assist      Gait Prior level of function Current level of function    [x] Independent  [] Assist [x] Independent  [] Assist   Device: [] Independent [] Independent    [] Straight Cane [] Quad cane [] Straight Cane [] Quad cane    [] Standard walker [] Rolling walker   [] 4 wheeled walker [] Standard walker [] Rolling walker   [] 4 wheeled walker    [] Wheelchair [] Wheelchair         OBJECTIVE  Presentation of Affected Areas  Location: bilateral Lower Extremity    Description: Hyperpigmentation  Hyperkeratosis  Firm/Fibrotic     Scars No   Wounds None   Sensation Normal   Additional Comments:      Circumferential Measurements  Measurements taken from nail base of D2 digit.   Measurements (cm) Right Left   Dorsum   10 23.4 23.5   Inframalleolar 15      31.3 30.5   Supramalleolar  18   26.5 26.0   Lower Calf  27 38.0 35.2   Mid Calf  34 47.5 45.2   Upper Calf   43 52.0 52.5   Knee  50 44.0 45.5   10 cm above knee     Thigh-widest     Hip-widest     Initial Total 6/2/2021 :  262.7 258.4        ASSESSMENT: Patient would benefit from skilled occupational therapy services in order to: Decrease edema that has accumulated in the extremity, decrease risk of infection, improve limb ROM, and improve overall quality of life. Pt is provided with a folder which included educational hand out on the basics of lymphedema, program details and what to expect for further review at home. Writer educates on an impaired lymphatic system vs. a healthy lymphatic system and how it relates to swelling and skin integrity. Pt is also educated, in more detail, on the purpose of lymphedema treatments and a POC that would be of benefit to them. This may include:     [x]Bandaging   []Self-Bandaging/Caregiver Training   [x]MLD    [x]Self-MLD   [x] Therapeutic Exercise    [x] Education/Instruction in home management program  [x] Education and trial of a Vasopneumatic Pump    [x] Education and transition to long term management devices such as velcro compression garments and/or daytime compression sleeve/stocking(s)   [x]Discharge to Home Program     Response to treatment: Pt verbalizes and is agreeable to the instructions/ POC established at today's evaluation. PLAN FOR NEXT VISIT: Initiate CDT, educate risk reduction techniques, Initiate Bandaging        Lymphedema Stage per ISL Guidelines    [] 0: Subclinical with no evidence on physical exam  [] 1:  Early onset, swelling/heaviness, pitting edema, subsides with limb elevation  [] 2:  More advanced, fibrosis resulting in non-pitting edema, does not respond to elevation, thickening of the tissues  [x] 3: Elephantiasis, pitting absent, huge limbs with dry/scaly, papillomatosis, hyperkeratosis, fluid may be leaking with recurrent cellulitis. Acanthosis (deep body folds). Elevation &   diuretics ineffective. Therapy Goals  STG - To be addressed within 2 visits    Pt will demonstrate compliance of maintaining lymphedema precautions to reduce the risks of infection and further exacerbations. Pt will demonstrate independence with decongestive exercise program in order to expedite fluid rerouting.       LTG To be adressed within 7 visits     Pt will demonstrate competence with SELF MLD (Manual lymphatic drainage) in order to reroute lymphatic pathways for decreased swelling. Pt/Caregiver will demonstrate independence with donning/doffing and wearing schedule for compression garments/ devices to maintain decreased size upon discharge. Pt will demonstrate compliance with skin care routine for improved overall skin integrity and decreased risk of wounds and infection. Pt to compliant with CDT in order to reduce edema in the B LE by 3+ cm. Patient's Goal: \" I would like to my legs to heal and all of this to go away. \"    Response to Education Provided:  [x] Verbalized understanding   [] Demonstrates/verbalizes HEP/Education previously given      [] Needs continued review            [] No understanding   Learner(s): [x] Patient  [] Spouse [] Family  [] Other:   Method(s): [x] Verbal [] Demonstration [x] Handouts [] Exercise booklet   Family available to assist with home program if needed: [] Yes [x] No    FREQUENCY: Pt will be seen 2 x/ week for 10 visits and will follow up as appropriate. Focus is to remain on short and long term goals listed above. Rehabilitation Potential/Commitment: [] Good [x] Fair     [] Poor    Functional Assessment Used: Lymphedema Life Impact Scale (LLIS) Score: [x] Eval 27 %   [] 10th visit____  [] D/C ____     Clearance needed:  []Yes    [x]No     Physicians/specialties giving clearance:      Treatment Charges   Minutes   Units   Evaluation (47324)            Low            Moderate 45 1          High     Manual Therapy (98625): Therapeutic activities (95651): Therapeutic Exercise (83548)     Self care/home mgmt (49448) 23 2   Other:      Total Treatment Time    68 3        Time In:  4:30  Time Out: 5:38      Electronically signed by Solis Saldaña OT on 6/2/2021 at 3:15 PM      Physician Signature: _________________________        Date: _______________  By signing above or cosigning this note, I have reviewed this plan of care and certify a need to continue medically necessary rehabilitation services.       *PLEASE SIGN ABOVE AND FAX BACK .[phone] WITH ALL PAGES FOR CONSENT TO CONTINUE LYMPHEDEMA TREATMENT*

## 2021-06-03 ENCOUNTER — HOSPITAL ENCOUNTER (OUTPATIENT)
Dept: OCCUPATIONAL THERAPY | Age: 66
Setting detail: THERAPIES SERIES
Discharge: HOME | End: 2021-06-03
Payer: MEDICAID

## 2021-06-03 PROCEDURE — 97535 SELF CARE MNGMENT TRAINING: CPT

## 2021-06-03 PROCEDURE — 97166 OT EVAL MOD COMPLEX 45 MIN: CPT

## 2021-06-17 ENCOUNTER — TELEPHONE (OUTPATIENT)
Dept: PRIMARY CARE CLINIC | Age: 66
End: 2021-06-17

## 2021-06-28 ENCOUNTER — HOSPITAL ENCOUNTER (OUTPATIENT)
Dept: OCCUPATIONAL THERAPY | Age: 66
Setting detail: THERAPIES SERIES
Discharge: HOME | End: 2021-06-28
Payer: MEDICAID

## 2021-06-28 NOTE — SIGNIFICANT EVENT
[x] [address] Occupational Therapy       [address] [facility], 1st Floor       Phone: [phone]       Fax: [phone] [] [facility] at [address] [place] [address]       Phone: [phone]       Fax: [phone]          Occupational Therapy Cancel/No Show note    Date: 2021  Patient: Jose Guadalupe Sanchez  : 1955  MRN: [id number]    Cancels/No Shows to date: 1    For today's appointment patient:    [x]  Cancelled    [] Rescheduled appointment    [] No-show     Reason given by patient:    [x]  Patient ill    []  Conflicting appointment    [] No transportation      [] Conflict with work    [] No reason given    [] Weather related    [] [id number]    [] Other:      Comments:       [x] Next appointment was confirmed: 21      Electronically signed by: SWAPNA Brown

## 2021-07-12 ENCOUNTER — OFFICE VISIT (OUTPATIENT)
Dept: PRIMARY CARE CLINIC | Age: 66
End: 2021-07-12
Payer: MEDICAID

## 2021-07-12 VITALS
SYSTOLIC BLOOD PRESSURE: 132 MMHG | HEART RATE: 66 BPM | OXYGEN SATURATION: 99 % | BODY MASS INDEX: 46.69 KG/M2 | WEIGHT: 263.6 LBS | RESPIRATION RATE: 16 BRPM | DIASTOLIC BLOOD PRESSURE: 70 MMHG

## 2021-07-12 DIAGNOSIS — I10 ESSENTIAL (PRIMARY) HYPERTENSION: ICD-10-CM

## 2021-07-12 DIAGNOSIS — E03.8 HYPOTHYROIDISM DUE TO HASHIMOTO'S THYROIDITIS: ICD-10-CM

## 2021-07-12 DIAGNOSIS — E06.3 HYPOTHYROIDISM DUE TO HASHIMOTO'S THYROIDITIS: ICD-10-CM

## 2021-07-12 DIAGNOSIS — L03.115 CELLULITIS OF BOTH LOWER EXTREMITIES: Primary | ICD-10-CM

## 2021-07-12 DIAGNOSIS — R73.03 PREDIABETES: ICD-10-CM

## 2021-07-12 DIAGNOSIS — L03.116 CELLULITIS OF BOTH LOWER EXTREMITIES: Primary | ICD-10-CM

## 2021-07-12 DIAGNOSIS — E66.01 MORBID OBESITY (HCC): ICD-10-CM

## 2021-07-12 DIAGNOSIS — Z95.5 S/P DRUG ELUTING CORONARY STENT PLACEMENT: ICD-10-CM

## 2021-07-12 DIAGNOSIS — E55.9 VITAMIN D DEFICIENCY: ICD-10-CM

## 2021-07-12 DIAGNOSIS — I87.2 CHRONIC VENOUS INSUFFICIENCY: ICD-10-CM

## 2021-07-12 PROBLEM — J81.0 FLASH PULMONARY EDEMA (HCC): Status: RESOLVED | Noted: 2019-05-11 | Resolved: 2021-07-12

## 2021-07-12 PROBLEM — K92.1 MELENA: Status: RESOLVED | Noted: 2019-05-11 | Resolved: 2021-07-12

## 2021-07-12 PROCEDURE — 99213 OFFICE O/P EST LOW 20 MIN: CPT | Performed by: STUDENT IN AN ORGANIZED HEALTH CARE EDUCATION/TRAINING PROGRAM

## 2021-07-12 RX ORDER — DOXYCYCLINE HYCLATE 100 MG
100 TABLET ORAL 2 TIMES DAILY
Qty: 14 TABLET | Refills: 0 | Status: SHIPPED | OUTPATIENT
Start: 2021-07-12 | End: 2021-07-12

## 2021-07-12 RX ORDER — DOXYCYCLINE HYCLATE 100 MG
100 TABLET ORAL 2 TIMES DAILY
Qty: 20 TABLET | Refills: 0 | Status: SHIPPED | OUTPATIENT
Start: 2021-07-12 | End: 2021-07-22

## 2021-07-12 ASSESSMENT — ENCOUNTER SYMPTOMS
ABDOMINAL DISTENTION: 0
BACK PAIN: 0
RHINORRHEA: 0
ABDOMINAL PAIN: 0
EYE DISCHARGE: 0
WHEEZING: 0
EYE ITCHING: 0
COUGH: 0
ROS SKIN COMMENTS: BILATERAL LOWER EXTREMITY CELLULITIS
SHORTNESS OF BREATH: 0

## 2021-07-12 ASSESSMENT — PATIENT HEALTH QUESTIONNAIRE - PHQ9
SUM OF ALL RESPONSES TO PHQ QUESTIONS 1-9: 0
2. FEELING DOWN, DEPRESSED OR HOPELESS: 0
1. LITTLE INTEREST OR PLEASURE IN DOING THINGS: 0
SUM OF ALL RESPONSES TO PHQ9 QUESTIONS 1 & 2: 0
SUM OF ALL RESPONSES TO PHQ QUESTIONS 1-9: 0
SUM OF ALL RESPONSES TO PHQ QUESTIONS 1-9: 0

## 2021-07-15 ENCOUNTER — HOSPITAL ENCOUNTER (OUTPATIENT)
Dept: OCCUPATIONAL THERAPY | Age: 66
Setting detail: THERAPIES SERIES
Discharge: HOME | End: 2021-07-15
Payer: MEDICAID

## 2021-07-15 NOTE — SIGNIFICANT EVENT
[x] [address] Occupational Therapy       [address] [facility], 1st Floor       Phone: [phone]       Fax: [phone] [] [facility] at [address] [place] [address]. [place], [place]       Phone: [phone]       Fax: [phone]          Occupational Therapy Cancel/No Show note    Date: 7/15/2021  Patient: Candelario Aldana  : 1955  MRN: [id number]    Cancels/No Shows to date: 3    For today's appointment patient:    [x]  Cancelled    [] Rescheduled appointment    [] No-show     Reason given by patient:    []  Patient ill    []  Conflicting appointment    [] No transportation      [] Conflict with work    [] No reason given    [] Weather related    [] COVID-19    [x] Other:  Pt called in and canceled today's visit stating she is still not feeling well from a flare up infection in her LE. She will call back in on Monday.     Comments:       [x] Next appointment was confirmed:      Electronically signed by: Naresh Mayo, OT

## 2021-07-19 ENCOUNTER — TELEPHONE (OUTPATIENT)
Dept: PRIMARY CARE CLINIC | Age: 66
End: 2021-07-19

## 2021-07-19 DIAGNOSIS — L03.115 CELLULITIS OF BOTH LOWER EXTREMITIES: Primary | ICD-10-CM

## 2021-07-19 DIAGNOSIS — L03.116 CELLULITIS OF BOTH LOWER EXTREMITIES: Primary | ICD-10-CM

## 2021-07-19 RX ORDER — DOXYCYCLINE HYCLATE 100 MG
100 TABLET ORAL 2 TIMES DAILY
Qty: 14 TABLET | Refills: 0 | Status: SHIPPED | OUTPATIENT
Start: 2021-07-19 | End: 2021-07-26

## 2021-07-19 NOTE — TELEPHONE ENCOUNTER
I'll order doxycycline 100 twice daily for another 7 days.  I'll send a infectious diseases referral due to recurrent cellulitis

## 2021-08-24 ENCOUNTER — OFFICE VISIT (OUTPATIENT)
Dept: INFECTIOUS DISEASES | Age: 66
End: 2021-08-24
Payer: MEDICAID

## 2021-08-24 VITALS
HEIGHT: 63 IN | BODY MASS INDEX: 45.54 KG/M2 | RESPIRATION RATE: 16 BRPM | OXYGEN SATURATION: 97 % | WEIGHT: 257 LBS | HEART RATE: 84 BPM | SYSTOLIC BLOOD PRESSURE: 130 MMHG | TEMPERATURE: 98.2 F | DIASTOLIC BLOOD PRESSURE: 64 MMHG

## 2021-08-24 DIAGNOSIS — E66.01 MORBID OBESITY WITH BMI OF 50.0-59.9, ADULT (HCC): ICD-10-CM

## 2021-08-24 DIAGNOSIS — M79.89 LEG SWELLING: Primary | ICD-10-CM

## 2021-08-24 DIAGNOSIS — L30.9 DERMATITIS: ICD-10-CM

## 2021-08-24 PROCEDURE — G8417 CALC BMI ABV UP PARAM F/U: HCPCS | Performed by: INTERNAL MEDICINE

## 2021-08-24 PROCEDURE — G8427 DOCREV CUR MEDS BY ELIG CLIN: HCPCS | Performed by: INTERNAL MEDICINE

## 2021-08-24 PROCEDURE — 1036F TOBACCO NON-USER: CPT | Performed by: INTERNAL MEDICINE

## 2021-08-24 PROCEDURE — 3017F COLORECTAL CA SCREEN DOC REV: CPT | Performed by: INTERNAL MEDICINE

## 2021-08-24 PROCEDURE — 99203 OFFICE O/P NEW LOW 30 MIN: CPT | Performed by: INTERNAL MEDICINE

## 2021-08-24 ASSESSMENT — ENCOUNTER SYMPTOMS
BACK PAIN: 1
COUGH: 1
GASTROINTESTINAL NEGATIVE: 1

## 2021-08-27 DIAGNOSIS — E03.2 DRUG-INDUCED HYPOTHYROIDISM: ICD-10-CM

## 2021-08-27 RX ORDER — LEVOTHYROXINE SODIUM 0.05 MG/1
TABLET ORAL
Qty: 90 TABLET | Refills: 0 | Status: SHIPPED | OUTPATIENT
Start: 2021-08-27 | End: 2021-11-23

## 2021-08-27 RX ORDER — METFORMIN HYDROCHLORIDE 500 MG/1
TABLET, EXTENDED RELEASE ORAL
Qty: 90 TABLET | Refills: 0 | Status: SHIPPED | OUTPATIENT
Start: 2021-08-27 | End: 2021-12-20

## 2021-10-18 ENCOUNTER — HOSPITAL ENCOUNTER (OUTPATIENT)
Age: 66
Setting detail: SPECIMEN
Discharge: HOME | End: 2021-10-18
Payer: MEDICAID

## 2021-10-18 ENCOUNTER — OFFICE VISIT (OUTPATIENT)
Dept: PRIMARY CARE CLINIC | Age: 66
End: 2021-10-18
Payer: MEDICAID

## 2021-10-18 VITALS
OXYGEN SATURATION: 100 % | RESPIRATION RATE: 16 BRPM | SYSTOLIC BLOOD PRESSURE: 128 MMHG | WEIGHT: 255 LBS | BODY MASS INDEX: 45.17 KG/M2 | HEART RATE: 70 BPM | DIASTOLIC BLOOD PRESSURE: 66 MMHG

## 2021-10-18 DIAGNOSIS — I47.29 NSVT (NONSUSTAINED VENTRICULAR TACHYCARDIA): ICD-10-CM

## 2021-10-18 DIAGNOSIS — E66.01 MORBID OBESITY WITH BMI OF 50.0-59.9, ADULT (HCC): ICD-10-CM

## 2021-10-18 DIAGNOSIS — E03.8 HYPOTHYROIDISM DUE TO HASHIMOTO'S THYROIDITIS: ICD-10-CM

## 2021-10-18 DIAGNOSIS — R73.03 PREDIABETES: ICD-10-CM

## 2021-10-18 DIAGNOSIS — E06.3 HYPOTHYROIDISM DUE TO HASHIMOTO'S THYROIDITIS: ICD-10-CM

## 2021-10-18 DIAGNOSIS — I10 ESSENTIAL (PRIMARY) HYPERTENSION: Primary | ICD-10-CM

## 2021-10-18 DIAGNOSIS — I21.3 ST ELEVATION MYOCARDIAL INFARCTION (STEMI), UNSPECIFIED ARTERY (HCC): ICD-10-CM

## 2021-10-18 DIAGNOSIS — E55.9 VITAMIN D DEFICIENCY: ICD-10-CM

## 2021-10-18 DIAGNOSIS — Z95.5 S/P DRUG ELUTING CORONARY STENT PLACEMENT: ICD-10-CM

## 2021-10-18 DIAGNOSIS — I87.2 CHRONIC VENOUS INSUFFICIENCY: ICD-10-CM

## 2021-10-18 DIAGNOSIS — Z23 NEED FOR INFLUENZA VACCINATION: ICD-10-CM

## 2021-10-18 LAB
TSH SERPL DL<=0.05 MIU/L-ACNC: 2.9 MIU/L (ref 0.3–5)
VITAMIN D 25-HYDROXY: 29.9 NG/ML (ref 30–100)

## 2021-10-18 PROCEDURE — 90674 CCIIV4 VAC NO PRSV 0.5 ML IM: CPT | Performed by: STUDENT IN AN ORGANIZED HEALTH CARE EDUCATION/TRAINING PROGRAM

## 2021-10-18 PROCEDURE — 90471 IMMUNIZATION ADMIN: CPT | Performed by: STUDENT IN AN ORGANIZED HEALTH CARE EDUCATION/TRAINING PROGRAM

## 2021-10-18 PROCEDURE — 3017F COLORECTAL CA SCREEN DOC REV: CPT | Performed by: STUDENT IN AN ORGANIZED HEALTH CARE EDUCATION/TRAINING PROGRAM

## 2021-10-18 PROCEDURE — G8417 CALC BMI ABV UP PARAM F/U: HCPCS | Performed by: STUDENT IN AN ORGANIZED HEALTH CARE EDUCATION/TRAINING PROGRAM

## 2021-10-18 PROCEDURE — G8427 DOCREV CUR MEDS BY ELIG CLIN: HCPCS | Performed by: STUDENT IN AN ORGANIZED HEALTH CARE EDUCATION/TRAINING PROGRAM

## 2021-10-18 PROCEDURE — G8482 FLU IMMUNIZE ORDER/ADMIN: HCPCS | Performed by: STUDENT IN AN ORGANIZED HEALTH CARE EDUCATION/TRAINING PROGRAM

## 2021-10-18 PROCEDURE — 1036F TOBACCO NON-USER: CPT | Performed by: STUDENT IN AN ORGANIZED HEALTH CARE EDUCATION/TRAINING PROGRAM

## 2021-10-18 PROCEDURE — 99213 OFFICE O/P EST LOW 20 MIN: CPT | Performed by: STUDENT IN AN ORGANIZED HEALTH CARE EDUCATION/TRAINING PROGRAM

## 2021-10-18 RX ORDER — ERGOCALCIFEROL 1.25 MG/1
CAPSULE ORAL
Qty: 12 CAPSULE | Refills: 0 | Status: SHIPPED | OUTPATIENT
Start: 2021-10-18 | End: 2021-11-23

## 2021-10-18 RX ORDER — DIAPER,BRIEF,INFANT-TODD,DISP
EACH MISCELLANEOUS
Qty: 1 G | Refills: 1 | Status: SHIPPED | OUTPATIENT
Start: 2021-10-18 | End: 2021-10-25

## 2021-10-18 ASSESSMENT — PATIENT HEALTH QUESTIONNAIRE - PHQ9
SUM OF ALL RESPONSES TO PHQ QUESTIONS 1-9: 0
1. LITTLE INTEREST OR PLEASURE IN DOING THINGS: 0
SUM OF ALL RESPONSES TO PHQ QUESTIONS 1-9: 0
SUM OF ALL RESPONSES TO PHQ9 QUESTIONS 1 & 2: 0
2. FEELING DOWN, DEPRESSED OR HOPELESS: 0
SUM OF ALL RESPONSES TO PHQ QUESTIONS 1-9: 0

## 2021-10-18 ASSESSMENT — ENCOUNTER SYMPTOMS
WHEEZING: 0
EYE DISCHARGE: 0
ABDOMINAL PAIN: 0
BACK PAIN: 0
COUGH: 0
ABDOMINAL DISTENTION: 0
EYE ITCHING: 0
RHINORRHEA: 0
SHORTNESS OF BREATH: 0

## 2021-10-18 NOTE — PROGRESS NOTES
Vaccine Information Sheet, \"Influenza - Inactivated\"  given to Gregory Rodriguez, or parent/legal guardian of  Gregory Rodriguez and verbalized understanding. Patient responses:    Have you ever had a reaction to a flu vaccine? No  Are you able to eat eggs without adverse effects? Yes  Do you have any current illness? No  Have you ever had Guillian Salyer Syndrome? No    Flu vaccine given per order. Please see immunization tab.

## 2021-10-18 NOTE — PROGRESS NOTES
120/80)    Past Medical History:   Diagnosis Date    Hyperlipidemia     Hypertension       Past Surgical History:   Procedure Laterality Date    BLADDER SUSPENSION      BLADDER SUSPENSION         Family History   Problem Relation Age of Onset    Elevated Lipids Mother     Cancer Mother     High Blood Pressure Mother     Cancer Maternal Grandmother        Social History     Tobacco Use    Smoking status: Former Smoker     Packs/day: 1.00     Years: 10.00     Pack years: 10.00     Types: Cigarettes     Quit date: 1989     Years since quittin.4    Smokeless tobacco: Never Used   Substance Use Topics    Alcohol use: Never      Current Outpatient Medications   Medication Sig Dispense Refill    vitamin D (ERGOCALCIFEROL) 1.25 MG (72896 UT) CAPS capsule TAKE 1 CAPSULE BY MOUTH ONE TIME A WEEK 12 capsule 0    hydrocortisone (ALA-JOHN) 1 % cream Apply topically 2 times daily.  1 g 1    Handicap Placard MISC by Does not apply route For 1 year 1 each 0    levothyroxine (SYNTHROID) 50 MCG tablet TAKE 1 TABLET BY MOUTH EVERY DAY 90 tablet 0    metFORMIN (GLUCOPHAGE-XR) 500 MG extended release tablet TAKE 1 TABLET BY MOUTH EVERY DAY WITH BREAKFAST 90 tablet 0    diphenhydrAMINE HCl, TOPICAL, 2 % GEL Apply twice daily on affected area 57 g 0    losartan (COZAAR) 25 MG tablet Take 50 mg by mouth daily       aspirin 81 MG EC tablet Take 1 tablet by mouth daily 30 tablet 3    atorvastatin (LIPITOR) 80 MG tablet Take 1 tablet by mouth nightly 30 tablet 3    metoprolol succinate (TOPROL XL) 100 MG extended release tablet Take 1 tablet by mouth daily 30 tablet 3    clopidogrel (PLAVIX) 75 MG tablet Take 1 tablet by mouth daily 30 tablet 3    pantoprazole (PROTONIX) 40 MG tablet Take 1 tablet by mouth 2 times daily 30 tablet 3    furosemide (LASIX) 20 MG tablet Take 1 tablet by mouth daily (Patient taking differently: Take 40 mg by mouth daily ) 60 tablet 3     No current facility-administered medications for this visit. No Known Allergies    Health Maintenance   Topic Date Due    Hepatitis C screen  Never done    COVID-19 Vaccine (1) Never done    HIV screen  Never done    DTaP/Tdap/Td vaccine (1 - Tdap) Never done    Cervical cancer screen  Never done    Colon cancer screen colonoscopy  Never done    Shingles Vaccine (1 of 2) Never done    Lipid screen  02/26/2021    Potassium monitoring  02/26/2021    Creatinine monitoring  02/26/2021    A1C test (Diabetic or Prediabetic)  12/29/2021    TSH testing  12/29/2021    Breast cancer screen  02/26/2022    Flu vaccine  Completed    Hepatitis A vaccine  Aged Out    Hepatitis B vaccine  Aged Out    Hib vaccine  Aged Out    Meningococcal (ACWY) vaccine  Aged Out    Pneumococcal 0-64 years Vaccine  Aged Out       Subjective:      Review of Systems   Constitutional: Negative for chills, fatigue and fever. HENT: Negative for congestion, hearing loss and rhinorrhea. Eyes: Negative for discharge and itching. Respiratory: Negative for cough, shortness of breath and wheezing. Cardiovascular: Negative for chest pain, palpitations and leg swelling. Gastrointestinal: Negative for abdominal distention and abdominal pain. Endocrine: Negative for polydipsia and polyphagia. Genitourinary: Negative for difficulty urinating and dysuria. Musculoskeletal: Negative for arthralgias and back pain. Skin: Positive for rash. Negative for wound. Bilateral lower extremity redness present. No evidence of infection. Neurological: Negative for dizziness, seizures, light-headedness and headaches. Psychiatric/Behavioral: Negative for agitation and behavioral problems. Objective:     Physical Exam  Constitutional:       Appearance: She is well-developed. HENT:      Head: Normocephalic and atraumatic. Eyes:      Conjunctiva/sclera: Conjunctivae normal.      Pupils: Pupils are equal, round, and reactive to light.    Neck: Thyroid: No thyromegaly. Vascular: No JVD. Cardiovascular:      Rate and Rhythm: Normal rate and regular rhythm. Heart sounds: Normal heart sounds. No murmur heard. Pulmonary:      Effort: Pulmonary effort is normal.      Breath sounds: Normal breath sounds. No wheezing. Abdominal:      General: Bowel sounds are normal. There is no distension. Palpations: Abdomen is soft. Tenderness: There is no abdominal tenderness. There is no rebound. Musculoskeletal:         General: No tenderness. Normal range of motion. Cervical back: Normal range of motion and neck supple. Skin:     General: Skin is warm. Findings: Erythema present. Comments: Bilateral lower extremity redness. Rt>left   Neurological:      Mental Status: She is alert and oriented to person, place, and time. Cranial Nerves: No cranial nerve deficit. Psychiatric:         Behavior: Behavior normal.       /66   Pulse 70   Resp 16   Wt 255 lb (115.7 kg)   SpO2 100%   BMI 45.17 kg/m²     Assessment:        Dane Smart was seen today for 3 month follow-up. Diagnoses and all orders for this visit:    Essential (primary) hypertension    Need for influenza vaccination  -     INFLUENZA, MDCK QUADV, 2 YRS AND OLDER, IM, PF, PREFILL SYR OR SDV, 0.5ML (FLUCELVAX QUADV, PF)    Hypothyroidism due to Hashimoto's thyroiditis  -     TSH With Reflex Ft4; Future    Prediabetes  -     Hemoglobin A1C; Future  -     Handicap Placard MISC; by Does not apply route For 1 year    Vitamin D deficiency  -     Vitamin D 25 Hydroxy;  Future    Chronic venous insufficiency  -     hydrocortisone (ALA-JOHN) 1 % cream; Apply topically 2 times daily.  -     Handicap Placard MISC; by Does not apply route For 1 year    Morbid obesity with BMI of 50.0-59.9, adult (HCC)  -     Handicap Placard MISC; by Does not apply route For 1 year    S/P drug eluting coronary stent placement  Continue on aspirin Plavix and statin  NSVT (nonsustained of prescribedmedications. All patient questions answered. Pt voiced understanding. Reviewed health maintenance. Instructed to continue current medications, diet and exercise. Patient agreed with treatment plan. Follow up as directed. I spent a total of 20-29 minutes face to face with this patient. Over 50% of that time was spent on counseling and care coordination. Please see assessment and plan for details. Electronically signed by   Claudetta Rad, MD on 10/18/2021 at 2:24 PM  [facility]. Please note that this chart was generated using voice recognition Dragon dictation software. Although every effort was made to ensure the accuracy of this automatedtranscription, some errors in transcription may have occurred.

## 2021-10-19 LAB
ESTIMATED AVERAGE GLUCOSE: 105 MG/DL
HBA1C MFR BLD: 5.3 % (ref 4–6)

## 2021-10-19 NOTE — RESULT ENCOUNTER NOTE
Please notify patient that their lab results are normal except vitamin D levels which are still low. Advised her to continue taking same medications.

## 2021-12-22 ENCOUNTER — APPOINTMENT (OUTPATIENT)
Dept: CT IMAGING | Age: 66
DRG: 137 | End: 2021-12-22
Payer: MEDICAID

## 2021-12-22 ENCOUNTER — HOSPITAL ENCOUNTER (INPATIENT)
Age: 66
LOS: 3 days | Discharge: HOME | DRG: 137 | End: 2021-12-25
Attending: EMERGENCY MEDICINE
Payer: MEDICAID

## 2021-12-22 DIAGNOSIS — J12.82 PNEUMONIA DUE TO COVID-19 VIRUS: ICD-10-CM

## 2021-12-22 DIAGNOSIS — U07.1 PNEUMONIA DUE TO COVID-19 VIRUS: ICD-10-CM

## 2021-12-22 DIAGNOSIS — I26.99 BILATERAL PULMONARY EMBOLISM (HCC): ICD-10-CM

## 2021-12-22 DIAGNOSIS — D50.0 BLOOD LOSS ANEMIA: ICD-10-CM

## 2021-12-22 DIAGNOSIS — U07.1 COVID: ICD-10-CM

## 2021-12-22 DIAGNOSIS — K92.2 GASTROINTESTINAL HEMORRHAGE, UNSPECIFIED GASTROINTESTINAL HEMORRHAGE TYPE: ICD-10-CM

## 2021-12-22 DIAGNOSIS — D64.9 SYMPTOMATIC ANEMIA: Primary | ICD-10-CM

## 2021-12-22 LAB
ABSOLUTE EOS #: 0.19 K/UL (ref 0–0.4)
ABSOLUTE IMMATURE GRANULOCYTE: ABNORMAL K/UL (ref 0–0.3)
ABSOLUTE LYMPH #: 1.46 K/UL (ref 1–4.8)
ABSOLUTE MONO #: 0.97 K/UL (ref 0.1–1.2)
ANION GAP SERPL CALCULATED.3IONS-SCNC: 12 MMOL/L (ref 9–17)
BASOPHILS # BLD: 0 % (ref 0–2)
BASOPHILS ABSOLUTE: 0 K/UL (ref 0–0.2)
BNP INTERPRETATION: ABNORMAL
BUN BLDV-MCNC: 22 MG/DL (ref 8–23)
BUN/CREAT BLD: ABNORMAL (ref 9–20)
CALCIUM SERPL-MCNC: 8.3 MG/DL (ref 8.6–10.4)
CHLORIDE BLD-SCNC: 104 MMOL/L (ref 98–107)
CO2: 25 MMOL/L (ref 20–31)
CREAT SERPL-MCNC: 1.32 MG/DL (ref 0.5–0.9)
D-DIMER QUANTITATIVE: 3.83 MG/L FEU
DIFFERENTIAL TYPE: ABNORMAL
EOSINOPHILS RELATIVE PERCENT: 2 % (ref 1–4)
GFR AFRICAN AMERICAN: 49 ML/MIN
GFR NON-AFRICAN AMERICAN: 41 ML/MIN
GFR SERPL CREATININE-BSD FRML MDRD: ABNORMAL ML/MIN/{1.73_M2}
GFR SERPL CREATININE-BSD FRML MDRD: ABNORMAL ML/MIN/{1.73_M2}
GLUCOSE BLD-MCNC: 90 MG/DL (ref 70–99)
HCT VFR BLD CALC: 26.1 % (ref 36–46)
HEMOGLOBIN: 6.6 G/DL (ref 12–16)
IMMATURE GRANULOCYTES: ABNORMAL %
LYMPHOCYTES # BLD: 15 % (ref 24–44)
MCH RBC QN AUTO: 18.5 PG (ref 26–34)
MCHC RBC AUTO-ENTMCNC: 25.3 G/DL (ref 31–37)
MCV RBC AUTO: 73.1 FL (ref 80–100)
MONOCYTES # BLD: 10 % (ref 2–11)
MORPHOLOGY: ABNORMAL
NRBC AUTOMATED: ABNORMAL PER 100 WBC
PDW BLD-RTO: 22.3 % (ref 12.5–15.4)
PLATELET # BLD: 488 K/UL (ref 140–450)
PLATELET ESTIMATE: ABNORMAL
PMV BLD AUTO: 10.1 FL (ref 8–14)
POTASSIUM SERPL-SCNC: 4.2 MMOL/L (ref 3.7–5.3)
PRO-BNP: 7985 PG/ML
RBC # BLD: 3.57 M/UL (ref 4–5.2)
RBC # BLD: ABNORMAL 10*6/UL
SARS-COV-2, RAPID: DETECTED
SEG NEUTROPHILS: 73 % (ref 36–66)
SEGMENTED NEUTROPHILS ABSOLUTE COUNT: 7.08 K/UL (ref 1.8–7.7)
SODIUM BLD-SCNC: 141 MMOL/L (ref 135–144)
SPECIMEN DESCRIPTION: ABNORMAL
TROPONIN INTERP: ABNORMAL
TROPONIN T: ABNORMAL NG/ML
TROPONIN, HIGH SENSITIVITY: 25 NG/L (ref 0–14)
WBC # BLD: 9.7 K/UL (ref 3.5–11)
WBC # BLD: ABNORMAL 10*3/UL

## 2021-12-22 PROCEDURE — 86901 BLOOD TYPING SEROLOGIC RH(D): CPT

## 2021-12-22 PROCEDURE — 6360000004 HC RX CONTRAST MEDICATION: Performed by: EMERGENCY MEDICINE

## 2021-12-22 PROCEDURE — 2060000000 HC ICU INTERMEDIATE R&B

## 2021-12-22 PROCEDURE — 87635 SARS-COV-2 COVID-19 AMP PRB: CPT

## 2021-12-22 PROCEDURE — 74177 CT ABD & PELVIS W/CONTRAST: CPT

## 2021-12-22 PROCEDURE — 86920 COMPATIBILITY TEST SPIN: CPT

## 2021-12-22 PROCEDURE — 86900 BLOOD TYPING SEROLOGIC ABO: CPT

## 2021-12-22 PROCEDURE — 84484 ASSAY OF TROPONIN QUANT: CPT

## 2021-12-22 PROCEDURE — 80048 BASIC METABOLIC PNL TOTAL CA: CPT

## 2021-12-22 PROCEDURE — 83880 ASSAY OF NATRIURETIC PEPTIDE: CPT

## 2021-12-22 PROCEDURE — 36415 COLL VENOUS BLD VENIPUNCTURE: CPT

## 2021-12-22 PROCEDURE — 85025 COMPLETE CBC W/AUTO DIFF WBC: CPT

## 2021-12-22 PROCEDURE — 71260 CT THORAX DX C+: CPT

## 2021-12-22 PROCEDURE — 93005 ELECTROCARDIOGRAM TRACING: CPT | Performed by: EMERGENCY MEDICINE

## 2021-12-22 PROCEDURE — 96374 THER/PROPH/DIAG INJ IV PUSH: CPT

## 2021-12-22 PROCEDURE — 85379 FIBRIN DEGRADATION QUANT: CPT

## 2021-12-22 PROCEDURE — 6370000000 HC RX 637 (ALT 250 FOR IP): Performed by: EMERGENCY MEDICINE

## 2021-12-22 PROCEDURE — 99291 CRITICAL CARE FIRST HOUR: CPT

## 2021-12-22 PROCEDURE — 6360000002 HC RX W HCPCS: Performed by: EMERGENCY MEDICINE

## 2021-12-22 PROCEDURE — 86850 RBC ANTIBODY SCREEN: CPT

## 2021-12-22 PROCEDURE — 2580000003 HC RX 258: Performed by: EMERGENCY MEDICINE

## 2021-12-22 RX ORDER — ASPIRIN 81 MG/1
324 TABLET, CHEWABLE ORAL ONCE
Status: COMPLETED | OUTPATIENT
Start: 2021-12-22 | End: 2021-12-22

## 2021-12-22 RX ORDER — FUROSEMIDE 10 MG/ML
40 INJECTION INTRAMUSCULAR; INTRAVENOUS ONCE
Status: COMPLETED | OUTPATIENT
Start: 2021-12-22 | End: 2021-12-22

## 2021-12-22 RX ORDER — SODIUM CHLORIDE 0.9 % (FLUSH) 0.9 %
10 SYRINGE (ML) INJECTION PRN
Status: DISCONTINUED | OUTPATIENT
Start: 2021-12-22 | End: 2021-12-25 | Stop reason: HOSPADM

## 2021-12-22 RX ORDER — 0.9 % SODIUM CHLORIDE 0.9 %
80 INTRAVENOUS SOLUTION INTRAVENOUS ONCE
Status: COMPLETED | OUTPATIENT
Start: 2021-12-22 | End: 2021-12-22

## 2021-12-22 RX ADMIN — SODIUM CHLORIDE 80 ML: 9 INJECTION, SOLUTION INTRAVENOUS at 20:06

## 2021-12-22 RX ADMIN — FUROSEMIDE 40 MG: 10 INJECTION, SOLUTION INTRAMUSCULAR; INTRAVENOUS at 16:47

## 2021-12-22 RX ADMIN — ASPIRIN 324 MG: 81 TABLET, CHEWABLE ORAL at 16:36

## 2021-12-22 RX ADMIN — IOPAMIDOL 75 ML: 755 INJECTION, SOLUTION INTRAVENOUS at 20:07

## 2021-12-22 RX ADMIN — SODIUM CHLORIDE, PRESERVATIVE FREE 10 ML: 5 INJECTION INTRAVENOUS at 20:05

## 2021-12-22 NOTE — Clinical Note
Patient Class: Inpatient [101]   REQUIRED: Diagnosis: Bilateral pulmonary embolism ([facility] [address] [257369]   Estimated Length of Stay: Estimated stay of more than 2 midnights   Admitting Provider: Tori Sanders [[id number]]   Preferred Department: [facility]   Telemetry/Cardiac Monitoring Required?: Yes

## 2021-12-22 NOTE — ED PROVIDER NOTES
[id number] [facility] ED  [address] [facility] [address] [facility] [address]  Phone: [phone]  Fax: Eliza Serna [phone]      Pt Name: Ratna Alcala  MRN: [id number]  [place] 1955  Date of evaluation: 12/22/2021    CHIEF COMPLAINT       Chief Complaint   Patient presents with    Shortness of [address] [place] [address]    Ratna Alcala is a 61 y.o. female who presents to the emergency department with worsening shortness of breath over the past few days. Sent by her cardiologist for rule out CHF versus Covid. Denies chest pain. Admits to lower extremity swelling that is getting a little bit worse. History of MI in 2019 last echocardiogram 65% ejection fraction. She denies any fevers or chills cough congestion. Nothing makes her symptoms better or exertion makes them worse. REVIEW OF SYSTEMS       Constitutional: No fevers or chills   HEENT: No sore throat, rhinorrhea, or earache   Eyes: No blurry vision or double vision no drainage   Cardiovascular: No chest pain or tachycardia   Respiratory: No wheezing positive shortness of breath no cough  Gastrointestinal: No nausea, vomiting, diarrhea, constipation, or abdominal pain   : No hematuria or dysuria   Musculoskeletal: positive lower extremity swelling no pain  Skin: No rash   Neurological: No focal neurologic complaints, paresthesias, weakness, or headache     PAST MEDICAL HISTORY    has a past medical history of Hyperlipidemia and Hypertension. SURGICAL HISTORY      has a past surgical history that includes bladder suspension and bladder suspension (2004).     CURRENT MEDICATIONS       Previous Medications    ASPIRIN 81 MG EC TABLET    Take 1 tablet by mouth daily    ATORVASTATIN (LIPITOR) 80 MG TABLET    Take 1 tablet by mouth nightly    CLOPIDOGREL (PLAVIX) 75 MG TABLET    Take 1 tablet by mouth daily    DIPHENHYDRAMINE HCL, TOPICAL, 2 % GEL    Apply twice daily on affected area    FUROSEMIDE (LASIX) 20 MG TABLET    Take 1 tablet by mouth daily    HANDICAP PLACARD MISC    by Does not apply route For 1 year    LEVOTHYROXINE (SYNTHROID) 50 MCG TABLET    TAKE 1 TABLET BY MOUTH EVERY DAY    LOSARTAN (COZAAR) 25 MG TABLET    Take 50 mg by mouth daily     METFORMIN (GLUCOPHAGE-XR) 500 MG EXTENDED RELEASE TABLET    TAKE 1 TABLET BY MOUTH IN THE MORNING WITH breakfast    METOPROLOL SUCCINATE (TOPROL XL) 100 MG EXTENDED RELEASE TABLET    Take 1 tablet by mouth daily    PANTOPRAZOLE (PROTONIX) 40 MG TABLET    Take 1 tablet by mouth 2 times daily    VITAMIN D (ERGOCALCIFEROL) 1.25 MG (70441 UT) CAPS CAPSULE    TAKE 1 CAPSULE BY MOUTH ONE TIME A WEEK       ALLERGIES     has No Known Allergies. FAMILY HISTORY     She indicated that her mother is . She indicated that her father is alive. She indicated that the status of her maternal grandmother is unknown.     family history includes Cancer in her maternal grandmother and mother; Elevated Lipids in her mother; High Blood Pressure in her mother. SOCIAL HISTORY      reports that she quit smoking about 32 years ago. Her smoking use included cigarettes. She has a 10.00 pack-year smoking history. She has never used smokeless tobacco. She reports that she does not drink alcohol and does not use drugs. PHYSICAL EXAM       ED Triage Vitals [21 1553]   BP Temp Temp Source Pulse Resp SpO2 Height Weight   134/71 98.7 °F (37.1 °C) Oral 84 16 99 % 5' 3\" (1.6 m) 237 lb (107.5 kg)       Constitutional: Alert, oriented x3, nontoxic, answering questions appropriately, acting properly for age, positive conversational dyspnea  HEENT: Extraocular muscles intact,   Neck: Trachea midline   Cardiovascular: Regular rhythm and rate no murmurs   Respiratory: Diminished to auscultation bilaterally no wheezes, rhonchi, rales, no respiratory distress no tachypnea no retractions no hypoxia  Gastrointestinal: Soft, nontender, nondistended, diminished bowel sounds.   No rebound, rigidity, or guarding. Musculoskeletal: No extremity pain significant bilateral lower extremity edema no calf tenderness  Neurologic: No gross focal neurologic deficits  Skin: Warm and dry       DIFFERENTIAL DIAGNOSIS/ MDM:     IV Lasix, chest imaging. Labs. EKG. DIAGNOSTIC RESULTS     EKG: All EKG's are interpreted by the Emergency Department Physician who either signs or Co-signs this chart in the absence of a cardiologist.    1632 sinus rhythm rate 74  QRS 82  no acute ST or T wave changes    Not indicated unless otherwise documented above    LABS:  Results for orders placed or performed during the hospital encounter of 12/22/21   COVID-19, Rapid    Specimen: Nasopharyngeal Swab   Result Value Ref Range    Specimen Description . NASOPHARYNGEAL SWAB     SARS-CoV-2, Rapid DETECTED (A) Not Detected   CBC Auto Differential   Result Value Ref Range    WBC 9.7 3.5 - 11.0 k/uL    RBC 3.57 (L) 4.0 - 5.2 m/uL    Hemoglobin 6.6 (LL) 12.0 - 16.0 g/dL    Hematocrit 26.1 (L) 36 - 46 %    MCV 73.1 (L) 80 - 100 fL    MCH 18.5 (L) 26 - 34 pg    MCHC 25.3 (L) 31 - 37 g/dL    RDW 22.3 (H) 12.5 - 15.4 %    Platelets 550 (H) 247 - 450 k/uL    MPV 10.1 8.0 - 14.0 fL    NRBC Automated NOT REPORTED per 100 WBC    Differential Type NOT REPORTED     Immature Granulocytes NOT REPORTED 0 %    Absolute Immature Granulocyte NOT REPORTED 0.00 - 0.30 k/uL    WBC Morphology NOT REPORTED     RBC Morphology NOT REPORTED     Platelet Estimate NOT REPORTED     Seg Neutrophils 73 (H) 36 - 66 %    Lymphocytes 15 (L) 24 - 44 %    Monocytes 10 2 - 11 %    Eosinophils % 2 1 - 4 %    Basophils 0 0 - 2 %    Segs Absolute 7.08 1.8 - 7.7 k/uL    Absolute Lymph # 1.46 1.0 - 4.8 k/uL    Absolute Mono # 0.97 0.1 - 1.2 k/uL    Absolute Eos # 0.19 0.0 - 0.4 k/uL    Basophils Absolute 0.00 0.0 - 0.2 k/uL    Morphology ANISOCYTOSIS PRESENT     Morphology MICROCYTOSIS PRESENT     Morphology HYPOCHROMIA PRESENT     Morphology 1+ ELLIPTOCYTES     Morphology 1+ Resp 20   Ht 5' 3\" (1.6 m)   Wt 107.5 kg (237 lb)   SpO2 98%   BMI 41.98 kg/m²     5:55 PM hemoglobin 6.6 down from 12 last year. Slightly elevated creatinine and low GFR. Troponin of 25. Patient resting comfortably no acute distress. Patient states she has been having black stools. Offered rectal examination patient refused despite talking to her about helping us find the diagnosis. She is very reluctant to have anything medical done she is willing to obtain a CT of the chest as well as her abdomen and pelvis for further work-up and will consider admission. Patient with symptomatic anemia and she is also Covid positive. 7 PM CT chest abdomen pelvis pending. Care transferred to [address] [place] [address] Av      1. Symptomatic anemia    2. COVID          DISPOSITION/PLAN   DISPOSITION          CONDITION ON DISPOSITION: STABLE       PATIENT REFERRED TO:  No follow-up provider specified.     DISCHARGE MEDICATIONS:  New Prescriptions    No medications on file       (Please note that portions of this note were completed with a voice recognition program.  Efforts were made to edit the dictations but occasionally words are mis-transcribed.)    Jacob Deleon DO   Attending Emergency Physician      Jacob Deleon DO  12/22/21 8435

## 2021-12-22 NOTE — ED TRIAGE NOTES
Patient arrives with c/o SOB. States she saw her cardiologist and was sent here for a COVID test and EKG. States chest discomfort when coughing.

## 2021-12-23 PROBLEM — K27.4 GASTROINTESTINAL HEMORRHAGE ASSOCIATED WITH PEPTIC ULCER: Status: ACTIVE | Noted: 2021-12-23

## 2021-12-23 PROBLEM — E43 SEVERE MALNUTRITION (HCC): Status: ACTIVE | Noted: 2021-12-23

## 2021-12-23 PROBLEM — U07.1 COVID-19 VIRUS INFECTION: Status: ACTIVE | Noted: 2021-12-23

## 2021-12-23 PROBLEM — E11.8 CONTROLLED TYPE 2 DIABETES MELLITUS WITH COMPLICATION, WITHOUT LONG-TERM CURRENT USE OF INSULIN (HCC): Status: ACTIVE | Noted: 2021-12-23

## 2021-12-23 LAB
ABSOLUTE EOS #: 0.08 K/UL (ref 0–0.4)
ABSOLUTE IMMATURE GRANULOCYTE: 0 K/UL (ref 0–0.3)
ABSOLUTE LYMPH #: 1.25 K/UL (ref 1–4.8)
ABSOLUTE MONO #: 0.25 K/UL (ref 0.1–0.8)
ALBUMIN SERPL-MCNC: 2.9 G/DL (ref 3.5–5.2)
ALBUMIN/GLOBULIN RATIO: 0.9 (ref 1–2.5)
ALP BLD-CCNC: 166 U/L (ref 35–104)
ALT SERPL-CCNC: 11 U/L (ref 5–33)
ANION GAP SERPL CALCULATED.3IONS-SCNC: 17 MMOL/L (ref 9–17)
AST SERPL-CCNC: 16 U/L
BASOPHILS # BLD: 1 % (ref 0–2)
BASOPHILS ABSOLUTE: 0.08 K/UL (ref 0–0.2)
BILIRUB SERPL-MCNC: 0.65 MG/DL (ref 0.3–1.2)
BUN BLDV-MCNC: 22 MG/DL (ref 8–23)
BUN/CREAT BLD: ABNORMAL (ref 9–20)
C-REACTIVE PROTEIN: 3.7 MG/L (ref 0–5)
CALCIUM SERPL-MCNC: 8.2 MG/DL (ref 8.6–10.4)
CHLORIDE BLD-SCNC: 105 MMOL/L (ref 98–107)
CO2: 21 MMOL/L (ref 20–31)
CREAT SERPL-MCNC: 1.37 MG/DL (ref 0.5–0.9)
D-DIMER QUANTITATIVE: 2.37 MG/L FEU
DIFFERENTIAL TYPE: ABNORMAL
EKG ATRIAL RATE: 74 BPM
EKG P AXIS: 12 DEGREES
EKG P-R INTERVAL: 156 MS
EKG Q-T INTERVAL: 402 MS
EKG QRS DURATION: 82 MS
EKG QTC CALCULATION (BAZETT): 446 MS
EKG R AXIS: -8 DEGREES
EKG T AXIS: -3 DEGREES
EKG VENTRICULAR RATE: 74 BPM
EOSINOPHILS RELATIVE PERCENT: 1 % (ref 1–4)
ESTIMATED AVERAGE GLUCOSE: 103 MG/DL
FERRITIN: 18 UG/L (ref 13–150)
FIBRINOGEN: 234 MG/DL (ref 140–420)
GFR AFRICAN AMERICAN: 47 ML/MIN
GFR NON-AFRICAN AMERICAN: 39 ML/MIN
GFR SERPL CREATININE-BSD FRML MDRD: ABNORMAL ML/MIN/{1.73_M2}
GFR SERPL CREATININE-BSD FRML MDRD: ABNORMAL ML/MIN/{1.73_M2}
GLUCOSE BLD-MCNC: 109 MG/DL (ref 65–105)
GLUCOSE BLD-MCNC: 126 MG/DL (ref 65–105)
GLUCOSE BLD-MCNC: 65 MG/DL (ref 65–105)
GLUCOSE BLD-MCNC: 76 MG/DL (ref 65–105)
GLUCOSE BLD-MCNC: 77 MG/DL (ref 70–99)
HBA1C MFR BLD: 5.2 % (ref 4–6)
HCT VFR BLD CALC: 26.9 % (ref 36.3–47.1)
HCT VFR BLD CALC: 28.6 % (ref 36.3–47.1)
HEMOGLOBIN: 7.4 G/DL (ref 11.9–15.1)
HEMOGLOBIN: 7.7 G/DL (ref 11.9–15.1)
IMMATURE GRANULOCYTES: 0 %
INR BLD: 1.1
LACTATE DEHYDROGENASE: 278 U/L (ref 135–214)
LACTIC ACID, WHOLE BLOOD: 1.6 MMOL/L (ref 0.7–2.1)
LACTIC ACID: NORMAL MMOL/L
LYMPHOCYTES # BLD: 15 % (ref 24–44)
MCH RBC QN AUTO: 20.4 PG (ref 25.2–33.5)
MCHC RBC AUTO-ENTMCNC: 27.5 G/DL (ref 28.4–34.8)
MCV RBC AUTO: 74.3 FL (ref 82.6–102.9)
MONOCYTES # BLD: 3 % (ref 1–7)
MORPHOLOGY: ABNORMAL
NRBC AUTOMATED: 0 PER 100 WBC
PARTIAL THROMBOPLASTIN TIME: 23 SEC (ref 20.5–30.5)
PDW BLD-RTO: 22.3 % (ref 11.8–14.4)
PLATELET # BLD: 398 K/UL (ref 138–453)
PLATELET ESTIMATE: ABNORMAL
PMV BLD AUTO: 10.1 FL (ref 8.1–13.5)
POTASSIUM SERPL-SCNC: 3.6 MMOL/L (ref 3.7–5.3)
PROCALCITONIN: 0.13 NG/ML
PROTHROMBIN TIME: 11.5 SEC (ref 9.1–12.3)
RBC # BLD: 3.62 M/UL (ref 3.95–5.11)
RBC # BLD: ABNORMAL 10*6/UL
SEG NEUTROPHILS: 80 % (ref 36–66)
SEGMENTED NEUTROPHILS ABSOLUTE COUNT: 6.64 K/UL (ref 1.8–7.7)
SODIUM BLD-SCNC: 143 MMOL/L (ref 135–144)
TOTAL PROTEIN: 6.1 G/DL (ref 6.4–8.3)
TROPONIN INTERP: ABNORMAL
TROPONIN T: ABNORMAL NG/ML
TROPONIN, HIGH SENSITIVITY: 25 NG/L (ref 0–14)
VITAMIN D 25-HYDROXY: 27.9 NG/ML (ref 30–100)
WBC # BLD: 8.3 K/UL (ref 3.5–11.3)
WBC # BLD: ABNORMAL 10*3/UL

## 2021-12-23 PROCEDURE — 84145 PROCALCITONIN (PCT): CPT

## 2021-12-23 PROCEDURE — 99223 1ST HOSP IP/OBS HIGH 75: CPT | Performed by: INTERNAL MEDICINE

## 2021-12-23 PROCEDURE — P9016 RBC LEUKOCYTES REDUCED: HCPCS

## 2021-12-23 PROCEDURE — 6360000002 HC RX W HCPCS: Performed by: INTERNAL MEDICINE

## 2021-12-23 PROCEDURE — 80053 COMPREHEN METABOLIC PANEL: CPT

## 2021-12-23 PROCEDURE — 85379 FIBRIN DEGRADATION QUANT: CPT

## 2021-12-23 PROCEDURE — 86900 BLOOD TYPING SEROLOGIC ABO: CPT

## 2021-12-23 PROCEDURE — 2580000003 HC RX 258: Performed by: NURSE PRACTITIONER

## 2021-12-23 PROCEDURE — 85025 COMPLETE CBC W/AUTO DIFF WBC: CPT

## 2021-12-23 PROCEDURE — 85610 PROTHROMBIN TIME: CPT

## 2021-12-23 PROCEDURE — 36415 COLL VENOUS BLD VENIPUNCTURE: CPT

## 2021-12-23 PROCEDURE — 83036 HEMOGLOBIN GLYCOSYLATED A1C: CPT

## 2021-12-23 PROCEDURE — 85014 HEMATOCRIT: CPT

## 2021-12-23 PROCEDURE — 85018 HEMOGLOBIN: CPT

## 2021-12-23 PROCEDURE — C9113 INJ PANTOPRAZOLE SODIUM, VIA: HCPCS | Performed by: INTERNAL MEDICINE

## 2021-12-23 PROCEDURE — 85730 THROMBOPLASTIN TIME PARTIAL: CPT

## 2021-12-23 PROCEDURE — 84484 ASSAY OF TROPONIN QUANT: CPT

## 2021-12-23 PROCEDURE — 2060000000 HC ICU INTERMEDIATE R&B

## 2021-12-23 PROCEDURE — 85384 FIBRINOGEN ACTIVITY: CPT

## 2021-12-23 PROCEDURE — 82728 ASSAY OF FERRITIN: CPT

## 2021-12-23 PROCEDURE — 2580000003 HC RX 258: Performed by: INTERNAL MEDICINE

## 2021-12-23 PROCEDURE — 82306 VITAMIN D 25 HYDROXY: CPT

## 2021-12-23 PROCEDURE — 99222 1ST HOSP IP/OBS MODERATE 55: CPT | Performed by: INTERNAL MEDICINE

## 2021-12-23 PROCEDURE — 83615 LACTATE (LD) (LDH) ENZYME: CPT

## 2021-12-23 PROCEDURE — 99255 IP/OBS CONSLTJ NEW/EST HI 80: CPT | Performed by: SURGERY

## 2021-12-23 PROCEDURE — 6370000000 HC RX 637 (ALT 250 FOR IP): Performed by: NURSE PRACTITIONER

## 2021-12-23 PROCEDURE — 82947 ASSAY GLUCOSE BLOOD QUANT: CPT

## 2021-12-23 PROCEDURE — 83605 ASSAY OF LACTIC ACID: CPT

## 2021-12-23 PROCEDURE — 86140 C-REACTIVE PROTEIN: CPT

## 2021-12-23 RX ORDER — ONDANSETRON 2 MG/ML
4 INJECTION INTRAMUSCULAR; INTRAVENOUS EVERY 6 HOURS PRN
Status: DISCONTINUED | OUTPATIENT
Start: 2021-12-23 | End: 2021-12-25 | Stop reason: HOSPADM

## 2021-12-23 RX ORDER — SODIUM CHLORIDE 0.9 % (FLUSH) 0.9 %
10 SYRINGE (ML) INJECTION PRN
Status: DISCONTINUED | OUTPATIENT
Start: 2021-12-23 | End: 2021-12-25 | Stop reason: HOSPADM

## 2021-12-23 RX ORDER — GUAIFENESIN DEXTROMETHORPHAN HYDROBROMIDE ORAL SOLUTION 10; 100 MG/5ML; MG/5ML
10 SOLUTION ORAL EVERY 4 HOURS PRN
Status: DISCONTINUED | OUTPATIENT
Start: 2021-12-23 | End: 2021-12-25 | Stop reason: HOSPADM

## 2021-12-23 RX ORDER — PANTOPRAZOLE SODIUM 40 MG/1
40 TABLET, DELAYED RELEASE ORAL ONCE
Status: COMPLETED | OUTPATIENT
Start: 2021-12-24 | End: 2021-12-24

## 2021-12-23 RX ORDER — NICOTINE POLACRILEX 4 MG
15 LOZENGE BUCCAL PRN
Status: DISCONTINUED | OUTPATIENT
Start: 2021-12-23 | End: 2021-12-25 | Stop reason: HOSPADM

## 2021-12-23 RX ORDER — SODIUM CHLORIDE 9 MG/ML
INJECTION, SOLUTION INTRAVENOUS CONTINUOUS
Status: DISCONTINUED | OUTPATIENT
Start: 2021-12-23 | End: 2021-12-25 | Stop reason: HOSPADM

## 2021-12-23 RX ORDER — ONDANSETRON 4 MG/1
4 TABLET, ORALLY DISINTEGRATING ORAL EVERY 8 HOURS PRN
Status: DISCONTINUED | OUTPATIENT
Start: 2021-12-23 | End: 2021-12-25 | Stop reason: HOSPADM

## 2021-12-23 RX ORDER — SODIUM CHLORIDE 9 MG/ML
INJECTION, SOLUTION INTRAVENOUS PRN
Status: DISCONTINUED | OUTPATIENT
Start: 2021-12-23 | End: 2021-12-25 | Stop reason: HOSPADM

## 2021-12-23 RX ORDER — PANTOPRAZOLE SODIUM 40 MG/10ML
40 INJECTION, POWDER, LYOPHILIZED, FOR SOLUTION INTRAVENOUS 2 TIMES DAILY
Status: DISCONTINUED | OUTPATIENT
Start: 2021-12-23 | End: 2021-12-24

## 2021-12-23 RX ORDER — LEVOTHYROXINE SODIUM 0.05 MG/1
50 TABLET ORAL DAILY
Status: DISCONTINUED | OUTPATIENT
Start: 2021-12-23 | End: 2021-12-25 | Stop reason: HOSPADM

## 2021-12-23 RX ORDER — SODIUM CHLORIDE 9 MG/ML
10 INJECTION INTRAVENOUS 2 TIMES DAILY
Status: DISCONTINUED | OUTPATIENT
Start: 2021-12-23 | End: 2021-12-24

## 2021-12-23 RX ORDER — ALBUTEROL SULFATE 90 UG/1
2 AEROSOL, METERED RESPIRATORY (INHALATION) EVERY 6 HOURS PRN
Status: DISCONTINUED | OUTPATIENT
Start: 2021-12-23 | End: 2021-12-25 | Stop reason: HOSPADM

## 2021-12-23 RX ORDER — PANTOPRAZOLE SODIUM 40 MG/1
40 TABLET, DELAYED RELEASE ORAL 2 TIMES DAILY
Status: DISCONTINUED | OUTPATIENT
Start: 2021-12-23 | End: 2021-12-23

## 2021-12-23 RX ORDER — LOSARTAN POTASSIUM 50 MG/1
50 TABLET ORAL DAILY
Status: DISCONTINUED | OUTPATIENT
Start: 2021-12-23 | End: 2021-12-25 | Stop reason: HOSPADM

## 2021-12-23 RX ORDER — DEXTROSE MONOHYDRATE 25 G/50ML
12.5 INJECTION, SOLUTION INTRAVENOUS PRN
Status: DISCONTINUED | OUTPATIENT
Start: 2021-12-23 | End: 2021-12-25 | Stop reason: HOSPADM

## 2021-12-23 RX ORDER — SODIUM CHLORIDE 9 MG/ML
25 INJECTION, SOLUTION INTRAVENOUS PRN
Status: DISCONTINUED | OUTPATIENT
Start: 2021-12-23 | End: 2021-12-25 | Stop reason: HOSPADM

## 2021-12-23 RX ORDER — METOPROLOL SUCCINATE 100 MG/1
100 TABLET, EXTENDED RELEASE ORAL DAILY
Status: DISCONTINUED | OUTPATIENT
Start: 2021-12-23 | End: 2021-12-25 | Stop reason: HOSPADM

## 2021-12-23 RX ORDER — GUAIFENESIN DEXTROMETHORPHAN HYDROBROMIDE ORAL SOLUTION 10; 100 MG/5ML; MG/5ML
5 SOLUTION ORAL EVERY 4 HOURS PRN
Status: DISCONTINUED | OUTPATIENT
Start: 2021-12-23 | End: 2021-12-23

## 2021-12-23 RX ORDER — ACETAMINOPHEN 325 MG/1
650 TABLET ORAL EVERY 6 HOURS PRN
Status: DISCONTINUED | OUTPATIENT
Start: 2021-12-23 | End: 2021-12-25 | Stop reason: HOSPADM

## 2021-12-23 RX ORDER — SODIUM CHLORIDE 0.9 % (FLUSH) 0.9 %
5-40 SYRINGE (ML) INJECTION EVERY 12 HOURS SCHEDULED
Status: DISCONTINUED | OUTPATIENT
Start: 2021-12-23 | End: 2021-12-25 | Stop reason: HOSPADM

## 2021-12-23 RX ORDER — ACETAMINOPHEN 650 MG/1
650 SUPPOSITORY RECTAL EVERY 6 HOURS PRN
Status: DISCONTINUED | OUTPATIENT
Start: 2021-12-23 | End: 2021-12-25 | Stop reason: HOSPADM

## 2021-12-23 RX ORDER — ATORVASTATIN CALCIUM 80 MG/1
80 TABLET, FILM COATED ORAL NIGHTLY
Status: DISCONTINUED | OUTPATIENT
Start: 2021-12-23 | End: 2021-12-25 | Stop reason: HOSPADM

## 2021-12-23 RX ORDER — DEXTROSE MONOHYDRATE 50 MG/ML
100 INJECTION, SOLUTION INTRAVENOUS PRN
Status: DISCONTINUED | OUTPATIENT
Start: 2021-12-23 | End: 2021-12-25 | Stop reason: HOSPADM

## 2021-12-23 RX ORDER — VITAMIN B COMPLEX
2000 TABLET ORAL DAILY
Status: DISCONTINUED | OUTPATIENT
Start: 2021-12-23 | End: 2021-12-25 | Stop reason: HOSPADM

## 2021-12-23 RX ADMIN — PANTOPRAZOLE SODIUM 40 MG: 40 TABLET, DELAYED RELEASE ORAL at 09:00

## 2021-12-23 RX ADMIN — ATORVASTATIN CALCIUM 80 MG: 80 TABLET, FILM COATED ORAL at 23:11

## 2021-12-23 RX ADMIN — SODIUM CHLORIDE, PRESERVATIVE FREE 10 ML: 5 INJECTION INTRAVENOUS at 23:10

## 2021-12-23 RX ADMIN — SODIUM CHLORIDE: 9 INJECTION, SOLUTION INTRAVENOUS at 13:45

## 2021-12-23 RX ADMIN — LEVOTHYROXINE SODIUM 50 MCG: 50 TABLET ORAL at 09:00

## 2021-12-23 RX ADMIN — METOPROLOL SUCCINATE 100 MG: 100 TABLET, FILM COATED, EXTENDED RELEASE ORAL at 09:00

## 2021-12-23 RX ADMIN — Medication 2000 UNITS: at 09:00

## 2021-12-23 RX ADMIN — SODIUM CHLORIDE, PRESERVATIVE FREE 10 ML: 5 INJECTION INTRAVENOUS at 09:00

## 2021-12-23 ASSESSMENT — ENCOUNTER SYMPTOMS
SINUS PRESSURE: 0
BACK PAIN: 0
BLOOD IN STOOL: 1
COUGH: 0
ABDOMINAL DISTENTION: 0
VOMITING: 0
SHORTNESS OF BREATH: 1
NAUSEA: 0
CHEST TIGHTNESS: 0
SINUS PAIN: 0

## 2021-12-23 ASSESSMENT — PAIN SCALES - GENERAL: PAINLEVEL_OUTOF10: 0

## 2021-12-23 NOTE — CONSULTS
Division of Vascular Surgery               Called by ER physician regarding patient with small subsegmental PE with anemia and positive fecal occult blood. Imaging reviewed, overall small clot burden. Recommend holding off on anticoagulation at this time given patient with anemia and GI bleed. Obtain lower extremity venous duplex tomorrow, if imaging reveals popliteal or above DVT will recommend IVC filter otherwise if below knee tibial thrombus or none then recommend holding off on anticoagulation and IVC filter with repeat imaging at 2 weeks. Patient being transferred to tertiary hospital due to COVID positivity, will be available for any assistance.             Electronically signed by Jeanmarie Saavdera MD on 12/22/2021 at 10:04 PM

## 2021-12-23 NOTE — PROGRESS NOTES
Occupational [address]  Occupational Therapy Not Seen Note    DATE: 2021    NAME: Palma Long  MRN: [id number]   : 1955      Patient not seen this date for Occupational Therapy due to: Other: Pt has bilateral pulmonary emboi and reccomendation is to hold anticoagulation at this time d/t active GI bleeding. GI consulted. Per chart, plan is \"Follow-up lower extremity venous duplex -pending results, will determine whether IVC filter or observation is necessary\". Will await finalized POC prior to EOB/OOB with occupational therapy per protocol.     Next Scheduled Treatment: Check back as able      Electronically signed by Chelle Cash OT on 2021 at 8:04 AM

## 2021-12-23 NOTE — PLAN OF CARE
Nutrition Problem #1: Severe malnutrition,In context of chronic illness  Intervention: Food and/or Nutrient Delivery: Continue NPO (Start diet as able; recommend ensure enlive supplements TID as able)  Nutritional Goals: Start diet within 24-72 hours

## 2021-12-23 NOTE — PLAN OF CARE
GI plan of care  Patient was initially scheduled for endoscopy in the OR today to assess for source of melena and microcytic anemia, however due to multiple issues including current Covid isolation OR time was not available. Patient is hemodynamically stable. Plan is to continue with medical management with PPI drip for 72 hours and add Carafate 1 g p.o. 4 times daily to be crushed with a small amount of water to make slurry for administration. Recommend serial H&H's every 6 hours x24 hours transfuse as clinically indicated. GI will continue to follow.

## 2021-12-23 NOTE — CONSULTS
Division of Vascular Surgery        New Consult      Physician Requesting Consult:  Dr. Gadiel Apodaca    Reason for Consult:   B/l PE    Chief Complaint:     SOB    History of Present Illness:      Rosario Calvo is a 61 y.o. woman who presents with COVID+ pneumonia and bilateral pulmonary embolisms. Patient states that she has been feeling short of breath for the last several weeks and subsequently found that she was Covid positive. Patient additionally endorses bilateral lower extremity swelling although this may be only slightly worse than its usual.  Denies any bilateral calf pain. Denies fevers, chills, chest pain, nausea or vomiting. Patient additionally states that she has been having blood in her stool for the last 8 to 9 months. She states that she feels embarrassed because she did not see anyone sooner for that. Reports that maybe about 50% of that time she will notice blood in the stool. States that she has never had a colonoscopy in the past and does not perform FIT or Cologuard. Reports that her mother has endometrial cancer maternal grandmother had breast cancer as well as pancreatic cancer. Medical History:     Past Medical History:   Diagnosis Date    Hyperlipidemia     Hypertension        Surgical History:     Past Surgical History:   Procedure Laterality Date    BLADDER SUSPENSION      BLADDER SUSPENSION  2004       Family History:     Family History   Problem Relation Age of Onset    Elevated Lipids Mother     Cancer Mother     High Blood Pressure Mother     Cancer Maternal Grandmother        Allergies:       Patient has no known allergies.     Medications:      Current Facility-Administered Medications   Medication Dose Route Frequency Provider Last Rate Last Admin    atorvastatin (LIPITOR) tablet 80 mg  80 mg Oral Nightly JOSEPH Crowe CNP        levothyroxine (SYNTHROID) tablet 50 mcg  50 mcg Oral Daily JOSEPH Crowe CNP        losartan (COZAAR) tablet 50 mg  50 mg Oral Daily JOSEPH Santana CNP        metoprolol succinate (TOPROL XL) extended release tablet 100 mg  100 mg Oral Daily JOSEPH Satnana CNP        pantoprazole (PROTONIX) tablet 40 mg  40 mg Oral BID JOSEPH Santana CNP        glucose (GLUTOSE) 40 % oral gel 15 g  15 g Oral PRN JOSEPH Santana CNP        dextrose 50 % IV solution  12.5 g IntraVENous PRN JOSEPH Santana CNP        glucagon (rDNA) injection 1 mg  1 mg IntraMUSCular PRN JOSEPH Santana CNP        dextrose 5 % solution  100 mL/hr IntraVENous PRN JOSEPH Santana CNP        0.9 % sodium chloride infusion   IntraVENous Continuous JOSEPH Santana CNP        sodium chloride flush 0.9 % injection 5-40 mL  5-40 mL IntraVENous 2 times per day JOSEPH Santana CNP        sodium chloride flush 0.9 % injection 10 mL  10 mL IntraVENous PRN JOSEPH Santana CNP        0.9 % sodium chloride infusion  25 mL IntraVENous PRN JOSEPH Santana CNP        ondansetron (ZOFRAN-ODT) disintegrating tablet 4 mg  4 mg Oral Q8H PRN JOSEPH Santana CNP        Or    ondansetron [facility]) injection 4 mg  4 mg IntraVENous Q6H PRN JOSEPH Santana CNP        magnesium hydroxide (MILK OF MAGNESIA) 400 MG/5ML suspension 30 mL  30 mL Oral Daily PRN JOSEPH Santana CNP        acetaminophen (TYLENOL) tablet 650 mg  650 mg Oral Q6H PRN JOSEPH Santana CNP        Or    acetaminophen (TYLENOL) suppository 650 mg  650 mg Rectal Q6H PRN JOSEPH Santana CNP        insulin lispro (HUMALOG) injection vial 0-6 Units  0-6 Units SubCUTAneous Q4H JOSEPH Santana CNP        dextromethorphan-guaiFENesin (ROBITUSSIN-DM)  MG/5ML liquid 5 mL  5 mL Oral Q4H PRN Lexis Nielsen, APRN - CNP        Vitamin D (CHOLECALCIFEROL) tablet 2,000 Units  2,000 Units Oral Daily Helga Dinesh, APRN - CNP        0.9 % sodium chloride infusion   IntraVENous PRN Benito Baumann MD        0.9 % sodium chloride infusion   IntraVENous PRN Benito Baumann MD        sodium chloride flush 0.9 % injection 10 mL  10 mL IntraVENous PRN Néstor Valencia, DO   10 mL at 12/22/21 2005       Social History:     Tobacco:    reports that she quit smoking about 32 years ago. Her smoking use included cigarettes. She has a 10.00 pack-year smoking history. She has never used smokeless tobacco.  Alcohol:      reports no history of alcohol use. Drug Use:  reports no history of drug use. Review of Systems:     Review of Systems   Constitutional: Positive for fatigue. Negative for chills and fever. HENT: Negative for congestion, sinus pressure and sinus pain. Respiratory: Positive for shortness of breath. Negative for cough and chest tightness. Cardiovascular: Positive for leg swelling. Negative for chest pain and palpitations. Gastrointestinal: Positive for blood in stool. Negative for abdominal distention, nausea and vomiting. Genitourinary: Negative for hematuria and urgency. Musculoskeletal: Negative for arthralgias and back pain. Neurological: Negative for dizziness, facial asymmetry and headaches. Hematological: Negative for adenopathy. Does not bruise/bleed easily. Psychiatric/Behavioral: Negative for agitation and confusion. Physical Exam:     Vitals:  /70   Pulse 106   Temp 98.7 °F (37.1 °C) (Axillary)   Resp 21   Ht 5' 3\" (1.6 m)   Wt 235 lb (106.6 kg)   SpO2 91%   BMI 41.63 kg/m²     Physical Exam  Constitutional:       Appearance: Normal appearance. She is obese. HENT:      Head: Normocephalic and atraumatic. Nose: No congestion or rhinorrhea. Mouth/Throat:      Mouth: Mucous membranes are moist.      Pharynx: Oropharynx is clear.    Eyes:      Extraocular Movements: Extraocular movements intact. Cardiovascular:      Rate and Rhythm: Normal rate and regular rhythm. Pulses: Normal pulses. Pulmonary:      Effort: No respiratory distress. Breath sounds: No wheezing. Abdominal:      Palpations: Abdomen is soft. Tenderness: There is no abdominal tenderness. There is no guarding or rebound. Musculoskeletal:         General: Swelling present. No tenderness. Cervical back: Neck supple. Right lower leg: Edema present. Left lower leg: Edema present. Skin:     General: Skin is warm. Capillary Refill: Capillary refill takes less than 2 seconds. Coloration: Skin is not jaundiced. Findings: No rash. Neurological:      General: No focal deficit present. Mental Status: She is alert and oriented to person, place, and time. Mental status is at baseline. Psychiatric:         Mood and Affect: Mood normal.         Behavior: Behavior normal.       Imaging/Labs:         DVT US: pending. Assessment and Plan:     Please additionally see Dr. Shelton Jose consult note on 12/22/2021 at 5236    72-year-old female with bilateral pulmonary embolisms, Covid positive pneumonia  · Continue medical management supportive care per primary team  · We will hold off on anticoagulation at this time as patient has anemia secondary to lower GI bleed with hemoglobin of 6.6 and was transfused 1 unit of PRBC  · Follow-up lower extremity venous duplex -pending results, will determine whether IVC filter or observation is necessary  · Recommend consultation to GI for GI bleed and need for colonoscopy    Electronically signed by Cristin Rubin DO on 12/23/21 at 5:37 AM EST      [address]  Office: [phone]  Cell: [phone]  Email: [email or web address]

## 2021-12-23 NOTE — PROGRESS NOTES
Comprehensive Nutrition Assessment    Type and Reason for Visit:  Initial,Positive Nutrition Screen (Wt loss, poor appetite)    Nutrition Recommendations/Plan:  -Continue NPO Status   -Start diet as able   -Recommend ensure enlive supplements TID as able   -Will monitor nutrition progression     Nutrition Assessment:  Pt admitted d/t a GI bleed and COVID-19. Pt currently in droplet plus precaution room. Writer attempted to call pt to interview - pt did not answer phone. Pt is currently NPO. Pt w/ 7.8% wt loss x 2 mo, significant. Pt meets the criteria for severe malnutrition. Will monitor for start of diet and add nutritional supplements as able.      Malnutrition Assessment:  Malnutrition Status:  Severe malnutrition    Context:  Chronic Illness     Findings of the 6 clinical characteristics of malnutrition:  Energy Intake:  7 - 75% or less estimated energy requirements for 1 month or longer  Weight Loss:  7 - 5% over 1 month     Body Fat Loss:  Unable to assess     Muscle Mass Loss:  Unable to assess    Fluid Accumulation:  1 - Mild Extremities,Generalized   Strength:  Not Performed    Estimated Daily Nutrient Needs:  Energy (kcal):  1.3-1.4 ~> 1073-9686 kcals/d; Weight Used for Energy Requirements:  Current     Protein (g):  1.5-1.7 gm/kg ~> 78-88 gms/d; Weight Used for Protein Requirements:  Ideal        Fluid (ml/day):  2800 mLs/d OR per MD discretion; Method Used for Fluid Requirements:   ([place])      Nutrition Related Findings:  active bowel sounds; K 3.6; synthroid      Wounds:  None       Current Nutrition Therapies:    Diet NPO    Anthropometric Measures:  · Height: 5' 3\" (160 cm)  · Current Body Weight: 235 lb (106.6 kg)   · Admission Body Weight: 237 lb (107.5 kg)    · Ideal Body Weight: 115 lbs; % Ideal Body Weight 204.3 %   · BMI: 41.6  · BMI Categories: Obese Class 3 (BMI 40.0 or greater)       Nutrition Diagnosis:   · Severe malnutrition,In context of chronic illness related to inadequate protein-energy intake (medical conditions) as evidenced by weight loss greater than or equal to 5% in 1 month,poor intake prior to admission,NPO or clear liquid status due to medical condition      Nutrition Interventions:   Food and/or Nutrient Delivery:  Continue NPO (Start diet as able; recommend ensure enlive supplements TID as able)  Nutrition Education/Counseling:  Education not indicated   Coordination of Nutrition Care:  Continue to monitor while inpatient    Goals: Set   Start diet within 24-72 hours       Nutrition Monitoring and Evaluation:   Food/Nutrient Intake Outcomes:  Diet Advancement/Tolerance  Physical Signs/Symptoms Outcomes:  Biochemical Data,Nutrition Focused Physical Findings,Weight,Skin,GI Status,Fluid Status or Edema     Discharge Planning:     Too soon to determine     Electronically signed by Venus Arias RD, LD on 12/23/21 at 2:42 PM EST    Contact: [phone]

## 2021-12-23 NOTE — PLAN OF CARE
consciousness  12/23/2021 1250 by Finesse Bennett RN  Outcome: Ongoing  12/23/2021 0538 by Dianna Mcnamara RN  Outcome: Ongoing  12/23/2021 0538 by Dianna Mcnamara RN  Outcome: Ongoing  Goal: Complications related to the disease process, condition or treatment will be avoided or minimized  12/23/2021 1250 by Finesse Bennett RN  Outcome: Ongoing  12/23/2021 0538 by Dianna Mcnamara RN  Outcome: Ongoing  12/23/2021 0538 by Dianna Mcnamara RN  Outcome: Ongoing     Problem: Isolation Precautions - Risk of Spread of Infection  Goal: Prevent transmission of infection  12/23/2021 1250 by Finesse Bennett RN  Outcome: Ongoing  12/23/2021 0538 by Dianna Mcnamara RN  Outcome: Ongoing  12/23/2021 0538 by Dianna Mcnamara RN  Outcome: Ongoing     Problem: Nutrition Deficits  Goal: Optimize nutritional status  12/23/2021 1250 by Finesse Bennett RN  Outcome: Ongoing  12/23/2021 0538 by Dianna Mcnamara RN  Outcome: Ongoing  12/23/2021 0538 by Dianna Mcnamara RN  Outcome: Ongoing     Problem: Risk for Fluid Volume Deficit  Goal: Maintain normal heart rhythm  12/23/2021 1250 by Finesse Bennett RN  Outcome: Ongoing  12/23/2021 0538 by Dianna Mcnamara RN  Outcome: Ongoing  12/23/2021 0538 by Dianna Mcnamara RN  Outcome: Ongoing  Goal: Maintain absence of muscle cramping  12/23/2021 1250 by Finesse Bennett RN  Outcome: Ongoing  12/23/2021 0538 by Dianna Mcnamara RN  Outcome: Ongoing  12/23/2021 0538 by Dianna Mcnamara RN  Outcome: Ongoing  Goal: Maintain normal serum potassium, sodium, calcium, phosphorus, and pH  12/23/2021 1250 by Finesse Bennett RN  Outcome: Ongoing  12/23/2021 0538 by Dianna Mcnamara RN  Outcome: Ongoing  12/23/2021 0538 by Dianna Mcnamara RN  Outcome: Ongoing     Problem: Loneliness or Risk for Loneliness  Goal: Demonstrate positive use of time alone when socialization is not possible  12/23/2021 1250 by Finesse Bennett RN  Outcome: Ongoing  12/23/2021 0538 by Dianna Mcnamara RN  Outcome: Ongoing  12/23/2021 0538 by Dianna Mcnamara, RN  Outcome: Ongoing     Problem: Fatigue  Goal: Verbalize increase energy and improved vitality  12/23/2021 1250 by Nini Zuleta RN  Outcome: Ongoing  12/23/2021 0538 by Anibal Vega RN  Outcome: Ongoing  12/23/2021 0538 by Anibal Vega RN  Outcome: Ongoing     Problem: Patient Education: Go to Patient Education Activity  Goal: Patient/Family Education  12/23/2021 1250 by Nini Zuleta RN  Outcome: Ongoing  12/23/2021 0538 by Anibal Vega RN  Outcome: Ongoing  12/23/2021 0538 by Anibal Vega RN  Outcome: Ongoing

## 2021-12-23 NOTE — CARE COORDINATION
Case Management Initial Discharge Plan  Beulah Cai             Met with:called patient to discuss discharge plans. Information verified: address, contacts, phone number, , insurance Yes  Insurance Provider: [facility]    Emergency Contact/Next of Kin name & number: sister Church at [phone]  Who are involved in patient's support system? Sister and niece    PCP: Dong Chapa MD  Date of last visit: 1 month ago      Discharge Planning    Living Arrangements:  [address] has 1 stories  2 stairs to climb to get into front door, Location of bedroom/bathroom in home main    Patient able to perform ADL's:Independent    Current Services (outpatient & in home) none  DME equipment: none  DME provider: n/a    Is patient receiving oral anticoagulation therapy? Yes    If indicated:   Physician managing anticoagulation treatment: cardiologist  Where does patient obtain lab work for ATC treatment? [place] [facility]      Potential Assistance Needed:       Patient agreeable to home care: doesn't know  Freedom of choice provided:  no    Prior SNF/Rehab Placement and Facility: none  Agreeable to SNF/Rehab: doesn't know  [place] of choice provided: no     Evaluation: no    Expected Discharge date:       Patient expects to be discharged to: If home: is the family and/or caregiver wiling & able to provide support at home? none  Who will be providing this support? Sister and niece    Follow Up Appointment: Best Day/ Time:      Transportation provider: Jhon or Sanford Kanwal, her sister  Transportation arrangements needed for discharge: No    Readmission Risk              Risk of Unplanned Readmission:  14             Does patient have a readmission risk score greater than 14?: No  If yes, follow-up appointment must be made within 7 days of discharge.      Goals of Care:       Educated pt on transitional options, provided freedom of choice and are agreeable with plan      Discharge Plan: Home independently with sister. Pt on room air. Sitting up in bed, able to speak full sentences.            Electronically signed by Jesus Person RN on 12/23/21 at 8:56 AM EST

## 2021-12-23 NOTE — PLAN OF CARE
Problem: Falls - Risk of:  Goal: Will remain free from falls  Description: Will remain free from falls  Outcome: Ongoing  Goal: Absence of physical injury  Description: Absence of physical injury  Outcome: Ongoing     Problem: Falls - Risk of:  Goal: Will remain free from falls  Description: Will remain free from falls  12/23/2021 0538 by Martha Park RN  Outcome: Ongoing  12/23/2021 0536 by Martha Park RN  Outcome: Ongoing  Goal: Absence of physical injury  Description: Absence of physical injury  12/23/2021 0538 by Martha Park RN  Outcome: Ongoing  12/23/2021 0536 by Martha Park RN  Outcome: Ongoing     Problem: Airway Clearance - Ineffective  Goal: Achieve or maintain patent airway  Outcome: Ongoing     Problem: Gas Exchange - Impaired  Goal: Absence of hypoxia  Outcome: Ongoing  Goal: Promote optimal lung function  Outcome: Ongoing     Problem: Breathing Pattern - Ineffective  Goal: Ability to achieve and maintain a regular respiratory rate  Outcome: Ongoing     Problem:  Body Temperature -  Risk of, Imbalanced  Goal: Ability to maintain a body temperature within defined limits  Outcome: Ongoing  Goal: Will regain or maintain usual level of consciousness  Outcome: Ongoing  Goal: Complications related to the disease process, condition or treatment will be avoided or minimized  Outcome: Ongoing     Problem: Isolation Precautions - Risk of Spread of Infection  Goal: Prevent transmission of infection  Outcome: Ongoing     Problem: Nutrition Deficits  Goal: Optimize nutritional status  Outcome: Ongoing     Problem: Risk for Fluid Volume Deficit  Goal: Maintain normal heart rhythm  Outcome: Ongoing  Goal: Maintain absence of muscle cramping  Outcome: Ongoing  Goal: Maintain normal serum potassium, sodium, calcium, phosphorus, and pH  Outcome: Ongoing     Problem: Loneliness or Risk for Loneliness  Goal: Demonstrate positive use of time alone when socialization is not possible  Outcome: Ongoing     Problem: Fatigue  Goal: Verbalize increase energy and improved vitality  Outcome: Ongoing     Problem: Patient Education: Go to Patient Education Activity  Goal: Patient/Family Education  Outcome: Ongoing

## 2021-12-23 NOTE — ED PROVIDER NOTES
ADDENDUM:      Care of this patient was assumed from Dr. Toño Lares. The patient was seen for Shortness of Breath  . The patient's initial evaluation and plan have been discussed with the prior provider who initially evaluated the patient. Nursing Notes, Past Medical Hx, Past Surgical Hx, Social Hx, Allergies, and Family Hx were all reviewed. Diagnostic Results       RADIOLOGY:   Non-plain film images such as CT, Ultrasound and MRI are read by the radiologist. [facility] radiographic images are visualized and the radiologist interpretations are reviewed as follows:     CT ABDOMEN PELVIS W IV CONTRAST Additional Contrast? None    Result Date: 12/22/2021  EXAMINATION: CT OF THE ABDOMEN AND PELVIS WITH CONTRAST 12/22/2021 5:12 pm TECHNIQUE: CT of the abdomen and pelvis was performed with the administration of intravenous contrast. Multiplanar reformatted images are provided for review. Dose modulation, iterative reconstruction, and/or weight based adjustment of the mA/kV was utilized to reduce the radiation dose to as low as reasonably achievable. COMPARISON: Abdominal CT study from May 11, 2019. HISTORY: ORDERING SYSTEM PROVIDED HISTORY: gi bleed TECHNOLOGIST PROVIDED HISTORY: gi bleed Decision Support Exception - unselect if not a suspected or confirmed emergency medical condition->Emergency Medical Condition (MA) Reason for Exam: dyspnea, elevated dimer, anemia, ? GI bleed FINDINGS: Lower Chest: Heart size is normal.  Scattered areas of ground-glass opacity are present within the visualized lower lobes, new from prior and possibly representing an active viral process. No pleural effusion. Organs: The liver, spleen, pancreas, kidneys, and adrenal glands appear normal. The gallbladder is mildly distended containing a 3.1 cm stone, similar in appearance to prior allowing for the differences in imaging technique. No surrounding inflammatory fat stranding or fluid. No biliary or pancreatic ductal dilation. GI/Bowel: Assessment is slightly limited due to motion artifact. The stomach and the small and large bowel loops appear normal in caliber, contour, and morphology, without acute or significant abnormality. No dilated loops or areas of bowel wall thickening. No areas of active bleed are identified. Peritoneum/Retroperitoneum: There is no free fluid or extraluminal gas. No enlarged or suspicious mesenteric or retroperitoneal lymphadenopathy. The abdominal aorta and iliac arteries are patent and of normal caliber. Pelvis: No pelvic free fluid or enlarged or suspicious pelvic or inguinal lymphadenopathy. No appreciable uterine or adnexal abnormality. The urinary bladder and the pelvic bowel loops are unremarkable. Bones/Soft Tissues: Degenerative changes are present throughout the lumbar spine. No acute fracture. No abdominal wall or inguinal hernia. Slightly limited study due to respiratory motion artifact. No acute or significant intestinal abnormality. Cholelithiasis, without evidence of acute cholecystitis. Mild diffuse ground-glass opacity at the visualized lung bases. The finding is nonspecific although may represent active COVID-19 viral pneumonia in the appropriate setting. CT CHEST PULMONARY EMBOLISM W CONTRAST    Result Date: 12/22/2021  EXAMINATION: CTA OF THE CHEST 12/22/2021 3:12 pm TECHNIQUE: CTA of the chest was performed after the administration of intravenous contrast.  Multiplanar reformatted images are provided for review. MIP images are provided for review. Dose modulation, iterative reconstruction, and/or weight based adjustment of the mA/kV was utilized to reduce the radiation dose to as low as reasonably achievable. COMPARISON: Chest CT, 05/11/2019.  HISTORY: ORDERING SYSTEM PROVIDED HISTORY: dyspnea elevated dimer anemia TECHNOLOGIST PROVIDED HISTORY: dyspnea elevated dimer anemia Decision Support Exception - unselect if not a suspected or confirmed emergency medical condition->Emergency Medical Condition (MA) Reason for Exam: dyspnea elevated dimer anemia FINDINGS: Pulmonary Arteries: There are filling defects seen within the pulmonary arteries in the right upper lobe, the right middle lobe, the right lower lobe, and the left lower lobe. Left upper lobe is spared. No evidence of right heart strain. Mediastinum: Visualized thyroid is unremarkable. Enlarged mediastinal and hilar lymph nodes are seen. For example, a right hilar lymph node measures 2.3 x 2.0 cm. A precarinal lymph node measures approximately 1.8 cm in short axis. Esophagus is unremarkable. Cardiac chambers unremarkable. Thoracic aorta normal in caliber without evidence of dissection. Lungs/pleura: There are subtle ground-glass infiltrates noted within the mid to lower lungs, greater in the periphery. Innumerable punctate tree-in-bud centrilobular micro nodules are detected within the upper lungs, similar when compared to the previous exam from 2019. Mild dependent atelectasis noted bilaterally. 8 mm nodule within the medial right lower lobe is unchanged since 2019 and considered benign. Upper Abdomen: Please see abdomen and pelvis report performed simultaneously. Soft Tissues/Bones: Visualized extra thoracic soft tissues unremarkable. No acute or suspicious bony abnormalities are identified. Positive for pulmonary embolism. No evidence of right heart strain. Mild patchy peripheral ground-glass infiltrates within the lower lungs bilaterally, compatible with COVID-19 pneumonia. Innumerable punctate centrilobular tree-in-bud micro nodules within the upper lungs bilaterally, similar when compared to the previous exam from May 2019, suggesting chronic bronchiolitis. Findings were discussed with Dr. Rupa Vasques at 8:59 pm on 12/22/2021.      LABS:   Results for orders placed or performed during the hospital encounter of 12/22/21   COVID-19, Rapid    Specimen: Nasopharyngeal Swab   Result Value Ref Range    Specimen Description . NASOPHARYNGEAL SWAB     SARS-CoV-2, Rapid DETECTED (A) Not Detected   CBC Auto Differential   Result Value Ref Range    WBC 9.7 3.5 - 11.0 k/uL    RBC 3.57 (L) 4.0 - 5.2 m/uL    Hemoglobin 6.6 (LL) 12.0 - 16.0 g/dL    Hematocrit 26.1 (L) 36 - 46 %    MCV 73.1 (L) 80 - 100 fL    MCH 18.5 (L) 26 - 34 pg    MCHC 25.3 (L) 31 - 37 g/dL    RDW 22.3 (H) 12.5 - 15.4 %    Platelets 729 (H) 957 - 450 k/uL    MPV 10.1 8.0 - 14.0 fL    NRBC Automated NOT REPORTED per 100 WBC    Differential Type NOT REPORTED     Immature Granulocytes NOT REPORTED 0 %    Absolute Immature Granulocyte NOT REPORTED 0.00 - 0.30 k/uL    WBC Morphology NOT REPORTED     RBC Morphology NOT REPORTED     Platelet Estimate NOT REPORTED     Seg Neutrophils 73 (H) 36 - 66 %    Lymphocytes 15 (L) 24 - 44 %    Monocytes 10 2 - 11 %    Eosinophils % 2 1 - 4 %    Basophils 0 0 - 2 %    Segs Absolute 7.08 1.8 - 7.7 k/uL    Absolute Lymph # 1.46 1.0 - 4.8 k/uL    Absolute Mono # 0.97 0.1 - 1.2 k/uL    Absolute Eos # 0.19 0.0 - 0.4 k/uL    Basophils Absolute 0.00 0.0 - 0.2 k/uL    Morphology ANISOCYTOSIS PRESENT     Morphology MICROCYTOSIS PRESENT     Morphology HYPOCHROMIA PRESENT     Morphology 1+ ELLIPTOCYTES     Morphology 1+ TEARDROPS     Morphology 1+ POLYCHROMASIA     Morphology BASOPHILIC STIPPLING PRESENT    Basic Metabolic Panel   Result Value Ref Range    Glucose 90 70 - 99 mg/dL    BUN 22 8 - 23 mg/dL    CREATININE 1.32 (H) 0.50 - 0.90 mg/dL    Bun/Cre Ratio NOT REPORTED 9 - 20    Calcium 8.3 (L) 8.6 - 10.4 mg/dL    Sodium 141 135 - 144 mmol/L    Potassium 4.2 3.7 - 5.3 mmol/L    Chloride 104 98 - 107 mmol/L    CO2 25 20 - 31 mmol/L    Anion Gap 12 9 - 17 mmol/L    GFR Non-African American 41 (L) >60 mL/min    GFR  49 (L) >60 mL/min    GFR Comment          GFR Staging NOT REPORTED    Troponin   Result Value Ref Range    Troponin, High Sensitivity 25 (H) 0 - 14 ng/L    Troponin T NOT REPORTED <0.03 ng/mL    Troponin Interp NOT REPORTED    D-Dimer, Quantitative   Result Value Ref Range    D-Dimer, Quant 3.83 mg/L FEU   Brain Natriuretic Peptide   Result Value Ref Range    Pro-BNP 7,985 (H) <300 pg/mL    BNP Interpretation NOT REPORTED    EKG 12 Lead   Result Value Ref Range    Ventricular Rate 74 BPM    Atrial Rate 74 BPM    P-R Interval 156 ms    QRS Duration 82 ms    Q-T Interval 402 ms    QTc Calculation (Bazett) 446 ms    P Axis 12 degrees    R Axis -8 degrees    T Axis -3 degrees   TYPE AND SCREEN   Result Value Ref Range    Expiration Date 12/25/2021,2359     Arm Band Number [id number]     ABO/Rh O POSITIVE     Antibody Screen NEGATIVE     Unit Number [id number]     Product Code Leukocyte Reduced Red Cell     Unit Divison 00     Dispense Status ISSUED     Transfusion Status OK TO TRANSFUSE     Crossmatch Result COMPATIBLE        RECENT VITALS:  BP: (!) 119/58, Temp: 98.6 °F (37 °C), Pulse: 75, Resp: 18     ED Course     The patient was given the following medications:  Orders Placed This Encounter   Medications    furosemide (LASIX) injection 40 mg    aspirin chewable tablet 324 mg    0.9 % sodium chloride bolus    iopamidol (ISOVUE-370) 76 % injection 75 mL    sodium chloride flush 0.9 % injection 10 mL    0.9 % sodium chloride infusion    0.9 % sodium chloride infusion     During the emergency department course, patient was given normal saline infusion and 1 unit of packed RBCs is being transfused. Patient will benefit from hospitalization. Medical Decision Making      59-year-old female patient presents to the emergency department for evaluation of shortness of breath. Upon reevaluation, patient is resting comfortably and does not appear to be in any pain or distress. She is afebrile and vital signs are stable. Pulse oximetry is 95% on room air. Her lungs are clear to auscultation, rhythm is regular without murmurs. Abdomen is soft and nontender with active bowel sounds.   Extremities without any edema or calf tenderness. Neurologically no focal deficits. I reviewed all her lab results, CAT scan of the abdomen and pelvis as well as CAT scan of the chest.  Patient has Covid pneumonia as well as bilateral pulmonary embolism. She has symptomatic anemia with hemoglobin of 6.6 g.  Rectal examination was performed which is Hemoccult positive. I discussed the case with Dr. Smith Shah, vascular surgeon who recommended to hold anticoagulation for now, get the venous Doppler study in the morning and patient will need Medusa filter placement if she has a large deep venous thrombosis. If she has a small DVT below the knee, she will not need anticoagulation. I discussed the case with Amadou Moore CNP covering for hospitalist group at [facility] and she kindly accepted the patient to be admitted on the stepdown unit. Transfer arrangements were made. CRITICAL CARE:  The high probability of sudden, clinically significant deterioration in the patient's  condition required the highest level of my preparedness to intervene urgently. The services I provided to this patient were to treat and/or prevent clinically significant  deterioration. Services included, but were not necessarily limited to, the following: chart data  review, reviewing nursing notes and/or old charts, documentation time, consultant  collaboration regarding findings and treatment options, medication orders and management,  direct patient care, vital sign assessments and ordering, interpreting and reviewing diagnostic  studies/lab tests. Aggregate critical care time includes only time during which I was engaged in work directly  related to the patient's care, as described above, whether at the bedside or elsewhere in the  Emergency Department. It did not include time spent performing other reported procedures or  the services of residents, students, nurses, nurse practitioners or physician assistants.   Critical Care: 30 minutes    Disposition FINAL IMPRESSION      1. Symptomatic anemia    2. COVID    3. Bilateral pulmonary embolism ([address]    4. Pneumonia due to COVID-19 virus    5. Gastrointestinal hemorrhage, unspecified gastrointestinal hemorrhage type    6. Blood loss anemia          DISPOSITION/PLAN   DISPOSITION Admitted 12/22/2021 11:40:08 PM      PATIENT REFERRED TO:  No follow-up provider specified. DISCHARGE MEDICATIONS:  New Prescriptions    No medications on file             (Please note that portions of this note were completed with a voice recognition program.  Efforts were made to edit the dictations but occasionally words are mis-transcribed.)    Madi Beckwith MD,, MD, F.A.C.E.P.   Attending Emergency Medicine Physician               Madi Beckwith MD  12/23/21 0193

## 2021-12-23 NOTE — PROGRESS NOTES
Physical Therapy        Physical Therapy Cancel Note      DATE: 2021    NAME: Diamond Hinkle  MRN: [id number]   : 1955      Patient not seen this date for Physical Therapy due to: Other: \"Follow-up lower extremity venous duplex -pending results, will determine whether IVC filter or observation is necessary\", pt with Bilateral PE and unable to anticoagulate at this time due to hgb 6.6 and GI bleed. PT will check back following finalized POC.     Electronically signed by Phyllis Alva PT on 2021 at 8:03 AM

## 2021-12-23 NOTE — CONSULTS
[address]    GASTROENTEROLOGY CONSULT    Patient:   Nancy Benites   :    1955   Facility:   [facility]   Date:    2021  Admission Dx:  Bilateral pulmonary embolism ([address] [I26.99]  Blood loss anemia [D50.0]  Symptomatic anemia [D64.9]  Gastrointestinal hemorrhage, unspecified gastrointestinal hemorrhage type [K92.2]  COVID [U07.1]  Pneumonia due to COVID-19 virus [U07.1, J12.82]  Requesting physician: Alecia Ramirez DO  Reason for consult:  Melena, positive FOBT.   CC : Shortness of breath. SUBJECTIVE     HISTORY OF PRESENT ILLNESS  This is a 61 y.o.  female who was admitted 2021 with Bilateral pulmonary embolism (HCC) [I26.99]  Blood loss anemia [D50.0]  Symptomatic anemia [D64.9]  Gastrointestinal hemorrhage, unspecified gastrointestinal hemorrhage type [K92.2]  COVID [U07.1]  Pneumonia due to COVID-19 virus [U07.1, J12.82]. We have been asked to see the patient in consultation by Alecia Ramirez DO for evaluation of GI bleed. Patient has a past medical history significant for hypertension, hypothyroidism, coronary artery disease status post PCI in May 2019 on DAPT admitted with worsening shortness of breath, found to have Covid 19 infection and bilateral small subsegmental PE. We have been asked to evaluate for melena and positive FOBT. On evaluation, she admits to black tarry stools for the last 6 months did not seek any medical attention for it. Although she denies abdominal pain nausea vomiting fresh blood in the stool/hematochezia. Did not have significant intake of NSAIDs recently. Patient has PCI to LAD in May 2019 and has been taking aspirin and clopidogrel since then. Labs reviewed hemoglobin 6.6 status post 1 PRBC, repeat hemoglobin is 7.4. Summary of imaging completed at this time:    CT chest showed Positive for pulmonary embolism. No evidence of right heart strain.  Mild patchy peripheral ground-glass infiltrates within the lower lungs bilaterally, compatible with COVID-19 pneumonia. Innumerable punctate centrilobular tree-in-bud micro nodules within the upper lungs bilaterally, similar when compared to the previous exam from May 2019, suggesting chronic bronchiolitis. CT abdomen and pelvis showed Slightly limited study due to respiratory motion artifact. No acute or significant intestinal abnormality. Cholelithiasis, without evidence of acute cholecystitis. Mild diffuse ground-glass opacity at the visualized lung bases. The finding is nonspecific although may represent active COVID-19 viral pneumonia in the appropriate setting. Summary of labs completed at this time:  Sodium 143 potassium 3.6 chloride 105. BUN 22 creatinine 1.37. CRP 3.7, .  proBNP 7985. Albumin 2.9 alk phosphatase 166 ALT 11 AST 16 bilirubin 0.65. Previous GI history:   No history of EGD or colonoscopy in the past.  Did not complain of any epigastric or abdominal pain. OBJECTIVE:     PAST MEDICAL/SURGICAL HISTORY  Past Medical History:   Diagnosis Date    Hyperlipidemia     Hypertension      Past Surgical History:   Procedure Laterality Date    BLADDER SUSPENSION      BLADDER SUSPENSION  2004       ALLERGIES:  No Known Allergies    HOME MEDICATIONS:  Prior to Admission medications    Medication Sig Start Date End Date Taking?  Authorizing Provider   levothyroxine (SYNTHROID) 50 MCG tablet TAKE 1 TABLET BY MOUTH EVERY DAY 11/23/21  Yes Britney Lane MD   diphenhydrAMINE HCl, TOPICAL, 2 % GEL Apply twice daily on affected area 12/29/20  Yes Kimberly Osorio MD   furosemide (LASIX) 20 MG tablet Take 1 tablet by mouth daily  Patient taking differently: Take 40 mg by mouth daily  5/15/19  Yes Marylene Pfeiffer, MD   metFORMIN (GLUCOPHAGE-XR) 500 MG extended release tablet TAKE 1 TABLET BY MOUTH IN THE MORNING WITH breakfast 12/20/21   Britney Lane MD   vitamin D (ERGOCALCIFEROL) 1.25 MG (14055 UT) CAPS capsule TAKE 1 CAPSULE BY MOUTH ONE TIME A WEEK 11/23/21   Candelaria Roberts MD   Narayan Galarza MISC by Does not apply route For 1 year 10/18/21   Bailey Bradshaw MD   losartan (COZAAR) 25 MG tablet Take 50 mg by mouth daily     Historical Provider, MD   aspirin 81 MG EC tablet Take 1 tablet by mouth daily 5/15/19   France John, DO   atorvastatin (LIPITOR) 80 MG tablet Take 1 tablet by mouth nightly 5/14/19   Philemon Clap Orlop, DO   metoprolol succinate (TOPROL XL) 100 MG extended release tablet Take 1 tablet by mouth daily 5/15/19   France John, DO   clopidogrel (PLAVIX) 75 MG tablet Take 1 tablet by mouth daily 5/15/19   Philemon Clap Orlop, DO   pantoprazole (PROTONIX) 40 MG tablet Take 1 tablet by mouth 2 times daily 5/14/19   France John, DO       CURRENT MEDICATIONS:  Scheduled Meds:   atorvastatin  80 mg Oral Nightly    levothyroxine  50 mcg Oral Daily    [Held by provider] losartan  50 mg Oral Daily    metoprolol succinate  100 mg Oral Daily    pantoprazole  40 mg Oral BID    sodium chloride flush  5-40 mL IntraVENous 2 times per day    insulin lispro  0-6 Units SubCUTAneous Q4H    Vitamin D  2,000 Units Oral Daily     Continuous Infusions:   dextrose      sodium chloride      sodium chloride      sodium chloride      sodium chloride       PRN Meds:glucose, dextrose, glucagon (rDNA), dextrose, sodium chloride flush, sodium chloride, ondansetron **OR** ondansetron, magnesium hydroxide, acetaminophen **OR** acetaminophen, dextromethorphan-guaiFENesin, sodium chloride, sodium chloride, sodium chloride flush    SOCIAL HISTORY:     Tobacco:   reports that she quit smoking about 32 years ago. Her smoking use included cigarettes. She has a 10.00 pack-year smoking history. She has never used smokeless tobacco.  Alcohol:   reports no history of alcohol use. Illicit drugs:  reports no history of drug use.     FAMILY HISTORY:     Family History   Problem Relation Age of Onset    Elevated Lipids Mother     Cancer Mother     High Blood Pressure Mother     Cancer Maternal Grandmother        REVIEW OF SYSTEMS:    Constitutional: Feeling weak tired and short of breath. HEENT:  No headache, otalgia, itchy eyes, nasal discharge or sore throat. Cardiac:  No chest pain, dyspnea, orthopnea or PND. Chest:   Cough and shortness of breath. Abdomen:  No abdominal pain, nausea or vomiting. Neuro:  No focal weakness, abnormal movements or seizure like activity. Skin:   No rashes, no itching. :   No hematuria, no pyuria, no dysuria, no flank pain. Extremities:  No swelling or joint pains. ROS was otherwise negative except as mentioned in the [address] PHYSICAL EXAM:    BP (!) 122/59   Pulse 88   Temp 97.7 °F (36.5 °C) (Axillary)   Resp 22   Ht 5' 3\" (1.6 m)   Wt 235 lb (106.6 kg)   SpO2 94%   BMI 41.63 kg/m²     GENERAL:  Well developed, Well nourished, No apparent distress  HEAD:   Normocephalic, Atraumatic  EENT:   EOMI, Sclera not icteric, Oropharynx moist   NECK:  Supple, Trachea midline  LUNGS: CTA Bilaterally  HEART:  RRR, No murmur  ABDOMEN:  Soft, Nontender, Nondistended, BS WNL  EXT:   No clubbing. No cyanosis. No edema. SKIN:  No rashes. No jaundice. No stigmata of liver disease.     MUSC/SKEL:  Adequate muscle bulk for patient's age, No significant synovitis, No deformities  NEURO:  A&O x Three, CN II- XII grossly intact      LABS AND IMAGING:     CBC  Recent Labs     12/22/21  1648 12/23/21  0709   WBC 9.7 8.3   HGB 6.6* 7.4*   HCT 26.1* 26.9*   MCV 73.1* 74.3*   MCH 18.5* 20.4*   MCHC 25.3* 27.5*   * 398       IMMATURE PLTs  No results found for: PLTFLUORE    BMP  Recent Labs     12/22/21  1648 12/23/21  0709    143   K 4.2 3.6*    105   CO2 25 21   BUN 22 22   CREATININE 1.32* 1.37*   GLUCOSE 90 77   CALCIUM 8.3* 8.2*       LFTS  Recent Labs     12/23/21  0709   ALKPHOS 166*   ALT 11   AST 16   PROT 6.1*   BILITOT 0.65   LABALBU 2.9*       AMYLASE/LIPASE/AMMONIA  No results for input(s): AMYLASE, LIPASE, AMMONIA in the last 72 hours. PT/INR  Recent Labs     12/23/21  0709   PROTIME 11.5   INR 1.1       ANEMIA STUDIES  Recent Labs     12/23/21  6026   FERRITIN 18       IMAGING  CT ABDOMEN PELVIS W IV CONTRAST Additional Contrast? None    Result Date: 12/22/2021  EXAMINATION: CT OF THE ABDOMEN AND PELVIS WITH CONTRAST 12/22/2021 5:12 pm TECHNIQUE: CT of the abdomen and pelvis was performed with the administration of intravenous contrast. Multiplanar reformatted images are provided for review. Dose modulation, iterative reconstruction, and/or weight based adjustment of the mA/kV was utilized to reduce the radiation dose to as low as reasonably achievable. COMPARISON: Abdominal CT study from May 11, 2019. HISTORY: ORDERING SYSTEM PROVIDED HISTORY: gi bleed TECHNOLOGIST PROVIDED HISTORY: gi bleed Decision Support Exception - unselect if not a suspected or confirmed emergency medical condition->Emergency Medical Condition (MA) Reason for Exam: dyspnea, elevated dimer, anemia, ? GI bleed FINDINGS: Lower Chest: Heart size is normal.  Scattered areas of ground-glass opacity are present within the visualized lower lobes, new from prior and possibly representing an active viral process. No pleural effusion. Organs: The liver, spleen, pancreas, kidneys, and adrenal glands appear normal. The gallbladder is mildly distended containing a 3.1 cm stone, similar in appearance to prior allowing for the differences in imaging technique. No surrounding inflammatory fat stranding or fluid. No biliary or pancreatic ductal dilation. GI/Bowel: Assessment is slightly limited due to motion artifact. The stomach and the small and large bowel loops appear normal in caliber, contour, and morphology, without acute or significant abnormality. No dilated loops or areas of bowel wall thickening. No areas of active bleed are identified.  Peritoneum/Retroperitoneum: There is no free fluid or extraluminal gas.  No enlarged or suspicious mesenteric or retroperitoneal lymphadenopathy. The abdominal aorta and iliac arteries are patent and of normal caliber. Pelvis: No pelvic free fluid or enlarged or suspicious pelvic or inguinal lymphadenopathy. No appreciable uterine or adnexal abnormality. The urinary bladder and the pelvic bowel loops are unremarkable. Bones/Soft Tissues: Degenerative changes are present throughout the lumbar spine. No acute fracture. No abdominal wall or inguinal hernia. Slightly limited study due to respiratory motion artifact. No acute or significant intestinal abnormality. Cholelithiasis, without evidence of acute cholecystitis. Mild diffuse ground-glass opacity at the visualized lung bases. The finding is nonspecific although may represent active COVID-19 viral pneumonia in the appropriate setting. CT CHEST PULMONARY EMBOLISM W CONTRAST    Result Date: 12/22/2021  EXAMINATION: CTA OF THE CHEST 12/22/2021 3:12 pm TECHNIQUE: CTA of the chest was performed after the administration of intravenous contrast.  Multiplanar reformatted images are provided for review. MIP images are provided for review. Dose modulation, iterative reconstruction, and/or weight based adjustment of the mA/kV was utilized to reduce the radiation dose to as low as reasonably achievable. COMPARISON: Chest CT, 05/11/2019. HISTORY: ORDERING SYSTEM PROVIDED HISTORY: dyspnea elevated dimer anemia TECHNOLOGIST PROVIDED HISTORY: dyspnea elevated dimer anemia Decision Support Exception - unselect if not a suspected or confirmed emergency medical condition->Emergency Medical Condition (MA) Reason for Exam: dyspnea elevated dimer anemia FINDINGS: Pulmonary Arteries: There are filling defects seen within the pulmonary arteries in the right upper lobe, the right middle lobe, the right lower lobe, and the left lower lobe. Left upper lobe is spared. No evidence of right heart strain.  Mediastinum: Visualized thyroid is unremarkable. Enlarged mediastinal and hilar lymph nodes are seen. For example, a right hilar lymph node measures 2.3 x 2.0 cm. A precarinal lymph node measures approximately 1.8 cm in short axis. Esophagus is unremarkable. Cardiac chambers unremarkable. Thoracic aorta normal in caliber without evidence of dissection. Lungs/pleura: There are subtle ground-glass infiltrates noted within the mid to lower lungs, greater in the periphery. Innumerable punctate tree-in-bud centrilobular micro nodules are detected within the upper lungs, similar when compared to the previous exam from 2019. Mild dependent atelectasis noted bilaterally. 8 mm nodule within the medial right lower lobe is unchanged since 2019 and considered benign. Upper Abdomen: Please see abdomen and pelvis report performed simultaneously. Soft Tissues/Bones: Visualized extra thoracic soft tissues unremarkable. No acute or suspicious bony abnormalities are identified. Positive for pulmonary embolism. No evidence of right heart strain. Mild patchy peripheral ground-glass infiltrates within the lower lungs bilaterally, compatible with COVID-19 pneumonia. Innumerable punctate centrilobular tree-in-bud micro nodules within the upper lungs bilaterally, similar when compared to the previous exam from May 2019, suggesting chronic bronchiolitis. Findings were discussed with Dr. Peraza Tar Heel at 8:59 pm on 12/22/2021. IMPRESSION:     1. Melena. GBS: 9 points  2. Acute blood loss anemia-status post 1 PRBC  3.  Bilateral subsegmental pulmonary embolism. 4.  COVID-19 infection  5. Coronary artery disease status post PCI on DAPT  6. Essential hypertension  7. Hypothyroidism    Old records, labs and imaging reviewed. PLAN   1. No hematochezia is noted by the patient. She has been having melena for the last 6 months. FOBT was positive. Patient has been taking dual antiplatelet since my 3136.   Given significant drop of hemoglobin 6.6, baseline hemoglobin was around 12 in 2020, Mobeetie Blatchford bleeding score of 9 points, we will start on IV Protonix drip. Patient will need EGD in the OR given the risk of aerosolization due to COVID-19 infection. She is already beyond 1 year of DAPT for PCI and if okay with cardiology to discontinue clopidogrel and continue with aspirin for secondary prophylaxis, but this need to be discussed with cardiology. 2.  Overall clot burden for subsegmental PE is low and vascular surgery is on board. Recommend bilateral lower extremity venous duplex, report awaited. Will hold heparin for now. 3.  Rest of the care per primary team.    This plan will be discussed with Dr. America Sinclair for final approval.  Thank you for allowing us to participate in the care of your patient. Jonathan Aguirre MD  Internal Medicine Resident, PGY-1  [address]  12/23/2021,12:57 PM    Please note that this note was generated using a voice recognition dictation software. Although every effort was made to ensure the accuracy of this automated transcription, some errors in transcription may have occurred.

## 2021-12-23 NOTE — H&P
[facility]  Office: [address], DO, Doroteo Gibson, DO, Jud Rangel, DO, Laura Prieto Blood, DO, Tracie España MD, Pascale Eller MD, Jun Yeh MD, Nelson Govea MD, Karlee Otto MD, Yandy Peñaloza MD, Tahmina Burnham MD, Dee Jewell, DO, Angie Tobin, DO, Coy Ozuna MD,  Kartik Martines DO, Junie Saldivar MD, Tramaine Roberto MD, Kia Taylor MD, Duncan Suarez MD, Betsey Fitch MD, Peyton Green MD, Crys Choi MD, Tyler Elena CNP, [facility], CNP, Savita Champion, CNP, [facility] , CNS, Elif Desai, CNP, Nayeli Pichardo, CNP, [place], CNP, Thiago Pearl, CNP, Dianna Martin, CNP, Juanita Cartwright PA-C, June Granger, DNP, Darío Cabrera, DNP, Gabino Horn, CNP, Mark Anthony Maki, CNP, Melisa Calloway, CNP, Rigoberto Martínez, CNP, Isis Beebe, CNP, Evangelina Hood, CNP         [address]    HISTORY AND PHYSICAL EXAMINATION            Date:   12/23/2021  Patient name:  Ronel Dominguez  Date of admission:  12/22/2021  3:45 PM  MRN:   [id number]  Account:  [de-identified]  YOB: 1955  PCP:    Evert Councilman, MD  Room:   2005/2005-01  Code Status:    Full Code    Chief Complaint:     Chief Complaint   Patient presents with    Shortness of Breath     History Obtained From:     patient, electronic medical record    History of Present Illness:     Ronel Dominguez is a 61 y.o.  female who initially presented to [facility] emergency department for evaluation of shortness of breath. She reports that she has had melena for about 6 months and has noted that her breathing has gotten worse over the last week. She notes that she has had multiple exposure to COVID-19 infected coworkers at her job over the last couple of weeks. She reports that one of her coworkers actually passed away from Covid this past week.   Her comorbidities include history of hypertension, hyperlipidemia, type 2 diabetes mellitus, congestive heart failure, and coronary artery disease status post PCI to the LAD in May 2019. In the emergency department, patient was noted to have a significantly low hemoglobin level of 6.6. She received 1 unit of PRBCs and repeat hemoglobin of 7.4. CT imaging of the chest did reveal bilateral pulmonary emboli and findings consistent with COVID-19 pneumonia. Vascular surgery was contacted and is recommending holding off on anticoagulation as the patient currently is with melena and has several subsegmental pulmonary emboli with low clot burden. She is on aspirin and Plavix. She was transferred to [facility] for further evaluation and treatment of the above. Past Medical History:     Past Medical History:   Diagnosis Date    Hyperlipidemia     Hypertension         Past Surgical History:     Past Surgical History:   Procedure Laterality Date    BLADDER SUSPENSION      BLADDER SUSPENSION  2004        Medications Prior to Admission:     Prior to Admission medications    Medication Sig Start Date End Date Taking?  Authorizing Provider   levothyroxine (SYNTHROID) 50 MCG tablet TAKE 1 TABLET BY MOUTH EVERY DAY 11/23/21  Yes Anam Palacios MD   diphenhydrAMINE HCl, TOPICAL, 2 % GEL Apply twice daily on affected area 12/29/20  Yes Susie Mike MD   furosemide (LASIX) 20 MG tablet Take 1 tablet by mouth daily  Patient taking differently: Take 40 mg by mouth daily  5/15/19  Yes Mariaa Monteiro MD   metFORMIN (GLUCOPHAGE-XR) 500 MG extended release tablet TAKE 1 TABLET BY MOUTH IN THE MORNING WITH breakfast 12/20/21   Candelaria Roberts MD   vitamin D (ERGOCALCIFEROL) 1.25 MG (60147 UT) CAPS capsule TAKE 1 CAPSULE BY MOUTH ONE TIME A WEEK 11/23/21   Anam Palacios MD   Handicap [facility] MISC by Does not apply route For 1 year 10/18/21   Anam Palacios MD   losartan (COZAAR) 25 MG tablet Take 50 mg by mouth daily     Historical Provider, MD   aspirin 81 MG EC tablet Take 1 joints  NEUROLOGICAL:  negative for headaches, dizziness, lightheadedness, numbness, pain, tingling extremities  BEHAVIOR/PSYCH:  negative for depression, anxiety    Physical Exam:   BP (!) 122/59   Pulse 88   Temp 97.7 °F (36.5 °C) (Axillary)   Resp 22   Ht 5' 3\" (1.6 m)   Wt 235 lb (106.6 kg)   SpO2 94%   BMI 41.63 kg/m²   Temp (24hrs), Av.4 °F (36.9 °C), Min:97.7 °F (36.5 °C), Max:98.9 °F (37.2 °C)    Recent Labs     21  0756   POCGLU 76       Intake/Output Summary (Last 24 hours) at 2021 1256  Last data filed at 2021 0341  Gross per 24 hour   Intake 300 ml   Output --   Net 300 ml       General Appearance: alert, well appearing, and in no acute distress, morbidly obese  female sitting up in bed  Mental status: oriented to person, place, and time  Head: normocephalic, atraumatic  Eye: no icterus, redness, pupils equal and reactive, extraocular eye movements intact, conjunctiva clear  Ear: normal external ear, no discharge, hearing intact  Nose: no drainage noted  Mouth: mucous membranes moist, face covering mouth and nose  Neck: supple, no carotid bruits, thyroid not palpable  Lungs: Bilateral equal air entry, clear to ausculation, no wheezing, rales or rhonchi, normal effort  Cardiovascular: normal rate, regular rhythm, no murmur, gallop, rub  Abdomen: Soft, nontender, nondistended, normal bowel sounds, no hepatomegaly or splenomegaly  Neurologic: There are no new focal motor or sensory deficits, normal muscle tone and bulk, no abnormal sensation, normal speech, cranial nerves II through XII grossly intact  Skin: No gross lesions, rashes, bruising or bleeding on exposed skin area  Extremities: peripheral pulses palpable, no pedal edema or calf pain with palpation  Psych: normal affect    Investigations:      Laboratory Testing:  Recent Results (from the past 24 hour(s))   EKG 12 Lead    Collection Time: 21  4:32 PM   Result Value Ref Range    Ventricular Rate 74 BPM Atrial Rate 74 BPM    P-R Interval 156 ms    QRS Duration 82 ms    Q-T Interval 402 ms    QTc Calculation (Bazett) 446 ms    P Axis 12 degrees    R Axis -8 degrees    T Axis -3 degrees   COVID-19, Rapid    Collection Time: 12/22/21  4:38 PM    Specimen: Nasopharyngeal Swab   Result Value Ref Range    Specimen Description . NASOPHARYNGEAL SWAB     SARS-CoV-2, Rapid DETECTED (A) Not Detected   CBC Auto Differential    Collection Time: 12/22/21  4:48 PM   Result Value Ref Range    WBC 9.7 3.5 - 11.0 k/uL    RBC 3.57 (L) 4.0 - 5.2 m/uL    Hemoglobin 6.6 (LL) 12.0 - 16.0 g/dL    Hematocrit 26.1 (L) 36 - 46 %    MCV 73.1 (L) 80 - 100 fL    MCH 18.5 (L) 26 - 34 pg    MCHC 25.3 (L) 31 - 37 g/dL    RDW 22.3 (H) 12.5 - 15.4 %    Platelets 295 (H) 240 - 450 k/uL    MPV 10.1 8.0 - 14.0 fL    NRBC Automated NOT REPORTED per 100 WBC    Differential Type NOT REPORTED     Immature Granulocytes NOT REPORTED 0 %    Absolute Immature Granulocyte NOT REPORTED 0.00 - 0.30 k/uL    WBC Morphology NOT REPORTED     RBC Morphology NOT REPORTED     Platelet Estimate NOT REPORTED     Seg Neutrophils 73 (H) 36 - 66 %    Lymphocytes 15 (L) 24 - 44 %    Monocytes 10 2 - 11 %    Eosinophils % 2 1 - 4 %    Basophils 0 0 - 2 %    Segs Absolute 7.08 1.8 - 7.7 k/uL    Absolute Lymph # 1.46 1.0 - 4.8 k/uL    Absolute Mono # 0.97 0.1 - 1.2 k/uL    Absolute Eos # 0.19 0.0 - 0.4 k/uL    Basophils Absolute 0.00 0.0 - 0.2 k/uL    Morphology ANISOCYTOSIS PRESENT     Morphology MICROCYTOSIS PRESENT     Morphology HYPOCHROMIA PRESENT     Morphology 1+ ELLIPTOCYTES     Morphology 1+ TEARDROPS     Morphology 1+ POLYCHROMASIA     Morphology BASOPHILIC STIPPLING PRESENT    Basic Metabolic Panel    Collection Time: 12/22/21  4:48 PM   Result Value Ref Range    Glucose 90 70 - 99 mg/dL    BUN 22 8 - 23 mg/dL    CREATININE 1.32 (H) 0.50 - 0.90 mg/dL    Bun/Cre Ratio NOT REPORTED 9 - 20    Calcium 8.3 (L) 8.6 - 10.4 mg/dL    Sodium 141 135 - 144 mmol/L    Potassium 4.2 3.7 - 5.3 mmol/L    Chloride 104 98 - 107 mmol/L    CO2 25 20 - 31 mmol/L    Anion Gap 12 9 - 17 mmol/L    GFR Non-African American 41 (L) >60 mL/min    GFR  49 (L) >60 mL/min    GFR Comment          GFR Staging NOT REPORTED    Troponin    Collection Time: 12/22/21  4:48 PM   Result Value Ref Range    Troponin, High Sensitivity 25 (H) 0 - 14 ng/L    Troponin T NOT REPORTED <0.03 ng/mL    Troponin Interp NOT REPORTED    D-Dimer, Quantitative    Collection Time: 12/22/21  4:48 PM   Result Value Ref Range    D-Dimer, Quant 3.83 mg/L FEU   Brain Natriuretic Peptide    Collection Time: 12/22/21  4:48 PM   Result Value Ref Range    Pro-BNP 7,985 (H) <300 pg/mL    BNP Interpretation NOT REPORTED    TYPE AND SCREEN    Collection Time: 12/22/21 11:25 PM   Result Value Ref Range    Expiration Date 12/25/2021,2359     Arm Band Number [id number]     ABO/Rh O POSITIVE     Antibody Screen NEGATIVE     Unit Number [id number]     Product Code Leukocyte Reduced Red Cell     Unit Divison 00     Dispense Status ISSUED     Transfusion Status OK TO TRANSFUSE     Crossmatch Result COMPATIBLE    CBC Auto Differential    Collection Time: 12/23/21  7:09 AM   Result Value Ref Range    WBC 8.3 3.5 - 11.3 k/uL    RBC 3.62 (L) 3.95 - 5.11 m/uL    Hemoglobin 7.4 (L) 11.9 - 15.1 g/dL    Hematocrit 26.9 (L) 36.3 - 47.1 %    MCV 74.3 (L) 82.6 - 102.9 fL    MCH 20.4 (L) 25.2 - 33.5 pg    MCHC 27.5 (L) 28.4 - 34.8 g/dL    RDW 22.3 (H) 11.8 - 14.4 %    Platelets 891 895 - 749 k/uL    MPV 10.1 8.1 - 13.5 fL    NRBC Automated 0.0 0.0 per 100 WBC    Differential Type NOT REPORTED     WBC Morphology NOT REPORTED     RBC Morphology NOT REPORTED     Platelet Estimate NOT REPORTED     Immature Granulocytes 0 0 %    Seg Neutrophils 80 (H) 36 - 66 %    Lymphocytes 15 (L) 24 - 44 %    Monocytes 3 1 - 7 %    Eosinophils % 1 1 - 4 %    Basophils 1 0 - 2 %    Absolute Immature Granulocyte 0.00 0.00 - 0.30 k/uL    Segs Absolute 6.64 1.8 - 7.7 k/uL Absolute Lymph # 1.25 1.0 - 4.8 k/uL    Absolute Mono # 0.25 0.1 - 0.8 k/uL    Absolute Eos # 0.08 0.0 - 0.4 k/uL    Basophils Absolute 0.08 0.0 - 0.2 k/uL    Morphology MICROCYTOSIS PRESENT     Morphology ANISOCYTOSIS PRESENT     Morphology 1+ POLYCHROMASIA     Morphology HYPOCHROMIA PRESENT     Morphology 1+ TEARDROPS    Comprehensive Metabolic Panel w/ Reflex to MG    Collection Time: 12/23/21  7:09 AM   Result Value Ref Range    Glucose 77 70 - 99 mg/dL    BUN 22 8 - 23 mg/dL    CREATININE 1.37 (H) 0.50 - 0.90 mg/dL    Bun/Cre Ratio NOT REPORTED 9 - 20    Calcium 8.2 (L) 8.6 - 10.4 mg/dL    Sodium 143 135 - 144 mmol/L    Potassium 3.6 (L) 3.7 - 5.3 mmol/L    Chloride 105 98 - 107 mmol/L    CO2 21 20 - 31 mmol/L    Anion Gap 17 9 - 17 mmol/L    Alkaline Phosphatase 166 (H) 35 - 104 U/L    ALT 11 5 - 33 U/L    AST 16 <32 U/L    Total Bilirubin 0.65 0.3 - 1.2 mg/dL    Total Protein 6.1 (L) 6.4 - 8.3 g/dL    Albumin 2.9 (L) 3.5 - 5.2 g/dL    Albumin/Globulin Ratio 0.9 (L) 1.0 - 2.5    GFR Non- 39 (L) >60 mL/min    GFR  47 (L) >60 mL/min    GFR Comment          GFR Staging NOT REPORTED    Protime-INR    Collection Time: 12/23/21  7:09 AM   Result Value Ref Range    Protime 11.5 9.1 - 12.3 sec    INR 1.1    APTT    Collection Time: 12/23/21  7:09 AM   Result Value Ref Range    PTT 23.0 20.5 - 30.5 sec   Fibrinogen    Collection Time: 12/23/21  7:09 AM   Result Value Ref Range    Fibrinogen 234 140 - 420 mg/dL   C-Reactive Protein    Collection Time: 12/23/21  7:09 AM   Result Value Ref Range    CRP 3.7 0.0 - 5.0 mg/L   D-Dimer, Quantitative    Collection Time: 12/23/21  7:09 AM   Result Value Ref Range    D-Dimer, Quant 2.37 mg/L FEU   Troponin    Collection Time: 12/23/21  7:09 AM   Result Value Ref Range    Troponin, High Sensitivity 25 (H) 0 - 14 ng/L    Troponin T NOT REPORTED <0.03 ng/mL    Troponin Interp NOT REPORTED    Lactic Acid, Plasma    Collection Time: 12/23/21  7:09 AM Result Value Ref Range    Lactic Acid NOT REPORTED mmol/L    Lactic Acid, Whole Blood 1.6 0.7 - 2.1 mmol/L   Vitamin D 25 Hydroxy    Collection Time: 12/23/21  7:09 AM   Result Value Ref Range    Vit D, 25-Hydroxy 27.9 (L) 30.0 - 100.0 ng/mL   Ferritin    Collection Time: 12/23/21  7:09 AM   Result Value Ref Range    Ferritin 18 13 - 150 ug/L   Lactate Dehydrogenase    Collection Time: 12/23/21  7:09 AM   Result Value Ref Range     (H) 135 - 214 U/L   Procalcitonin    Collection Time: 12/23/21  7:09 AM   Result Value Ref Range    Procalcitonin 0.13 (H) <0.09 ng/mL   Hemoglobin A1C    Collection Time: 12/23/21  7:09 AM   Result Value Ref Range    Hemoglobin A1C 5.2 4.0 - 6.0 %    Estimated Avg Glucose 103 mg/dL   POC Glucose Fingerstick    Collection Time: 12/23/21  7:56 AM   Result Value Ref Range    POC Glucose 76 65 - 105 mg/dL       Imaging/Diagnostics:  CT ABDOMEN PELVIS W IV CONTRAST Additional Contrast? None    Result Date: 12/22/2021  Slightly limited study due to respiratory motion artifact. No acute or significant intestinal abnormality. Cholelithiasis, without evidence of acute cholecystitis. Mild diffuse ground-glass opacity at the visualized lung bases. The finding is nonspecific although may represent active COVID-19 viral pneumonia in the appropriate setting. CT CHEST PULMONARY EMBOLISM W CONTRAST    Result Date: 12/22/2021  Positive for pulmonary embolism. No evidence of right heart strain. Mild patchy peripheral ground-glass infiltrates within the lower lungs bilaterally, compatible with COVID-19 pneumonia. Innumerable punctate centrilobular tree-in-bud micro nodules within the upper lungs bilaterally, similar when compared to the previous exam from May 2019, suggesting chronic bronchiolitis. Findings were discussed with Dr. Douglas Zavala at 8:59 pm on 12/22/2021.        Assessment :      Hospital Problems           Last Modified POA    * (Principal) Bilateral pulmonary embolism ([address] hours.    33 Lubna Hickman DO  12/23/2021  12:56 PM    Copy sent to Dr. Edy Amador MD

## 2021-12-24 LAB
ABO/RH: NORMAL
ABSOLUTE EOS #: 0.26 K/UL (ref 0–0.4)
ABSOLUTE IMMATURE GRANULOCYTE: 0 K/UL (ref 0–0.3)
ABSOLUTE LYMPH #: 1.58 K/UL (ref 1–4.8)
ABSOLUTE MONO #: 0.7 K/UL (ref 0.1–0.8)
ANION GAP SERPL CALCULATED.3IONS-SCNC: 14 MMOL/L (ref 9–17)
ANTIBODY SCREEN: NEGATIVE
ARM BAND NUMBER: NORMAL
BASOPHILS # BLD: 0 % (ref 0–2)
BASOPHILS ABSOLUTE: 0 K/UL (ref 0–0.2)
BLD PROD TYP BPU: NORMAL
BUN BLDV-MCNC: 21 MG/DL (ref 8–23)
BUN/CREAT BLD: ABNORMAL (ref 9–20)
C-REACTIVE PROTEIN: 3.8 MG/L (ref 0–5)
CALCIUM SERPL-MCNC: 8 MG/DL (ref 8.6–10.4)
CHLORIDE BLD-SCNC: 102 MMOL/L (ref 98–107)
CO2: 20 MMOL/L (ref 20–31)
CREAT SERPL-MCNC: 1.32 MG/DL (ref 0.5–0.9)
CROSSMATCH RESULT: NORMAL
DIFFERENTIAL TYPE: ABNORMAL
DISPENSE STATUS BLOOD BANK: NORMAL
EOSINOPHILS RELATIVE PERCENT: 3 % (ref 1–4)
EXPIRATION DATE: NORMAL
GFR AFRICAN AMERICAN: 49 ML/MIN
GFR NON-AFRICAN AMERICAN: 41 ML/MIN
GFR SERPL CREATININE-BSD FRML MDRD: ABNORMAL ML/MIN/{1.73_M2}
GFR SERPL CREATININE-BSD FRML MDRD: ABNORMAL ML/MIN/{1.73_M2}
GLUCOSE BLD-MCNC: 104 MG/DL (ref 65–105)
GLUCOSE BLD-MCNC: 108 MG/DL (ref 65–105)
GLUCOSE BLD-MCNC: 90 MG/DL (ref 70–99)
GLUCOSE BLD-MCNC: 91 MG/DL (ref 65–105)
HCT VFR BLD CALC: 27.2 % (ref 36.3–47.1)
HCT VFR BLD CALC: 30.5 % (ref 36.3–47.1)
HCT VFR BLD CALC: 30.7 % (ref 36.3–47.1)
HEMOGLOBIN: 7.2 G/DL (ref 11.9–15.1)
HEMOGLOBIN: 7.9 G/DL (ref 11.9–15.1)
HEMOGLOBIN: 8 G/DL (ref 11.9–15.1)
IMMATURE GRANULOCYTES: 0 %
LYMPHOCYTES # BLD: 18 % (ref 24–44)
MCH RBC QN AUTO: 19.9 PG (ref 25.2–33.5)
MCHC RBC AUTO-ENTMCNC: 26.5 G/DL (ref 28.4–34.8)
MCV RBC AUTO: 75.1 FL (ref 82.6–102.9)
MONOCYTES # BLD: 8 % (ref 1–7)
MORPHOLOGY: ABNORMAL
NRBC AUTOMATED: 0.2 PER 100 WBC
NUCLEATED RED BLOOD CELLS: 1 PER 100 WBC
PDW BLD-RTO: 22.3 % (ref 11.8–14.4)
PLATELET # BLD: 428 K/UL (ref 138–453)
PLATELET ESTIMATE: ABNORMAL
PMV BLD AUTO: 10.1 FL (ref 8.1–13.5)
POTASSIUM SERPL-SCNC: 3.9 MMOL/L (ref 3.7–5.3)
RBC # BLD: 3.62 M/UL (ref 3.95–5.11)
RBC # BLD: ABNORMAL 10*6/UL
SEG NEUTROPHILS: 71 % (ref 36–66)
SEGMENTED NEUTROPHILS ABSOLUTE COUNT: 6.26 K/UL (ref 1.8–7.7)
SODIUM BLD-SCNC: 136 MMOL/L (ref 135–144)
TRANSFUSION STATUS: NORMAL
UNIT DIVISION: 0
UNIT NUMBER: NORMAL
WBC # BLD: 8.8 K/UL (ref 3.5–11.3)
WBC # BLD: ABNORMAL 10*3/UL

## 2021-12-24 PROCEDURE — 6370000000 HC RX 637 (ALT 250 FOR IP): Performed by: NURSE PRACTITIONER

## 2021-12-24 PROCEDURE — 2580000003 HC RX 258: Performed by: FAMILY MEDICINE

## 2021-12-24 PROCEDURE — 6370000000 HC RX 637 (ALT 250 FOR IP): Performed by: FAMILY MEDICINE

## 2021-12-24 PROCEDURE — 99232 SBSQ HOSP IP/OBS MODERATE 35: CPT | Performed by: FAMILY MEDICINE

## 2021-12-24 PROCEDURE — 99254 IP/OBS CNSLTJ NEW/EST MOD 60: CPT | Performed by: INTERNAL MEDICINE

## 2021-12-24 PROCEDURE — 86140 C-REACTIVE PROTEIN: CPT

## 2021-12-24 PROCEDURE — 99232 SBSQ HOSP IP/OBS MODERATE 35: CPT | Performed by: INTERNAL MEDICINE

## 2021-12-24 PROCEDURE — 36415 COLL VENOUS BLD VENIPUNCTURE: CPT

## 2021-12-24 PROCEDURE — 6360000002 HC RX W HCPCS: Performed by: INTERNAL MEDICINE

## 2021-12-24 PROCEDURE — 82947 ASSAY GLUCOSE BLOOD QUANT: CPT

## 2021-12-24 PROCEDURE — 85025 COMPLETE CBC W/AUTO DIFF WBC: CPT

## 2021-12-24 PROCEDURE — 2580000003 HC RX 258: Performed by: NURSE PRACTITIONER

## 2021-12-24 PROCEDURE — 2580000003 HC RX 258: Performed by: INTERNAL MEDICINE

## 2021-12-24 PROCEDURE — 6370000000 HC RX 637 (ALT 250 FOR IP): Performed by: INTERNAL MEDICINE

## 2021-12-24 PROCEDURE — 6360000002 HC RX W HCPCS: Performed by: FAMILY MEDICINE

## 2021-12-24 PROCEDURE — 80048 BASIC METABOLIC PNL TOTAL CA: CPT

## 2021-12-24 PROCEDURE — 85014 HEMATOCRIT: CPT

## 2021-12-24 PROCEDURE — XW033G6 INTRODUCTION OF REGN-COV2 MONOCLONAL ANTIBODY INTO PERIPHERAL VEIN, PERCUTANEOUS APPROACH, NEW TECHNOLOGY GROUP 6: ICD-10-PCS | Performed by: INTERNAL MEDICINE

## 2021-12-24 PROCEDURE — 85018 HEMOGLOBIN: CPT

## 2021-12-24 PROCEDURE — 2060000000 HC ICU INTERMEDIATE R&B

## 2021-12-24 RX ORDER — PANTOPRAZOLE SODIUM 40 MG/1
40 TABLET, DELAYED RELEASE ORAL
Status: DISCONTINUED | OUTPATIENT
Start: 2021-12-24 | End: 2021-12-25 | Stop reason: HOSPADM

## 2021-12-24 RX ORDER — LANOLIN ALCOHOL/MO/W.PET/CERES
325 CREAM (GRAM) TOPICAL 2 TIMES DAILY WITH MEALS
Status: DISCONTINUED | OUTPATIENT
Start: 2021-12-24 | End: 2021-12-25 | Stop reason: HOSPADM

## 2021-12-24 RX ORDER — MULTIVITAMIN WITH IRON
1 TABLET ORAL DAILY
Status: DISCONTINUED | OUTPATIENT
Start: 2021-12-24 | End: 2021-12-25 | Stop reason: HOSPADM

## 2021-12-24 RX ADMIN — Medication 2000 UNITS: at 10:03

## 2021-12-24 RX ADMIN — Medication: at 12:46

## 2021-12-24 RX ADMIN — LEVOTHYROXINE SODIUM 50 MCG: 50 TABLET ORAL at 10:03

## 2021-12-24 RX ADMIN — ATORVASTATIN CALCIUM 80 MG: 80 TABLET, FILM COATED ORAL at 21:34

## 2021-12-24 RX ADMIN — FERROUS SULFATE TAB EC 325 MG (65 MG FE EQUIVALENT) 325 MG: 325 (65 FE) TABLET DELAYED RESPONSE at 17:20

## 2021-12-24 RX ADMIN — PANTOPRAZOLE SODIUM 40 MG: 40 TABLET, DELAYED RELEASE ORAL at 03:50

## 2021-12-24 RX ADMIN — ALCOHOL 1 TABLET: 70.47 GEL TOPICAL at 14:07

## 2021-12-24 RX ADMIN — SODIUM CHLORIDE, PRESERVATIVE FREE 10 ML: 5 INJECTION INTRAVENOUS at 19:59

## 2021-12-24 RX ADMIN — METOPROLOL SUCCINATE 100 MG: 100 TABLET, FILM COATED, EXTENDED RELEASE ORAL at 10:03

## 2021-12-24 RX ADMIN — PANTOPRAZOLE SODIUM 40 MG: 40 TABLET, DELAYED RELEASE ORAL at 17:20

## 2021-12-24 RX ADMIN — SODIUM CHLORIDE: 9 INJECTION, SOLUTION INTRAVENOUS at 21:34

## 2021-12-24 RX ADMIN — PANTOPRAZOLE SODIUM 40 MG: 40 TABLET, DELAYED RELEASE ORAL at 12:46

## 2021-12-24 RX ADMIN — IRON SUCROSE 200 MG: 20 INJECTION, SOLUTION INTRAVENOUS at 14:07

## 2021-12-24 ASSESSMENT — ENCOUNTER SYMPTOMS
BLOOD IN STOOL: 0
VOMITING: 0
COLOR CHANGE: 0
COUGH: 0
SHORTNESS OF BREATH: 1
NAUSEA: 0
EYE REDNESS: 0
ABDOMINAL DISTENTION: 0
SHORTNESS OF BREATH: 0
CONSTIPATION: 0
WHEEZING: 0
CHEST TIGHTNESS: 0
COUGH: 1
ABDOMINAL PAIN: 0
RHINORRHEA: 0
DIARRHEA: 0

## 2021-12-24 NOTE — CONSULTS
[facility] -   Infectious diseases evaluation  admission date 12/22/2021    reason for consultation:   covid     Impression :   Current:  · covid illness and early pneumonia - no infl markers elevation, no hypoxemia  · Melena and anemia    Other:  ·   Discussion / summary of stay / plan of care   · Comes for a symptomatic anemia and melena  · SOB and no there covid signs -  · infl markers neg and CT very few small infil that could be covid  · 12/23 regenron   Recommendations   · 12/24 regeneron due to lack of inflammation and no o2 need and very subtle CT changes  · Watch condition and CRP ferritin for any progression despite the regeneron, which could necessitate more agressive tx   · GI on board- no plans for scope  · Ok for DC  otherwise  If condition remains stable, isolate only 10 days till 1/1/22    Infection Control Recommendations   · [place] Precautions  · Contact Isolation   · Airborne isolation  · Droplet Isolation      Antimicrobial Stewardship Recommendations   Off AB    Coordination ofOutpatient Care:   · Estimated Length of IV antimicrobials:  · Patient will need Midline / picc Catheter Insertion:   · Patient will need SNF:  · Patient will need outpatient wound care:     History of Present Illness:   Initial history:  Noemi Ko is a 61y.o.-year-old female w melena x 6 months, getting worse.   Exposed to covid patients recently  Was getting SOb x a week and increase in her LE edema -  Denies fever  Has a small old  Cough   no nausea, vomiting, diarrhea     transferred to [facility] for the covid concerns  All infl markers are neg and CT chest shows very subtle changes that might be covid pneumonia as below    Received regeneron 12/23 and tolerated      Interval changes  12/24/2021   Patient Vitals for the past 8 hrs:   BP Temp Temp src Pulse SpO2   12/24/21 0752 129/70 98.1 °F (36.7 °C) Oral 67 --   12/24/21 0330 114/63 97.7 °F (36.5 °C) Oral 71 92 % Summary of relevant labs:  Labs:  Procalcitonin0.13 High     High    CRP3.7   Ferritin 10  Fibrinogen 234    Micro:    Imaging:  CT + PE and very early covid infil      I have personally reviewed the past medical history, past surgical history, medications, social history, and family history, and I haveupdated the database accordingly. Allergies:   Patient has no known allergies. Review of Systems:     Review of Systems   Constitutional: Negative for activity change and fatigue. HENT: Negative for congestion. Eyes: Negative for redness. Respiratory: Positive for cough (on off x while) and shortness of breath. Cardiovascular: Negative for chest pain. Gastrointestinal: Negative for abdominal distention. Endocrine: Negative for polydipsia. Genitourinary: Negative for dysuria. Musculoskeletal: Negative for arthralgias. Skin: Negative for color change. Allergic/Immunologic: Negative for immunocompromised state. Neurological: Negative for dizziness. Hematological: Negative for adenopathy. Psychiatric/Behavioral: Negative for agitation. Physical Examination :       Physical Exam  Constitutional:       Appearance: Normal appearance. HENT:      Head: Normocephalic and atraumatic. Nose: Nose normal.      Mouth/Throat:      Mouth: Mucous membranes are moist.   Eyes:      General: No scleral icterus. Conjunctiva/sclera: Conjunctivae normal.   Cardiovascular:      Rate and Rhythm: Normal rate and regular rhythm. Heart sounds: Normal heart sounds. No murmur heard. Pulmonary:      Breath sounds: No stridor. Abdominal:      Palpations: There is no mass. Genitourinary:     Comments: No najera  Musculoskeletal:         General: No swelling or deformity. Cervical back: Neck supple. No rigidity. Skin:     General: Skin is dry. Coloration: Skin is not jaundiced. Neurological:      General: No focal deficit present.       Mental Status: She is alert and oriented to person, place, and time. Psychiatric:         Mood and Affect: Mood normal.         Thought Content:  Thought content normal.         Past Medical History:     Past Medical History:   Diagnosis Date    Hyperlipidemia     Hypertension        Past Surgical  History:     Past Surgical History:   Procedure Laterality Date    BLADDER SUSPENSION      BLADDER SUSPENSION         Medications:      atorvastatin  80 mg Oral Nightly    levothyroxine  50 mcg Oral Daily    [Held by provider] losartan  50 mg Oral Daily    metoprolol succinate  100 mg Oral Daily    sodium chloride flush  5-40 mL IntraVENous 2 times per day    insulin lispro  0-6 Units SubCUTAneous Q4H    Vitamin D  2,000 Units Oral Daily    pantoprazole  40 mg IntraVENous BID    And    sodium chloride (PF)  10 mL IntraVENous BID    casirivimab and imdevimab IVPB   IntraVENous Once       Social History:     Social History     Socioeconomic History    Marital status: Single     Spouse name: Not on file    Number of children: Not on file    Years of education: Not on file    Highest education level: Not on file   Occupational History    Not on file   Tobacco Use    Smoking status: Former Smoker     Packs/day: 1.00     Years: 10.00     Pack years: 10.00     Types: Cigarettes     Quit date: 1989     Years since quittin.6    Smokeless tobacco: Never Used   Vaping Use    Vaping Use: Never used   Substance and Sexual Activity    Alcohol use: Never    Drug use: Never    Sexual activity: Not on file   Other Topics Concern    Not on file   Social History Narrative    Not on file     Social Determinants of Health     Financial Resource Strain: Low Risk     Difficulty of Paying Living Expenses: Not very hard   Food Insecurity: No Food Insecurity    Worried About Running Out of Food in the Last Year: Never true    Haresh of Food in the Last Year: Never true   Transportation Needs: No Transportation Needs    Lack of Transportation (Medical): No    Lack of Transportation (Non-Medical): No   Physical Activity:     Days of Exercise per Week: Not on file    Minutes of Exercise per Session: Not on file   Stress:     Feeling of Stress : Not on file   Social Connections:     Frequency of Communication with Friends and Family: Not on file    Frequency of Social Gatherings with Friends and Family: Not on file    Attends Restoration Services: Not on file    Active Member of [address] [place] [address] [email or web address] or Organizations: Not on file    Attends Club or Organization Meetings: Not on file    Marital Status: Not on file   Intimate Partner Violence:     Fear of Current or Ex-Partner: Not on file    Emotionally Abused: Not on file    Physically Abused: Not on file    Sexually Abused: Not on file   Housing Stability:     Unable to Pay for Housing in the Last Year: Not on file    Number of [place] in the Last Year: Not on file    Unstable Housing in the Last Year: Not on file       Family History:     Family History   Problem Relation Age of Onset    Elevated Lipids Mother     Cancer Mother     High Blood Pressure Mother     Cancer Maternal Grandmother       Medical Decision Making:   I have independently reviewed/ordered the following labs:    CBC with Differential:   Recent Labs     12/22/21  1648 12/22/21  1648 12/23/21  0709 12/23/21  2216   WBC 9.7  --  8.3  --    HGB 6.6*   < > 7.4* 7.7*   HCT 26.1*   < > 26.9* 28.6*   *  --  398  --    LYMPHOPCT 15*  --  15*  --    MONOPCT 10  --  3  --     < > = values in this interval not displayed. BMP:  Recent Labs     12/22/21  1648 12/23/21  0709    143   K 4.2 3.6*    105   CO2 25 21   BUN 22 22   CREATININE 1.32* 1.37*     Hepatic Function Panel:   Recent Labs     12/23/21  0709   PROT 6.1*   LABALBU 2.9*   BILITOT 0.65   ALKPHOS 166*   ALT 11   AST 16     No results for input(s): RPR in the last 72 hours. No results for input(s): HIV in the last 72 hours.   No results for input(s): BC in the last 72 hours. Lab Results   Component Value Date    CREATININE 1.37 12/23/2021    GLUCOSE 77 12/23/2021       Detailed results: Thank you for allowing us to participate in the care of this patient. Please call with questions. This note is created with the assistance of a speech recognition program.  While intending to generate adocument that actually reflects the content of the visit, the document can still have some errors including those of syntax and sound a like substitutions which may escape proof reading. It such instances, actual meaningcan be extrapolated by contextual diversion.     Daniele Person MD  Office: [phone]  Perfect serve / office [phone]

## 2021-12-24 NOTE — PLAN OF CARE
Problem: Falls - Risk of:  Goal: Will remain free from falls  Description: Will remain free from falls  12/24/2021 1013 by Lee David RN  Outcome: Ongoing  12/23/2021 2143 by Adelina Rice RN  Outcome: Ongoing  Goal: Absence of physical injury  Description: Absence of physical injury  12/24/2021 1013 by Lee David RN  Outcome: Ongoing  12/23/2021 2143 by Adelina Rice RN  Outcome: Ongoing     Problem: Airway Clearance - Ineffective  Goal: Achieve or maintain patent airway  12/24/2021 1013 by Lee David RN  Outcome: Ongoing  12/23/2021 2143 by Adelina Rice RN  Outcome: Ongoing     Problem: Gas Exchange - Impaired  Goal: Absence of hypoxia  12/24/2021 1013 by Lee David RN  Outcome: Ongoing  12/23/2021 2143 by Adelina Rice RN  Outcome: Ongoing  Goal: Promote optimal lung function  12/24/2021 1013 by Lee David RN  Outcome: Ongoing  12/23/2021 2143 by Adelina Rice RN  Outcome: Ongoing     Problem: Breathing Pattern - Ineffective  Goal: Ability to achieve and maintain a regular respiratory rate  12/24/2021 1013 by Lee David RN  Outcome: Ongoing  12/23/2021 2143 by Adelina Rice RN  Outcome: Ongoing     Problem:  Body Temperature -  Risk of, Imbalanced  Goal: Ability to maintain a body temperature within defined limits  12/24/2021 1013 by Lee David RN  Outcome: Ongoing  12/23/2021 2143 by Adelina Rice RN  Outcome: Ongoing  Goal: Will regain or maintain usual level of consciousness  12/24/2021 1013 by Lee David RN  Outcome: Ongoing  12/23/2021 2143 by Adelina Rice RN  Outcome: Ongoing  Goal: Complications related to the disease process, condition or treatment will be avoided or minimized  12/24/2021 1013 by Lee David RN  Outcome: Ongoing  12/23/2021 2143 by Adelina Rice RN  Outcome: Ongoing     Problem: Isolation Precautions - Risk of Spread of Infection  Goal: Prevent transmission of infection  12/24/2021 1013 by Arielle Alaniz RN  Outcome: Ongoing  12/23/2021 2143 by Adelso Babb RN  Outcome: Ongoing     Problem: Nutrition Deficits  Goal: Optimize nutritional status  12/24/2021 1013 by Arielle Alaniz RN  Outcome: Ongoing  12/23/2021 2143 by Adelso aBbb RN  Outcome: Ongoing     Problem: Risk for Fluid Volume Deficit  Goal: Maintain normal heart rhythm  12/24/2021 1013 by Arielle Alaniz RN  Outcome: Ongoing  12/23/2021 2143 by Adelso Babb RN  Outcome: Ongoing  Goal: Maintain absence of muscle cramping  12/24/2021 1013 by Arielle Alaniz RN  Outcome: Ongoing  12/23/2021 2143 by Adelso Babb RN  Outcome: Ongoing  Goal: Maintain normal serum potassium, sodium, calcium, phosphorus, and pH  12/24/2021 1013 by Arielle Alaniz RN  Outcome: Ongoing  12/23/2021 2143 by Adelso Babb RN  Outcome: Ongoing     Problem: Loneliness or Risk for Loneliness  Goal: Demonstrate positive use of time alone when socialization is not possible  12/24/2021 1013 by Arielle Alaniz RN  Outcome: Ongoing  12/23/2021 2143 by Adelso Babb RN  Outcome: Ongoing     Problem: Fatigue  Goal: Verbalize increase energy and improved vitality  12/24/2021 1013 by Arielle Alaniz RN  Outcome: Ongoing  12/23/2021 2143 by Adelso Babb RN  Outcome: Ongoing     Problem: Patient Education: Go to Patient Education Activity  Goal: Patient/Family Education  12/24/2021 1013 by Arielle Alaniz RN  Outcome: Ongoing  12/23/2021 2143 by Adelso Babb RN  Outcome: Ongoing     Problem: Nutrition  Goal: Optimal nutrition therapy  12/24/2021 1013 by Arielle Alaniz RN  Outcome: Ongoing  12/23/2021 2143 by Adelso Babb RN  Outcome: Ongoing

## 2021-12-24 NOTE — PROGRESS NOTES
[facility] Progress Note    Rich Hernandez is a 61 y.o. female patient. Hospitalization Day:2      Chief consult reason:   Melena, positive FOBT. Subjective:  Patient seen and examined on bedside. No acute event overnight. Hemodynamically stable, saturating on room air. Labs reviewed and evaluated. Hemoglobin remained stable. Objective:   VITALS:  /70   Pulse 67   Temp 98.1 °F (36.7 °C) (Oral)   Resp 25   Ht 5' 3\" (1.6 m)   Wt 235 lb (106.6 kg)   SpO2 92%   BMI 41.63 kg/m²   TEMPERATURE:  Current - Temp: 98.1 °F (36.7 °C);  Max - Temp  Av.2 °F (36.8 °C)  Min: 97.7 °F (36.5 °C)  Max: 98.8 °F (37.1 °C)    Physical Assessment:  General appearance:  alert, cooperative and no distress  Mental Status:  oriented to person, place and time and normal affect  Lungs:  clear to auscultation bilaterally, normal effort  Heart:  regular rate and rhythm, no murmur  Abdomen:  soft, nontender, nondistended, normal bowel sounds, no masses  Extremities:  no edema, no redness, No clubbing  Skin:  warm, dry, no gross lesions or rashes    CURRENT MEDICATIONS:  Scheduled Meds:   pantoprazole  40 mg Oral BID AC    atorvastatin  80 mg Oral Nightly    levothyroxine  50 mcg Oral Daily    [Held by provider] losartan  50 mg Oral Daily    metoprolol succinate  100 mg Oral Daily    sodium chloride flush  5-40 mL IntraVENous 2 times per day    insulin lispro  0-6 Units SubCUTAneous Q4H    Vitamin D  2,000 Units Oral Daily    pantoprazole  40 mg IntraVENous BID    And    sodium chloride (PF)  10 mL IntraVENous BID    casirivimab and imdevimab IVPB   IntraVENous Once     Continuous Infusions:   dextrose      sodium chloride 75 mL/hr at 21 1345    sodium chloride      sodium chloride      sodium chloride       PRN Meds:glucose, dextrose, glucagon (rDNA), dextrose, sodium chloride flush, sodium chloride, ondansetron **OR** ondansetron, magnesium hydroxide, acetaminophen **OR** acid:  Recent Labs     12/23/21  0709   LACTACIDWB 1.6         Radiology Review:    CT ABDOMEN PELVIS W IV CONTRAST Additional Contrast? None    Result Date: 12/22/2021  EXAMINATION: CT OF THE ABDOMEN AND PELVIS WITH CONTRAST 12/22/2021 5:12 pm TECHNIQUE: CT of the abdomen and pelvis was performed with the administration of intravenous contrast. Multiplanar reformatted images are provided for review. Dose modulation, iterative reconstruction, and/or weight based adjustment of the mA/kV was utilized to reduce the radiation dose to as low as reasonably achievable. COMPARISON: Abdominal CT study from May 11, 2019. HISTORY: ORDERING SYSTEM PROVIDED HISTORY: gi bleed TECHNOLOGIST PROVIDED HISTORY: gi bleed Decision Support Exception - unselect if not a suspected or confirmed emergency medical condition->Emergency Medical Condition (MA) Reason for Exam: dyspnea, elevated dimer, anemia, ? GI bleed FINDINGS: Lower Chest: Heart size is normal.  Scattered areas of ground-glass opacity are present within the visualized lower lobes, new from prior and possibly representing an active viral process. No pleural effusion. Organs: The liver, spleen, pancreas, kidneys, and adrenal glands appear normal. The gallbladder is mildly distended containing a 3.1 cm stone, similar in appearance to prior allowing for the differences in imaging technique. No surrounding inflammatory fat stranding or fluid. No biliary or pancreatic ductal dilation. GI/Bowel: Assessment is slightly limited due to motion artifact. The stomach and the small and large bowel loops appear normal in caliber, contour, and morphology, without acute or significant abnormality. No dilated loops or areas of bowel wall thickening. No areas of active bleed are identified. Peritoneum/Retroperitoneum: There is no free fluid or extraluminal gas. No enlarged or suspicious mesenteric or retroperitoneal lymphadenopathy.  The abdominal aorta and iliac arteries are patent and of normal caliber. Pelvis: No pelvic free fluid or enlarged or suspicious pelvic or inguinal lymphadenopathy. No appreciable uterine or adnexal abnormality. The urinary bladder and the pelvic bowel loops are unremarkable. Bones/Soft Tissues: Degenerative changes are present throughout the lumbar spine. No acute fracture. No abdominal wall or inguinal hernia. Slightly limited study due to respiratory motion artifact. No acute or significant intestinal abnormality. Cholelithiasis, without evidence of acute cholecystitis. Mild diffuse ground-glass opacity at the visualized lung bases. The finding is nonspecific although may represent active COVID-19 viral pneumonia in the appropriate setting. CT CHEST PULMONARY EMBOLISM W CONTRAST    Result Date: 12/22/2021  EXAMINATION: CTA OF THE CHEST 12/22/2021 3:12 pm TECHNIQUE: CTA of the chest was performed after the administration of intravenous contrast.  Multiplanar reformatted images are provided for review. MIP images are provided for review. Dose modulation, iterative reconstruction, and/or weight based adjustment of the mA/kV was utilized to reduce the radiation dose to as low as reasonably achievable. COMPARISON: Chest CT, 05/11/2019. HISTORY: ORDERING SYSTEM PROVIDED HISTORY: dyspnea elevated dimer anemia TECHNOLOGIST PROVIDED HISTORY: dyspnea elevated dimer anemia Decision Support Exception - unselect if not a suspected or confirmed emergency medical condition->Emergency Medical Condition (MA) Reason for Exam: dyspnea elevated dimer anemia FINDINGS: Pulmonary Arteries: There are filling defects seen within the pulmonary arteries in the right upper lobe, the right middle lobe, the right lower lobe, and the left lower lobe. Left upper lobe is spared. No evidence of right heart strain. Mediastinum: Visualized thyroid is unremarkable. Enlarged mediastinal and hilar lymph nodes are seen. For example, a right hilar lymph node measures 2.3 x 2.0 cm.   A precarinal lymph node measures approximately 1.8 cm in short axis. Esophagus is unremarkable. Cardiac chambers unremarkable. Thoracic aorta normal in caliber without evidence of dissection. Lungs/pleura: There are subtle ground-glass infiltrates noted within the mid to lower lungs, greater in the periphery. Innumerable punctate tree-in-bud centrilobular micro nodules are detected within the upper lungs, similar when compared to the previous exam from 2019. Mild dependent atelectasis noted bilaterally. 8 mm nodule within the medial right lower lobe is unchanged since 2019 and considered benign. Upper Abdomen: Please see abdomen and pelvis report performed simultaneously. Soft Tissues/Bones: Visualized extra thoracic soft tissues unremarkable. No acute or suspicious bony abnormalities are identified. Positive for pulmonary embolism. No evidence of right heart strain. Mild patchy peripheral ground-glass infiltrates within the lower lungs bilaterally, compatible with COVID-19 pneumonia. Innumerable punctate centrilobular tree-in-bud micro nodules within the upper lungs bilaterally, similar when compared to the previous exam from May 2019, suggesting chronic bronchiolitis. Findings were discussed with Dr. Janna Tripathi at 8:59 pm on 12/22/2021. ENDOSCOPY     Principal Problem:    Bilateral pulmonary embolism (HCC)  Active Problems:    Gastrointestinal hemorrhage associated with peptic ulcer    COVID-19 virus infection    Controlled type 2 diabetes mellitus with complication, without long-term current use of insulin (HCC)    Essential (primary) hypertension    Obesity, morbid, BMI 40.0-49.9 (HCC)    S/P drug eluting coronary stent placement    Hypothyroidism due to Hashimoto's thyroiditis    Severe malnutrition (HCC)    Symptomatic anemia  Resolved Problems:    * No resolved hospital problems. *       GI Assessment:  1. Melena. GBS: 9 points. No hematochezia is noted by the patient.   She has been having melena for the last 6 months. FOBT was positive. Hemoglobin dropped to 6.6 status post 1 PRBC. Repeat H&H was 7.4-7.6-7.7. Patient has been taking dual antiplatelet since my 4847. She is already beyond 1 year of DAPT for PCI and if okay with cardiology to discontinue clopidogrel and continue with aspirin for secondary prophylaxis, but this need to be discussed with cardiology. 2.  Acute blood loss anemia-status post 1 PRBC  3.  Bilateral subsegmental pulmonary embolism. 4.  COVID-19 infection  5. Coronary artery disease status post PCI on DAPT  6. Essential hypertension  7. Hypothyroidism     Old records, labs and imaging reviewed. Plan of care:  1. Conservative management for melena. We will continue with Protonix drip for next 48 hours and will shift to p.o. Protonix twice daily. Patient will need to have outpatient follow-up and will need EGD once she is out of isolation. We will schedule an appointment. Avoid NSAIDs. Hemoglobin is now stabilized, recent Hgb is 7.7.  2.  Overall clot burden for subsegmental PE is low and vascular surgery is on board. Recommend bilateral lower extremity venous duplex, report awaited. Will hold heparin for now. 3.  Rest of the care per primary team.   GI will sign off. In case of any questions or concerns please do not hesitate to reach us.     This plan will be discussed with Dr. Sebas Piña for final approval.  Thank you for allowing us to participate in the care of your patient. Mirella Valiente MD  Internal Medicine Resident, PGY-1  [address]   12/24/2021,11:49 [address] Gastroenterology    Please note that this note was generated using a voice recognition dictation software. Although every effort was made to ensure the accuracy of this automated transcription, some errors in transcription may have occurred.

## 2021-12-24 NOTE — PROGRESS NOTES
[facility]  Office: [address], DO, Doroteo Gibson, DO, Jud Rangel, DO, Laura Prieto Blood, DO, Tracie España MD, Pascale Eller MD, Jun Yeh MD, Nelson Govea MD, Karlee Otto MD, Yandy Peñaloza MD, Tahmina Burnham MD, Dee Jewell, DO, Angie Tobin, DO, Coy Ozuna MD,  Kartik Martines, DO, Junie Saldivar MD, Tramaine Roberto MD, Kia Taylor MD, Duncan Suarez MD, Betsey Fitch MD, Peyton Green MD, Crys Choi MD, Tyler [facility], [facility], CNP, Savita Champion, CNP, [facility] , CNS, Elif Desai, CNP, Nayeli Pichardo, CNP, [place], CNP, Thiago Pearl, CNP, Dianna Martin, CNP, Juanita Cartwright PA-C, June Granger, DNP, Darío Cabrera, DNP, Gabino Horn, CNP, Mark Anthony Maki, CNP, Melisa Calloway, CNP, Rigoberto Martínez, CNP, Isis Beebe, CNP, Evangelina Hood, [facility]      Daily Progress Note     Admit Date: 12/22/2021  Bed/Room No.  2005/2005-01  Admitting Physician : Jun Yeh MD  Code Status :Full Code  Hospital Day:  LOS: 2 days   Chief Complaint:     Chief Complaint   Patient presents with    Shortness of Breath     Principal Problem:    Bilateral pulmonary embolism [facility])  Active Problems:    Gastrointestinal hemorrhage associated with peptic ulcer    COVID-19 virus infection    Controlled type 2 diabetes mellitus with complication, without long-term current use of insulin ([facility] [address]    Essential (primary) hypertension    Obesity, morbid, BMI 40.0-49.9 ([facility] [address]    S/P drug eluting coronary stent placement    Hypothyroidism due to Hashimoto's thyroiditis    Severe malnutrition ([facility] [address]    Symptomatic anemia  Resolved Problems:    * No resolved hospital problems. *    Subjective : Interval History/Significant events :  12/24/21    Patient denies any breathing difficulty at rest, but reports shortness of breath on exertion. She denies cough, expectoration. Patient continues to have dark stools.     Vitals - Stable BID  casirivimab and imdevimab IVPB, , IntraVENous, Once        Review of Systems   Review of Systems   Constitutional: Negative for appetite change, fatigue, fever and unexpected weight change. HENT: Negative for congestion, rhinorrhea and sneezing. Eyes: Negative for visual disturbance. Respiratory: Negative for cough, chest tightness, shortness of breath and wheezing. Cardiovascular: Negative for chest pain and palpitations. Gastrointestinal: Negative for abdominal pain, blood in stool, constipation, diarrhea, nausea and vomiting. Genitourinary: Negative for dysuria, enuresis, frequency and hematuria. Musculoskeletal: Negative for arthralgias and myalgias. Skin: Negative for rash. Neurological: Negative for dizziness, weakness, light-headedness and headaches. Hematological: Does not bruise/bleed easily. Psychiatric/Behavioral: Negative for dysphoric mood and sleep disturbance. Objective :      Current Vitals : Temp: 98.1 °F (36.7 °C),  Pulse: 67, Resp: 25, BP: 129/70, SpO2: 92 %  Last 24 Hrs Vitals   Patient Vitals for the past 24 hrs:   BP Temp Temp src Pulse Resp SpO2 Height   12/24/21 0752 129/70 98.1 °F (36.7 °C) Oral 67 -- -- --   12/24/21 0330 114/63 97.7 °F (36.5 °C) Oral 71 -- 92 % --   12/23/21 2330 (!) 117/56 98.4 °F (36.9 °C) Oral 73 25 97 % --   12/23/21 2030 (!) 114/59 98.2 °F (36.8 °C) Oral 75 21 92 % --   12/23/21 1705 -- 98.8 °F (37.1 °C) Oral -- -- -- --   12/23/21 1422 -- -- -- -- -- -- 5' 3\" (1.6 m)   12/23/21 1100 (!) 122/59 97.7 °F (36.5 °C) Axillary 88 22 94 % --   12/23/21 1000 120/64 -- -- 73 21 93 % --   12/23/21 0900 127/65 -- -- 80 21 93 % --     Intake / output   No intake/output data recorded. Physical Exam:  Physical Exam  Vitals and nursing note reviewed. Constitutional:       General: She is not in acute distress. Appearance: She is not diaphoretic. HENT:      Head: Normocephalic and atraumatic.       Nose:      Right Sinus: No maxillary sinus tenderness or frontal sinus tenderness. Left Sinus: No maxillary sinus tenderness or frontal sinus tenderness. Mouth/Throat:      Pharynx: No oropharyngeal exudate. Eyes:      General: No scleral icterus. Conjunctiva/sclera: Conjunctivae normal.      Pupils: Pupils are equal, round, and reactive to light. Neck:      Thyroid: No thyromegaly. Vascular: No JVD. Cardiovascular:      Rate and Rhythm: Normal rate and regular rhythm. Pulses:           Dorsalis pedis pulses are 2+ on the right side and 2+ on the left side. Heart sounds: Normal heart sounds. No murmur heard. Pulmonary:      Effort: Pulmonary effort is normal.      Breath sounds: Normal breath sounds. No wheezing or rales. Abdominal:      Palpations: Abdomen is soft. There is no mass. Tenderness: There is no abdominal tenderness. Musculoskeletal:      Cervical back: Full passive range of motion without pain and neck supple. Lymphadenopathy:      Head:      Right side of head: No submandibular adenopathy. Left side of head: No submandibular adenopathy. Cervical: No cervical adenopathy. Skin:     General: Skin is warm. Neurological:      Mental Status: She is alert and oriented to person, place, and time. Motor: No tremor. Psychiatric:         Behavior: Behavior is cooperative.            Laboratory findings:    Recent Labs     12/22/21  1648 12/23/21  0709 12/23/21  2216   WBC 9.7 8.3  --    HGB 6.6* 7.4* 7.7*   HCT 26.1* 26.9* 28.6*   * 398  --    INR  --  1.1  --      Recent Labs     12/22/21  1648 12/23/21  0709    143   K 4.2 3.6*    105   CO2 25 21   GLUCOSE 90 77   BUN 22 22   CREATININE 1.32* 1.37*   CALCIUM 8.3* 8.2*     Recent Labs     12/23/21  0709   PROT 6.1*   LABALBU 2.9*   LABA1C 5.2   AST 16   ALT 11   *   ALKPHOS 166*   BILITOT 0.65          Specific Gravity, UA   Date Value Ref Range Status   05/11/2019 1.016 1.005 - 1.030 Final     Protein, UA Date Value Ref Range Status   05/11/2019 NEGATIVE NEGATIVE Final     RBC, UA   Date Value Ref Range Status   05/11/2019 0 TO 2 0 - 2 /HPF Final     Bacteria, UA   Date Value Ref Range Status   05/11/2019 RARE (A) None Final     Nitrite, Urine   Date Value Ref Range Status   05/11/2019 NEGATIVE NEGATIVE Final     WBC, UA   Date Value Ref Range Status   05/11/2019 0 TO 2 0 - 5 /HPF Final     Leukocyte Esterase, Urine   Date Value Ref Range Status   05/11/2019 NEGATIVE NEGATIVE Final       Imaging / Clinical Data :-   CT ABDOMEN PELVIS W IV CONTRAST Additional Contrast? None    Result Date: 12/22/2021  Slightly limited study due to respiratory motion artifact. No acute or significant intestinal abnormality. Cholelithiasis, without evidence of acute cholecystitis. Mild diffuse ground-glass opacity at the visualized lung bases. The finding is nonspecific although may represent active COVID-19 viral pneumonia in the appropriate setting. CT CHEST PULMONARY EMBOLISM W CONTRAST    Result Date: 12/22/2021  Positive for pulmonary embolism. No evidence of right heart strain. Mild patchy peripheral ground-glass infiltrates within the lower lungs bilaterally, compatible with COVID-19 pneumonia. Innumerable punctate centrilobular tree-in-bud micro nodules within the upper lungs bilaterally, similar when compared to the previous exam from May 2019, suggesting chronic bronchiolitis. Findings were discussed with Dr. Mary Ervin at 8:59 pm on 12/22/2021. Clinical Course : stable  Assessment and Plan  :        1. Melena due to chronic GI loss-Protonix infusion. Monitor H&H every 6 hours. GI following. No plan for EGD at this time due to COVID-19 infection. 2. COVID-19 pneumonia treated with Regeneron 12/24/2021. Monitor SPO2  3. Subsegmental bilateral PE - no anticoagulation due to severe anemia -   4. CAD s/p PCI to LAD in 05/2019 -   5. CKD stage III -stable  6.  Essential hypertension -well-controlled  7. Hyperlipidemia -on statin  8. Morbid obesity -lifestyle changes  9. Unvaccinated for COVID-19      Continue to monitor vitals , Intake / output ,  Cell count , HGB , Kidney function, oxygenation  as indicated . Plan and updates discussed with patient ,  answers  explained to satisfaction.    Plan discussed with Staff clayton ALARCON     (Please note that portions of this note were completed with a voice recognition program. Efforts were made to edit the dictations but occasionally words are mis-transcribed.)      Telly Henley MD  12/24/2021

## 2021-12-24 NOTE — PROGRESS NOTES
Physical Therapy         Physical Therapy Cancel Note      DATE: 2021    NAME: Rich Hernandez  MRN: [id number]   : 1955      Patient not seen this date for Physical Therapy due to:    Discussed with nurse. Dr. Analy Carter recommends bilateral lower extremity venous dopplers. Will wait for results and anticoagulation.     Electronically signed by Dunia Fontanez PT on 2021 at 11:59 AM

## 2021-12-24 NOTE — PLAN OF CARE
covid very pneumonia and SOB x days, and very high risk pt  PE on CT w mild early covid patchy pneumonia  CRP ferritin are normal   and has melena        Plan;  regeneron x 1  Watch CRP course    Pearl Lopez MD. Infectious Diseases

## 2021-12-24 NOTE — PLAN OF CARE
Problem: Falls - Risk of:  Goal: Will remain free from falls  Description: Will remain free from falls  12/23/2021 2143 by Miladis Driscoll RN  Outcome: Ongoing  12/23/2021 1250 by Guzman Solis RN  Outcome: Ongoing  Goal: Absence of physical injury  Description: Absence of physical injury  12/23/2021 2143 by Miladis Driscoll RN  Outcome: Ongoing  12/23/2021 1250 by Guzman Solis RN  Outcome: Ongoing     Problem: Airway Clearance - Ineffective  Goal: Achieve or maintain patent airway  12/23/2021 2143 by Miladis Driscoll RN  Outcome: Ongoing  12/23/2021 1250 by Guzman Solis RN  Outcome: Ongoing     Problem: Gas Exchange - Impaired  Goal: Absence of hypoxia  12/23/2021 2143 by Miladis Driscoll RN  Outcome: Ongoing  12/23/2021 1250 by Guzman Solis RN  Outcome: Ongoing  Goal: Promote optimal lung function  12/23/2021 2143 by Miladis Driscoll RN  Outcome: Ongoing  12/23/2021 1250 by Guzman Solis RN  Outcome: Ongoing     Problem: Breathing Pattern - Ineffective  Goal: Ability to achieve and maintain a regular respiratory rate  12/23/2021 2143 by Miladis Driscoll RN  Outcome: Ongoing  12/23/2021 1250 by Guzman Solis RN  Outcome: Ongoing     Problem:  Body Temperature -  Risk of, Imbalanced  Goal: Ability to maintain a body temperature within defined limits  12/23/2021 2143 by Miladis Driscoll RN  Outcome: Ongoing  12/23/2021 1250 by Guzman Solis RN  Outcome: Ongoing  Goal: Will regain or maintain usual level of consciousness  12/23/2021 2143 by Miladis Driscoll RN  Outcome: Ongoing  12/23/2021 1250 by Guzman Solis RN  Outcome: Ongoing  Goal: Complications related to the disease process, condition or treatment will be avoided or minimized  12/23/2021 2143 by Miladis Driscoll RN  Outcome: Ongoing  12/23/2021 1250 by Guzman Solis RN  Outcome: Ongoing     Problem: Isolation Precautions - Risk of Spread of Infection  Goal: Prevent transmission of infection  12/23/2021 2143 by Verona Lynch RN  Outcome: Ongoing  12/23/2021 1250 by Ralph Osorio RN  Outcome: Ongoing     Problem: Nutrition Deficits  Goal: Optimize nutritional status  12/23/2021 2143 by Verona Lynch RN  Outcome: Ongoing  12/23/2021 1250 by Ralph Osorio RN  Outcome: Ongoing     Problem: Risk for Fluid Volume Deficit  Goal: Maintain normal heart rhythm  12/23/2021 2143 by Verona Lynch RN  Outcome: Ongoing  12/23/2021 1250 by Ralph Osorio RN  Outcome: Ongoing  Goal: Maintain absence of muscle cramping  12/23/2021 2143 by Verona Lynch RN  Outcome: Ongoing  12/23/2021 1250 by Ralph Osorio RN  Outcome: Ongoing  Goal: Maintain normal serum potassium, sodium, calcium, phosphorus, and pH  12/23/2021 2143 by Verona Lynch RN  Outcome: Ongoing  12/23/2021 1250 by Ralph Osorio RN  Outcome: Ongoing     Problem: Loneliness or Risk for Loneliness  Goal: Demonstrate positive use of time alone when socialization is not possible  12/23/2021 2143 by Verona Lynch RN  Outcome: Ongoing  12/23/2021 1250 by Ralph Osorio RN  Outcome: Ongoing     Problem: Fatigue  Goal: Verbalize increase energy and improved vitality  12/23/2021 2143 by Verona Lynch RN  Outcome: Ongoing  12/23/2021 1250 by Ralph Osorio RN  Outcome: Ongoing     Problem: Patient Education: Go to Patient Education Activity  Goal: Patient/Family Education  12/23/2021 2143 by Verona Lynch RN  Outcome: Ongoing  12/23/2021 1250 by Ralph Osorio RN  Outcome: Ongoing     Problem: Nutrition  Goal: Optimal nutrition therapy  12/23/2021 2143 by Verona Lynch RN  Outcome: Ongoing  12/23/2021 1448 by Cheryl Rey RD, LD  Outcome: Ongoing  Note: Nutrition Problem #1: Severe malnutrition,In context of chronic illness  Intervention: Food and/or Nutrient Delivery: Continue NPO (Start diet as able; recommend ensure enlive supplements TID as able)  Nutritional Goals: Start diet within 24-72 hours

## 2021-12-25 VITALS
WEIGHT: 235 LBS | BODY MASS INDEX: 41.64 KG/M2 | TEMPERATURE: 98.1 F | OXYGEN SATURATION: 91 % | HEART RATE: 76 BPM | HEIGHT: 63 IN | DIASTOLIC BLOOD PRESSURE: 67 MMHG | SYSTOLIC BLOOD PRESSURE: 132 MMHG | RESPIRATION RATE: 12 BRPM

## 2021-12-25 LAB
ABSOLUTE EOS #: 0.58 K/UL (ref 0–0.4)
ABSOLUTE IMMATURE GRANULOCYTE: 0.1 K/UL (ref 0–0.3)
ABSOLUTE LYMPH #: 2.33 K/UL (ref 1–4.8)
ABSOLUTE MONO #: 0.97 K/UL (ref 0.1–0.8)
BASOPHILS # BLD: 1 % (ref 0–2)
BASOPHILS ABSOLUTE: 0.1 K/UL (ref 0–0.2)
DIFFERENTIAL TYPE: ABNORMAL
EOSINOPHILS RELATIVE PERCENT: 6 % (ref 1–4)
HCT VFR BLD CALC: 28.7 % (ref 36.3–47.1)
HCT VFR BLD CALC: 30.4 % (ref 36.3–47.1)
HEMOGLOBIN: 7.5 G/DL (ref 11.9–15.1)
HEMOGLOBIN: 7.8 G/DL (ref 11.9–15.1)
IMMATURE GRANULOCYTES: 1 %
LYMPHOCYTES # BLD: 24 % (ref 24–44)
MCH RBC QN AUTO: 19.7 PG (ref 25.2–33.5)
MCHC RBC AUTO-ENTMCNC: 26.1 G/DL (ref 28.4–34.8)
MCV RBC AUTO: 75.5 FL (ref 82.6–102.9)
MONOCYTES # BLD: 10 % (ref 1–7)
MORPHOLOGY: ABNORMAL
NRBC AUTOMATED: 0.2 PER 100 WBC
PDW BLD-RTO: 22.6 % (ref 11.8–14.4)
PLATELET # BLD: 455 K/UL (ref 138–453)
PLATELET ESTIMATE: ABNORMAL
PMV BLD AUTO: 10 FL (ref 8.1–13.5)
RBC # BLD: 3.8 M/UL (ref 3.95–5.11)
RBC # BLD: ABNORMAL 10*6/UL
SEG NEUTROPHILS: 58 % (ref 36–66)
SEGMENTED NEUTROPHILS ABSOLUTE COUNT: 5.62 K/UL (ref 1.8–7.7)
WBC # BLD: 9.7 K/UL (ref 3.5–11.3)
WBC # BLD: ABNORMAL 10*3/UL

## 2021-12-25 PROCEDURE — 36415 COLL VENOUS BLD VENIPUNCTURE: CPT

## 2021-12-25 PROCEDURE — 85025 COMPLETE CBC W/AUTO DIFF WBC: CPT

## 2021-12-25 PROCEDURE — 6370000000 HC RX 637 (ALT 250 FOR IP): Performed by: FAMILY MEDICINE

## 2021-12-25 PROCEDURE — 85014 HEMATOCRIT: CPT

## 2021-12-25 PROCEDURE — 6370000000 HC RX 637 (ALT 250 FOR IP): Performed by: NURSE PRACTITIONER

## 2021-12-25 PROCEDURE — 6370000000 HC RX 637 (ALT 250 FOR IP): Performed by: INTERNAL MEDICINE

## 2021-12-25 PROCEDURE — 85018 HEMOGLOBIN: CPT

## 2021-12-25 PROCEDURE — 99239 HOSP IP/OBS DSCHRG MGMT >30: CPT | Performed by: FAMILY MEDICINE

## 2021-12-25 RX ORDER — PANTOPRAZOLE SODIUM 40 MG/1
40 TABLET, DELAYED RELEASE ORAL 2 TIMES DAILY
Qty: 30 TABLET | Refills: 3 | Status: SHIPPED | OUTPATIENT
Start: 2021-12-25 | End: 2022-03-09

## 2021-12-25 RX ORDER — CHOLECALCIFEROL (VITAMIN D3) 50 MCG
2000 TABLET ORAL DAILY
Qty: 60 TABLET | Refills: 0 | Status: SHIPPED | OUTPATIENT
Start: 2021-12-25

## 2021-12-25 RX ORDER — LANOLIN ALCOHOL/MO/W.PET/CERES
325 CREAM (GRAM) TOPICAL 2 TIMES DAILY WITH MEALS
Qty: 90 TABLET | Refills: 3 | Status: SHIPPED | OUTPATIENT
Start: 2021-12-25 | End: 2022-10-25 | Stop reason: DRUGHIGH

## 2021-12-25 RX ADMIN — LEVOTHYROXINE SODIUM 50 MCG: 50 TABLET ORAL at 10:02

## 2021-12-25 RX ADMIN — PANTOPRAZOLE SODIUM 40 MG: 40 TABLET, DELAYED RELEASE ORAL at 05:24

## 2021-12-25 RX ADMIN — FERROUS SULFATE TAB EC 325 MG (65 MG FE EQUIVALENT) 325 MG: 325 (65 FE) TABLET DELAYED RESPONSE at 16:29

## 2021-12-25 RX ADMIN — FERROUS SULFATE TAB EC 325 MG (65 MG FE EQUIVALENT) 325 MG: 325 (65 FE) TABLET DELAYED RESPONSE at 10:02

## 2021-12-25 RX ADMIN — Medication 2000 UNITS: at 10:02

## 2021-12-25 RX ADMIN — METOPROLOL SUCCINATE 100 MG: 100 TABLET, FILM COATED, EXTENDED RELEASE ORAL at 10:02

## 2021-12-25 RX ADMIN — ALCOHOL 1 TABLET: 70.47 GEL TOPICAL at 10:02

## 2021-12-25 ASSESSMENT — ENCOUNTER SYMPTOMS
BLOOD IN STOOL: 0
ABDOMINAL PAIN: 0
CHEST TIGHTNESS: 0
SHORTNESS OF BREATH: 0
RHINORRHEA: 0
NAUSEA: 0
DIARRHEA: 0
COUGH: 0
CONSTIPATION: 0
VOMITING: 0
WHEEZING: 0

## 2021-12-25 ASSESSMENT — PAIN SCALES - GENERAL: PAINLEVEL_OUTOF10: 0

## 2021-12-25 NOTE — PROGRESS NOTES
[facility]  Office: [address], DO, Jarett So, DO, Albert Love, DO, Angeli Navarro Blood, DO, Thelma Parrish MD, Elle Delgado MD, Telly Henley MD, Roshan Mckeon MD, Patrick Mccartney MD, Luc Pastor MD, Priscilla Otto MD, Kirstin Aguirre, DO, Prabha Cpopola, DO, Mandy Santana MD,  Candace Cabrales, DO, Matt Swann MD, Luz Marina Crawford MD, Martha Weber MD, Olivia Khan MD, Tyrel Miles MD, Garret Jones MD, Hugh Khanna MD, Tyler Durand [facility], [facility], CNP, Sawyer De Jesus, CNP, Edy Mcarthur, CNS, Neri Camarena, CNP, Vamsi Thao, CNP, Jennifer Og, CNP, Angeles Sinclair, CNP, Merna Noriega, CNP, Blanco Cortez PA-C, Camila Armijo, [facility], Sara Coyle, [facility], Atiya Norton, CNP, Jose Salcedo, CNP, Chelo Schumacher, CNP, Susannah Xiong, CNP, Jason No, CNP, Yehuda Stout, [facility]      Daily Progress Note     Admit Date: 12/22/2021  Bed/Room No.  2005/2005-01  Admitting Physician : Telly Henley MD  Code Status :Full Code  Hospital Day:  LOS: 3 days   Chief Complaint:     Chief Complaint   Patient presents with    Shortness of Breath     Principal Problem:    Bilateral pulmonary embolism [facility])  Active Problems:    Gastrointestinal hemorrhage associated with peptic ulcer    COVID-19 virus infection    Controlled type 2 diabetes mellitus with complication, without long-term current use of insulin ([facility] [address]    Essential (primary) hypertension    Obesity, morbid, BMI 40.0-49.9 ([facility] [address]    S/P drug eluting coronary stent placement    Hypothyroidism due to Hashimoto's thyroiditis    Severe malnutrition ([facility] [address]    Symptomatic anemia  Resolved Problems:    * No resolved hospital problems. *    Subjective : Interval History/Significant events :  12/25/21    Patient reports that she is having normal stools without any melena. She denies any abdominal pain, dysphagia, swallowing difficulty. Patient was placed on O2 at nighttime.   She denies any difficulty breathing, cough, congestion  Vitals - Stable afebrile  Labs -hemoglobin 7.5. Nursing notes , Consults notes reviewed. Overnight events and updates discussed with Nursing staff . Background History:         Nico Brown is 61 y.o. female  Who was admitted to the hospital on 12/22/2021 for treatment of Bilateral pulmonary embolism ([facility] [address] Patient presented to [facility] emergency room with difficulty in breathing for 1 week. Patient reported that she had many coworkers at her job who tested positive for COVID-19 infection. Patient also reported melena for 6 months. She has underlying history of hypertension, hyperlipidemia, type 2 diabetes mellitus, CHF, CAD s/p PCI to LAD in May 2019. Initial evaluation showed low hemoglobin 6.6 and was given transfusion. CT chest showed bilateral subsegmental pulmonary emboli and intraparenchymal infiltrates consistent with atypical pneumonia due to COVID-19. Vascular surgery was consulted and recommended to evaluate GI bleed first before starting anticoagulation due to low clot burden and embolism and involvement of subsegmental versus main circulation. Aspirin Plavix was held. Patient was treated with Protonix infusion and Carafate. GI was consulted and endoscopy could not be performed due to COVID-19 infection. Patient also received 1 PRBC transfusion on 12/23/2021. ID was consulted and patient was given Regeneron (casirivimab-Imdevimab).      PMH:  Past Medical History:   Diagnosis Date    Hyperlipidemia     Hypertension       Allergies: No Known Allergies   Medications :  pantoprazole, 40 mg, Oral, BID AC  ferrous sulfate, 325 mg, Oral, BID WC  iron sucrose, 200 mg, IntraVENous, Q24H  multivitamin, 1 tablet, Oral, Daily  atorvastatin, 80 mg, Oral, Nightly  levothyroxine, 50 mcg, Oral, Daily  [Held by provider] losartan, 50 mg, Oral, Daily  metoprolol succinate, 100 mg, Oral, Daily  sodium chloride flush, 5-40 mL, IntraVENous, 2 times per day  Vitamin D, 2,000 Units, Oral, Daily        Review of Systems   Review of Systems   Constitutional: Negative for appetite change, fatigue, fever and unexpected weight change. HENT: Negative for congestion, rhinorrhea and sneezing. Eyes: Negative for visual disturbance. Respiratory: Negative for cough, chest tightness, shortness of breath and wheezing. Cardiovascular: Negative for chest pain and palpitations. Gastrointestinal: Negative for abdominal pain, blood in stool, constipation, diarrhea, nausea and vomiting. Genitourinary: Negative for dysuria, enuresis, frequency and hematuria. Musculoskeletal: Negative for arthralgias and myalgias. Skin: Negative for rash. Neurological: Negative for dizziness, weakness, light-headedness and headaches. Hematological: Does not bruise/bleed easily. Psychiatric/Behavioral: Negative for dysphoric mood and sleep disturbance. Objective :      Current Vitals : Temp: 98.2 °F (36.8 °C),  Pulse: 63, Resp: 20, BP: (!) 118/54, SpO2: 98 %  Last 24 Hrs Vitals   Patient Vitals for the past 24 hrs:   BP Temp Temp src Pulse Resp SpO2   12/25/21 0338 (!) 118/54 98.2 °F (36.8 °C) Oral 63 20 98 %   12/24/21 2328 131/70 98.4 °F (36.9 °C) Oral 73 19 93 %   12/24/21 2042 116/61 97.9 °F (36.6 °C) Oral 67 21 90 %   12/24/21 1720 112/67 98.1 °F (36.7 °C) Oral 69 20 96 %   12/24/21 1140 121/70 97.4 °F (36.3 °C) Oral 65 -- 92 %   12/24/21 0752 129/70 98.1 °F (36.7 °C) Oral 67 -- --     Intake / output   No intake/output data recorded. Physical Exam:  Physical Exam  Vitals and nursing note reviewed. Constitutional:       General: She is not in acute distress. Appearance: She is not diaphoretic. HENT:      Head: Normocephalic and atraumatic. Nose:      Right Sinus: No maxillary sinus tenderness or frontal sinus tenderness. Left Sinus: No maxillary sinus tenderness or frontal sinus tenderness. Mouth/Throat:      Pharynx: No oropharyngeal exudate. Eyes:      General: No scleral icterus. Conjunctiva/sclera: Conjunctivae normal.      Pupils: Pupils are equal, round, and reactive to light. Neck:      Thyroid: No thyromegaly. Vascular: No JVD. Cardiovascular:      Rate and Rhythm: Normal rate and regular rhythm. Pulses:           Dorsalis pedis pulses are 2+ on the right side and 2+ on the left side. Heart sounds: Normal heart sounds. No murmur heard. Pulmonary:      Effort: Pulmonary effort is normal.      Breath sounds: Normal breath sounds. No wheezing or rales. Abdominal:      Palpations: Abdomen is soft. There is no mass. Tenderness: There is no abdominal tenderness. Musculoskeletal:      Cervical back: Full passive range of motion without pain and neck supple. Lymphadenopathy:      Head:      Right side of head: No submandibular adenopathy. Left side of head: No submandibular adenopathy. Cervical: No cervical adenopathy. Skin:     General: Skin is warm. Neurological:      Mental Status: She is alert and oriented to person, place, and time. Motor: No tremor. Psychiatric:         Behavior: Behavior is cooperative. Laboratory findings:    Recent Labs     12/23/21  0709 12/23/21  2216 12/24/21  1040 12/24/21  1040 12/24/21  1420 12/24/21  2030 12/25/21  0537   WBC 8.3  --  8.8  --   --   --  9.7   HGB 7.4*   < > 7.2*   < > 8.0* 7.9* 7.5*   HCT 26.9*   < > 27.2*   < > 30.5* 30.7* 28.7*     --  428  --   --   --  455*   INR 1.1  --   --   --   --   --   --     < > = values in this interval not displayed.      Recent Labs     12/22/21  1648 12/23/21  0709 12/24/21  1040    143 136   K 4.2 3.6* 3.9    105 102   CO2 25 21 20   GLUCOSE 90 77 90   BUN 22 22 21   CREATININE 1.32* 1.37* 1.32*   CALCIUM 8.3* 8.2* 8.0*     Recent Labs     12/23/21  0709   PROT 6.1*   LABALBU 2.9*   LABA1C 5.2   AST 16   ALT 11   *   ALKPHOS 166*   BILITOT 0.65          Specific Ken[place], UA Date Value Ref Range Status   05/11/2019 1.016 1.005 - 1.030 Final     Protein, UA   Date Value Ref Range Status   05/11/2019 NEGATIVE NEGATIVE Final     RBC, UA   Date Value Ref Range Status   05/11/2019 0 TO 2 0 - 2 /HPF Final     Bacteria, UA   Date Value Ref Range Status   05/11/2019 RARE (A) None Final     Nitrite, Urine   Date Value Ref Range Status   05/11/2019 NEGATIVE NEGATIVE Final     WBC, UA   Date Value Ref Range Status   05/11/2019 0 TO 2 0 - 5 /HPF Final     Leukocyte Esterase, Urine   Date Value Ref Range Status   05/11/2019 NEGATIVE NEGATIVE Final       Imaging / Clinical Data :-   CT ABDOMEN PELVIS W IV CONTRAST Additional Contrast? None    Result Date: 12/22/2021  Slightly limited study due to respiratory motion artifact. No acute or significant intestinal abnormality. Cholelithiasis, without evidence of acute cholecystitis. Mild diffuse ground-glass opacity at the visualized lung bases. The finding is nonspecific although may represent active COVID-19 viral pneumonia in the appropriate setting. CT CHEST PULMONARY EMBOLISM W CONTRAST    Result Date: 12/22/2021  Positive for pulmonary embolism. No evidence of right heart strain. Mild patchy peripheral ground-glass infiltrates within the lower lungs bilaterally, compatible with COVID-19 pneumonia. Innumerable punctate centrilobular tree-in-bud micro nodules within the upper lungs bilaterally, similar when compared to the previous exam from May 2019, suggesting chronic bronchiolitis. Findings were discussed with Dr. Amena Abdi at 8:59 pm on 12/22/2021. Clinical Course : stable  Assessment and Plan  :        1. Melena due to chronic GI loss-likely upper GI bleed. Treated with Protonix infusion. EGD not performed at this time due to COVID-19 infection. Protonix oral twice daily for 8 weeks. Follow-up outpatient with GI to schedule EGD and will also benefit with colonoscopy. 2. COVID-19 pneumonia treated with Regeneron 12/24/2021. 3. Subsegmental bilateral PE - no anticoagulation due to severe anemia -follow outpatient with vascular surgery. 4. CAD s/p PCI to LAD in 05/2019 -stop Plavix. Continue aspirin, Lipitor, metoprolol succinate. 5. CKD stage III -stable -continue losartan. Avoid nephrotoxic agents, NSAIDs. 6. Essential hypertension -well-controlled  7. Hyperlipidemia -on statin  8. Morbid obesity -lifestyle changes  9. Unvaccinated for COVID-19    Discharge home. Follow-up as advised. Plan and updates discussed with patient ,  answers  explained to satisfaction.    Plan discussed with Staff Sonia ALARCON     (Please note that portions of this note were completed with a voice recognition program. Efforts were made to edit the dictations but occasionally words are mis-transcribed.)      Teresa Gross MD  12/25/2021

## 2021-12-25 NOTE — PLAN OF CARE
Problem: Falls - Risk of:  Goal: Will remain free from falls  Description: Will remain free from falls  12/24/2021 2050 by Sabrina Ulrich RN  Outcome: Ongoing  12/24/2021 1013 by Sumit Galo RN  Outcome: Ongoing  Goal: Absence of physical injury  Description: Absence of physical injury  12/24/2021 2050 by Sabrina Ulrich RN  Outcome: Ongoing  12/24/2021 1013 by Sumit Galo RN  Outcome: Ongoing     Problem: Airway Clearance - Ineffective  Goal: Achieve or maintain patent airway  12/24/2021 2050 by Sabrina Ulrich RN  Outcome: Ongoing  12/24/2021 1013 by Sumit Galo RN  Outcome: Ongoing     Problem: Gas Exchange - Impaired  Goal: Absence of hypoxia  12/24/2021 2050 by Sabrina Ulrich RN  Outcome: Ongoing  12/24/2021 1013 by Sumit Galo RN  Outcome: Ongoing  Goal: Promote optimal lung function  12/24/2021 2050 by Sabrina Ulrich RN  Outcome: Ongoing  12/24/2021 1013 by Sumit Galo RN  Outcome: Ongoing     Problem: Breathing Pattern - Ineffective  Goal: Ability to achieve and maintain a regular respiratory rate  12/24/2021 2050 by Sabrina Ulrich RN  Outcome: Ongoing  12/24/2021 1013 by Sumit Galo RN  Outcome: Ongoing     Problem:  Body Temperature -  Risk of, Imbalanced  Goal: Ability to maintain a body temperature within defined limits  12/24/2021 2050 by Sabrina Ulrich RN  Outcome: Ongoing  12/24/2021 1013 by Sumit Galo RN  Outcome: Ongoing  Goal: Will regain or maintain usual level of consciousness  12/24/2021 2050 by Sabrina Ulrich RN  Outcome: Ongoing  12/24/2021 1013 by Sumit Galo RN  Outcome: Ongoing  Goal: Complications related to the disease process, condition or treatment will be avoided or minimized  12/24/2021 2050 by Sabrina Ulrich RN  Outcome: Ongoing  12/24/2021 1013 by Sumit Galo RN  Outcome: Ongoing     Problem: Isolation Precautions - Risk of Spread of Infection  Goal: Prevent transmission of infection  12/24/2021 2050 by Marilee Welsh RN  Outcome: Ongoing  12/24/2021 1013 by Oda Fothergill, RN  Outcome: Ongoing     Problem: Nutrition Deficits  Goal: Optimize nutritional status  12/24/2021 2050 by Marilee Welsh RN  Outcome: Ongoing  12/24/2021 1013 by Oda Fothergill, RN  Outcome: Ongoing     Problem: Risk for Fluid Volume Deficit  Goal: Maintain normal heart rhythm  12/24/2021 2050 by Marilee Welsh RN  Outcome: Ongoing  12/24/2021 1013 by Oda Fothergill, RN  Outcome: Ongoing  Goal: Maintain absence of muscle cramping  12/24/2021 2050 by Marilee Welsh RN  Outcome: Ongoing  12/24/2021 1013 by Oda Fothergill, RN  Outcome: Ongoing  Goal: Maintain normal serum potassium, sodium, calcium, phosphorus, and pH  12/24/2021 2050 by Marilee Welsh RN  Outcome: Ongoing  12/24/2021 1013 by Oda Fothergill, RN  Outcome: Ongoing     Problem: Loneliness or Risk for Loneliness  Goal: Demonstrate positive use of time alone when socialization is not possible  12/24/2021 2050 by Marilee Welsh RN  Outcome: Ongoing  12/24/2021 1013 by Oda Fothergill, RN  Outcome: Ongoing     Problem: Fatigue  Goal: Verbalize increase energy and improved vitality  12/24/2021 2050 by Marilee Welsh RN  Outcome: Ongoing  12/24/2021 1013 by Oda Fothergill, RN  Outcome: Ongoing     Problem: Patient Education: Go to Patient Education Activity  Goal: Patient/Family Education  12/24/2021 2050 by Marilee Welsh RN  Outcome: Ongoing  12/24/2021 1013 by Oda Fothergill, RN  Outcome: Ongoing     Problem: Nutrition  Goal: Optimal nutrition therapy  12/24/2021 2050 by Marilee Welsh RN  Outcome: Ongoing  12/24/2021 1013 by Oda Fothergill, RN  Outcome: Ongoing Doxepin Counseling:  Patient advised that the medication is sedating and not to drive a car after taking this medication. Patient informed of potential adverse effects including but not limited to dry mouth, urinary retention, and blurry vision.  The patient verbalized understanding of the proper use and possible adverse effects of doxepin.  All of the patient's questions and concerns were addressed.

## 2021-12-25 NOTE — DISCHARGE INSTR - DIET
Good nutrition is important when healing from an illness, injury, or surgery. Follow any nutrition recommendations given to you during your hospital stay. If you were given an oral nutrition supplement while in the hospital, continue to take this supplement at home. You can take it with meals, in-between meals, and/or before bedtime. These supplements can be purchased at most local grocery stores, pharmacies, and chain [employer]-stores.    If you have any questions about your diet or nutrition, call the hospital and ask for the dietitian    Diabetic Diet

## 2021-12-25 NOTE — PROGRESS NOTES
Pt. Given and explained discharge instructions in detail and  Verbalizes understanding. All questions about medications answered. Pt. Wheeled down to awaiting car in wheelchair and all belongings in hand.

## 2021-12-26 ENCOUNTER — CARE COORDINATION (OUTPATIENT)
Dept: CASE MANAGEMENT | Age: 66
End: 2021-12-26

## 2021-12-26 NOTE — CARE COORDINATION
[facility] Transitions Initial Follow Up Call    Call within 2 business days of discharge: Yes    Patient: Lina Wade   Patient : 1955   MRN: [id number]    Reason for Admission: COVID, Bilateral PE, GI bleed, anemia (6.6 - transfused)  Discharge Date: 21   RARS: Readmission Risk Score: 15.7 ( )      Last Discharge [facility]       Complaint Diagnosis Description Type Department Provider    21 Shortness of Breath Symptomatic anemia . .. ED to Hosp-Admission (Discharged) (ADMITTED) [facility] 2 Cyrus Groves MD; Tata Mom. .. Spoke with: patient - reports that she isSOB, \"not easy breathing\" especially after moving around, slightest exertion. She vega not have a pulse oximeter, but informed that they can be purchased OTC. Explained that she can be SOB due to covid, PE, anemia, hx CAD. Cough - clear to whitish expectorant. Does not have a spirometer - educated on breathing exercises. Some fatigue. Eating & drinking without issues. Lasix was reduced from 40mg to 20mg - instructed to weigh daily & watch for swelling. Plavix DC per GI, ASA 81mg resumed at DC. Patient plans to discuss GI & vascular referrals with PCP - wants them to be close to her home. Patient is unable to do VV according to her with the type of phone she has. Informed her of [facility] walk-in/urgent care # & address if needed prior to PCP appt. Leon Galeana is completed  - routed to CT support for appt.     Informed of care transitions & CTN contact number as well as covid hotline #      Facility: [facility]  Non-face-to-face services provided:  Obtained and reviewed discharge summary and/or continuity of care documents    Care Transitions 24 Hour Call    Do you have any ongoing symptoms?: Yes  Patient-reported symptoms: Cough, Fatigue, Shortness of Breath  Interventions for patient-reported symptoms: Notified Home Care  Do you have a copy of your discharge instructions?: Yes  Do you have all of your instructions, medical action plan and red flag symptoms with the patient who verbalized understanding. Discussed COVID vaccination status: Yes. Education provided on COVID-19 vaccination as appropriate. Discussed exposure protocols and quarantine with CDC Guidelines. Patient was given an opportunity to verbalize any questions and concerns and agrees to contact CTN or health care provider for questions related to their healthcare. Reviewed and educated patient on any new and changed medications related to discharge diagnosis. Was patient discharged with a pulse oximeter? No - Informed patient of pulse oximeter being available for purchase at pharmacies. CTN provided contact information. Plan for follow-up call in 5-7 days based on severity of symptoms and risk factors.   Plan for next call: symptom management-reassess  follow up appointment-review & check appt timing        Follow Up  Future Appointments   Date Time Provider Martha Delgado   4/18/2022  1:00 PM MD Tory Evans RN

## 2021-12-26 NOTE — DISCHARGE SUMMARY
[facility]  Office: [phone]  [facility] Cent[facility]iffanie Blood DO, Lexie Stallings MD, Jimmy Aguilar MD, Jayy Zamora MD, Eamon Ferro MD, Dione Runner MD, Kailey Gunderson MD, Jayne Saldivar MD, Myriam Meade MD, Venus Ortiz MD, Claudell Orion DO, Anat Angel MD, Yury Blair MD, Halie Feldman DO, Lexi Celis MD,  Taryn Muhammad DO, Reena Kat MD, Bethany Thompson MD, Nicolasa Larson [facility], [facility] CNP, Shellye [place] CNP, Darian Brash CNS, Verlan Low CNP, Bartolo Darter CNP, Rossy Bhakta CNP, Malika Parra CNP, Vasile Parkinson CNP, Hallie Chacko PA-C, Johanna Marina CNP, Sindy Ying CNP, Madison Ramsey CNP, Keisha Lyons CNP, Gretchen Goodson CNP, Mariajose Boast, [facility]      Discharge Summary     Patient ID: Jesus Clarke  :  1955   MRN: [id number]     ACCOUNT:  [de-identified]   Patient Location :   Patient's PCP: Verónica Valdez MD  Admit Date: 2021   Discharge Date: 2021     Length of Stay: 3  Code Status:  Prior  Admitting Physician: No admitting provider for patient encounter.   Discharge Physician: Jayy Zamora MD     Active Discharge Diagnosis :     Primary Problem  Bilateral pulmonary embolism [facility])      [facility] Problems    Diagnosis Date Noted    Gastrointestinal hemorrhage associated with peptic ulcer [K27.4] 2021     Priority: High    COVID-19 virus infection [U07.1] 2021     Priority: High    Controlled type 2 diabetes mellitus with complication, without long-term current use of insulin ([facility] [E11.8] 2021     Priority: Medium    Severe malnutrition ([address] [E43] 2021    Symptomatic anemia [D64.9]     Bilateral pulmonary embolism ([facility] [I26.99] 2021    Hypothyroidism due to Hashimoto's thyroiditis [E03.8, E06.3] 2020    S/P drug eluting coronary stent placement [Z95.5] 2020    Essential (primary) loss-likely upper GI bleed. Treated with Protonix infusion. EGD not performed at this time due to COVID-19 infection. Protonix oral twice daily for 8 weeks. Follow-up outpatient with GI to schedule EGD and will also benefit with colonoscopy. Outpatient follow-up with GI.  2. COVID-19 pneumonia treated with Regeneron 12/24/2021.   3. Subsegmental bilateral PE - no anticoagulation due to severe anemia -follow outpatient with vascular surgery. 4. CAD s/p PCI to LAD in 05/2019 -stop Plavix. Continue aspirin, Lipitor, metoprolol succinate. 5. CKD stage III -stable -continue losartan. Avoid nephrotoxic agents, NSAIDs. 6. Essential hypertension -well-controlled  7. Hyperlipidemia -on statin  8. Morbid obesity -lifestyle changes  9. Unvaccinated for COVID-19      Significant Diagnostic Studies:   Labs / Micro:/Radiology  Recent Labs     12/25/21  1429 12/25/21  0537 12/24/21  2030 12/24/21  1420 12/24/21  1040 12/23/21  2216 12/23/21  0709   WBC  --  9.7  --   --  8.8  --  8.3   HGB 7.8* 7.5* 7.9*   < > 7.2*   < > 7.4*   HCT 30.4* 28.7* 30.7*   < > 27.2*   < > 26.9*   MCV  --  75.5*  --   --  75.1*  --  74.3*   PLT  --  455*  --   --  428  --  398    < > = values in this interval not displayed.      Labs Renal Latest Ref Rng & Units 12/24/2021 12/23/2021 12/22/2021 2/26/2020 5/12/2019   BUN 8 - 23 mg/dL 21 22 22 24(H) 20   Cr 0.50 - 0.90 mg/dL 1.32(H) 1.37(H) 1.32(H) 1.17(H) 1.15(H)   K 3.7 - 5.3 mmol/L 3.9 3.6(L) 4.2 4.3 4.0   Na 135 - 144 mmol/L 136 143 141 145(H) 139     Lab Results   Component Value Date    ALT 11 12/23/2021    AST 16 12/23/2021    ALKPHOS 166 (H) 12/23/2021    BILITOT 0.65 12/23/2021     Lab Results   Component Value Date    TSH 2.90 10/18/2021     No results found for: HAV, HEPAIGM, HEPBIGM, HEPBCAB, HBEAG, HEPCAB  Lab Results   Component Value Date    COLORU YELLOW 05/11/2019    NITRU NEGATIVE 05/11/2019    GLUCOSEU NEGATIVE 05/11/2019    KETUA TRACE 05/11/2019    UROBILINOGEN Normal 05/11/2019    BILIRUBINUR NEGATIVE 05/11/2019     Lab Results   Component Value Date    LABA1C 5.2 12/23/2021     Lab Results   Component Value Date     12/23/2021     Lab Results   Component Value Date    INR 1.1 12/23/2021    PROTIME 11.5 12/23/2021       CT ABDOMEN PELVIS W IV CONTRAST Additional Contrast? None    Result Date: 12/22/2021  Slightly limited study due to respiratory motion artifact. No acute or significant intestinal abnormality. Cholelithiasis, without evidence of acute cholecystitis. Mild diffuse ground-glass opacity at the visualized lung bases. The finding is nonspecific although may represent active COVID-19 viral pneumonia in the appropriate setting. CT CHEST PULMONARY EMBOLISM W CONTRAST    Result Date: 12/22/2021  Positive for pulmonary embolism. No evidence of right heart strain. Mild patchy peripheral ground-glass infiltrates within the lower lungs bilaterally, compatible with COVID-19 pneumonia. Innumerable punctate centrilobular tree-in-bud micro nodules within the upper lungs bilaterally, similar when compared to the previous exam from May 2019, suggesting chronic bronchiolitis. Findings were discussed with Dr. Magnus Segura at 8:59 pm on 12/22/2021. Consultations:    Consults:     Final Specialist Recommendations/Findings:   IP CONSULT TO VASCULAR SURGERY  IP CONSULT TO INFECTIOUS DISEASES  IP CONSULT TO GI  IP CONSULT TO PHARMACY  IP CONSULT TO IV TEAM      The patient was seen and examined on day of discharge and this discharge summary is in conjunction with any daily progress note from day of discharge.     Discharge plan:     Disposition: Home    Physician Follow Up:     Roseanna Hess MD  [facility]  [address] [address]  [phone]    Schedule an appointment as soon as possible for a visit in 2 weeks  Please call to make a follow-up appointment with Dr. Cami Kang gastroenterology for outpatient EGD and colonoscopy    Yumiko Colon, [address], [address] [facility]  DANIEL [address], [address]    Phone: [phone]   · ferrous sulfate 325 (65 Fe) MG EC tablet  · pantoprazole 40 MG tablet  · vitamin D 50 MCG (2000 UT) Tabs tablet         Time Spent on discharge is  33 mins in patient examination, evaluation, counseling as well as medication reconciliation, prescriptions for required medications, discharge plan and follow up. Electronically signed by   Steve Oliva MD  12/26/2021        Thank you Dr. Teresa Capps MD for the opportunity to be involved in this patient's care.

## 2021-12-27 ENCOUNTER — TELEPHONE (OUTPATIENT)
Dept: PRIMARY CARE CLINIC | Age: 66
End: 2021-12-27

## 2021-12-27 ENCOUNTER — CARE COORDINATION (OUTPATIENT)
Dept: CASE MANAGEMENT | Age: 66
End: 2021-12-27

## 2021-12-27 DIAGNOSIS — Z12.11 ENCOUNTER FOR SCREENING COLONOSCOPY: Primary | ICD-10-CM

## 2021-12-27 DIAGNOSIS — I26.99 BILATERAL PULMONARY EMBOLISM (HCC): ICD-10-CM

## 2021-12-27 NOTE — TELEPHONE ENCOUNTER
[facility] Transitions Initial Follow Up Call    Outreach made within 2 business days of discharge: Yes    Patient: Craig Tuttle Patient : 1955   MRN: [id number]  Reason for Admission: There are no discharge diagnoses documented for the most recent discharge. Discharge Date: 21       Spoke with: Patient    Discharge department/facility: [facility]    TCM Interactive Patient Contact:  Was patient able to fill all prescriptions: Yes  Was patient instructed to bring all medications to the follow-up visit: Yes  Is patient taking all medications as directed in the discharge summary?  Yes  Does patient understand their discharge instructions: Yes  Does patient have questions or concerns that need addressed prior to 7-14 day follow up office visit: no    Scheduled appointment with PCP within 7-14 days    Follow Up  Future Appointments   Date Time Provider Martha Delgado   1/3/2022 10:00 AM MD Tory Robles [facility]   2022  1:00 PM Edy Amador MD [facility]  Lluvia Hood, [address] [place] [address] Shala Coates

## 2021-12-27 NOTE — CARE COORDINATION
Request from [address], RN., please schedule patient for hospital f/u. Called made appt with Dr. Avis Vasquez on 1/3 @ 10am.    Called patient LVM with time and date of appt.     Mary Blackburn, [address] [facility] Coordination Transition

## 2021-12-27 NOTE — TELEPHONE ENCOUNTER
Pt was recently hospitalized for COVID. Advised to see Vascular surgeon dx PE, GI dx colonoscopy and EGD. Pt stated she was given referrals to specialists at [facility].  She is requesting to see providers closer, referably in [place]

## 2021-12-30 ENCOUNTER — CARE COORDINATION (OUTPATIENT)
Dept: CASE MANAGEMENT | Age: 66
End: 2021-12-30

## 2021-12-30 NOTE — CARE COORDINATION
[facility] Transitions Follow Up Call    2021    Patient: Rich Hernandez  Patient : 1955   MRN: [id number]  Reason for Admission: COVID, PE  Discharge Date: 21 RARS: Readmission Risk Score: 15.7 ( )         Spoke with: [facility] Transitions Subsequent and Final Call    Subsequent and Final Calls  Do you have any ongoing symptoms?: Yes  Onset of Patient-reported symptoms: Other  Patient-reported symptoms: Cough, Shortness of Breath, Weakness  Have your medications changed?: No  Do you have any questions related to your medications?: No  Do you have any needs or concerns that I can assist you with?: No  Care Transitions Interventions  Other Interventions:       States the,\"cough is driving me nuts\". Instructed importance of hydration and deep breathing and coughing. We discussed OTC cough medicine as well. V/U  Continues with SOB, weakness and fatigue. States is better than it was. Is coughing up white, clear sputum. Is not using Oxygen, does not have an SP02 monitor. Denies fever, eating and sleeping \"OK\". Patient contacted regarding COVID-19 risk, exposure, diagnosis, pulse oximeter ordered at discharge and monoclonal antibody infusion follow up. Discussed COVID-19 related testing which was available at this time. Test results were positive. Patient informed of results, if available? Yes    Care Transition Nurse contacted the patient by telephone to perform follow-up assessment. Verified name and  with patient as identifiers. Patient has following risk factors of: diabetes. Symptoms reviewed with patient who verbalized the following symptoms: no new symptoms and no worsening symptoms. Due to no new or worsening symptoms encounter was not routed to provider for escalation. Educated patient about risk for severe COVID-19 due to risk factors according to CDC guidelines.  CTN reviewed discharge instructions, medical action plan and red flag symptoms with the patient who verbalized understanding. Discussed COVID vaccination status: No. Education provided on COVID-19 vaccination as appropriate. Discussed exposure protocols and quarantine with CDC Guidelines. Patient was given an opportunity to verbalize any questions and concerns and agrees to contact CTN or health care provider for questions related to their healthcare. Was patient discharged with a pulse oximeter? No Discussed and confirmed pulse oximeter discharge instructions and when to notify provider or seek emergency care. CTN provided contact information. Plan for follow-up call in 5-7 days based on severity of symptoms and risk factors.       Follow Up  Future Appointments   Date Time Provider Martha Delgado   1/3/2022 10:00 AM [address], MD [facility]   1/13/2022  2:15 PM MD Tory Baird GI [facility]   1/17/2022 10:30 AM MD tory Mcguire [facility]   4/18/2022  1:00 PM 1240 MD Tory Linda RN

## 2022-01-03 ENCOUNTER — OFFICE VISIT (OUTPATIENT)
Dept: PRIMARY CARE CLINIC | Age: 67
End: 2022-01-03
Payer: MEDICAID

## 2022-01-03 VITALS
WEIGHT: 256.6 LBS | RESPIRATION RATE: 16 BRPM | OXYGEN SATURATION: 100 % | DIASTOLIC BLOOD PRESSURE: 82 MMHG | HEART RATE: 78 BPM | BODY MASS INDEX: 45.46 KG/M2 | SYSTOLIC BLOOD PRESSURE: 130 MMHG | HEIGHT: 63 IN

## 2022-01-03 DIAGNOSIS — E66.01 OBESITY, MORBID, BMI 40.0-49.9 (HCC): ICD-10-CM

## 2022-01-03 DIAGNOSIS — I26.99 BILATERAL PULMONARY EMBOLISM (HCC): ICD-10-CM

## 2022-01-03 DIAGNOSIS — D64.9 SYMPTOMATIC ANEMIA: ICD-10-CM

## 2022-01-03 DIAGNOSIS — E11.8 CONTROLLED TYPE 2 DIABETES MELLITUS WITH COMPLICATION, WITHOUT LONG-TERM CURRENT USE OF INSULIN (HCC): ICD-10-CM

## 2022-01-03 DIAGNOSIS — I10 ESSENTIAL (PRIMARY) HYPERTENSION: ICD-10-CM

## 2022-01-03 DIAGNOSIS — U07.1 COVID-19 VIRUS INFECTION: Primary | ICD-10-CM

## 2022-01-03 PROBLEM — E43 SEVERE MALNUTRITION (HCC): Status: RESOLVED | Noted: 2021-12-23 | Resolved: 2022-01-03

## 2022-01-03 PROCEDURE — 99214 OFFICE O/P EST MOD 30 MIN: CPT | Performed by: STUDENT IN AN ORGANIZED HEALTH CARE EDUCATION/TRAINING PROGRAM

## 2022-01-03 PROCEDURE — 1111F DSCHRG MED/CURRENT MED MERGE: CPT | Performed by: STUDENT IN AN ORGANIZED HEALTH CARE EDUCATION/TRAINING PROGRAM

## 2022-01-03 RX ORDER — BENZONATATE 200 MG/1
200 CAPSULE ORAL 3 TIMES DAILY PRN
Qty: 60 CAPSULE | Refills: 0 | Status: SHIPPED | OUTPATIENT
Start: 2022-01-03 | End: 2022-01-31 | Stop reason: SDUPTHER

## 2022-01-03 RX ORDER — LOSARTAN POTASSIUM 50 MG/1
TABLET ORAL
COMMUNITY
Start: 2021-11-23

## 2022-01-03 RX ORDER — ALBUTEROL SULFATE 90 UG/1
2 AEROSOL, METERED RESPIRATORY (INHALATION) 4 TIMES DAILY PRN
Qty: 54 G | Refills: 1 | Status: SHIPPED | OUTPATIENT
Start: 2022-01-03

## 2022-01-03 NOTE — PROGRESS NOTES
Post-Discharge Transitional Care Management Services or Hospital Follow Up      Michelle Patrick   YOB: 1955    Date of Office Visit:  1/3/2022  Date of Hospital Admission: 12/22/21  Date of Hospital Discharge: 12/25/21  Risk of hospital readmission (high >=14%. Medium >=10%) :Readmission Risk Score: 15.7 ( )      Care management risk score Rising risk (score 2-5) and Complex Care (Scores >=6): 5     Non face to face  following discharge, date last encounter closed (first attempt may have been earlier): 12/27/2021 11:59 AM    Call initiated 2 business days of discharge: Yes    Patient Active Problem List   Diagnosis    STEMI [facility] elevation myocardial infarction) [facility]    Essential (primary) hypertension    Ob[facility], morbid, BMI 40.0-49.9 [facility]    NSVT (nonsustained ventricular tachycardia) [facility]    S/P drug eluting coronary stent placement    Vitamin D deficiency    Prediabetes    Hypothyroidism due to Hashimoto's thyroiditis    Chronic venous insufficiency    [facility]ilateral pulmonary [facility]    Gastrointestinal hemorrhage associated with peptic ulcer    Controlled type 2 diabetes mellitus with complication, without long-term current use of insulin ([facility]    COVID-19 virus infection    Symptomatic anemia       No Known Allergies    Medications listed as ordered at the time of discharge from hospital     Medication List          Accurate as of January 3, 2022 10:54 AM. If you have any questions, ask your nurse or doctor.             START taking these medications    albuterol sulfate  (90 Base) MCG/ACT inhaler  Commonly known as: Ventolin HFA  Inhale 2 puffs into the lungs 4 times daily as needed for Wheezing  Started by: Lubna Lindsey MD     benzonatate 200 MG capsule  Commonly known as: TESSALON  Take 1 capsule by mouth 3 times daily as needed for Cough  Started by: Lubna Lindsey MD        CHANGE how you take these medications    furosemide 20 MG tablet  Commonly known as: LASIX  Take 1 tablet by mouth daily  What changed: how much to take        CONTINUE taking these medications    aspirin 81 MG EC tablet  Take 1 tablet by mouth daily     atorvastatin 80 MG tablet  Commonly known as: LIPITOR  Take 1 tablet by mouth nightly     diphenhydrAMINE HCl (TOPICAL) 2 % Gel  Apply twice daily on affected area     ferrous sulfate 325 (65 Fe) MG EC tablet  Commonly known as: FE TABS 325  Take 1 tablet by mouth 2 times daily (with meals)     Handicap Placard Misc  by Does not apply route For 1 year     levothyroxine 50 MCG tablet  Commonly known as: SYNTHROID  TAKE 1 TABLET BY MOUTH EVERY DAY     * losartan 50 MG tablet  Commonly known as: COZAAR     * losartan 25 MG tablet  Commonly known as: COZAAR     metFORMIN 500 MG extended release tablet  Commonly known as: GLUCOPHAGE-XR  TAKE 1 TABLET BY MOUTH IN THE MORNING WITH breakfast     metoprolol succinate 100 MG extended release tablet  Commonly known as: TOPROL XL  Take 1 tablet by mouth daily     pantoprazole 40 MG tablet  Commonly known as: PROTONIX  Take 1 tablet by mouth 2 times daily     vitamin D 50 MCG (2000 UT) Tabs tablet  Commonly known as: CHOLECALCIFEROL  Take 1 tablet by mouth daily         * This list has 2 medication(s) that are the same as other medications prescribed for you. Read the directions carefully, and ask your doctor or other care provider to review them with you.                Where to Get Your Medications      These medications were sent to [facility] [facility], [address] [facility]  DANIEL [facility], [address]    Phone: [phone]   · albuterol sulfate  (90 Base) MCG/ACT inhaler  · benzonatate 200 MG capsule           Medications marked \"taking\" at this time  Outpatient Medications Marked as Taking for the 1/3/22 encounter (Office Visit) with Marge Coyne MD   Medication Sig Dispense Refill    losartan (COZAAR) 50 MG tablet TAKE 1 TABLET BY MOUTH EVERY DAY  benzonatate (TESSALON) 200 MG capsule Take 1 capsule by mouth 3 times daily as needed for Cough 60 capsule 0    albuterol sulfate HFA (VENTOLIN HFA) 108 (90 Base) MCG/ACT inhaler Inhale 2 puffs into the lungs 4 times daily as needed for Wheezing 54 g 1    ferrous sulfate (FE TABS 325) 325 (65 Fe) MG EC tablet Take 1 tablet by mouth 2 times daily (with meals) 90 tablet 3    Vitamin D (CHOLECALCIFEROL) 50 MCG (2000 UT) TABS tablet Take 1 tablet by mouth daily 60 tablet 0    pantoprazole (PROTONIX) 40 MG tablet Take 1 tablet by mouth 2 times daily 30 tablet 3    metFORMIN (GLUCOPHAGE-XR) 500 MG extended release tablet TAKE 1 TABLET BY MOUTH IN THE MORNING WITH breakfast 90 tablet 3    levothyroxine (SYNTHROID) 50 MCG tablet TAKE 1 TABLET BY MOUTH EVERY DAY 90 tablet 3    Handicap Placard MISC by Does not apply route For 1 year 1 each 0    diphenhydrAMINE HCl, TOPICAL, 2 % GEL Apply twice daily on affected area 57 g 0    losartan (COZAAR) 25 MG tablet Take 50 mg by mouth daily       aspirin 81 MG EC tablet Take 1 tablet by mouth daily 30 tablet 3    atorvastatin (LIPITOR) 80 MG tablet Take 1 tablet by mouth nightly 30 tablet 3    metoprolol succinate (TOPROL XL) 100 MG extended release tablet Take 1 tablet by mouth daily 30 tablet 3    furosemide (LASIX) 20 MG tablet Take 1 tablet by mouth daily (Patient taking differently: Take 40 mg by mouth daily ) 60 tablet 3        Medications patient taking as of now reconciled against medications ordered at time of hospital discharge: No    Chief Complaint   Patient presents with    Follow-Up from [address] [place] [address] 12/22/2021 bilateral PE and positive Covid test        History of Present illness - Follow up of Hospital diagnosis(es): Bilateral PE and positive COVID-19 infection and severe anemia. Inpatient course: Discharge summary reviewed- see chart. Interval history/Current status: Stable post discharge.   Patient mentioned that she feels really winded and short of breath while walking few steps. She also reported having melena sometimes even after post discharge. Has upcoming appointment with gastroenterology for further recommendations. Not on any anticoagulation currently for bilateral PE. Has follow-up appointment with vascular surgery. Plavix was stopped in hospital. Continued on aspirin, Lipitor, metoprolol. A comprehensive review of systems was negative except for what was noted in the HPI. Vitals:    01/03/22 1010   BP: 130/82   Site: Left Upper Arm   Position: Sitting   Cuff Size: Large Adult   Pulse: 78   Resp: 16   SpO2: 100%   Weight: 256 lb 9.6 oz (116.4 kg)   Height: 5' 3\" (1.6 m)     Body mass index is 45.45 kg/m².    Wt Readings from Last 3 Encounters:   01/03/22 256 lb 9.6 oz (116.4 kg)   12/23/21 235 lb (106.6 kg)   10/18/21 255 lb (115.7 kg)     BP Readings from Last 3 Encounters:   01/03/22 130/82   12/25/21 132/67   10/18/21 128/66        Physical Exam:  General Appearance: alert and oriented to person, place and time, well developed and well- nourished, in no acute distress  Skin: warm and dry, no rash or erythema  Head: normocephalic and atraumatic  Eyes: pupils equal, round, and reactive to light, extraocular eye movements intact, conjunctivae normal  ENT: tympanic membrane, external ear and ear canal normal bilaterally, nose without deformity, nasal mucosa and turbinates normal without polyps  Neck: supple and non-tender without mass, no thyromegaly or thyroid nodules, no cervical lymphadenopathy  Pulmonary/Chest: clear to auscultation bilaterally- no wheezes, rales or rhonchi, normal air movement, no respiratory distress  Cardiovascular: normal rate, regular rhythm, normal S1 and S2, no murmurs, rubs, clicks, or gallops, distal pulses intact, no carotid bruits  Abdomen: soft, non-tender, non-distended, normal bowel sounds, no masses or organomegaly  Extremities: no cyanosis, clubbing or edema  Musculoskeletal: normal range of motion, no joint swelling, deformity or tenderness  Neurologic: reflexes normal and symmetric, no cranial nerve deficit, gait, coordination and speech normal    Assessment/Plan:  1. COVID-19 virus infection    - NV DISCHARGE MEDS RECONCILED W/ CURRENT OUTPATIENT MED LIST  - albuterol sulfate HFA (VENTOLIN HFA) 108 (90 Base) MCG/ACT inhaler; Inhale 2 puffs into the lungs 4 times daily as needed for Wheezing  Dispense: 54 g; Refill: 1    2. Bilateral pulmonary embolism (HCC)  Not on anticoagulation due to anemia. Has follow-up appointment with vascular surgery for further recommendations. 3. Essential (primary) hypertension  Stable on Cozaar 50    4. Symptomatic anemia  Continues to have intermittent melena. Hemodynamically stable. Has follow-up appointment with gastroenterology soon for further recommendations. 5. Obesity, morbid, BMI 40.0-49.9 (HCC)  Lifestyle changes    6.  Controlled type 2 diabetes mellitus with complication, without long-term current use of insulin (HCC)  Stable on Metformin        Medical Decision Making: high complexity      [address], MD  [address] [place] [address] 100  [address] [facility] [address] [address]

## 2022-01-13 ENCOUNTER — HOSPITAL ENCOUNTER (OUTPATIENT)
Age: 67
Setting detail: SPECIMEN
Discharge: HOME | End: 2022-01-13

## 2022-01-13 ENCOUNTER — TELEPHONE (OUTPATIENT)
Dept: GASTROENTEROLOGY | Age: 67
End: 2022-01-13

## 2022-01-13 ENCOUNTER — OFFICE VISIT (OUTPATIENT)
Dept: GASTROENTEROLOGY | Age: 67
End: 2022-01-13
Payer: MEDICAID

## 2022-01-13 VITALS — BODY MASS INDEX: 46.23 KG/M2 | WEIGHT: 261 LBS | SYSTOLIC BLOOD PRESSURE: 128 MMHG | DIASTOLIC BLOOD PRESSURE: 74 MMHG

## 2022-01-13 DIAGNOSIS — K62.5 RECTAL BLEEDING: ICD-10-CM

## 2022-01-13 DIAGNOSIS — D64.9 SYMPTOMATIC ANEMIA: ICD-10-CM

## 2022-01-13 DIAGNOSIS — K27.4 GASTROINTESTINAL HEMORRHAGE ASSOCIATED WITH PEPTIC ULCER: ICD-10-CM

## 2022-01-13 DIAGNOSIS — K27.4 GASTROINTESTINAL HEMORRHAGE ASSOCIATED WITH PEPTIC ULCER: Primary | ICD-10-CM

## 2022-01-13 LAB
ABSOLUTE EOS #: 0.36 K/UL (ref 0–0.44)
ABSOLUTE IMMATURE GRANULOCYTE: 0 K/UL (ref 0–0.3)
ABSOLUTE LYMPH #: 1.18 K/UL (ref 1.1–3.7)
ABSOLUTE MONO #: 0.82 K/UL (ref 0.1–1.2)
ALBUMIN SERPL-MCNC: 3.4 G/DL (ref 3.5–5.2)
ALBUMIN/GLOBULIN RATIO: 1.1 (ref 1–2.5)
ALP BLD-CCNC: 142 U/L (ref 35–104)
ALT SERPL-CCNC: 19 U/L (ref 5–33)
ANION GAP SERPL CALCULATED.3IONS-SCNC: 11 MMOL/L (ref 9–17)
AST SERPL-CCNC: 21 U/L
BASOPHILS # BLD: 1 % (ref 0–2)
BASOPHILS ABSOLUTE: 0.09 K/UL (ref 0–0.2)
BILIRUB SERPL-MCNC: 0.42 MG/DL (ref 0.3–1.2)
BILIRUBIN DIRECT: 0.14 MG/DL
BILIRUBIN, INDIRECT: 0.28 MG/DL (ref 0–1)
BUN BLDV-MCNC: 19 MG/DL (ref 8–23)
BUN/CREAT BLD: ABNORMAL (ref 9–20)
CALCIUM SERPL-MCNC: 8.8 MG/DL (ref 8.6–10.4)
CHLORIDE BLD-SCNC: 109 MMOL/L (ref 98–107)
CO2: 23 MMOL/L (ref 20–31)
CREAT SERPL-MCNC: 1.02 MG/DL (ref 0.5–0.9)
DIFFERENTIAL TYPE: ABNORMAL
EOSINOPHILS RELATIVE PERCENT: 4 % (ref 1–4)
GFR AFRICAN AMERICAN: >60 ML/MIN
GFR NON-AFRICAN AMERICAN: 55 ML/MIN
GFR SERPL CREATININE-BSD FRML MDRD: ABNORMAL ML/MIN/{1.73_M2}
GFR SERPL CREATININE-BSD FRML MDRD: ABNORMAL ML/MIN/{1.73_M2}
GLOBULIN: ABNORMAL G/DL (ref 1.5–3.8)
GLUCOSE BLD-MCNC: 96 MG/DL (ref 70–99)
HCT VFR BLD CALC: 31 % (ref 36.3–47.1)
HEMOGLOBIN: 8.1 G/DL (ref 11.9–15.1)
IMMATURE GRANULOCYTES: 0 %
INR BLD: 1
LYMPHOCYTES # BLD: 13 % (ref 24–43)
MCH RBC QN AUTO: 22.8 PG (ref 25.2–33.5)
MCHC RBC AUTO-ENTMCNC: 26.1 G/DL (ref 28.4–34.8)
MCV RBC AUTO: 87.3 FL (ref 82.6–102.9)
MONOCYTES # BLD: 9 % (ref 3–12)
MORPHOLOGY: ABNORMAL
MORPHOLOGY: ABNORMAL
NRBC AUTOMATED: 0 PER 100 WBC
PDW BLD-RTO: 27.8 % (ref 11.8–14.4)
PLATELET # BLD: 491 K/UL (ref 138–453)
PLATELET ESTIMATE: ABNORMAL
PMV BLD AUTO: 9.7 FL (ref 8.1–13.5)
POTASSIUM SERPL-SCNC: 4.8 MMOL/L (ref 3.7–5.3)
PROTHROMBIN TIME: 10.6 SEC (ref 9.1–12.3)
RBC # BLD: 3.55 M/UL (ref 3.95–5.11)
RBC # BLD: ABNORMAL 10*6/UL
SEG NEUTROPHILS: 73 % (ref 36–65)
SEGMENTED NEUTROPHILS ABSOLUTE COUNT: 6.65 K/UL (ref 1.5–8.1)
SODIUM BLD-SCNC: 143 MMOL/L (ref 135–144)
TOTAL PROTEIN: 6.5 G/DL (ref 6.4–8.3)
WBC # BLD: 9.1 K/UL (ref 3.5–11.3)
WBC # BLD: ABNORMAL 10*3/UL

## 2022-01-13 PROCEDURE — 1111F DSCHRG MED/CURRENT MED MERGE: CPT | Performed by: INTERNAL MEDICINE

## 2022-01-13 PROCEDURE — G8427 DOCREV CUR MEDS BY ELIG CLIN: HCPCS | Performed by: INTERNAL MEDICINE

## 2022-01-13 PROCEDURE — 3017F COLORECTAL CA SCREEN DOC REV: CPT | Performed by: INTERNAL MEDICINE

## 2022-01-13 PROCEDURE — G8417 CALC BMI ABV UP PARAM F/U: HCPCS | Performed by: INTERNAL MEDICINE

## 2022-01-13 PROCEDURE — 99213 OFFICE O/P EST LOW 20 MIN: CPT | Performed by: INTERNAL MEDICINE

## 2022-01-13 PROCEDURE — G8482 FLU IMMUNIZE ORDER/ADMIN: HCPCS | Performed by: INTERNAL MEDICINE

## 2022-01-13 PROCEDURE — 1036F TOBACCO NON-USER: CPT | Performed by: INTERNAL MEDICINE

## 2022-01-13 ASSESSMENT — ENCOUNTER SYMPTOMS
SHORTNESS OF BREATH: 1
EYES NEGATIVE: 1
BLOOD IN STOOL: 1
ALLERGIC/IMMUNOLOGIC NEGATIVE: 1

## 2022-01-13 NOTE — TELEPHONE ENCOUNTER
EGD ordered at office visit 1/13/22. Per Dr Janae Payan needs to be scheduled with Dr Lopez Later in the OR at [facility]. Pt is not covid vaccinated. Pt needs cardiac clearance for baby asa prior to scheduling. Per pt she has a cardiology appt on 1/20/22 with Dr Hieu Jurado. Sent cardiac clearance. Once clearance rec'd will need to call pt to schedule.

## 2022-01-13 NOTE — PROGRESS NOTES
Reason for Referral: Post discharge follow-up      [address], [place]  [address] [address],[address]  [facility] [address]    Chief Complaint   Patient presents with    New Patient     referred for colonoscopy    Rectal Bleeding     Patient denies having hemorrhoids, states having rectal bleeding with about 65% of bowel movements. Has black stools since starting Iron.  Gastroesophageal Reflux     Patient taking protonix 40mg BID.  Anemia     Patient states being SOB on exertion starting prior to emergency department visit and still is today. HISTORY OF PRESENT ILLNESS: Alda Regan is a 61 y.o. female with a past history remarkable for prior history of hypertension, hyperlipidemia, prior history of bladder suspension, type 2 diabetes, obesity, Hashimoto's thyroiditis, CHF, CAD status post PCI to LAD in May 2019, recent mission to [place] for COVID-19 related pneumonia, patient was identified to have bilateral PEs and placed on anticoagulation, patient remains on aspirin and Plavix however this was held given patient's reported episodes of melena and anemia that was identified during her hospital course. Upper endoscopy and colonoscopy was deferred due to the patient's COVID-19 status, recommendation was to follow-up as an outpatient to coordinate procedures. Patient is currently not on any anticoagulation or antiplatelet therapy. Significant mount of lower extremity edema was observed during this visit, patient denies any hematochezia, no bright blood per rectum, no melena. Denies any upper GI symptoms or active GI symptoms at this time. Referred for evaluation of her previous history of isolated melena and anemia. Plan to schedule endoscopic evaluation.       Smoker: none   Drinking history: None   Abdominal surgeries:   Prior Colonoscopy:  Prior EGD:  FH of GI issues: None, GM-      Past Medical,Family, and Social History reviewed and does contribute to the patient presentingcondition. Patient's PMH/PSH,SH,PSYCH Hx, MEDs, ALLERGIES, and ROS were all reviewed and updated in the appropriate sections.     PAST MEDICAL HISTORY:  Past Medical History:   Diagnosis Date    Hyperlipidemia     Hypertension        Past Surgical History:   Procedure Laterality Date    BLADDER SUSPENSION      BLADDER SUSPENSION  2004       CURRENT MEDICATIONS:    Current Outpatient Medications:     losartan (COZAAR) 50 MG tablet, TAKE 1 TABLET BY MOUTH EVERY DAY, Disp: , Rfl:     benzonatate (TESSALON) 200 MG capsule, Take 1 capsule by mouth 3 times daily as needed for Cough, Disp: 60 capsule, Rfl: 0    albuterol sulfate HFA (VENTOLIN HFA) 108 (90 Base) MCG/ACT inhaler, Inhale 2 puffs into the lungs 4 times daily as needed for Wheezing, Disp: 54 g, Rfl: 1    ferrous sulfate (FE TABS 325) 325 (65 Fe) MG EC tablet, Take 1 tablet by mouth 2 times daily (with meals), Disp: 90 tablet, Rfl: 3    Vitamin D (CHOLECALCIFEROL) 50 MCG (2000 UT) TABS tablet, Take 1 tablet by mouth daily, Disp: 60 tablet, Rfl: 0    pantoprazole (PROTONIX) 40 MG tablet, Take 1 tablet by mouth 2 times daily, Disp: 30 tablet, Rfl: 3    metFORMIN (GLUCOPHAGE-XR) 500 MG extended release tablet, TAKE 1 TABLET BY MOUTH IN THE MORNING WITH breakfast, Disp: 90 tablet, Rfl: 3    levothyroxine (SYNTHROID) 50 MCG tablet, TAKE 1 TABLET BY MOUTH EVERY DAY, Disp: 90 tablet, Rfl: 3    Handicap Placard MISC, by Does not apply route For 1 year, Disp: 1 each, Rfl: 0    diphenhydrAMINE HCl, TOPICAL, 2 % GEL, Apply twice daily on affected area, Disp: 57 g, Rfl: 0    aspirin 81 MG EC tablet, Take 1 tablet by mouth daily, Disp: 30 tablet, Rfl: 3    atorvastatin (LIPITOR) 80 MG tablet, Take 1 tablet by mouth nightly, Disp: 30 tablet, Rfl: 3    metoprolol succinate (TOPROL XL) 100 MG extended release tablet, Take 1 tablet by mouth daily, Disp: 30 tablet, Rfl: 3    furosemide (LASIX) 20 MG tablet, Take 1 tablet by mouth daily (Patient taking differently: Take 40 mg by mouth daily ), Disp: 60 tablet, Rfl: 3    ALLERGIES:   No Known Allergies    FAMILY HISTORY:       Problem Relation Age of Onset    Elevated Lipids Mother     Cancer Mother     High Blood Pressure Mother     Cancer Maternal Grandmother          SOCIAL HISTORY:   Social History     Socioeconomic History    Marital status: Single     Spouse name: Not on file    Number of children: Not on file    Years of education: Not on file    Highest education level: Not on file   Occupational History    Not on file   Tobacco Use    Smoking status: Former Smoker     Packs/day: 1.00     Years: 10.00     Pack years: 10.00     Types: Cigarettes     Quit date: 1989     Years since quittin.6    Smokeless tobacco: Never Used   Vaping Use    Vaping Use: Never used   Substance and Sexual Activity    Alcohol use: Never    Drug use: Never    Sexual activity: Not on file   Other Topics Concern    Not on file   Social History Narrative    Not on file     Social Determinants of Health     Financial Resource Strain: Low Risk     Difficulty of Paying Living Expenses: Not very hard   Food Insecurity: No Food Insecurity    Worried About Running Out of Food in the Last Year: Never true    [address] in the Last Year: Never true   Transportation Needs: No Transportation Needs    Lack of Transportation (Medical): No    Lack of Transportation (Non-Medical):  No   Physical Activity:     Days of Exercise per Week: Not on file    Minutes of Exercise per Session: Not on file   Stress:     Feeling of Stress : Not on file   Social Connections:     Frequency of Communication with Friends and Family: Not on file    Frequency of Social Gatherings with Friends and Family: Not on file    Attends Sikh Services: Not on file    Active Member of Clubs or Organizations: Not on file    Attends Club or Organization Meetings: Not on file    Marital Status: Not on file   Intimate Partner Violence:     Fear of Current or Ex-Partner: Not on file    Emotionally Abused: Not on file    Physically Abused: Not on file    Sexually Abused: Not on file   Housing Stability:     Unable to Pay for Housing in the Last Year: Not on file    Number of Places Lived in the Last Year: Not on file    Unstable Housing in the Last Year: Not on file         REVIEW OF SYSTEMS: A 12-point review of systems was obtained and pertinent positives and negatives were listed below. REVIEW OF SYSTEMS:     Constitutional: No fever, no chills, no lethargy, no weakness. HEENT:  No headache, otalgia, itchy eyes, nasal discharge or sore throat. Cardiac:  No chest pain, dyspnea, orthopnea or PND. Chest:   No cough, phlegm or wheezing. Abdomen:      Detailed by MA   Neuro:  No focal weakness, abnormal movements or seizure like activity. Skin:   No rashes, no itching. :   No hematuria, no pyuria, no dysuria, no flank pain. Extremities:  No swelling or joint pains. ROS was otherwise negative    Review of Systems   Constitutional: Positive for fatigue. HENT: Negative. Eyes: Negative. Respiratory: Positive for shortness of breath. Cardiovascular: Positive for leg swelling. Gastrointestinal: Positive for blood in stool. Endocrine: Negative. Genitourinary: Negative. Musculoskeletal: Negative. Skin: Negative. Allergic/Immunologic: Negative. Neurological: Negative. Hematological: Negative. Psychiatric/Behavioral: Negative. All other systems reviewed and are negative. PHYSICAL EXAMINATION: Vital signs reviewed per the nursing documentation. BP (!) 147/82   Wt 261 lb (118.4 kg)   BMI 46.23 kg/m²   Body mass index is 46.23 kg/m². Physical Exam    Physical Exam   Constitutional: Patient is oriented to person, place, and time. Patient appears well-developed and well-nourished. HENT:   Head: Normocephalic and atraumatic. Eyes: Pupils are equal, round, and reactive to light.  EOM are normal.   Neck: Normal range of motion. Neck supple. No JVD present. No tracheal deviation present. No thyromegaly present. Cardiovascular: Normal rate, regular rhythm, normal heart sounds and intact distal pulses. Pulmonary/Chest: Effort normal and breath sounds normal. No stridor. No respiratory distress. He has no wheezes. He has no rales. He exhibits no tenderness. Abdominal: Soft. Bowel sounds are normal. He exhibits no distension and no mass. There is no tenderness. There is no rebound and no guarding. No hernia. Musculoskeletal: Normal range of motion. Lymphadenopathy:    Patient has no cervical adenopathy. Neurological: Patient is alert and oriented to person, place, and time. Psychiatric: Patient has a normal mood and affect.  Patient behavior is normal.       LABORATORY DATA: Reviewed  Lab Results   Component Value Date    WBC 9.7 12/25/2021    HGB 7.8 (L) 12/25/2021    HCT 30.4 (L) 12/25/2021    MCV 75.5 (L) 12/25/2021     (H) 12/25/2021     12/24/2021    K 3.9 12/24/2021     12/24/2021    CO2 20 12/24/2021    BUN 21 12/24/2021    CREATININE 1.32 (H) 12/24/2021    LABALBU 2.9 (L) 12/23/2021    BILITOT 0.65 12/23/2021    ALKPHOS 166 (H) 12/23/2021    AST 16 12/23/2021    ALT 11 12/23/2021    INR 1.1 12/23/2021         Lab Results   Component Value Date    RBC 3.80 (L) 12/25/2021    HGB 7.8 (L) 12/25/2021    MCV 75.5 (L) 12/25/2021    MCH 19.7 (L) 12/25/2021    MCHC 26.1 (L) 12/25/2021    RDW 22.6 (H) 12/25/2021    MPV 10.0 12/25/2021    BASOPCT 1 12/25/2021    LYMPHSABS 2.33 12/25/2021    MONOSABS 0.97 (H) 12/25/2021    NEUTROABS 5.62 12/25/2021    EOSABS 0.58 (H) 12/25/2021    BASOSABS 0.10 12/25/2021         DIAGNOSTIC TESTING:     CT ABDOMEN PELVIS W IV CONTRAST Additional Contrast? None    Result Date: 12/22/2021  EXAMINATION: CT OF THE ABDOMEN AND PELVIS WITH CONTRAST 12/22/2021 5:12 pm TECHNIQUE: CT of the abdomen and pelvis was performed with the administration of intravenous contrast. Multiplanar reformatted images are provided for review. Dose modulation, iterative reconstruction, and/or weight based adjustment of the mA/kV was utilized to reduce the radiation dose to as low as reasonably achievable. COMPARISON: Abdominal CT study from May 11, 2019. HISTORY: ORDERING SYSTEM PROVIDED HISTORY: gi bleed TECHNOLOGIST PROVIDED HISTORY: gi bleed Decision Support Exception - unselect if not a suspected or confirmed emergency medical condition->Emergency Medical Condition (MA) Reason for Exam: dyspnea, elevated dimer, anemia, ? GI bleed FINDINGS: Lower Chest: Heart size is normal.  Scattered areas of ground-glass opacity are present within the visualized lower lobes, new from prior and possibly representing an active viral process. No pleural effusion. Organs: The liver, spleen, pancreas, kidneys, and adrenal glands appear normal. The gallbladder is mildly distended containing a 3.1 cm stone, similar in appearance to prior allowing for the differences in imaging technique. No surrounding inflammatory fat stranding or fluid. No biliary or pancreatic ductal dilation. GI/Bowel: Assessment is slightly limited due to motion artifact. The stomach and the small and large bowel loops appear normal in caliber, contour, and morphology, without acute or significant abnormality. No dilated loops or areas of bowel wall thickening. No areas of active bleed are identified. Peritoneum/Retroperitoneum: There is no free fluid or extraluminal gas. No enlarged or suspicious mesenteric or retroperitoneal lymphadenopathy. The abdominal aorta and iliac arteries are patent and of normal caliber. Pelvis: No pelvic free fluid or enlarged or suspicious pelvic or inguinal lymphadenopathy. No appreciable uterine or adnexal abnormality. The urinary bladder and the pelvic bowel loops are unremarkable. Bones/Soft Tissues: Degenerative changes are present throughout the lumbar spine. No acute fracture.   No abdominal wall or inguinal hernia. Slightly limited study due to respiratory motion artifact. No acute or significant intestinal abnormality. Cholelithiasis, without evidence of acute cholecystitis. Mild diffuse ground-glass opacity at the visualized lung bases. The finding is nonspecific although may represent active COVID-19 viral pneumonia in the appropriate setting. CT CHEST PULMONARY EMBOLISM W CONTRAST    Result Date: 12/22/2021  EXAMINATION: CTA OF THE CHEST 12/22/2021 3:12 pm TECHNIQUE: CTA of the chest was performed after the administration of intravenous contrast.  Multiplanar reformatted images are provided for review. MIP images are provided for review. Dose modulation, iterative reconstruction, and/or weight based adjustment of the mA/kV was utilized to reduce the radiation dose to as low as reasonably achievable. COMPARISON: Chest CT, 05/11/2019. HISTORY: ORDERING SYSTEM PROVIDED HISTORY: dyspnea elevated dimer anemia TECHNOLOGIST PROVIDED HISTORY: dyspnea elevated dimer anemia Decision Support Exception - unselect if not a suspected or confirmed emergency medical condition->Emergency Medical Condition (MA) Reason for Exam: dyspnea elevated dimer anemia FINDINGS: Pulmonary Arteries: There are filling defects seen within the pulmonary arteries in the right upper lobe, the right middle lobe, the right lower lobe, and the left lower lobe. Left upper lobe is spared. No evidence of right heart strain. Mediastinum: Visualized thyroid is unremarkable. Enlarged mediastinal and hilar lymph nodes are seen. For example, a right hilar lymph node measures 2.3 x 2.0 cm. A precarinal lymph node measures approximately 1.8 cm in short axis. Esophagus is unremarkable. Cardiac chambers unremarkable. Thoracic aorta normal in caliber without evidence of dissection. Lungs/pleura: There are subtle ground-glass infiltrates noted within the mid to lower lungs, greater in the periphery.  Innumerable punctate tree-in-bud centrilobular micro nodules are detected within the upper lungs, similar when compared to the previous exam from 2019. Mild dependent atelectasis noted bilaterally. 8 mm nodule within the medial right lower lobe is unchanged since 2019 and considered benign. Upper Abdomen: Please see abdomen and pelvis report performed simultaneously. Soft Tissues/Bones: Visualized extra thoracic soft tissues unremarkable. No acute or suspicious bony abnormalities are identified. Positive for pulmonary embolism. No evidence of right heart strain. Mild patchy peripheral ground-glass infiltrates within the lower lungs bilaterally, compatible with COVID-19 pneumonia. Innumerable punctate centrilobular tree-in-bud micro nodules within the upper lungs bilaterally, similar when compared to the previous exam from May 2019, suggesting chronic bronchiolitis. Findings were discussed with Dr. Nickolas Albarran at 8:59 pm on 12/22/2021. IMPRESSION: Srinivas Sigala is a 61 y.o. female with a past history remarkable for prior history of hypertension, hyperlipidemia, prior history of bladder suspension, type 2 diabetes, obesity, Hashimoto's thyroiditis, CHF, CAD status post PCI to LAD in May 2019, recent mission to [place] for COVID-19 related pneumonia, patient was identified to have bilateral PEs and placed on anticoagulation, patient remains on aspirin and Plavix however this was held given patient's reported episodes of melena and anemia that was identified during her hospital course. Upper endoscopy and colonoscopy was deferred due to the patient's COVID-19 status, recommendation was to follow-up as an outpatient to coordinate procedures. Patient is currently not on any anticoagulation or antiplatelet therapy. Significant mount of lower extremity edema was observed during this visit, patient denies any hematochezia, no bright blood per rectum, no melena.   Denies any upper GI symptoms or active GI symptoms at this time. Referred for evaluation of her previous history of isolated melena and anemia. Plan to schedule endoscopic evaluation. Assessment  1. Gastrointestinal hemorrhage associated with peptic ulcer    2. Symptomatic anemia    3. Rectal bleeding        PLAN:    1) isolated episode of melena-suspect upper GI source- plan on scheduling upper endoscopy next week. Patient is reluctant to undergo endoscopic evaluation despite risks of not performing procedure fully explained. We will continue patient on protonix 40mg BID, recommend repeat blood work to evaluate hemoglobin trend. 2) Patient reports no active signs of bleeding, was not sent home on [facility] [place]. Off plavix, history of CAD and PCI. Significant peripheral edema. Needs close follow-up and clearance from cardiology. Likely decompensated heart failure. 3) EGD at [facility] given recent history of pulmonary emboli. If there is no signs of active GI bleeding and hemoglobin stable patient may resume anticoagulation for her PEs but this will need to be discontinued prior to her procedure. Recommend close follow-up with vascular, pulmonary, and cardiology to discuss optimization for an invasive procedure and management of the patient's pulmonary emboli. Currently, the patient denies any fatigue or shortness of breath. Ambulating with some difficulty due to lower extremity edema. Patient advised to call the office should she have any noticeable changes in her bowel frequency and any observable blood in her stool. Offered colonoscopy, patient refusing at this time. Risks fully explained to the patient. Thank you for allowing me to participate in the care of Ms. Ida Vargas. For any further questions please do not hesitate to contact me. I have reviewed and agree with the MA/LPN ROS please refer to their documentation from today's encounter on a separate note.      Simran Henley MD, MPH   [facility]  Office #: [phone]          this note is created with the assistance of a speech recognition program.  While intending to generate a document that actually reflects the content of the visit, the document can still have some errors including those of syntax and sound a like substitutions which may escape proof reading. It such instances, actual meaning can be extrapolated by contextual diversion.

## 2022-01-17 ENCOUNTER — INITIAL CONSULT (OUTPATIENT)
Dept: VASCULAR SURGERY | Age: 67
End: 2022-01-17
Payer: MEDICAID

## 2022-01-17 ENCOUNTER — HOSPITAL ENCOUNTER (OUTPATIENT)
Dept: ULTRASOUND IMAGING | Age: 67
Discharge: HOME | End: 2022-01-19
Payer: MEDICAID

## 2022-01-17 VITALS
SYSTOLIC BLOOD PRESSURE: 131 MMHG | BODY MASS INDEX: 46.25 KG/M2 | WEIGHT: 261 LBS | OXYGEN SATURATION: 97 % | TEMPERATURE: 98 F | HEART RATE: 79 BPM | HEIGHT: 63 IN | RESPIRATION RATE: 18 BRPM | DIASTOLIC BLOOD PRESSURE: 79 MMHG

## 2022-01-17 DIAGNOSIS — I26.93 SINGLE SUBSEGMENTAL PULMONARY EMBOLISM WITHOUT ACUTE COR PULMONALE (HCC): ICD-10-CM

## 2022-01-17 DIAGNOSIS — I89.0 LYMPHEDEMA OF BOTH LOWER EXTREMITIES: ICD-10-CM

## 2022-01-17 DIAGNOSIS — I26.93 SINGLE SUBSEGMENTAL PULMONARY EMBOLISM WITHOUT ACUTE COR PULMONALE (HCC): Primary | ICD-10-CM

## 2022-01-17 DIAGNOSIS — I87.8 VENOUS STASIS: Primary | ICD-10-CM

## 2022-01-17 DIAGNOSIS — I87.2 CHRONIC VENOUS INSUFFICIENCY OF LOWER EXTREMITY: ICD-10-CM

## 2022-01-17 PROCEDURE — 1111F DSCHRG MED/CURRENT MED MERGE: CPT | Performed by: SURGERY

## 2022-01-17 PROCEDURE — G8417 CALC BMI ABV UP PARAM F/U: HCPCS | Performed by: SURGERY

## 2022-01-17 PROCEDURE — G8482 FLU IMMUNIZE ORDER/ADMIN: HCPCS | Performed by: SURGERY

## 2022-01-17 PROCEDURE — 3017F COLORECTAL CA SCREEN DOC REV: CPT | Performed by: SURGERY

## 2022-01-17 PROCEDURE — 1036F TOBACCO NON-USER: CPT | Performed by: SURGERY

## 2022-01-17 PROCEDURE — 93971 EXTREMITY STUDY: CPT

## 2022-01-17 PROCEDURE — G8427 DOCREV CUR MEDS BY ELIG CLIN: HCPCS | Performed by: SURGERY

## 2022-01-17 PROCEDURE — 99215 OFFICE O/P EST HI 40 MIN: CPT | Performed by: SURGERY

## 2022-01-17 ASSESSMENT — ENCOUNTER SYMPTOMS
SHORTNESS OF BREATH: 1
ALLERGIC/IMMUNOLOGIC NEGATIVE: 1
ABDOMINAL PAIN: 0
COLOR CHANGE: 1
COUGH: 1

## 2022-01-17 NOTE — PROGRESS NOTES
Division of Vascular Surgery        New Consult      Physician Requesting Consult:  Alexis Murphy MD    Reason for Consult:   Bilateral pulmonary embolus    Chief Complaint:      Shortness of breath, leg swelling    History of Present Illness:      Cristina Hernandez is a 61 y.o. woman who presents for follow up from the hospital for pulmonary embolus. She was found to have subsegmental pulmonary embolus without right heart strain, along with GI bleed and anemia. Recommendation was for her to not be started on anticoagulation and get lower extremity duplex. For some reason or another she never had duplex done. Her dual antiplatelet therapy for coronary disease (stenting in 2019) was also janeth. She continues to have shortness of breath with exertion. Also has noticed increased swelling in both legs and now has had some breaks in her skin causing drainage. She has dealt with chronic lower extremity swelling before, but has had difficulty putting on compression stockings on her own. Medical History:     Past Medical History:   Diagnosis Date    Hyperlipidemia     Hypertension        Surgical History:     Past Surgical History:   Procedure Laterality Date    BLADDER SUSPENSION      BLADDER SUSPENSION  2004       Family History:     Family History   Problem Relation Age of Onset    Elevated Lipids Mother     Cancer Mother     High Blood Pressure Mother     Cancer Maternal Grandmother        Allergies:       Patient has no known allergies.     Medications:      Current Outpatient Medications   Medication Sig Dispense Refill    losartan (COZAAR) 50 MG tablet TAKE 1 TABLET BY MOUTH EVERY DAY      benzonatate (TESSALON) 200 MG capsule Take 1 capsule by mouth 3 times daily as needed for Cough 60 capsule 0    albuterol sulfate HFA (VENTOLIN HFA) 108 (90 Base) MCG/ACT inhaler Inhale 2 puffs into the lungs 4 times daily as needed for Wheezing 54 g 1    ferrous sulfate (FE TABS 325) 325 (65 Fe) MG EC tablet Take 1 tablet by mouth 2 times daily (with meals) 90 tablet 3    Vitamin D (CHOLECALCIFEROL) 50 MCG (2000 UT) TABS tablet Take 1 tablet by mouth daily 60 tablet 0    pantoprazole (PROTONIX) 40 MG tablet Take 1 tablet by mouth 2 times daily 30 tablet 3    metFORMIN (GLUCOPHAGE-XR) 500 MG extended release tablet TAKE 1 TABLET BY MOUTH IN THE MORNING WITH breakfast 90 tablet 3    levothyroxine (SYNTHROID) 50 MCG tablet TAKE 1 TABLET BY MOUTH EVERY DAY 90 tablet 3    Handicap Placard MISC by Does not apply route For 1 year 1 each 0    diphenhydrAMINE HCl, TOPICAL, 2 % GEL Apply twice daily on affected area 57 g 0    aspirin 81 MG EC tablet Take 1 tablet by mouth daily 30 tablet 3    atorvastatin (LIPITOR) 80 MG tablet Take 1 tablet by mouth nightly 30 tablet 3    metoprolol succinate (TOPROL XL) 100 MG extended release tablet Take 1 tablet by mouth daily 30 tablet 3    furosemide (LASIX) 20 MG tablet Take 1 tablet by mouth daily (Patient taking differently: Take 40 mg by mouth daily ) 60 tablet 3     No current facility-administered medications for this visit. Social History:     Tobacco:    reports that she quit smoking about 32 years ago. Her smoking use included cigarettes. She has a 10.00 pack-year smoking history. She has never used smokeless tobacco.  Alcohol:      reports no history of alcohol use. Drug Use:  reports no history of drug use. Review of Systems:     Review of Systems   Constitutional: Positive for fatigue. Negative for activity change, chills and fever. HENT: Positive for congestion. Eyes: Negative for visual disturbance. Respiratory: Positive for cough and shortness of breath. Cardiovascular: Positive for leg swelling. Negative for chest pain. Gastrointestinal: Negative for abdominal pain. Endocrine: Negative. Genitourinary: Negative. Musculoskeletal: Positive for gait problem. Skin: Positive for color change and wound.    Allergic/Immunologic: Negative. Neurological: Negative for facial asymmetry, speech difficulty, weakness and numbness. Hematological: Negative. Psychiatric/Behavioral: Negative. Physical Exam:     Vitals:  /79 (Site: Right Upper Arm, Position: Sitting, Cuff Size: Large Adult)   Pulse 79   Temp 98 °F (36.7 °C) (Temporal)   Resp 18   Ht 5' 3\" (1.6 m)   Wt 261 lb (118.4 kg)   SpO2 97%   BMI 46.23 kg/m²     Physical Exam  Constitutional:       Appearance: She is well-developed and well-groomed. She is morbidly obese. Eyes:      Extraocular Movements: Extraocular movements intact. Conjunctiva/sclera: Conjunctivae normal.   Neck:      Vascular: No carotid bruit. Cardiovascular:      Rate and Rhythm: Normal rate and regular rhythm. Pulses:           Dorsalis pedis pulses are detected w/ Doppler on the right side and detected w/ Doppler on the left side. Posterior tibial pulses are detected w/ Doppler on the right side and detected w/ Doppler on the left side. Pulmonary:      Effort: Pulmonary effort is normal. No respiratory distress. Abdominal:      Palpations: Abdomen is soft. Tenderness: There is no abdominal tenderness. Musculoskeletal:      Cervical back: Full passive range of motion without pain. Right lower leg: No tenderness. 2+ Pitting Edema present. Left lower leg: No tenderness. 2+ Pitting Edema present. Comments: Small ulcerations over right calf causing serous drainage   Feet:      Right foot:      Skin integrity: No ulcer or skin breakdown. Left foot:      Skin integrity: No ulcer or skin breakdown. Skin:     General: Skin is warm. Capillary Refill: Capillary refill takes less than 2 seconds. 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 GCS: GCS eye subscore is 4. GCS verbal subscore is 5.  GCS motor subscore is 6. Sensory: Sensation is intact. Motor: Motor function is intact. Psychiatric:         Mood and Affect: Mood normal.         Speech: Speech normal.         Behavior: Behavior normal.         Thought Content: Thought content normal.         Imaging/Labs:     Small subsegmental PE in right lower lobe            Assessment and Plan:     Pulmonary embolus without right heart strain, chronic venous insufficiency with ulceration, lymphedema  · Patient never had venous duplex done, testing ordered and scheduled to get done  · If there is no large DVT (popliteal or proximal) then no need for anticoagulation  · If there is a large DVT then will plan for placement of IVC filter  · Will need upper and lower endoscopy to find source of GI bleed  · Will make referral to wound care center for possible unna boot application  · Lymphedema clinic referral for evaluation and treatment  Patient has tried and failed 4 weeks of conservative treatments including elevation, compression, and exercise and significant symptoms still remain. Patient has proximal swelling leading into the groin and abdomen area. A basic pump could exacerbate symptoms and cause an increase in proximal swelling. I am recommending this patient receive a flexitouch to address truncal swelling and safely and efficiently move lymphatic fluid. · Follow up in 6 months for re-evaluation    Electronically signed by Matt Rees MD on 1/17/22 at 10:33 AM [address]  Office: [phone]  Cell: [phone]  Email: [email or web address]

## 2022-01-18 ENCOUNTER — CARE COORDINATION (OUTPATIENT)
Dept: CASE MANAGEMENT | Age: 67
End: 2022-01-18

## 2022-01-20 ENCOUNTER — HOSPITAL ENCOUNTER (OUTPATIENT)
Dept: WOUND CARE | Age: 67
Discharge: HOME | End: 2022-01-20
Payer: MEDICAID

## 2022-01-20 VITALS
HEIGHT: 63 IN | BODY MASS INDEX: 46.25 KG/M2 | WEIGHT: 261 LBS | SYSTOLIC BLOOD PRESSURE: 151 MMHG | TEMPERATURE: 97.9 F | HEART RATE: 82 BPM | DIASTOLIC BLOOD PRESSURE: 79 MMHG | RESPIRATION RATE: 18 BRPM

## 2022-01-20 DIAGNOSIS — E66.01 OBESITY, MORBID, BMI 40.0-49.9 (HCC): Chronic | ICD-10-CM

## 2022-01-20 DIAGNOSIS — I89.0 LYMPHEDEMA OF BOTH LOWER EXTREMITIES: Chronic | ICD-10-CM

## 2022-01-20 DIAGNOSIS — L98.499 ULCER OF EXTREMITY DUE TO CHRONIC VENOUS INSUFFICIENCY (HCC): Chronic | ICD-10-CM

## 2022-01-20 DIAGNOSIS — I87.2 ULCER OF EXTREMITY DUE TO CHRONIC VENOUS INSUFFICIENCY (HCC): Chronic | ICD-10-CM

## 2022-01-20 DIAGNOSIS — E11.8 CONTROLLED TYPE 2 DIABETES MELLITUS WITH COMPLICATION, WITHOUT LONG-TERM CURRENT USE OF INSULIN (HCC): Chronic | ICD-10-CM

## 2022-01-20 DIAGNOSIS — R73.03 PREDIABETES: Primary | Chronic | ICD-10-CM

## 2022-01-20 PROBLEM — Z95.5 S/P DRUG ELUTING CORONARY STENT PLACEMENT: Chronic | Status: ACTIVE | Noted: 2020-02-19

## 2022-01-20 PROCEDURE — 86403 PARTICLE AGGLUT ANTBDY SCRN: CPT

## 2022-01-20 PROCEDURE — 87176 TISSUE HOMOGENIZATION CULTR: CPT

## 2022-01-20 PROCEDURE — 11045 DBRDMT SUBQ TISS EACH ADDL: CPT

## 2022-01-20 PROCEDURE — 87070 CULTURE OTHR SPECIMN AEROBIC: CPT

## 2022-01-20 PROCEDURE — 87205 SMEAR GRAM STAIN: CPT

## 2022-01-20 PROCEDURE — 11042 DBRDMT SUBQ TIS 1ST 20SQCM/<: CPT

## 2022-01-20 PROCEDURE — 87147 CULTURE TYPE IMMUNOLOGIC: CPT

## 2022-01-20 PROCEDURE — 87075 CULTR BACTERIA EXCEPT BLOOD: CPT

## 2022-01-20 PROCEDURE — 99213 OFFICE O/P EST LOW 20 MIN: CPT

## 2022-01-20 PROCEDURE — 87186 SC STD MICRODIL/AGAR DIL: CPT

## 2022-01-20 RX ORDER — LIDOCAINE 50 MG/G
OINTMENT TOPICAL ONCE
Status: CANCELLED | OUTPATIENT
Start: 2022-01-20 | End: 2022-01-20

## 2022-01-20 RX ORDER — GINSENG 100 MG
CAPSULE ORAL ONCE
Status: CANCELLED | OUTPATIENT
Start: 2022-01-20 | End: 2022-01-20

## 2022-01-20 RX ORDER — BACITRACIN, NEOMYCIN, POLYMYXIN B 400; 3.5; 5 [USP'U]/G; MG/G; [USP'U]/G
OINTMENT TOPICAL ONCE
Status: CANCELLED | OUTPATIENT
Start: 2022-01-20 | End: 2022-01-20

## 2022-01-20 RX ORDER — BACITRACIN ZINC AND POLYMYXIN B SULFATE 500; 1000 [USP'U]/G; [USP'U]/G
OINTMENT TOPICAL ONCE
Status: CANCELLED | OUTPATIENT
Start: 2022-01-20 | End: 2022-01-20

## 2022-01-20 RX ORDER — LIDOCAINE HYDROCHLORIDE 20 MG/ML
JELLY TOPICAL ONCE
Status: CANCELLED | OUTPATIENT
Start: 2022-01-20 | End: 2022-01-20

## 2022-01-20 RX ORDER — BETAMETHASONE DIPROPIONATE 0.05 %
OINTMENT (GRAM) TOPICAL ONCE
Status: CANCELLED | OUTPATIENT
Start: 2022-01-20 | End: 2022-01-20

## 2022-01-20 RX ORDER — LIDOCAINE HYDROCHLORIDE 40 MG/ML
SOLUTION TOPICAL ONCE
Status: CANCELLED | OUTPATIENT
Start: 2022-01-20 | End: 2022-01-20

## 2022-01-20 RX ORDER — GENTAMICIN SULFATE 1 MG/G
OINTMENT TOPICAL ONCE
Status: CANCELLED | OUTPATIENT
Start: 2022-01-20 | End: 2022-01-20

## 2022-01-20 RX ORDER — LIDOCAINE 40 MG/G
CREAM TOPICAL ONCE
Status: CANCELLED | OUTPATIENT
Start: 2022-01-20 | End: 2022-01-20

## 2022-01-20 RX ORDER — CLOBETASOL PROPIONATE 0.5 MG/G
OINTMENT TOPICAL ONCE
Status: CANCELLED | OUTPATIENT
Start: 2022-01-20 | End: 2022-01-20

## 2022-01-20 ASSESSMENT — PAIN SCALES - GENERAL: PAINLEVEL_OUTOF10: 2

## 2022-01-20 NOTE — PROGRESS NOTES
[employer] Application   Below Knee    NAME:  Christiano Fay  YOB: 1955  MEDICAL RECORD NUMBER:  [id number]  DATE:  1/20/2022     [x] Applied moisturizing agent to dry skin as needed.  [x] Appied primary and secondary dressing as ordered     [x] Applied Unna roll from toes to knee overlapping each time.  [x] Applied ace wrap or coban from toes to below the knee.  [x] Secured with tape and/or metal clips covered with tape.  [x] Instructed patient/caregiver to keep dressing dry and intact. DO NOT REMOVE DRESSING.  [x] Instructed pt/family/caregiver to report excessive draining, loose bandage, wet dressing, severe pain or tingling in toes.  [x] Applied [employer] dressing below the knee toBILATERAL  lower leg(s)        Unna Boot(s) were applied per  Guidelines.      Electronically signed by Bartolo Singleton RN on 1/20/2022 at 3:40 PM

## 2022-01-20 NOTE — PROGRESS NOTES
[address]   Progress Note and Procedure Note      [address] RECORD NUMBER:  [id number]  AGE: 61 y.o. GENDER: female  : 1955  EPISODE DATE:  2022    Subjective:     Chief Complaint   Patient presents with    Wound Check     Bilateral lower legs         HISTORY of PRESENT ILLNESS HPI     Caroline Arnold is a 61 y.o. female who presents today for wound/ulcer evaluation. History of Wound Context: She is an unvaccinated patient who had COVID pneumonia and was discharge right after lili. She has chronic venous insufficiency and lymphedema of the bilateral lower extremities.   She has chronic ulcers on both lower extremities with blisters that are draining copious serous fluid  Wound/Ulcer Pain Timing/Severity: constant  Quality of pain: sharp  Severity:  2 / 10   Modifying Factors: Pain worsens with debridement  Associated Signs/Symptoms: edema, erythema and drainage    Ulcer Identification:  Ulcer Type: venous  Contributing Factors: edema, venous stasis, lymphedema, diabetes, decreased mobility and obesity    Wound: N/A        PAST MEDICAL HISTORY        Diagnosis Date    Hyperlipidemia     Hypertension        PAST SURGICAL HISTORY    Past Surgical History:   Procedure Laterality Date    BLADDER SUSPENSION      BLADDER SUSPENSION         FAMILY HISTORY    Family History   Problem Relation Age of Onset    Elevated Lipids Mother     Cancer Mother     High Blood Pressure Mother     Cancer Maternal Grandmother        SOCIAL HISTORY    Social History     Tobacco Use    Smoking status: Former Smoker     Packs/day: 1.00     Years: 10.00     Pack years: 10.00     Types: Cigarettes     Quit date: 1989     Years since quittin.6    Smokeless tobacco: Never Used   Vaping Use    Vaping Use: Never used   Substance Use Topics    Alcohol use: Never    Drug use: Never       ALLERGIES    No Known Allergies    MEDICATIONS    Current Outpatient Medications on File Prior to Encounter   Medication Sig Dispense Refill    losartan (COZAAR) 50 MG tablet TAKE 1 TABLET BY MOUTH EVERY DAY      benzonatate (TESSALON) 200 MG capsule Take 1 capsule by mouth 3 times daily as needed for Cough 60 capsule 0    albuterol sulfate HFA (VENTOLIN HFA) 108 (90 Base) MCG/ACT inhaler Inhale 2 puffs into the lungs 4 times daily as needed for Wheezing 54 g 1    ferrous sulfate (FE TABS 325) 325 (65 Fe) MG EC tablet Take 1 tablet by mouth 2 times daily (with meals) 90 tablet 3    Vitamin D (CHOLECALCIFEROL) 50 MCG (2000 UT) TABS tablet Take 1 tablet by mouth daily 60 tablet 0    pantoprazole (PROTONIX) 40 MG tablet Take 1 tablet by mouth 2 times daily 30 tablet 3    metFORMIN (GLUCOPHAGE-XR) 500 MG extended release tablet TAKE 1 TABLET BY MOUTH IN THE MORNING WITH breakfast 90 tablet 3    levothyroxine (SYNTHROID) 50 MCG tablet TAKE 1 TABLET BY MOUTH EVERY DAY 90 tablet 3    Handicap Placard MISC by Does not apply route For 1 year 1 each 0    diphenhydrAMINE HCl, TOPICAL, 2 % GEL Apply twice daily on affected area 57 g 0    aspirin 81 MG EC tablet Take 1 tablet by mouth daily 30 tablet 3    atorvastatin (LIPITOR) 80 MG tablet Take 1 tablet by mouth nightly 30 tablet 3    metoprolol succinate (TOPROL XL) 100 MG extended release tablet Take 1 tablet by mouth daily 30 tablet 3    furosemide (LASIX) 20 MG tablet Take 1 tablet by mouth daily 60 tablet 3     No current facility-administered medications on file prior to encounter. REVIEW OF SYSTEMS    Pertinent items are noted in HPI.     Objective:      BP (!) 151/79   Pulse 82   Temp 97.9 °F (36.6 °C) (Tympanic)   Resp 18   Ht 5' 3\" (1.6 m)   Wt 261 lb (118.4 kg)   BMI 46.23 kg/m²     Wt Readings from Last 3 Encounters:   01/20/22 261 lb (118.4 kg)   01/17/22 261 lb (118.4 kg)   01/13/22 261 lb (118.4 kg)       PHYSICAL EXAM    General Appearance: alert and oriented to person, place and time, well developed and well- nourished, in no acute distress  Skin: warm and dry, no rash or erythema  Neck: supple and non-tender without mass, no thyromegaly or thyroid nodules, no cervical lymphadenopathy  Pulmonary/Chest: clear to auscultation bilaterally- no wheezes, rales or rhonchi, normal air movement, no respiratory distress  Cardiovascular: normal rate, regular rhythm, normal S1 and S2, no murmurs, rubs, clicks, or gallops, distal pulses intact, no carotid bruits  Abdomen: soft, non-tender, non-distended, normal bowel sounds, no masses or organomegaly  Extremities: no cyanosis, clubbing or edema  Musculoskeletal: normal range of motion, no joint swelling, deformity or tenderness  Neurologic: reflexes normal and symmetric, no cranial nerve deficit, gait, coordination and speech normal      Assessment:     Problem List Items Addressed This Visit     Controlled type 2 diabetes mellitus with complication, without long-term current use of insulin (HCC) (Chronic)    Obesity, morbid, BMI 40.0-49.9 (HCC) (Chronic)    Prediabetes - Primary (Chronic)    Lymphedema of both lower extremities (Chronic)    Ulcer of extremity due to chronic venous insufficiency (HCC) (Chronic)           Procedure Note  Indications:  Based on my examination of this patient's wound(s)/ulcer(s) today, debridement is required to promote healing and evaluate the wound base. Performed by: Magalys Bird MD    Consent obtained:  Yes    Time out taken:  Yes    Pain Control: Anesthetic  Anesthetic: 4% Lidocaine Liquid Topical       Debridement:Excisional Debridement    Using curette the wound(s)/ulcer(s) was/were sharply debrided down through and including the removal of subcutaneous tissue.         Devitalized Tissue Debrided:  fibrin, biofilm and slough    Pre Debridement Measurements:  Are located in the Wound/Ulcer Documentation Flow Sheet    Wound/Ulcer #: 2      Post Debridement Measurements:  Wound/Ulcer Descriptions are Pre Debridement except measurements:        Wound 01/20/22 Leg Right; Lower #1 circumfrential (Active)   Wound Image    01/20/22 1433   Dressing Status New drainage noted; Old drainage noted 01/20/22 1433   Wound Cleansed Soap and water 01/20/22 1433   Wound Length (cm) 22.3 cm 01/20/22 1433   Wound Width (cm) 45.2 cm 01/20/22 1433   Wound Depth (cm) 0.3 cm 01/20/22 1433   Wound Surface Area (cm^2) 1007.96 cm^2 01/20/22 1433   Wound Volume (cm^3) 302.388 cm^3 01/20/22 1433   Wound Assessment Pink/red;Slough 01/20/22 1433   Drainage Amount Large 01/20/22 1433   Drainage Description Serous 01/20/22 1433   Odor None 01/20/22 1433   Ro-wound Assessment Blanchable erythema 01/20/22 1433   Margins Attached edges 01/20/22 1433   Wound Thickness Description not for Pressure Injury Full thickness 01/20/22 1433   Number of days: 0       Wound 01/20/22 Leg Left; Lower #2 circumfrential (Active)   Wound Image    01/20/22 1433   Dressing Status New drainage noted; Old drainage noted 01/20/22 1433   Wound Cleansed Soap and water 01/20/22 1433   Wound Length (cm) 22.3 cm 01/20/22 1433   Wound Width (cm) 40 cm 01/20/22 1433   Wound Depth (cm) 0.3 cm 01/20/22 1433   Wound Surface Area (cm^2) 892 cm^2 01/20/22 1433   Wound Volume (cm^3) 267.6 cm^3 01/20/22 1433   Wound Assessment Pink/red;Slough 01/20/22 1433   Drainage Amount Large 01/20/22 1433   Drainage Description Serous 01/20/22 1433   Odor None 01/20/22 1433   Ro-wound Assessment Blanchable erythema 01/20/22 1433   Margins Attached edges 01/20/22 1433   Wound Thickness Description not for Pressure Injury Full thickness 01/20/22 1433   Number of days: 0          Percent of Wound(s)/Ulcer(s) Debrided: 2%    Total Surface Area Debrided:  37.99 sq cm       Diabetic/Pressure/Non Pressure Ulcers only:  Ulcer: Non-Pressure ulcer, fat layer exposed      Estimated Blood Loss:  Minimal    Hemostasis Achieved:  by pressure    Procedural Pain:  2  / 10     Post Procedural Pain:  2 / 10     Response to treatment:  Well tolerated by patient. Plan:     Treatment Note please see Discharge Instructions    Written patient dismissal instructions given to patient and signed by patient or POA.              Electronically signed by Uche Crisostomo MD on 1/20/2022 at 3:15 PM

## 2022-01-21 NOTE — CARE COORDINATION
[facility] Transitions Final Follow Up Call    2022  Patient: Анна Cevallos    Patient : 1955   MRN: [id number]    Reason for Admission: COVID, Bilateral PE, GI bleed, anemia (6.6 - transfused)  Discharge Date: 21   RARS: Readmission Risk Score: 15.7 ( )      Spoke with: patient - feeling better. VALDEZ, cough improved - clear mucus expectorant. Discussed with patient all of the different specialties she is following up with & that it may benefit her to have an ACM. She is agreeable & will refer. Thinks this will help her keep track of things that need to be addressed & ongoing issues. Different services involved - cardiology - Dr Richard De Souza (previous stent - plavix stopped b/o GI bleed - continues asa)  Vascular - Dr Radha Barnard - Tessa Alfaro - Dr Norris Bonita - needs to have EGD & follow up - unable to currently be on anticoagulants for PE until cleared by GI  Wound care clinic - 1st appt is  - legs swollen & was told she has cellulitis    Informed of final covid transition call. Episode resolved      Challenges to be reviewed by the provider   Additional needs identified to be addressed with provider: No  none                 Encounter was not routed to provider for escalation. Method of communication with provider:  none. Contacted the patient by telephone to follow up after hospital visit. Status: improved  Interventions to address identified needs: Scheduled appointment with PCP-1/3  Scheduled appointment with Specialist-GI , vascular , wound care clinic   Obtained and reviewed discharge summary and/or continuity of care documents    [facility] follow up appointment(s):   Future Appointments   Date Time Provider Martha Delgado   2022  2:45 PM JOSEPH Arita - CNP [facility] BERTHA Jimler   2022  3:45 PM MD Tory Merchant GI Analy Wise   2022  1:00 PM [facility]       Follow up appointment completed? Yes.     Provided contact information for future needs. No further care transition f/u call - [facility] COVID Transitions episode resolved      Care Transition Summary:  -  ST admission for COVID PNE, bilateral PE, GI bleed - transfused. Plavix stopped - on ASA, but not started on anticoagulant until cleared by GI - she needs to follow as an outpatient - they did not do EGD while she had covid, so needs to see Dr Dani Martin for GI testing. Patient: Christiano Fay   Patient : 1955        Is patient active with support services?  yes - wound care clinic as of   Continued Care Coordination Recommended:  yes - referral sent      Problem List:   Patient Active Problem List   Diagnosis    STEMI (ST elevation myocardial infarction) ([address]    Essential (primary) hypertension    Obesity, morbid, BMI 40.0-49.9 ([address]    NSVT (nonsustained ventricular tachycardia) ([address]    S/P drug eluting coronary stent placement    Vitamin D deficiency    Prediabetes    Hypothyroidism due to Hashimoto's thyroiditis    Chronic venous insufficiency    Bilateral pulmonary embolism ([address]    Gastrointestinal hemorrhage associated with peptic ulcer    Controlled type 2 diabetes mellitus with complication, without long-term current use of insulin ([address]    COVID-19 virus infection    Symptomatic anemia    Single subsegmental pulmonary embolism without acute cor pulmonale (HCC)    Chronic venous insufficiency of lower extremity    Lymphedema of both lower extremities    Ulcer of extremity due to chronic venous insufficiency ([address]         Follow up appointments:    Future Appointments   Date Time Provider Martha Delgado   2022  2:45 PM JOSEPH Evans - CNP [facility] CAR Janet Litter   2022  3:45 PM MD Tory Sellers [facility]   2022  1:00 PM MD Tory aMrtin RN

## 2022-01-23 LAB
CULTURE: ABNORMAL
CULTURE: ABNORMAL
DIRECT EXAM: ABNORMAL
DIRECT EXAM: ABNORMAL
Lab: ABNORMAL
SPECIMEN DESCRIPTION: ABNORMAL

## 2022-01-25 ENCOUNTER — HOSPITAL ENCOUNTER (OUTPATIENT)
Dept: WOUND CARE | Age: 67
Discharge: HOME | End: 2022-01-25
Payer: MEDICAID

## 2022-01-25 VITALS
HEART RATE: 78 BPM | SYSTOLIC BLOOD PRESSURE: 153 MMHG | RESPIRATION RATE: 20 BRPM | DIASTOLIC BLOOD PRESSURE: 74 MMHG | TEMPERATURE: 98 F

## 2022-01-25 DIAGNOSIS — I87.2 ULCER OF EXTREMITY DUE TO CHRONIC VENOUS INSUFFICIENCY (HCC): ICD-10-CM

## 2022-01-25 DIAGNOSIS — I89.0 LYMPHEDEMA OF BOTH LOWER EXTREMITIES: Chronic | ICD-10-CM

## 2022-01-25 DIAGNOSIS — I87.2 CHRONIC VENOUS INSUFFICIENCY: ICD-10-CM

## 2022-01-25 DIAGNOSIS — L98.499 ULCER OF EXTREMITY DUE TO CHRONIC VENOUS INSUFFICIENCY (HCC): ICD-10-CM

## 2022-01-25 DIAGNOSIS — E11.8 CONTROLLED TYPE 2 DIABETES MELLITUS WITH COMPLICATION, WITHOUT LONG-TERM CURRENT USE OF INSULIN (HCC): Chronic | ICD-10-CM

## 2022-01-25 DIAGNOSIS — L97.922 LEG ULCER, LEFT, WITH FAT LAYER EXPOSED (HCC): ICD-10-CM

## 2022-01-25 DIAGNOSIS — L97.912 LEG ULCER, RIGHT, WITH FAT LAYER EXPOSED (HCC): Primary | ICD-10-CM

## 2022-01-25 DIAGNOSIS — E66.01 OBESITY, MORBID, BMI 40.0-49.9 (HCC): Chronic | ICD-10-CM

## 2022-01-25 PROCEDURE — 29580 STRAPPING UNNA BOOT: CPT

## 2022-01-25 PROCEDURE — 99213 OFFICE O/P EST LOW 20 MIN: CPT | Performed by: NURSE PRACTITIONER

## 2022-01-25 RX ORDER — LIDOCAINE HYDROCHLORIDE 20 MG/ML
JELLY TOPICAL ONCE
Status: CANCELLED | OUTPATIENT
Start: 2022-01-25 | End: 2022-01-25

## 2022-01-25 RX ORDER — LIDOCAINE HYDROCHLORIDE 40 MG/ML
SOLUTION TOPICAL ONCE
Status: COMPLETED | OUTPATIENT
Start: 2022-01-25 | End: 2022-01-25

## 2022-01-25 RX ORDER — GINSENG 100 MG
CAPSULE ORAL ONCE
Status: CANCELLED | OUTPATIENT
Start: 2022-01-25 | End: 2022-01-25

## 2022-01-25 RX ORDER — DOXYCYCLINE HYCLATE 100 MG
100 TABLET ORAL 2 TIMES DAILY
Qty: 20 TABLET | Refills: 0 | Status: SHIPPED | OUTPATIENT
Start: 2022-01-25 | End: 2022-02-04

## 2022-01-25 RX ORDER — CLOBETASOL PROPIONATE 0.5 MG/G
OINTMENT TOPICAL ONCE
Status: CANCELLED | OUTPATIENT
Start: 2022-01-25 | End: 2022-01-25

## 2022-01-25 RX ORDER — BACITRACIN ZINC AND POLYMYXIN B SULFATE 500; 1000 [USP'U]/G; [USP'U]/G
OINTMENT TOPICAL ONCE
Status: CANCELLED | OUTPATIENT
Start: 2022-01-25 | End: 2022-01-25

## 2022-01-25 RX ORDER — BETAMETHASONE DIPROPIONATE 0.05 %
OINTMENT (GRAM) TOPICAL ONCE
Status: CANCELLED | OUTPATIENT
Start: 2022-01-25 | End: 2022-01-25

## 2022-01-25 RX ORDER — LIDOCAINE HYDROCHLORIDE 40 MG/ML
SOLUTION TOPICAL ONCE
Status: CANCELLED | OUTPATIENT
Start: 2022-01-25 | End: 2022-01-25

## 2022-01-25 RX ORDER — LIDOCAINE 50 MG/G
OINTMENT TOPICAL ONCE
Status: CANCELLED | OUTPATIENT
Start: 2022-01-25 | End: 2022-01-25

## 2022-01-25 RX ORDER — LIDOCAINE 40 MG/G
CREAM TOPICAL ONCE
Status: CANCELLED | OUTPATIENT
Start: 2022-01-25 | End: 2022-01-25

## 2022-01-25 RX ORDER — BACITRACIN, NEOMYCIN, POLYMYXIN B 400; 3.5; 5 [USP'U]/G; MG/G; [USP'U]/G
OINTMENT TOPICAL ONCE
Status: CANCELLED | OUTPATIENT
Start: 2022-01-25 | End: 2022-01-25

## 2022-01-25 RX ORDER — GENTAMICIN SULFATE 1 MG/G
OINTMENT TOPICAL ONCE
Status: CANCELLED | OUTPATIENT
Start: 2022-01-25 | End: 2022-01-25

## 2022-01-25 RX ADMIN — LIDOCAINE HYDROCHLORIDE 10 ML: 40 SOLUTION TOPICAL at 15:32

## 2022-01-25 ASSESSMENT — PAIN DESCRIPTION - LOCATION: LOCATION: LEG

## 2022-01-25 ASSESSMENT — PAIN SCALES - GENERAL: PAINLEVEL_OUTOF10: 0

## 2022-01-25 ASSESSMENT — PAIN DESCRIPTION - PAIN TYPE: TYPE: CHRONIC PAIN

## 2022-01-25 ASSESSMENT — PAIN DESCRIPTION - ORIENTATION: ORIENTATION: RIGHT;LEFT

## 2022-01-25 NOTE — TELEPHONE ENCOUNTER
Per Suzan Gonzalez, pt came in for her office visit appt. on 1/20/22. Labs and ECHO we're ordered at that appt and pt is scheduled for ECHO on 1/28/22. Pt is scheduled for f/u for surgery clearance on 2/10/22.

## 2022-01-25 NOTE — PROGRESS NOTES
[address]   Progress Note and Procedure Note      [address] RECORD NUMBER:  [id number]  AGE: 61 y.o. GENDER: female  : 1955  EPISODE DATE:  2022    Subjective:     Chief Complaint   Patient presents with    Wound Check     right and left lower leg         HISTORY of PRESENT ILLNESS HPI     Dale Ortega is a 61 y.o. female who presents today for wound/ulcer evaluation. History of Wound Context: here to follow up on bilateral lower leg ulcerations. Did very well with unna boot. Wounds have improved. Complains of itching today. Tissue culture grew MSSA - susceptible to tetracycline, doxycycline was sent to pharmacy.    Wound/Ulcer Pain Timing/Severity: none  Quality of pain: N/A  Severity:  0 / 10   Modifying Factors: None  Associated Signs/Symptoms: none    Ulcer Identification:  Ulcer Type: venous  Contributing Factors: edema, venous stasis, lymphedema, diabetes, decreased mobility and obesity    Wound: N/A        PAST MEDICAL HISTORY        Diagnosis Date    Hyperlipidemia     Hypertension        PAST SURGICAL HISTORY    Past Surgical History:   Procedure Laterality Date    BLADDER SUSPENSION      BLADDER SUSPENSION         FAMILY HISTORY    Family History   Problem Relation Age of Onset    Elevated Lipids Mother     Cancer Mother     High Blood Pressure Mother     Cancer Maternal Grandmother        SOCIAL HISTORY    Social History     Tobacco Use    Smoking status: Former Smoker     Packs/day: 1.00     Years: 10.00     Pack years: 10.00     Types: Cigarettes     Quit date: 1989     Years since quittin.7    Smokeless tobacco: Never Used   Vaping Use    Vaping Use: Never used   Substance Use Topics    Alcohol use: Never    Drug use: Never       ALLERGIES    No Known Allergies    MEDICATIONS    Current Outpatient Medications on File Prior to Encounter   Medication Sig Dispense Refill    losartan (COZAAR) 50 MG tablet TAKE 1 TABLET BY MOUTH EVERY DAY      benzonatate (TESSALON) 200 MG capsule Take 1 capsule by mouth 3 times daily as needed for Cough 60 capsule 0    albuterol sulfate HFA (VENTOLIN HFA) 108 (90 Base) MCG/ACT inhaler Inhale 2 puffs into the lungs 4 times daily as needed for Wheezing 54 g 1    metFORMIN (GLUCOPHAGE-XR) 500 MG extended release tablet TAKE 1 TABLET BY MOUTH IN THE MORNING WITH breakfast 90 tablet 3    aspirin 81 MG EC tablet Take 1 tablet by mouth daily 30 tablet 3    atorvastatin (LIPITOR) 80 MG tablet Take 1 tablet by mouth nightly 30 tablet 3    ferrous sulfate (FE TABS 325) 325 (65 Fe) MG EC tablet Take 1 tablet by mouth 2 times daily (with meals) 90 tablet 3    Vitamin D (CHOLECALCIFEROL) 50 MCG (2000 UT) TABS tablet Take 1 tablet by mouth daily 60 tablet 0    pantoprazole (PROTONIX) 40 MG tablet Take 1 tablet by mouth 2 times daily 30 tablet 3    levothyroxine (SYNTHROID) 50 MCG tablet TAKE 1 TABLET BY MOUTH EVERY DAY 90 tablet 3    Handicap Placard MISC by Does not apply route For 1 year 1 each 0    metoprolol succinate (TOPROL XL) 100 MG extended release tablet Take 1 tablet by mouth daily 30 tablet 3    furosemide (LASIX) 20 MG tablet Take 1 tablet by mouth daily 60 tablet 3     No current facility-administered medications on file prior to encounter.        REVIEW OF SYSTEMS    Constitutional: negative  Eyes: negative  Ears, nose, mouth, throat, and face: negative  Respiratory: negative  Cardiovascular: negative except for lower extremity edema  Gastrointestinal: negative  Genitourinary:negative  Integument/breast: negative except for bilateral lower leg ulcerations  Hematologic/lymphatic: negative  Musculoskeletal:negative  Neurological: negative  Behavioral/Psych: negative  Endocrine: negative  Allergic/Immunologic: negative    Objective:      BP (!) 153/74   Pulse 78   Temp 98 °F (36.7 °C) (Tympanic)   Resp 20     Wt Readings from Last 3 Encounters:   01/20/22 261 lb (118.4 kg) 01/17/22 261 lb (118.4 kg)   01/13/22 261 lb (118.4 kg)       PHYSICAL EXAM    General Appearance: alert and oriented to person, place and time, well-developed and obese, in no acute distress  Skin: warm and dry, no rash or erythema, bilateral lower leg ulcerations   Head: normocephalic and atraumatic  Eyes: pupils equal, round, extraocular eye movements intact, and conjunctivae normal  Pulmonary/Chest: normal air movement, no respiratory distress  Extremities: no cyanosis and no clubbing   Musculoskeletal: no joint swelling, deformity or tenderness  Neurologic: gait, coordination normal and speech normal      Assessment:     Problem List Items Addressed This Visit     Chronic venous insufficiency    Controlled type 2 diabetes mellitus with complication, without long-term current use of insulin (HCC) (Chronic)    Leg ulcer, left, with fat layer exposed (HCC)    Leg ulcer, right, with fat layer exposed (HCC)    Lymphedema of both lower extremities (Chronic)    Obesity, morbid, BMI 40.0-49.9 (HCC) (Chronic)    Ulcer of extremity due to chronic venous insufficiency (HCC) - Primary (Chronic)    Relevant Orders    Initiate Outpatient Wound Care Protocol           Procedure Note  Indications:  Based on my examination of this patient's wound(s)/ulcer(s) today, debridement is not required to promote healing and evaluate the wound base. Post Debridement Measurements:  Wound/Ulcer Descriptions are Pre Debridement except measurements:    Wound 01/20/22 Leg Right; Lower #1 circumfrential (Active)   Wound Image    01/20/22 1433   Wound Etiology Venous 01/25/22 1516   Dressing Status New drainage noted 01/25/22 1516   Wound Cleansed Soap and water 01/25/22 1516   Wound Length (cm) 1.5 cm 01/25/22 1516   Wound Width (cm) 1.5 cm 01/25/22 1516   Wound Depth (cm) 0.1 cm 01/25/22 1516   Wound Surface Area (cm^2) 2.25 cm^2 01/25/22 1516   Change in Wound Size % (l*w) 99.78 01/25/22 1516   Wound Volume (cm^3) 0.225 cm^3 01/25/22 1516   Wound Healing % 100 01/25/22 1516   Post-Procedure Length (cm) 1.5 cm 01/25/22 1516   Post-Procedure Width (cm) 1.5 cm 01/25/22 1516   Post-Procedure Depth (cm) 0.2 cm 01/25/22 1516   Post-Procedure Surface Area (cm^2) 2.25 cm^2 01/25/22 1516   Post-Procedure Volume (cm^3) 0.45 cm^3 01/25/22 1516   Wound Assessment Pink/red 01/25/22 1516   Drainage Amount Moderate 01/25/22 1516   Drainage Description Serous 01/25/22 1516   Odor None 01/25/22 1516   Ro-wound Assessment Blanchable erythema 01/25/22 1516   Margins Attached edges 01/25/22 1516   Wound Thickness Description not for Pressure Injury Full thickness 01/25/22 1516   Number of days: 5       Wound 01/20/22 Leg Left; Lower #2 circumfrential (Active)   Wound Image    01/20/22 1433   Wound Etiology Venous 01/25/22 1516   Dressing Status New drainage noted 01/25/22 1516   Wound Cleansed Soap and water 01/25/22 1516   Wound Length (cm) 2 cm 01/25/22 1516   Wound Width (cm) 2 cm 01/25/22 1516   Wound Depth (cm) 0.2 cm 01/25/22 1516   Wound Surface Area (cm^2) 4 cm^2 01/25/22 1516   Change in Wound Size % (l*w) 99.55 01/25/22 1516   Wound Volume (cm^3) 0.8 cm^3 01/25/22 1516   Wound Healing % 100 01/25/22 1516   Post-Procedure Length (cm) 2 cm 01/25/22 1516   Post-Procedure Width (cm) 2 cm 01/25/22 1516   Post-Procedure Depth (cm) 0.2 cm 01/25/22 1516   Post-Procedure Surface Area (cm^2) 4 cm^2 01/25/22 1516   Post-Procedure Volume (cm^3) 0.8 cm^3 01/25/22 1516   Wound Assessment Pink/red;Slough 01/25/22 1516   Drainage Amount Moderate 01/25/22 1516   Drainage Description Serous 01/25/22 1516   Odor None 01/25/22 1516   Ro-wound Assessment Blanchable erythema 01/25/22 1516   Margins Attached edges 01/25/22 1516   Wound Thickness Description not for Pressure Injury Full thickness 01/25/22 1516   Number of days: 5            Plan:     Doxycycline sent to pharmacy   Follow up in one week     Treatment Note please see Discharge Instructions    Written patient dismissal instructions given to patient and signed by patient or POA.            Electronically signed by JOSEPH Sweeney CNP on 1/25/2022 at 4:00 PM

## 2022-01-25 NOTE — PROGRESS NOTES
Compression  [facility]:     [place] [address] f: [phone] f: [phone] p: [phone] [email or web address]      Ordering Center:     [address]  [place] [address]  [phone]  WOUND CARE Dept: [address] [phone]    Patient Information:      Debora Estrada [address] [facility]   [phone]   : 1955  AGE: 61 y.o. GENDER: female   TODAYS DATE:  2022    Insurance:      PRIMARY INSURANCE:  Plan: [employer] [facility], 1928–1983 PLAN  Coverage: [facility]  Effective Date: 2019  Group Number: [unfilled]  Subscriber Number: [id number] - (Medicaid Managed)    Payor/Plan Subscr  Sex Relation Sub. Ins. ID Effective Group Num   1. [address] [facility] 1955 Female Self [id number]                                   [address]       Patient Information:      Problem List Items Addressed This Visit     Ulcer of extremity due to chronic venous insufficiency ([address] - Primary (Chronic)    Relevant Orders    Initiate Outpatient Wound Care Protocol          Wound 22 Leg Right; Lower #1 circumfrential (Active)   Wound Image    22 1433   Wound Etiology Venous 22 1516   Dressing Status New drainage noted 22 1516   Wound Cleansed Soap and water 22 1516   Wound Length (cm) 1.5 cm 22 1516   Wound Width (cm) 1.5 cm 22 1516   Wound Depth (cm) 0.1 cm 22 1516   Wound Surface Area (cm^2) 2.25 cm^2 22 1516   Change in Wound Size % (l*w) 99.78 22 1516   Wound Volume (cm^3) 0.225 cm^3 22 1516   Wound Healing % 100 22 1516   Post-Procedure Length (cm) 1.5 cm 22   Post-Procedure Width (cm) 1.5 cm 22   Post-Procedure Depth (cm) 0.2 cm 22   Post-Procedure Surface Area (cm^2) 2.25 cm^2 22   Post-Procedure Volume (cm^3) 0.45 cm^3 01/25/22 1516   Wound Assessment Pink/red 01/25/22 1516   Drainage Amount Moderate 01/25/22 1516   Drainage Description Serous 01/25/22 1516   Odor None 01/25/22 1516   Ro-wound Assessment Blanchable erythema 01/25/22 1516   Margins Attached edges 01/25/22 1516   Wound Thickness Description not for Pressure Injury Full thickness 01/25/22 1516   Number of days: 5       Wound 01/20/22 Leg Left; Lower #2 circumfrential (Active)   Wound Image    01/20/22 1433   Wound Etiology Venous 01/25/22 1516   Dressing Status New drainage noted 01/25/22 1516   Wound Cleansed Soap and water 01/25/22 1516   Wound Length (cm) 2 cm 01/25/22 1516   Wound Width (cm) 2 cm 01/25/22 1516   Wound Depth (cm) 0.2 cm 01/25/22 1516   Wound Surface Area (cm^2) 4 cm^2 01/25/22 1516   Change in Wound Size % (l*w) 99.55 01/25/22 1516   Wound Volume (cm^3) 0.8 cm^3 01/25/22 1516   Wound Healing % 100 01/25/22 1516   Post-Procedure Length (cm) 2 cm 01/25/22 1516   Post-Procedure Width (cm) 2 cm 01/25/22 1516   Post-Procedure Depth (cm) 0.2 cm 01/25/22 1516   Post-Procedure Surface Area (cm^2) 4 cm^2 01/25/22 1516   Post-Procedure Volume (cm^3) 0.8 cm^3 01/25/22 1516   Wound Assessment Pink/red;Slough 01/25/22 1516   Drainage Amount Moderate 01/25/22 1516   Drainage Description Serous 01/25/22 1516   Odor None 01/25/22 1516   Ro-wound Assessment Blanchable erythema 01/25/22 1516   Margins Attached edges 01/25/22 1516   Wound Thickness Description not for Pressure Injury Full thickness 01/25/22 1516   Number of days: 5       Right Leg Measurements: (ALL measurements are in cm)  Right Leg Edema Point of Measurement  Great toe to forefoot: 10 cm  Heel to ankle: 10 cm  Heel to calf: 30  Leg circumference: 51.5 cm  Ankle circumference: 30 cm  Foot circumference: 26 cm  Compression Therapy: Yes,Unna boot  Length ankle to knee 36 cm    Left Leg Measurements: (ALL measurements are in cm)  Left Leg Edema Point of Measurement  Great toe to forefoot: 10 cm  Heel to ankle: 10 cm  Heel to calf: 30  Leg circumference: 50 cm  Ankle circumference: 27 cm  Foot circumference: 25 cm  Compression Therapy: Yes,Unna boot  Length ankle to knee 37 cm    Supplies Requested :     Medicare Requirements  Patient must have a qualifying Active Venus Ulcer if ordering Bilateral Compression Wounds MUST be present on both legs for Medicare Coverage. The patient can Not be on home health or have had a Medicare part A stay in the past 24 hours.     Patient Wound(s) Debrided: [x] Yes if yes please add date 1/20/2022   [] No    Debribement Type: Excisional/Sharp    Patient currently being seen by Home Health: [] Yes   [x] No     Compression Type: Circaid Juxtalite, HD, 30-40 mm/Hg, BILATERAL lower legs     Provider Information:      PROVIDER'S NAME: Ayo GUY    [id number]

## 2022-01-25 NOTE — PLAN OF CARE
Problem: Falls - Risk of:  Goal: Will remain free from falls  Description: Will remain free from falls  Outcome: Ongoing     Problem: Pain:  Goal: Pain level will decrease  Description: Pain level will decrease  Outcome: Ongoing  Goal: Control of acute pain  Description: Control of acute pain  Outcome: Ongoing  Goal: Control of chronic pain  Description: Control of chronic pain  Outcome: Ongoing

## 2022-01-28 ENCOUNTER — HOSPITAL ENCOUNTER (OUTPATIENT)
Age: 67
Setting detail: SPECIMEN
Discharge: HOME | End: 2022-01-28

## 2022-01-28 LAB
ALT SERPL-CCNC: 15 U/L (ref 5–33)
AST SERPL-CCNC: 22 U/L
CHOLESTEROL, FASTING: 116 MG/DL
CHOLESTEROL/HDL RATIO: 3.6
HDLC SERPL-MCNC: 32 MG/DL
LDL CHOLESTEROL: 59 MG/DL (ref 0–130)
TRIGLYCERIDE, FASTING: 127 MG/DL
VLDLC SERPL CALC-MCNC: ABNORMAL MG/DL (ref 1–30)

## 2022-01-31 DIAGNOSIS — Z12.11 ENCOUNTER FOR SCREENING FOR MALIGNANT NEOPLASM OF COLON: ICD-10-CM

## 2022-01-31 RX ORDER — BENZONATATE 200 MG/1
200 CAPSULE ORAL 3 TIMES DAILY PRN
Qty: 60 CAPSULE | Refills: 0 | Status: SHIPPED | OUTPATIENT
Start: 2022-01-31 | End: 2022-02-02

## 2022-01-31 NOTE — TELEPHONE ENCOUNTER
Last Visit Date: 1/3/2022   Next Visit Date: 4/18/2022     Patient is wondering if there is anything stronger that she maybe able to take for her chronic cough. She states that she no longer feels that the Benzonatate is helping anymore.

## 2022-01-31 NOTE — TELEPHONE ENCOUNTER
Pt called back and I gave her an appt for tomorrow am w/ sg, and told her that her script was called in

## 2022-02-01 ENCOUNTER — HOSPITAL ENCOUNTER (OUTPATIENT)
Dept: WOUND CARE | Age: 67
Discharge: HOME | End: 2022-02-01
Payer: MEDICAID

## 2022-02-01 VITALS
TEMPERATURE: 98.6 F | HEART RATE: 78 BPM | WEIGHT: 261 LBS | RESPIRATION RATE: 18 BRPM | SYSTOLIC BLOOD PRESSURE: 137 MMHG | BODY MASS INDEX: 46.25 KG/M2 | DIASTOLIC BLOOD PRESSURE: 73 MMHG | HEIGHT: 63 IN

## 2022-02-01 DIAGNOSIS — L97.922 LEG ULCER, LEFT, WITH FAT LAYER EXPOSED (HCC): ICD-10-CM

## 2022-02-01 DIAGNOSIS — L98.499 ULCER OF EXTREMITY DUE TO CHRONIC VENOUS INSUFFICIENCY (HCC): Primary | ICD-10-CM

## 2022-02-01 DIAGNOSIS — E11.8 CONTROLLED TYPE 2 DIABETES MELLITUS WITH COMPLICATION, WITHOUT LONG-TERM CURRENT USE OF INSULIN (HCC): ICD-10-CM

## 2022-02-01 DIAGNOSIS — I89.0 LYMPHEDEMA OF BOTH LOWER EXTREMITIES: ICD-10-CM

## 2022-02-01 DIAGNOSIS — E66.01 OBESITY, MORBID, BMI 40.0-49.9 (HCC): ICD-10-CM

## 2022-02-01 DIAGNOSIS — I87.2 CHRONIC VENOUS INSUFFICIENCY OF LOWER EXTREMITY: ICD-10-CM

## 2022-02-01 DIAGNOSIS — I87.2 ULCER OF EXTREMITY DUE TO CHRONIC VENOUS INSUFFICIENCY (HCC): Primary | ICD-10-CM

## 2022-02-01 DIAGNOSIS — L97.912 LEG ULCER, RIGHT, WITH FAT LAYER EXPOSED (HCC): ICD-10-CM

## 2022-02-01 PROCEDURE — 29580 STRAPPING UNNA BOOT: CPT

## 2022-02-01 PROCEDURE — 99213 OFFICE O/P EST LOW 20 MIN: CPT | Performed by: NURSE PRACTITIONER

## 2022-02-01 RX ORDER — LIDOCAINE HYDROCHLORIDE 40 MG/ML
SOLUTION TOPICAL ONCE
Status: DISCONTINUED | OUTPATIENT
Start: 2022-02-01 | End: 2022-02-01

## 2022-02-01 RX ORDER — LIDOCAINE HYDROCHLORIDE 40 MG/ML
SOLUTION TOPICAL ONCE
Status: CANCELLED | OUTPATIENT
Start: 2022-02-01 | End: 2022-02-01

## 2022-02-01 RX ORDER — LIDOCAINE HYDROCHLORIDE 20 MG/ML
JELLY TOPICAL ONCE
Status: CANCELLED | OUTPATIENT
Start: 2022-02-01 | End: 2022-02-01

## 2022-02-01 ASSESSMENT — PAIN DESCRIPTION - LOCATION: LOCATION: LEG

## 2022-02-01 ASSESSMENT — PAIN DESCRIPTION - PAIN TYPE: TYPE: CHRONIC PAIN

## 2022-02-01 ASSESSMENT — PAIN SCALES - GENERAL: PAINLEVEL_OUTOF10: 0

## 2022-02-01 NOTE — PROGRESS NOTES
[employer] Application   Below Knee    NAME:  Hector Ortiz  YOB: 1955  MEDICAL RECORD NUMBER:  [id number]  DATE:  2/1/2022   Calamine unna boot   [x] Removed old Unna boot if indicated and wash leg with mild soap and water.  [x] Applied moisturizing agent to dry skin as needed.  [x] Appied primary and secondary dressing as ordered     [x] Applied Unna roll from toes to knee overlapping each time.  [x] Applied ace wrap or coban from toes to below the knee.  [x] Secured with tape and/or metal clips covered with tape.  [x] Instructed patient/caregiver to keep dressing dry and intact. DO NOT REMOVE DRESSING.  [x] Instructed pt/family/caregiver to report excessive draining, loose bandage, wet dressing, severe pain or tingling in toes.  [x] Applied [employer] dressing below the knee to Bilateral lower leg(s)        Unna Boot(s) were applied per  Guidelines.      Electronically signed by Rosalia Severance, RN on 2/1/2022 at 4:01 PM

## 2022-02-01 NOTE — PROGRESS NOTES
[address]   Progress Note and Procedure Note      [address] RECORD NUMBER:  [id number]  AGE: 61 y.o. GENDER: female  : 1955  EPISODE DATE:  2022    Subjective:     Chief Complaint   Patient presents with    Wound Check     lower legs         HISTORY of PRESENT ILLNESS HPI     Jade Nina is a 61 y.o. female who presents today for wound/ulcer evaluation. History of Wound Context: here to follow up on bilateral lower leg wounds that are closed today. Will wrap one more week. Her juxa lite wraps are on the way but have not arrived yet.    Wound/Ulcer Pain Timing/Severity: none  Quality of pain: N/A  Severity:  0 / 10   Modifying Factors: None  Associated Signs/Symptoms: none    Ulcer Identification:  Ulcer Type: venous  Contributing Factors: edema, venous stasis, lymphedema, diabetes, decreased mobility and obesity    Wound: N/A        PAST MEDICAL HISTORY        Diagnosis Date    Hyperlipidemia     Hypertension        PAST SURGICAL HISTORY    Past Surgical History:   Procedure Laterality Date    BLADDER SUSPENSION      BLADDER SUSPENSION  2004       FAMILY HISTORY    Family History   Problem Relation Age of Onset    Elevated Lipids Mother     Cancer Mother     High Blood Pressure Mother     Cancer Maternal Grandmother        SOCIAL HISTORY    Social History     Tobacco Use    Smoking status: Former Smoker     Packs/day: 1.00     Years: 10.00     Pack years: 10.00     Types: Cigarettes     Quit date: 1989     Years since quittin.7    Smokeless tobacco: Never Used   Vaping Use    Vaping Use: Never used   Substance Use Topics    Alcohol use: Never    Drug use: Never       ALLERGIES    No Known Allergies    MEDICATIONS    Current Outpatient Medications on File Prior to Encounter   Medication Sig Dispense Refill    benzonatate (TESSALON) 200 MG capsule Take 1 capsule by mouth 3 times daily as needed for Cough 60 capsule 0    doxycycline hyclate (VIBRA-TABS) 100 MG tablet Take 1 tablet by mouth 2 times daily for 10 days 20 tablet 0    losartan (COZAAR) 50 MG tablet TAKE 1 TABLET BY MOUTH EVERY DAY      albuterol sulfate HFA (VENTOLIN HFA) 108 (90 Base) MCG/ACT inhaler Inhale 2 puffs into the lungs 4 times daily as needed for Wheezing 54 g 1    ferrous sulfate (FE TABS 325) 325 (65 Fe) MG EC tablet Take 1 tablet by mouth 2 times daily (with meals) 90 tablet 3    Vitamin D (CHOLECALCIFEROL) 50 MCG (2000 UT) TABS tablet Take 1 tablet by mouth daily 60 tablet 0    pantoprazole (PROTONIX) 40 MG tablet Take 1 tablet by mouth 2 times daily 30 tablet 3    metFORMIN (GLUCOPHAGE-XR) 500 MG extended release tablet TAKE 1 TABLET BY MOUTH IN THE MORNING WITH breakfast 90 tablet 3    levothyroxine (SYNTHROID) 50 MCG tablet TAKE 1 TABLET BY MOUTH EVERY DAY 90 tablet 3    Handicap Placard MISC by Does not apply route For 1 year 1 each 0    aspirin 81 MG EC tablet Take 1 tablet by mouth daily 30 tablet 3    atorvastatin (LIPITOR) 80 MG tablet Take 1 tablet by mouth nightly 30 tablet 3    metoprolol succinate (TOPROL XL) 100 MG extended release tablet Take 1 tablet by mouth daily 30 tablet 3    furosemide (LASIX) 20 MG tablet Take 1 tablet by mouth daily 60 tablet 3     No current facility-administered medications on file prior to encounter.        REVIEW OF SYSTEMS    Constitutional: negative  Eyes: negative  Ears, nose, mouth, throat, and face: negative  Respiratory: negative  Cardiovascular: negative except for lower extremity edema  Gastrointestinal: negative  Genitourinary:negative  Integument/breast: negative  Hematologic/lymphatic: negative  Musculoskeletal:negative  Neurological: negative  Behavioral/Psych: negative  Endocrine: negative  Allergic/Immunologic: negative    Objective:      /73   Pulse 78   Temp 98.6 °F (37 °C) (Tympanic)   Resp 18   Ht 5' 3\" (1.6 m)   Wt 261 lb (118.4 kg)   BMI 46.23 kg/m²     Wt Readings from Last 3 Encounters:   02/01/22 261 lb (118.4 kg)   01/20/22 261 lb (118.4 kg)   01/17/22 261 lb (118.4 kg)       PHYSICAL EXAM    General Appearance: alert and oriented to person, place and time, well-developed and obese, in no acute distress  Skin: warm and dry, no rash or erythema  Head: normocephalic and atraumatic  Eyes: pupils equal, round, extraocular eye movements intact, and conjunctivae normal  Pulmonary/Chest: normal air movement, no respiratory distress  Extremities: no cyanosis and no clubbing  Musculoskeletal: no joint swelling, deformity or tenderness  Neurologic: gait, coordination normal and speech normal      Assessment:      Problem List Items Addressed This Visit     Chronic venous insufficiency of lower extremity    Relevant Medications    lidocaine (XYLOCAINE) 4 % external solution    Other Relevant Orders    Initiate Outpatient Wound Care Protocol    Controlled type 2 diabetes mellitus with complication, without long-term current use of insulin ([facility]) (Chronic)    Relevant Medications    lidocaine (XYLOCAINE) 4 % external solution    Other Relevant Orders    Initiate Outpatient Wound Care Protocol    Leg ulcer, left, with fat layer exposed ([facility])    Relevant Medications    lidocaine (XYLOCAINE) 4 % external solution    Other Relevant Orders    Initiate Outpatient Wound Care Protocol    Leg ulcer, right, with fat layer exposed ([address]    Relevant Medications    lidocaine (XYLOCAINE) 4 % external solution    Other Relevant Orders    Initiate Outpatient Wound Care Protocol    Lymphedema of both lower extremities (Chronic)    Relevant Medications    lidocaine (XYLOCAINE) 4 % external solution    Other Relevant Orders    Initiate Outpatient Wound Care Protocol    Obesity, morbid, BMI 40.0-49.9 ([facility]) (Chronic)    Relevant Medications    lidocaine (XYLOCAINE) 4 % external solution    Other Relevant Orders    Initiate Outpatient Wound Care Protocol    Ulcer of extremity due to chronic venous insufficiency ([facility]) - Primary (Chronic)    Relevant Medications    lidocaine (XYLOCAINE) 4 % external solution    Other Relevant Orders    Initiate Outpatient Wound Care Protocol           Wound 01/20/22 Leg Right; Lower #1 circumfrential (Active)   Wound Image    01/20/22 1433   Wound Etiology Venous 02/01/22 1450   Dressing Status New drainage noted 02/01/22 1450   Wound Cleansed Soap and water 02/01/22 1450   Wound Length (cm) 0 cm 02/01/22 1450   Wound Width (cm) 0 cm 02/01/22 1450   Wound Depth (cm) 0 cm 02/01/22 1450   Wound Surface Area (cm^2) 0 cm^2 02/01/22 1450   Change in Wound Size % (l*w) 100 02/01/22 1450   Wound Volume (cm^3) 0 cm^3 02/01/22 1450   Wound Healing % 100 02/01/22 1450   Post-Procedure Length (cm) 0 cm 02/01/22 1450   Post-Procedure Width (cm) 0 cm 02/01/22 1450   Post-Procedure Depth (cm) 0 cm 02/01/22 1450   Post-Procedure Surface Area (cm^2) 0 cm^2 02/01/22 1450   Post-Procedure Volume (cm^3) 0 cm^3 02/01/22 1450   Wound Assessment Pink/red 02/01/22 1450   Drainage Amount None 02/01/22 1450   Drainage Description Serous 01/25/22 1516   Odor None 02/01/22 1450   Ro-wound Assessment Blanchable erythema 02/01/22 1450   Margins Attached edges 02/01/22 1450   Wound Thickness Description not for Pressure Injury Full thickness 02/01/22 1450   Number of days: 12       Wound 01/20/22 Leg Left; Lower #2 circumfrential (Active)   Wound Image    01/20/22 1433   Wound Etiology Venous 02/01/22 1450   Dressing Status New drainage noted 02/01/22 1450   Wound Cleansed Soap and water 02/01/22 1450   Wound Length (cm) 0 cm 02/01/22 1450   Wound Width (cm) 0 cm 02/01/22 1450   Wound Depth (cm) 0 cm 02/01/22 1450   Wound Surface Area (cm^2) 0 cm^2 02/01/22 1450   Change in Wound Size % (l*w) 100 02/01/22 1450   Wound Volume (cm^3) 0 cm^3 02/01/22 1450   Wound Healing % 100 02/01/22 1450   Post-Procedure Length (cm) 0 cm 02/01/22 1450   Post-Procedure Width (cm) 0 cm 02/01/22 1450   Post-Procedure Depth (cm) 0 cm 02/01/22 1450 Post-Procedure Surface Area (cm^2) 0 cm^2 02/01/22 1450   Post-Procedure Volume (cm^3) 0 cm^3 02/01/22 1450   Wound Assessment Pink/red 02/01/22 1450   Drainage Amount None 02/01/22 1450   Drainage Description Serous 01/25/22 1516   Odor None 02/01/22 1450   Ro-wound Assessment Blanchable erythema 02/01/22 1450   Margins Attached edges 02/01/22 1450   Wound Thickness Description not for Pressure Injury Full thickness 02/01/22 1450   Number of days: 12          Wound is healed    Plan:     Treatment Note please see Discharge Instructions    Written patient dismissal instructions given to patient and signed by patient or POA.            Electronically signed by JOSEPH Willard CNP on 2/1/2022 at 3:51 PM

## 2022-02-02 ENCOUNTER — OFFICE VISIT (OUTPATIENT)
Dept: PRIMARY CARE CLINIC | Age: 67
End: 2022-02-02
Payer: MEDICARE

## 2022-02-02 VITALS
BODY MASS INDEX: 43.61 KG/M2 | WEIGHT: 246.2 LBS | DIASTOLIC BLOOD PRESSURE: 78 MMHG | HEART RATE: 78 BPM | OXYGEN SATURATION: 94 % | SYSTOLIC BLOOD PRESSURE: 126 MMHG

## 2022-02-02 DIAGNOSIS — R05.9 COUGH: Primary | ICD-10-CM

## 2022-02-02 PROCEDURE — G8482 FLU IMMUNIZE ORDER/ADMIN: HCPCS | Performed by: NURSE PRACTITIONER

## 2022-02-02 PROCEDURE — G8427 DOCREV CUR MEDS BY ELIG CLIN: HCPCS | Performed by: NURSE PRACTITIONER

## 2022-02-02 PROCEDURE — 3017F COLORECTAL CA SCREEN DOC REV: CPT | Performed by: NURSE PRACTITIONER

## 2022-02-02 PROCEDURE — 1036F TOBACCO NON-USER: CPT | Performed by: NURSE PRACTITIONER

## 2022-02-02 PROCEDURE — 99214 OFFICE O/P EST MOD 30 MIN: CPT | Performed by: NURSE PRACTITIONER

## 2022-02-02 PROCEDURE — G8417 CALC BMI ABV UP PARAM F/U: HCPCS | Performed by: NURSE PRACTITIONER

## 2022-02-02 RX ORDER — METHYLPREDNISOLONE 4 MG/1
TABLET ORAL
Qty: 1 KIT | Refills: 0 | Status: SHIPPED | OUTPATIENT
Start: 2022-02-02 | End: 2022-02-08

## 2022-02-02 RX ORDER — LORATADINE 10 MG/1
10 TABLET ORAL DAILY
Qty: 30 TABLET | Refills: 0 | Status: SHIPPED | OUTPATIENT
Start: 2022-02-02 | End: 2022-02-23

## 2022-02-02 ASSESSMENT — ENCOUNTER SYMPTOMS
BACK PAIN: 0
COUGH: 1
SHORTNESS OF BREATH: 0
SINUS PAIN: 0
ABDOMINAL PAIN: 0
SINUS PRESSURE: 0
SORE THROAT: 0

## 2022-02-02 NOTE — PROGRESS NOTES
[facility] PRIMARY CARE  [facility] [address] [facility] [address]  [address] [address]  Dept: [phone]  Dept Fax: [phone]    Renata Mckinley is a 61 y.o. female who presentstoday for her medical conditions/complaints as noted below. Renata Mckinley is c/o of  Chief Complaint   Patient presents with    Cough     x2mos, declines SOB just alot of phlegm           HPI:     Presents for acute visit  PCP Dr. Curran Roles  BP well controlled  Weight is stable    C/o chronic cough x 2 months  No improvement with use of mucinex, claritin, tesssalon perles  Using albuterol inhaler 1-2x daily, this makes her cough worse  Notes cough is worse at end of day and in the morning  Sleeping sitting up in the chair, has been doing this for years  Denies any SOB with cough  Does not feel anything draining down the back of her throat  Occasional wheezes noted  Cough does not prevent her from sleeping    Hx of diabetes- Hga1c well controlled at 5.3. Does not monitor BS at home.  Has always been 6.4 or less  Using metformin    Hx of GERD- Using protonix 40mg BID, denies any burning sensation    Denies any other problems/concerns        Hemoglobin A1C (%)   Date Value   2021 5.2   10/18/2021 5.3   2020 6.3 (H)             ( goal A1C is < 7)   No results found for: LABMICR  LDL Cholesterol (mg/dL)   Date Value   2022 59   2020 59   2019 187 (H)       (goal LDL is <100)   AST (U/L)   Date Value   2022 22     ALT (U/L)   Date Value   2022 15     BUN (mg/dL)   Date Value   2022 19     BP Readings from Last 3 Encounters:   22 126/78   22 137/73   22 (!) 153/74          ([id number])    Past Medical History:   Diagnosis Date    Hyperlipidemia     Hypertension       Past Surgical History:   Procedure Laterality Date    BLADDER SUSPENSION      BLADDER SUSPENSION         Family History   Problem Relation Age of Onset    Elevated Lipids Mother    Leidy Rangel Cancer Mother     High Blood Pressure Mother     Cancer Maternal Grandmother           Social History     Tobacco Use    Smoking status: Former Smoker     Packs/day: 1.00     Years: 10.00     Pack years: 10.00     Types: Cigarettes     Quit date: 1989     Years since quittin.7    Smokeless tobacco: Never Used   Substance Use Topics    Alcohol use: Never      Current Outpatient Medications   Medication Sig Dispense Refill    loratadine (CLARITIN) 10 MG tablet Take 1 tablet by mouth daily 30 tablet 0    methylPREDNISolone (MEDROL, BARB,) 4 MG tablet Take as directed 1 kit 0    doxycycline hyclate (VIBRA-TABS) 100 MG tablet Take 1 tablet by mouth 2 times daily for 10 days 20 tablet 0    losartan (COZAAR) 50 MG tablet TAKE 1 TABLET BY MOUTH EVERY DAY      albuterol sulfate HFA (VENTOLIN HFA) 108 (90 Base) MCG/ACT inhaler Inhale 2 puffs into the lungs 4 times daily as needed for Wheezing 54 g 1    ferrous sulfate (FE TABS 325) 325 (65 Fe) MG EC tablet Take 1 tablet by mouth 2 times daily (with meals) 90 tablet 3    Vitamin D (CHOLECALCIFEROL) 50 MCG (2000 UT) TABS tablet Take 1 tablet by mouth daily 60 tablet 0    pantoprazole (PROTONIX) 40 MG tablet Take 1 tablet by mouth 2 times daily 30 tablet 3    metFORMIN (GLUCOPHAGE-XR) 500 MG extended release tablet TAKE 1 TABLET BY MOUTH IN THE MORNING WITH breakfast 90 tablet 3    levothyroxine (SYNTHROID) 50 MCG tablet TAKE 1 TABLET BY MOUTH EVERY DAY 90 tablet 3    Handicap Placard MISC by Does not apply route For 1 year 1 each 0    aspirin 81 MG EC tablet Take 1 tablet by mouth daily 30 tablet 3    atorvastatin (LIPITOR) 80 MG tablet Take 1 tablet by mouth nightly 30 tablet 3    metoprolol succinate (TOPROL XL) 100 MG extended release tablet Take 1 tablet by mouth daily 30 tablet 3    furosemide (LASIX) 20 MG tablet Take 1 tablet by mouth daily 60 tablet 3     No current facility-administered medications for this visit.      No Known Allergies    Health Maintenance   Topic Date Due    Hepatitis C screen  Never done    COVID-19 Vaccine (1) Never done    Pneumococcal 0-64 years Vaccine (1 of 2 - PPSV23) Never done    Diabetic foot exam  Never done    HIV screen  Never done    Diabetic microalbuminuria test  Never done    Diabetic retinal exam  Never done    DTaP/Tdap/Td vaccine (1 - Tdap) Never done    Cervical cancer screen  Never done    Shingles Vaccine (1 of 2) Never done    Breast cancer screen  02/26/2022    Depression Screen  10/18/2022    TSH testing  10/18/2022    A1C test (Diabetic or Prediabetic)  12/23/2022    Potassium monitoring  01/13/2023    Creatinine monitoring  01/13/2023    Lipid screen  01/28/2023    Colon cancer screen fecal DNA test (Cologuard)  01/18/2025    Flu vaccine  Completed    Hepatitis A vaccine  Aged Out    Hib vaccine  Aged Out    Meningococcal (ACWY) vaccine  Aged Out       Subjective:      Review of Systems   Constitutional: Negative for chills, fatigue and fever. HENT: Positive for congestion. Negative for postnasal drip, sinus pressure, sinus pain and sore throat. Eyes: Negative for visual disturbance. Respiratory: Positive for cough. Negative for shortness of breath. Cardiovascular: Negative for chest pain and palpitations. Gastrointestinal: Negative for abdominal pain. Genitourinary: Negative for dysuria. Musculoskeletal: Negative for back pain. Neurological: Negative for dizziness, numbness and headaches. Psychiatric/Behavioral: Negative for self-injury, sleep disturbance and suicidal ideas. The patient is not nervous/anxious. Objective:     Physical Exam  Vitals and nursing note reviewed. Constitutional:       Appearance: She is well-developed. HENT:      Head: Normocephalic and atraumatic. Eyes:      Pupils: Pupils are equal, round, and reactive to light. Cardiovascular:      Rate and Rhythm: Normal rate and regular rhythm.       Heart sounds: Normal heart sounds. Pulmonary:      Effort: Pulmonary effort is normal.      Breath sounds: Normal breath sounds. Abdominal:      General: Bowel sounds are normal.      Palpations: Abdomen is soft. Tenderness: There is no abdominal tenderness. Musculoskeletal:         General: Normal range of motion. Cervical back: Normal range of motion and neck supple. Skin:     General: Skin is warm and dry. Neurological:      Mental Status: She is alert and oriented to person, place, and time. Psychiatric:         Behavior: Behavior normal.         Thought Content: Thought content normal.         Judgment: Judgment normal.       /78   Pulse 78   Wt 246 lb 3.2 oz (111.7 kg)   SpO2 94%   BMI 43.61 kg/m²     Assessment:       Diagnosis Orders   1. Cough               Plan:      Return if symptoms worsen or fail to improve. 1. Cough- Rx given for claritin, reviewed importance of daily use x 14 days. Rx given for medrol dose pack. Continue current meds. Follow up if no improvement in cough in 2 weeks, otherwise follow up with PCP as scheduled. Orders Placed This Encounter   Medications    loratadine (CLARITIN) 10 MG tablet     Sig: Take 1 tablet by mouth daily     Dispense:  30 tablet     Refill:  0    methylPREDNISolone (MEDROL, BARB,) 4 MG tablet     Sig: Take as directed     Dispense:  1 kit     Refill:  0       Patient given educational materials - see patient instructions. Discussed use, benefit, and side effects of prescribed medications. All patientquestions answered. Pt voiced understanding. Reviewed health maintenance. Instructedto continue current medications, diet and exercise. Patient agreed with treatmentplan. Follow up as directed.      Electronicallysigned by JOSEPH Perla CNP on 2/2/2022 at 9:23 AM

## 2022-02-08 ENCOUNTER — CARE COORDINATION (OUTPATIENT)
Dept: CARE COORDINATION | Age: 67
End: 2022-02-08

## 2022-02-08 ENCOUNTER — HOSPITAL ENCOUNTER (OUTPATIENT)
Dept: WOUND CARE | Age: 67
Discharge: HOME | End: 2022-02-08
Payer: MEDICARE

## 2022-02-08 VITALS
WEIGHT: 246 LBS | SYSTOLIC BLOOD PRESSURE: 138 MMHG | HEART RATE: 69 BPM | RESPIRATION RATE: 16 BRPM | BODY MASS INDEX: 43.59 KG/M2 | TEMPERATURE: 98.5 F | HEIGHT: 63 IN | DIASTOLIC BLOOD PRESSURE: 76 MMHG

## 2022-02-08 DIAGNOSIS — I87.2 CHRONIC VENOUS INSUFFICIENCY OF LOWER EXTREMITY: ICD-10-CM

## 2022-02-08 DIAGNOSIS — I87.2 CHRONIC VENOUS INSUFFICIENCY: Primary | ICD-10-CM

## 2022-02-08 DIAGNOSIS — E11.8 CONTROLLED TYPE 2 DIABETES MELLITUS WITH COMPLICATION, WITHOUT LONG-TERM CURRENT USE OF INSULIN (HCC): Chronic | ICD-10-CM

## 2022-02-08 PROBLEM — U07.1 COVID-19 VIRUS INFECTION: Status: RESOLVED | Noted: 2021-12-23 | Resolved: 2022-02-08

## 2022-02-08 PROCEDURE — 99212 OFFICE O/P EST SF 10 MIN: CPT

## 2022-02-08 PROCEDURE — 99213 OFFICE O/P EST LOW 20 MIN: CPT | Performed by: NURSE PRACTITIONER

## 2022-02-08 ASSESSMENT — PAIN SCALES - GENERAL: PAINLEVEL_OUTOF10: 0

## 2022-02-08 NOTE — PROGRESS NOTES
[address]   Progress Note and Procedure Note      [address] RECORD NUMBER:  [id number]  AGE: 61 y.o. GENDER: female  : 1955  EPISODE DATE:  2022    Subjective:     Chief Complaint   Patient presents with    Wound Check         HISTORY of PRESENT ILLNESS HPI     Caroline Arnold is a 61 y.o. female who presents today for wound/ulcer evaluation. History of Wound Context: here to follow up on bilateral lower leg wounds that remain healed. She brought juxa lite wraps with her today.    Wound/Ulcer Pain Timing/Severity: none  Quality of pain: N/A  Severity:  0 / 10   Modifying Factors: None  Associated Signs/Symptoms: none    Ulcer Identification:  Ulcer Type: venous  Contributing Factors: edema, venous stasis, lymphedema, diabetes, decreased mobility and obesity    Wound: N/A        PAST MEDICAL HISTORY        Diagnosis Date    COVID-19 virus infection 2021    Hyperlipidemia     Hypertension        PAST SURGICAL HISTORY    Past Surgical History:   Procedure Laterality Date    BLADDER SUSPENSION      BLADDER SUSPENSION  2004       FAMILY HISTORY    Family History   Problem Relation Age of Onset    Elevated Lipids Mother     Cancer Mother     High Blood Pressure Mother     Cancer Maternal Grandmother        SOCIAL HISTORY    Social History     Tobacco Use    Smoking status: Former Smoker     Packs/day: 1.00     Years: 10.00     Pack years: 10.00     Types: Cigarettes     Quit date: 1989     Years since quittin.7    Smokeless tobacco: Never Used   Vaping Use    Vaping Use: Never used   Substance Use Topics    Alcohol use: Never    Drug use: Never       ALLERGIES    No Known Allergies    MEDICATIONS    Current Outpatient Medications on File Prior to Encounter   Medication Sig Dispense Refill    loratadine (CLARITIN) 10 MG tablet Take 1 tablet by mouth daily 30 tablet 0    methylPREDNISolone (MEDROL, BARB,) 4 MG tablet Take as directed 1 kit 0    losartan (COZAAR) 50 MG tablet TAKE 1 TABLET BY MOUTH EVERY DAY      albuterol sulfate HFA (VENTOLIN HFA) 108 (90 Base) MCG/ACT inhaler Inhale 2 puffs into the lungs 4 times daily as needed for Wheezing 54 g 1    ferrous sulfate (FE TABS 325) 325 (65 Fe) MG EC tablet Take 1 tablet by mouth 2 times daily (with meals) 90 tablet 3    Vitamin D (CHOLECALCIFEROL) 50 MCG (2000 UT) TABS tablet Take 1 tablet by mouth daily 60 tablet 0    pantoprazole (PROTONIX) 40 MG tablet Take 1 tablet by mouth 2 times daily 30 tablet 3    metFORMIN (GLUCOPHAGE-XR) 500 MG extended release tablet TAKE 1 TABLET BY MOUTH IN THE MORNING WITH breakfast 90 tablet 3    levothyroxine (SYNTHROID) 50 MCG tablet TAKE 1 TABLET BY MOUTH EVERY DAY 90 tablet 3    aspirin 81 MG EC tablet Take 1 tablet by mouth daily 30 tablet 3    atorvastatin (LIPITOR) 80 MG tablet Take 1 tablet by mouth nightly 30 tablet 3    metoprolol succinate (TOPROL XL) 100 MG extended release tablet Take 1 tablet by mouth daily 30 tablet 3    furosemide (LASIX) 20 MG tablet Take 1 tablet by mouth daily 60 tablet 3    Handicap Placard MISC by Does not apply route For 1 year 1 each 0     No current facility-administered medications on file prior to encounter.        REVIEW OF SYSTEMS    Constitutional: negative  Eyes: negative  Ears, nose, mouth, throat, and face: negative  Respiratory: negative  Cardiovascular: negative except for lower extremity edema  Gastrointestinal: negative  Genitourinary:negative  Integument/breast: negative  Hematologic/lymphatic: negative  Musculoskeletal:negative  Neurological: negative  Behavioral/Psych: negative  Endocrine: negative  Allergic/Immunologic: negative    Objective:      /76   Pulse 69   Temp 98.5 °F (36.9 °C) (Tympanic)   Resp 16   Ht 5' 3\" (1.6 m)   Wt 246 lb (111.6 kg)   BMI 43.58 kg/m²     Wt Readings from Last 3 Encounters:   02/08/22 246 lb (111.6 kg)   02/02/22 246 lb 3.2 oz (111.7 kg)   02/01/22 261 lb (118.4 kg)       PHYSICAL EXAM    General Appearance: alert and oriented to person, place and time, well-developed and obese, in no acute distress  Skin: warm and dry, no rash or erythema  Head: normocephalic and atraumatic  Eyes: pupils equal, round, extraocular eye movements intact, conjunctivae normal  Pulmonary/Chest: normal air movement, no respiratory distress  Extremities: no cyanosis and no clubbing  Musculoskeletal: no joint swelling, deformity or tenderness  Neurologic: gait, coordination normal and speech normal      Assessment:      Problem List Items Addressed This Visit     Chronic venous insufficiency - Primary    Chronic venous insufficiency of lower extremity    Controlled type 2 diabetes mellitus with complication, without long-term current use of insulin (HCC) (Chronic)                   Wound is healed    Plan:     Treatment Note please see Discharge Instructions    Written patient dismissal instructions given to patient and signed by patient or POA.            Electronically signed by JOSEPH Womack CNP on 2/8/2022 at 3:57 PM

## 2022-02-08 NOTE — CARE COORDINATION
Ambulatory Care Coordination Note     2/08/2022  CM Risk Score: 5  Charlson 10 Year Mortality Risk Score: 79%     ACC: Belkis Burnham RN    Summary Note: Hx of Chronic venous insufficiency, BLE wounds, lymphedema, NIDDM  Agreeable to care coordination. Pt follows with [facility] and wounds are healing and closed. Saw provider last week for chronic cough and congestion, received rx for Claritin and Medrol dose pack. NIDDM; A1C 5.2, does not test blood sugar at home. comfortable with her diet and doesnt feel RD is necessary  Started initial cc assessment. Pt has wound care appointment today . CC plan; review CC assessment, outcome of wound care appt     Future Appointments   Date Time Provider Martha Delgado   4/13/2022  3:45 PM MD Tory Coates [facility]   [facility]  [facility] \[facility] [place] [facility]"" [address] Coordination Interventions    Program Enrollment: Complex Care  Referral from Primary Care Provider: No  Suggested Interventions and Community Resources  Disease Specific Clinic: Completed (Comment: [address] [facility] )  Registered Dietician: Declined  Transportation Support: Declined  Other Services or Interventions: Javad Addressed                 This Visit's Progress     Conditions and Symptoms        I will schedule office visits, as directed by my provider. I will notify my provider of any symptoms that indicate a worsening of my condition. Barriers: overwhelmed by complexity of regimen and lack of education  Plan for overcoming my barriers: education  Confidence: 8/10  Anticipated Goal Completion Date: 6/8/22         Medication Management        I will notify my provider of any problems with medications, like adverse effects or side effects.     Barriers: overwhelmed by complexity of regimen, medication side effects, and lack of education  Plan for overcoming my barriers: education,  Confidence: 8/10  Anticipated Goal Completion Date: 6/8/22            Prior to Admission medications    Medication Sig Start Date End Date Taking?  Authorizing Provider   loratadine (CLARITIN) 10 MG tablet Take 1 tablet by mouth daily 2/2/22   JOSEPH Gomez CNP   losartan (COZAAR) 50 MG tablet TAKE 1 TABLET BY MOUTH EVERY DAY 11/23/21   Historical Provider, MD   albuterol sulfate HFA (VENTOLIN HFA) 108 (90 Base) MCG/ACT inhaler Inhale 2 puffs into the lungs 4 times daily as needed for Wheezing 1/3/22   Candelaria Proctor MD   ferrous sulfate (FE TABS 325) 325 (65 Fe) MG EC tablet Take 1 tablet by mouth 2 times daily (with meals) 12/25/21   Baudilio Fitzgerald MD   Vitamin D (CHOLECALCIFEROL) 50 MCG (2000 UT) TABS tablet Take 1 tablet by mouth daily 12/25/21   Baudilio Fitzgerald MD   pantoprazole (PROTONIX) 40 MG tablet Take 1 tablet by mouth 2 times daily 12/25/21   Baudilio Fitzgerald MD   metFORMIN (GLUCOPHAGE-XR) 500 MG extended release tablet TAKE 1 TABLET BY MOUTH IN THE MORNING WITH breakfast 12/20/21   Candelaria Roberts MD   levothyroxine (SYNTHROID) 50 MCG tablet TAKE 1 TABLET BY MOUTH EVERY DAY 11/23/21   Candelaria Roberts MD   [facility] by Does not apply route For 1 year 10/18/21   Breezy Orr MD   aspirin 81 MG EC tablet Take 1 tablet by mouth daily 5/15/19   Ayaka Burden, DO   atorvastatin (LIPITOR) 80 MG tablet Take 1 tablet by mouth nightly 5/14/19   Ayaka Burden, DO   metoprolol succinate (TOPROL XL) 100 MG extended release tablet Take 1 tablet by mouth daily 5/15/19   Codi Rape, DO   furosemide (LASIX) 20 MG tablet Take 1 tablet by mouth daily 5/15/19   Sandra Quiñones MD       Future Appointments   Date Time Provider Martha Delgado   4/13/2022  3:45 PM MD Tory Salazar [facility]   4/18/2022  1:00 PM MD Tory [facility]     ,   Diabetes Assessment    Meal Planning: None   How often do you test your blood sugar?: No Testing   Do you have barriers with adherence to non-pharmacologic self-management interventions?  (Nutrition/Exercise/Self-Monitoring): No   Have you ever had to go to the ED for symptoms of low blood sugar?: No       No patient-reported symptoms   Do you have hyperglycemia symptoms?: No   Do you have hypoglycemia symptoms?: No   Blood Sugar Monitoring Regimen: Not Testing   Blood Sugar Trends: No Change       and   General Assessment    Do you have any symptoms that are causing concern?: Yes  Progression since Onset: Gradually Improving  Reported Symptoms: Other (Comment: BLE wounds )

## 2022-02-10 SDOH — ECONOMIC STABILITY: HOUSING INSECURITY: IN THE LAST 12 MONTHS, HOW MANY PLACES HAVE YOU LIVED?: 1

## 2022-02-10 SDOH — ECONOMIC STABILITY: HOUSING INSECURITY
IN THE LAST 12 MONTHS, WAS THERE A TIME WHEN YOU DID NOT HAVE A STEADY PLACE TO SLEEP OR SLEPT IN A SHELTER (INCLUDING NOW)?: NO

## 2022-02-10 SDOH — ECONOMIC STABILITY: INCOME INSECURITY: IN THE LAST 12 MONTHS, WAS THERE A TIME WHEN YOU WERE NOT ABLE TO PAY THE MORTGAGE OR RENT ON TIME?: NO

## 2022-02-14 ENCOUNTER — CARE COORDINATION (OUTPATIENT)
Dept: CARE COORDINATION | Age: 67
End: 2022-02-14

## 2022-02-14 SDOH — HEALTH STABILITY: PHYSICAL HEALTH: ON AVERAGE, HOW MANY MINUTES DO YOU ENGAGE IN EXERCISE AT THIS LEVEL?: 0 MIN

## 2022-02-14 SDOH — HEALTH STABILITY: PHYSICAL HEALTH: ON AVERAGE, HOW MANY DAYS PER WEEK DO YOU ENGAGE IN MODERATE TO STRENUOUS EXERCISE (LIKE A BRISK WALK)?: 0 DAYS

## 2022-02-14 ASSESSMENT — LIFESTYLE VARIABLES: HOW OFTEN DO YOU HAVE A DRINK CONTAINING ALCOHOL: NEVER

## 2022-02-14 NOTE — CARE COORDINATION
Ambulatory Care Coordination Note  2/14/2022  CM Risk Score: 5  Charlson 10 Year Mortality Risk Score: 79%     ACC: Juanpablo Green, DORIAN    Summary Note: BLE wounds have healed and finished her visits at the wound care clinic  Saw provider recently and started Claritin and Medrol  NIDDM is stable, shes feeling fine. Does not test BSs at home. denies any s/s of low or high blood sugar   Started back to work part time and feeling ok. Reports, overall has been feeling ok. denes any questions or concerns. No   No issues getting meds filled and taking as prescribed   CC plan, review assessments and   that BLE wounds are still healed. And how is she tolerating being back to work/ part time    Future Appointments   Date Time Provider Martha Delgado   4/13/2022  3:45 PM MD Tory Thorne [facility]   4/18/2022  1:00 PM MD Tory Balderrama [facility] AND WOMEN'S [facility] [address] Coordination Interventions    Program Enrollment: Complex Care  Referral from Primary Care Provider: No  Suggested Interventions and Community Resources  Disease Specific Clinic: Completed (Comment: [address] [facility] )  Registered Dietician: Declined  Transportation Support: Declined  Zone Management Tools: Declined  Other Services or Interventions: Javad Addressed    None         Prior to Admission medications    Medication Sig Start Date End Date Taking?  Authorizing Provider   loratadine (CLARITIN) 10 MG tablet Take 1 tablet by mouth daily 2/2/22   JOSEPH Torres CNP   losartan (COZAAR) 50 MG tablet TAKE 1 TABLET BY MOUTH EVERY DAY 11/23/21   Historical Provider, MD   albuterol sulfate HFA (VENTOLIN HFA) 108 (90 Base) MCG/ACT inhaler Inhale 2 puffs into the lungs 4 times daily as needed for Wheezing 1/3/22   Candelaria Fitzgerald MD   ferrous sulfate (FE TABS 325) 325 (65 Fe) MG EC tablet Take 1 tablet by mouth 2 times daily (with meals) 12/25/21   An Perez MD   Vitamin D (CHOLECALCIFEROL) 50 MCG (2000 UT) TABS tablet Take 1 tablet by mouth daily 12/25/21   Natasha Salinas MD   pantoprazole (PROTONIX) 40 MG tablet Take 1 tablet by mouth 2 times daily 12/25/21   Natasha Salinas MD   metFORMIN (GLUCOPHAGE-XR) 500 MG extended release tablet TAKE 1 TABLET BY MOUTH IN THE MORNING WITH breakfast 12/20/21   Candelaria Roberts MD   levothyroxine (SYNTHROID) 50 MCG tablet TAKE 1 TABLET BY MOUTH EVERY DAY 11/23/21   Candelaria Roberts MD   Handicap Placard MISC by Does not apply route For 1 year 10/18/21   Jayjay Jimenez MD   aspirin 81 MG EC tablet Take 1 tablet by mouth daily 5/15/19   Kia Redman, DO   atorvastatin (LIPITOR) 80 MG tablet Take 1 tablet by mouth nightly 5/14/19   Rica Burden, DO   metoprolol succinate (TOPROL XL) 100 MG extended release tablet Take 1 tablet by mouth daily 5/15/19   Kia Redman, DO   furosemide (LASIX) 20 MG tablet Take 1 tablet by mouth daily 5/15/19   Minerva Ziegler MD       Future Appointments   Date Time Provider Martha Delgado   4/13/2022  3:45 PM Brianne Lackey MD [facility]   4/18/2022  1:00 PM Jayjay Jimenez MD [facility]     ,   Diabetes Assessment    Meal Planning: None   How often do you test your blood sugar?: No Testing   Do you have barriers with adherence to non-pharmacologic self-management interventions?  (Nutrition/Exercise/Self-Monitoring): No   Have you ever had to go to the ED for symptoms of low blood sugar?: No       No patient-reported symptoms       and   General Assessment    Do you have any symptoms that are causing concern?: No

## 2022-02-15 NOTE — TELEPHONE ENCOUNTER
Writer called and spoke to Herlinda at [facility]. Per Herlinda pt did go to the office appointment on 2/10/22 but stated to them that she was NOT there for surgery clearance and that she was not planning on having the EGD at this time. Fulda stated pt is scheduled for f/u appt on 3/31/22 but she could be cleared now if needed. Writer called pt and lvm requesting call back to discuss if pt wants to schedule EGD at this time or if she plans to wait.

## 2022-02-17 NOTE — TELEPHONE ENCOUNTER
Writer called pt and lvm requesting call back to discuss scheduling the EGD or to see if patient plans to hold off on that at this time as stated from Julián Beck with [facility]. Also, sent letter.

## 2022-02-21 ENCOUNTER — CARE COORDINATION (OUTPATIENT)
Dept: CARE COORDINATION | Age: 67
End: 2022-02-21

## 2022-02-23 RX ORDER — LORATADINE 10 MG/1
TABLET ORAL
Qty: 30 TABLET | Refills: 0 | Status: SHIPPED | OUTPATIENT
Start: 2022-02-23 | End: 2022-03-14

## 2022-02-28 RX ORDER — METHYLPREDNISOLONE 4 MG/1
TABLET ORAL
Refills: 0 | OUTPATIENT
Start: 2022-02-28

## 2022-03-01 ENCOUNTER — CARE COORDINATION (OUTPATIENT)
Dept: CARE COORDINATION | Age: 67
End: 2022-03-01

## 2022-03-08 ENCOUNTER — CARE COORDINATION (OUTPATIENT)
Dept: CARE COORDINATION | Age: 67
End: 2022-03-08

## 2022-03-08 NOTE — CARE COORDINATION
Ambulatory Care Coordination Note  3/8/2022  CM Risk Score: 5  Charlson 10 Year Mortality Risk Score: 79%     ACC: Nasir Amezcua, RN    Summary Note: reports, shes feeling fine. Coughing and shortens of breath have improved   BLE wounds improved and doesnt have to go to the wound care clinic anymore    DM is stable, feeling fine, no s/s of low/high blood sugars. She does not test blood sugars. She is back to work. Pharmacy has contacted cardiology about meds they have prescribed   Compliant taking meds as directed. Denies any questions or concerns. Does not feel further call backs are needed. Reviewed care coordination graduation and agreeable  CC graduation, pt has ACMs contact phone number for any further needs     Future Appointments   Date Time Provider Martha Delgado   4/13/2022  3:45 PM MD Tory Nunes [facility]   4/18/2022  1:00 PM [facility] [place] [facility] Coordination Interventions    Program Enrollment: Complex Care  Referral from Primary Care Provider: No  Suggested Interventions and Community Resources  Disease Specific Clinic: Completed (Comment: [facility] )  Registered Dietician: Declined  Transportation Support: Declined  Zone Management Tools: Declined  Other Services or Interventions: Javad Addressed                 This Visit's Progress     COMPLETED: Conditions and Symptoms   On track     I will schedule office visits, as directed by my provider. I will notify my provider of any symptoms that indicate a worsening of my condition. Barriers: overwhelmed by complexity of regimen and lack of education  Plan for overcoming my barriers: education  Confidence: 8/10  Anticipated Goal Completion Date: 6/8/22         COMPLETED: Medication Management   On track     I will notify my provider of any problems with medications, like adverse effects or side effects.     Barriers: overwhelmed by complexity of regimen, medication side effects, and lack of education  Plan for overcoming my barriers: education,  Confidence: 8/10  Anticipated Goal Completion Date: 6/8/22            Prior to Admission medications    Medication Sig Start Date End Date Taking?  Authorizing Provider   loratadine (CLARITIN) 10 MG tablet TAKE 1 TABLET BY MOUTH EVERY DAY 2/23/22   Hancocksebastien Leyva, APRN - CNP   losartan (COZAAR) 50 MG tablet TAKE 1 TABLET BY MOUTH EVERY DAY 11/23/21   Historical Provider, MD   albuterol sulfate HFA (VENTOLIN HFA) 108 (90 Base) MCG/ACT inhaler Inhale 2 puffs into the lungs 4 times daily as needed for Wheezing 1/3/22   Candelaria Correa MD   ferrous sulfate (FE TABS 325) 325 (65 Fe) MG EC tablet Take 1 tablet by mouth 2 times daily (with meals) 12/25/21   Faith Tracy MD   Vitamin D (CHOLECALCIFEROL) 50 MCG (2000 UT) TABS tablet Take 1 tablet by mouth daily 12/25/21   Faith Tracy MD   pantoprazole (PROTONIX) 40 MG tablet Take 1 tablet by mouth 2 times daily 12/25/21   Faith Tracy MD   metFORMIN (GLUCOPHAGE-XR) 500 MG extended release tablet TAKE 1 TABLET BY MOUTH IN THE MORNING WITH breakfast 12/20/21   Candelaria Roberts MD   levothyroxine (SYNTHROID) 50 MCG tablet TAKE 1 TABLET BY MOUTH EVERY DAY 11/23/21   Candelaria Roberts MD   Handicap Placbren [facility] by Does not apply route For 1 year 10/18/21   Mauro Khan MD   aspirin 81 MG EC tablet Take 1 tablet by mouth daily 5/15/19   Taurus Burden DO   atorvastatin (LIPITOR) 80 MG tablet Take 1 tablet by mouth nightly 5/14/19   Taurus Burden DO   metoprolol succinate (TOPROL XL) 100 MG extended release tablet Take 1 tablet by mouth daily 5/15/19   Lemuel Lyn DO   furosemide (LASIX) 20 MG tablet Take 1 tablet by mouth daily 5/15/19   Nupur Romero MD       Future Appointments   Date Time Provider Martha Delgado   4/13/2022  3:45 PM Kristin Kussmaul, MD [facility]   4/18/2022  1:00 PM Mauro Khan MD [id number]     ,   Diabetes Assessment    Meal Planning: None How often do you test your blood sugar?: No Testing   Do you have barriers with adherence to non-pharmacologic self-management interventions?  (Nutrition/Exercise/Self-Monitoring): No   Have you ever had to go to the ED for symptoms of low blood sugar?: No       No patient-reported symptoms   Do you have hyperglycemia symptoms?: No   Do you have hypoglycemia symptoms?: No   Blood Sugar Monitoring Regimen: Not Testing       and   General Assessment    Do you have any symptoms that are causing concern?: Yes  Progression since Onset: Resolved  Reported Symptoms: Shortness of Breath (Comment: BLE wounds )

## 2022-03-09 RX ORDER — PANTOPRAZOLE SODIUM 40 MG/1
TABLET, DELAYED RELEASE ORAL
Qty: 30 TABLET | Refills: 0 | Status: SHIPPED | OUTPATIENT
Start: 2022-03-09 | End: 2022-03-24

## 2022-03-14 RX ORDER — LORATADINE 10 MG/1
TABLET ORAL
Qty: 30 TABLET | Refills: 0 | Status: SHIPPED | OUTPATIENT
Start: 2022-03-14 | End: 2022-05-02

## 2022-03-22 ENCOUNTER — CLINICAL DOCUMENTATION (OUTPATIENT)
Dept: OCCUPATIONAL THERAPY | Age: 67
End: 2022-03-22

## 2022-03-22 NOTE — DISCHARGE SUMMARY
TREATMENT LOCATION:   [] [address]   [address]: [phone]  F: [phone] [x] [address]: [phone]  F: [phone]     Lymphedema Therapy Discharge Note    Date: 3/22/2022      Patient: Christiano Fay  : 1955  MRN: [id number]    Referring Physician: Alfonso Mock MD       Phone: [phone]                      Fax: [phone]  Insurance: [employer]-  1 / [facility] Diagnosis: Chronic venous insufficiency (I87.2 [ICD-10-CM])  Rehab Codes: I89.0     Onset Date: 2021  Visit# / total visits:       Total visits attended: 1        Cancels/No shows: 6  Date of initial visit: 6/3/2021                Date of final visit: Eval only       Subjective:  Refer to initial evaluation/ treatment notes. Objective:  Refer to initial evaluation/treatment notes. Assessment:  Refer to initial evaluation/ treatment notes. Treatment to Date:  [] Therapeutic Exercise           [x] Instruction in Home Program                       [] Manual Therapy  [x] Self-Care/Home Management  [] Vasopneumatic Pump             [] Other:    Discharge Status:   [x] Pt has not called or returned to clinic in over 90 days with additional concerns. Electronically signed by Dariusz Rosado OT on 3/22/2022 at 11:38 AM    The patient is being discharged due to the status listed above. If patient would like to return to the clinic for additional therapy a new referral will be necessary. Thank you again for your referral and allowing us to assist in the care of this patient! For any questions or concerns, please don't hesitate to call us at [phone].

## 2022-03-24 RX ORDER — PANTOPRAZOLE SODIUM 40 MG/1
TABLET, DELAYED RELEASE ORAL
Qty: 30 TABLET | Refills: 0 | Status: SHIPPED | OUTPATIENT
Start: 2022-03-24 | End: 2022-04-07

## 2022-04-07 RX ORDER — PANTOPRAZOLE SODIUM 40 MG/1
TABLET, DELAYED RELEASE ORAL
Qty: 30 TABLET | Refills: 0 | Status: SHIPPED | OUTPATIENT
Start: 2022-04-07 | End: 2022-04-25

## 2022-04-07 NOTE — TELEPHONE ENCOUNTER
Received cardiac clearance. Pt to stop Aspirin for 7 days prior to procedure. LVM for patient to call to schedule EGD in OR with Dr. Darylene Pool.

## 2022-04-19 ENCOUNTER — HOSPITAL ENCOUNTER (OUTPATIENT)
Age: 67
Setting detail: SPECIMEN
Discharge: HOME | End: 2022-04-19

## 2022-04-19 ENCOUNTER — OFFICE VISIT (OUTPATIENT)
Dept: PRIMARY CARE CLINIC | Age: 67
End: 2022-04-19
Payer: MEDICAID

## 2022-04-19 VITALS
WEIGHT: 241 LBS | HEART RATE: 74 BPM | OXYGEN SATURATION: 98 % | DIASTOLIC BLOOD PRESSURE: 80 MMHG | BODY MASS INDEX: 42.69 KG/M2 | SYSTOLIC BLOOD PRESSURE: 127 MMHG

## 2022-04-19 DIAGNOSIS — I89.0 LYMPHEDEMA OF BOTH LOWER EXTREMITIES: Chronic | ICD-10-CM

## 2022-04-19 DIAGNOSIS — Z12.31 ENCOUNTER FOR SCREENING MAMMOGRAM FOR MALIGNANT NEOPLASM OF BREAST: ICD-10-CM

## 2022-04-19 DIAGNOSIS — E06.3 HYPOTHYROIDISM DUE TO HASHIMOTO'S THYROIDITIS: ICD-10-CM

## 2022-04-19 DIAGNOSIS — I10 ESSENTIAL (PRIMARY) HYPERTENSION: ICD-10-CM

## 2022-04-19 DIAGNOSIS — I47.29 NSVT (NONSUSTAINED VENTRICULAR TACHYCARDIA): ICD-10-CM

## 2022-04-19 DIAGNOSIS — E55.9 VITAMIN D DEFICIENCY: ICD-10-CM

## 2022-04-19 DIAGNOSIS — D50.8 OTHER IRON DEFICIENCY ANEMIA: ICD-10-CM

## 2022-04-19 DIAGNOSIS — R53.83 OTHER FATIGUE: ICD-10-CM

## 2022-04-19 DIAGNOSIS — R73.03 PREDIABETES: ICD-10-CM

## 2022-04-19 DIAGNOSIS — E03.8 HYPOTHYROIDISM DUE TO HASHIMOTO'S THYROIDITIS: ICD-10-CM

## 2022-04-19 DIAGNOSIS — I87.2 CHRONIC VENOUS INSUFFICIENCY: ICD-10-CM

## 2022-04-19 DIAGNOSIS — L65.9 FALLING HAIR: Primary | ICD-10-CM

## 2022-04-19 DIAGNOSIS — L65.9 FALLING HAIR: ICD-10-CM

## 2022-04-19 DIAGNOSIS — I26.99 BILATERAL PULMONARY EMBOLISM (HCC): ICD-10-CM

## 2022-04-19 DIAGNOSIS — Z95.5 S/P DRUG ELUTING CORONARY STENT PLACEMENT: Chronic | ICD-10-CM

## 2022-04-19 DIAGNOSIS — M81.0 POSTMENOPAUSAL OSTEOPOROSIS: ICD-10-CM

## 2022-04-19 DIAGNOSIS — E66.01 OBESITY, MORBID, BMI 40.0-49.9 (HCC): Chronic | ICD-10-CM

## 2022-04-19 PROBLEM — E11.8 CONTROLLED TYPE 2 DIABETES MELLITUS WITH COMPLICATION, WITHOUT LONG-TERM CURRENT USE OF INSULIN (HCC): Chronic | Status: RESOLVED | Noted: 2021-12-23 | Resolved: 2022-04-19

## 2022-04-19 LAB
ABSOLUTE EOS #: 0.64 K/UL (ref 0–0.44)
ABSOLUTE IMMATURE GRANULOCYTE: 0.03 K/UL (ref 0–0.3)
ABSOLUTE LYMPH #: 1.3 K/UL (ref 1.1–3.7)
ABSOLUTE MONO #: 0.85 K/UL (ref 0.1–1.2)
ANION GAP SERPL CALCULATED.3IONS-SCNC: 10 MMOL/L (ref 9–17)
BASOPHILS # BLD: 1 % (ref 0–2)
BASOPHILS ABSOLUTE: 0.05 K/UL (ref 0–0.2)
BUN BLDV-MCNC: 24 MG/DL (ref 8–23)
CALCIUM SERPL-MCNC: 8.9 MG/DL (ref 8.6–10.4)
CHLORIDE BLD-SCNC: 105 MMOL/L (ref 98–107)
CO2: 28 MMOL/L (ref 20–31)
CREAT SERPL-MCNC: 1.11 MG/DL (ref 0.5–0.9)
CREATININE URINE: 216 MG/DL (ref 28–217)
EOSINOPHILS RELATIVE PERCENT: 8 % (ref 1–4)
ESTIMATED AVERAGE GLUCOSE: 126 MG/DL
FOLATE: 7.5 NG/ML
GFR AFRICAN AMERICAN: 60 ML/MIN
GFR NON-AFRICAN AMERICAN: 49 ML/MIN
GFR SERPL CREATININE-BSD FRML MDRD: ABNORMAL ML/MIN/{1.73_M2}
GLUCOSE BLD-MCNC: 97 MG/DL (ref 70–99)
HBA1C MFR BLD: 6 % (ref 4–6)
HCT VFR BLD CALC: 38.4 % (ref 36.3–47.1)
HEMOGLOBIN: 11.7 G/DL (ref 11.9–15.1)
IMMATURE GRANULOCYTES: 0 %
LYMPHOCYTES # BLD: 16 % (ref 24–43)
MCH RBC QN AUTO: 28.6 PG (ref 25.2–33.5)
MCHC RBC AUTO-ENTMCNC: 30.5 G/DL (ref 28.4–34.8)
MCV RBC AUTO: 93.9 FL (ref 82.6–102.9)
MICROALBUMIN/CREAT 24H UR: 37 MG/L
MICROALBUMIN/CREAT UR-RTO: 17 MCG/MG CREAT
MONOCYTES # BLD: 11 % (ref 3–12)
NRBC AUTOMATED: 0 PER 100 WBC
PDW BLD-RTO: 17.2 % (ref 11.8–14.4)
PLATELET # BLD: 317 K/UL (ref 138–453)
PMV BLD AUTO: 11 FL (ref 8.1–13.5)
POTASSIUM SERPL-SCNC: 5.2 MMOL/L (ref 3.7–5.3)
RBC # BLD: 4.09 M/UL (ref 3.95–5.11)
RBC # BLD: ABNORMAL 10*6/UL
SEG NEUTROPHILS: 64 % (ref 36–65)
SEGMENTED NEUTROPHILS ABSOLUTE COUNT: 5.16 K/UL (ref 1.5–8.1)
SODIUM BLD-SCNC: 143 MMOL/L (ref 135–144)
TSH SERPL DL<=0.05 MIU/L-ACNC: 3.4 UIU/ML (ref 0.3–5)
VITAMIN B-12: 569 PG/ML (ref 232–1245)
VITAMIN D 25-HYDROXY: 36.8 NG/ML
WBC # BLD: 8 K/UL (ref 3.5–11.3)

## 2022-04-19 PROCEDURE — 1123F ACP DISCUSS/DSCN MKR DOCD: CPT | Performed by: STUDENT IN AN ORGANIZED HEALTH CARE EDUCATION/TRAINING PROGRAM

## 2022-04-19 PROCEDURE — G8417 CALC BMI ABV UP PARAM F/U: HCPCS | Performed by: STUDENT IN AN ORGANIZED HEALTH CARE EDUCATION/TRAINING PROGRAM

## 2022-04-19 PROCEDURE — 1090F PRES/ABSN URINE INCON ASSESS: CPT | Performed by: STUDENT IN AN ORGANIZED HEALTH CARE EDUCATION/TRAINING PROGRAM

## 2022-04-19 PROCEDURE — 99214 OFFICE O/P EST MOD 30 MIN: CPT | Performed by: STUDENT IN AN ORGANIZED HEALTH CARE EDUCATION/TRAINING PROGRAM

## 2022-04-19 PROCEDURE — 1036F TOBACCO NON-USER: CPT | Performed by: STUDENT IN AN ORGANIZED HEALTH CARE EDUCATION/TRAINING PROGRAM

## 2022-04-19 PROCEDURE — G8400 PT W/DXA NO RESULTS DOC: HCPCS | Performed by: STUDENT IN AN ORGANIZED HEALTH CARE EDUCATION/TRAINING PROGRAM

## 2022-04-19 PROCEDURE — 4040F PNEUMOC VAC/ADMIN/RCVD: CPT | Performed by: STUDENT IN AN ORGANIZED HEALTH CARE EDUCATION/TRAINING PROGRAM

## 2022-04-19 PROCEDURE — G8427 DOCREV CUR MEDS BY ELIG CLIN: HCPCS | Performed by: STUDENT IN AN ORGANIZED HEALTH CARE EDUCATION/TRAINING PROGRAM

## 2022-04-19 PROCEDURE — 3017F COLORECTAL CA SCREEN DOC REV: CPT | Performed by: STUDENT IN AN ORGANIZED HEALTH CARE EDUCATION/TRAINING PROGRAM

## 2022-04-19 ASSESSMENT — ENCOUNTER SYMPTOMS
EYE ITCHING: 0
COUGH: 0
ABDOMINAL PAIN: 0
SHORTNESS OF BREATH: 0
ABDOMINAL DISTENTION: 0
RHINORRHEA: 0
BACK PAIN: 0
WHEEZING: 0
EYE DISCHARGE: 0

## 2022-04-19 NOTE — PROGRESS NOTES
[facility] PRIMARY CARE  [facility] [address] [facility] [address]  [address] [address]  Dept: [phone]  Dept Fax: [phone]    Tressa Castaneda is a 74 y.o. female who presents today for her medical conditions/complaints as noted below. Chief Complaint   Patient presents with    Diabetes     Prediabetes    Alopecia     Hair getting very thin & falling out, cardiologist suggested talking to PCP    Discuss Medications     Discuss the need for Vit D    Health Maintenance     would like orders for LATRICIA & DEXA       HPI:     62 y/o female presented today for follow-up visit. She is concerned about her hair fall which started after her hospitalization in December. Other chronic comorbidities including chronic venous insufficiency, prediabetes, hypothyroidism have been stable. She has history of CAD s/p PCI. Follows up with cardiology. She is morbidly obese. Advised weight loss  Health maintenance-ordered mammogram and DEXA scan. Patient declined all vaccinations. Her vital signs are stable this visit. Medications reviewed and refilled as appropriate, problem list updated. All concerns discussed in details and all questions answered to patient satisfaction.             Hemoglobin A1C (%)   Date Value   2021 5.2   10/18/2021 5.3   2020 6.3 (H)             ( goal A1C is < 7)   No results found for: LABMICR  LDL Cholesterol (mg/dL)   Date Value   2022 59   2020 59   2019 187 (H)       (goal LDL is <100)   AST (U/L)   Date Value   2022 22     ALT (U/L)   Date Value   2022 15     BUN (mg/dL)   Date Value   2022 19     BP Readings from Last 3 Encounters:   22 127/80   22 138/76   22 126/78          (goal 120/80)    Past Medical History:   Diagnosis Date    COVID-19 virus infection 2021    Hyperlipidemia     Hypertension       Past Surgical History:   Procedure Laterality Date    BLADDER SUSPENSION  BLADDER SUSPENSION         Family History   Problem Relation Age of Onset    Elevated Lipids Mother     Cancer Mother     High Blood Pressure Mother     Cancer Maternal Grandmother        Social History     Tobacco Use    Smoking status: Former Smoker     Packs/day: 1.00     Years: 10.00     Pack years: 10.00     Types: Cigarettes     Quit date: 1989     Years since quittin.9    Smokeless tobacco: Never Used   Substance Use Topics    Alcohol use: Never      Current Outpatient Medications   Medication Sig Dispense Refill    pantoprazole (PROTONIX) 40 MG tablet TAKE 1 TABLET BY MOUTH TWO TIMES A DAY 30 tablet 0    loratadine (CLARITIN) 10 MG tablet TAKE 1 TABLET BY MOUTH EVERY DAY 30 tablet 0    losartan (COZAAR) 50 MG tablet TAKE 1 TABLET BY MOUTH EVERY DAY      albuterol sulfate HFA (VENTOLIN HFA) 108 (90 Base) MCG/ACT inhaler Inhale 2 puffs into the lungs 4 times daily as needed for Wheezing 54 g 1    ferrous sulfate (FE TABS 325) 325 (65 Fe) MG EC tablet Take 1 tablet by mouth 2 times daily (with meals) 90 tablet 3    Vitamin D (CHOLECALCIFEROL) 50 MCG (2000 UT) TABS tablet Take 1 tablet by mouth daily 60 tablet 0    metFORMIN (GLUCOPHAGE-XR) 500 MG extended release tablet TAKE 1 TABLET BY MOUTH IN THE MORNING WITH breakfast 90 tablet 3    levothyroxine (SYNTHROID) 50 MCG tablet TAKE 1 TABLET BY MOUTH EVERY DAY 90 tablet 3    Handicap Placard MISC by Does not apply route For 1 year 1 each 0    aspirin 81 MG EC tablet Take 1 tablet by mouth daily 30 tablet 3    atorvastatin (LIPITOR) 80 MG tablet Take 1 tablet by mouth nightly 30 tablet 3    metoprolol succinate (TOPROL XL) 100 MG extended release tablet Take 1 tablet by mouth daily 30 tablet 3     No current facility-administered medications for this visit.      No Known Allergies    Health Maintenance   Topic Date Due    Hepatitis C screen  Never done    COVID-19 Vaccine (1) Never done    Pneumococcal 65+ years Vaccine (1 - PCV) Never done    HIV screen  Never done    Diabetic microalbuminuria test  Never done    Diabetic retinal exam  Never done    DTaP/Tdap/Td vaccine (1 - Tdap) Never done    Cervical cancer screen  Never done    Shingles Vaccine (1 of 2) Never done    DEXA (modify frequency per FRAX score)  Never done    Breast cancer screen  02/26/2022    Depression Screen  10/18/2022    TSH testing  10/18/2022    A1C test (Diabetic or Prediabetic)  12/23/2022    Potassium monitoring  01/13/2023    Creatinine monitoring  01/13/2023    Lipid screen  01/28/2023    Diabetic foot exam  04/19/2023    Colorectal Cancer Screen  01/18/2025    Flu vaccine  Completed    Hepatitis A vaccine  Aged Out    Hib vaccine  Aged Out    Meningococcal (ACWY) vaccine  Aged Out       Subjective:      Review of Systems   Constitutional: Negative for chills and fever. Obese   HENT: Negative for congestion, hearing loss and rhinorrhea. Positive for hair fall   Eyes: Negative for discharge and itching. Respiratory: Negative for cough, shortness of breath and wheezing. Cardiovascular: Negative for palpitations and leg swelling. Gastrointestinal: Negative for abdominal distention and abdominal pain. Genitourinary: Negative for difficulty urinating and dysuria. Musculoskeletal: Negative for arthralgias and back pain. Skin: Negative for rash and wound. Neurological: Negative for light-headedness. Psychiatric/Behavioral: Negative for agitation and behavioral problems. Objective:     Physical Exam  Constitutional:       Appearance: She is well-developed. She is obese. HENT:      Head: Normocephalic and atraumatic. Eyes:      Conjunctiva/sclera: Conjunctivae normal.      Pupils: Pupils are equal, round, and reactive to light. Neck:      Thyroid: No thyromegaly. Vascular: No JVD. Cardiovascular:      Rate and Rhythm: Normal rate and regular rhythm. Heart sounds: Normal heart sounds.  No murmur heard. Pulmonary:      Effort: Pulmonary effort is normal.      Breath sounds: Normal breath sounds. No wheezing. Abdominal:      General: Bowel sounds are normal. There is no distension. Palpations: Abdomen is soft. Tenderness: There is no abdominal tenderness. There is no rebound. Musculoskeletal:         General: No tenderness. Normal range of motion. Cervical back: Normal range of motion and neck supple. Skin:     General: Skin is warm. Findings: No erythema. Neurological:      Mental Status: She is alert and oriented to person, place, and time. Cranial Nerves: No cranial nerve deficit. Psychiatric:         Behavior: Behavior normal.       /80   Pulse 74   Wt 241 lb (109.3 kg)   SpO2 98%   BMI 42.69 kg/m²     Assessment:        Sara Henley was seen today for follow-up. Diagnoses and all orders for this visit:    Diagnoses and all orders for this visit:  Falling hair  -     TSH with Reflex; Future  Encounter for screening mammogram for malignant neoplasm of breast  -     LATRICIA DIGITAL SCREEN W OR WO CAD BILATERAL; Future  Other fatigue  -     TSH with Reflex; Future  -     Vitamin B12 & Folate; Future  Postmenopausal osteoporosis  -     DEXA BONE DENSITY 2 SITES; Future  Vitamin D deficiency  -     Vitamin D 25 Hydroxy; Future  Prediabetes  -     HM DIABETES FOOT EXAM  -     Microalbumin, Ur; Future  -     Hemoglobin A1C; Future  Other iron deficiency anemia  -     CBC with Auto Differential; Future    Essential (primary) hypertension-continued on Toprol-XL    Lymphedema of both lower extremities-stable    S/P drug eluting coronary stent placement-on aspirin statin and Bumex. Follows up with cardiology    Obesity, morbid, BMI 40.0-49.9 (HCC)-advised weight loss    Hypothyroidism due to Hashimoto's yhezucjbqrk-ngppdo-jc labs.   Continued on Synthroid    Chronic venous insufficiency-stable  Bilateral pulmonary embolism (HCC)-stable  NSVT (nonsustained ventricular tachycardia) (HCC)-resolved               Plan:      Return in about 6 months (around 10/19/2022). Orders Placed This Encounter   Procedures    DEXA BONE DENSITY 2 SITES     Standing Status:   Future     Standing Expiration Date:   4/19/2023     Order Specific Question:   Reason for exam:     Answer:   Postmenopausal osteoporosis    LATRICIA DIGITAL SCREEN W OR WO CAD BILATERAL     Standing Status:   Future     Standing Expiration Date:   6/19/2023     Order Specific Question:   Reason for exam:     Answer:   screening mammogram    Microalbumin, Ur     Standing Status:   Future     Standing Expiration Date:   4/19/2023    CBC with Auto Differential     Standing Status:   Future     Standing Expiration Date:   4/19/2023    Hemoglobin A1C     Standing Status:   Future     Standing Expiration Date:   10/19/2023    Vitamin D 25 Hydroxy     Standing Status:   Future     Standing Expiration Date:   10/19/2023    TSH with Reflex     Standing Status:   Future     Standing Expiration Date:   4/19/2023    Vitamin B12 & Folate     Standing Status:   Future     Standing Expiration Date:   4/19/2023    HM DIABETES FOOT EXAM     No orders of the defined types were placed in this encounter. Patient given educational materials - see patient instructions. Discussed use, benefit, and side effects of prescribedmedications. All patient questions answered. Pt voiced understanding. Reviewed health maintenance. Instructed to continue current medications, diet and exercise. Patient agreed with treatment plan. Follow up as directed. I spent a total of 30-39 minutes face to face with this patient. Over 50% of that time was spent on counseling and care coordination. Please see assessment and plan for details. Electronically signed by   Joellen Trejo MD on 4/19/2022 at 8:17 AM  [facility]. Please note that this chart was generated using voice recognition Dragon dictation software.   Although every effort was made to ensure the accuracy of this automatedtranscription, some errors in transcription may have occurred.

## 2022-04-25 RX ORDER — PANTOPRAZOLE SODIUM 40 MG/1
TABLET, DELAYED RELEASE ORAL
Qty: 30 TABLET | Refills: 0 | Status: SHIPPED | OUTPATIENT
Start: 2022-04-25 | End: 2022-05-11 | Stop reason: SDUPTHER

## 2022-04-28 NOTE — RESULT ENCOUNTER NOTE
Please notify patient that their lab results showed HbA1c of 6 which is better than before, her creatinine is 1.1 which is around her baseline. Her hemoglobin is 11.7 which is improved her. No active intervention at this time. We will continue to monitor.

## 2022-05-02 ENCOUNTER — TELEPHONE (OUTPATIENT)
Dept: PRIMARY CARE CLINIC | Age: 67
End: 2022-05-02

## 2022-05-02 DIAGNOSIS — K59.09 OTHER CONSTIPATION: Primary | ICD-10-CM

## 2022-05-02 RX ORDER — LORATADINE 10 MG/1
TABLET ORAL
Qty: 30 TABLET | Refills: 0 | Status: SHIPPED | OUTPATIENT
Start: 2022-05-02 | End: 2022-06-03

## 2022-05-02 RX ORDER — DOCUSATE SODIUM 100 MG/1
100 CAPSULE, LIQUID FILLED ORAL 2 TIMES DAILY
Qty: 60 CAPSULE | Refills: 0 | Status: SHIPPED | OUTPATIENT
Start: 2022-05-02 | End: 2022-06-03

## 2022-05-02 NOTE — TELEPHONE ENCOUNTER
Patient states that the iron tablets are making her constipated and is asking what she can do to relieve the constipation. Patient has not tried anything as of now to help.

## 2022-05-02 NOTE — TELEPHONE ENCOUNTER
She can take iron pills every alternate day. I have also prescribed Colace for constipation.   Thank you

## 2022-05-11 RX ORDER — PANTOPRAZOLE SODIUM 40 MG/1
TABLET, DELAYED RELEASE ORAL
Qty: 60 TABLET | Refills: 0 | Status: SHIPPED | OUTPATIENT
Start: 2022-05-11 | End: 2022-05-27

## 2022-05-23 ENCOUNTER — HOSPITAL ENCOUNTER (OUTPATIENT)
Dept: WOMENS IMAGING | Age: 67
Discharge: HOME | End: 2022-05-25
Payer: MEDICARE

## 2022-05-23 DIAGNOSIS — Z12.31 ENCOUNTER FOR SCREENING MAMMOGRAM FOR MALIGNANT NEOPLASM OF BREAST: ICD-10-CM

## 2022-05-23 DIAGNOSIS — M81.0 POSTMENOPAUSAL OSTEOPOROSIS: ICD-10-CM

## 2022-05-23 PROCEDURE — 77080 DXA BONE DENSITY AXIAL: CPT

## 2022-05-23 PROCEDURE — 77067 SCR MAMMO BI INCL CAD: CPT

## 2022-05-27 ENCOUNTER — TELEPHONE (OUTPATIENT)
Dept: PRIMARY CARE CLINIC | Age: 67
End: 2022-05-27

## 2022-05-27 RX ORDER — PANTOPRAZOLE SODIUM 40 MG/1
TABLET, DELAYED RELEASE ORAL
Qty: 90 TABLET | Refills: 3 | Status: SHIPPED | OUTPATIENT
Start: 2022-05-27

## 2022-06-03 DIAGNOSIS — K59.09 OTHER CONSTIPATION: ICD-10-CM

## 2022-06-03 RX ORDER — LORATADINE 10 MG/1
TABLET ORAL
Qty: 60 TABLET | Refills: 3 | Status: SHIPPED | OUTPATIENT
Start: 2022-06-03

## 2022-06-03 RX ORDER — DOCUSATE SODIUM 100 MG/1
CAPSULE, LIQUID FILLED ORAL
Qty: 60 CAPSULE | Refills: 3 | Status: SHIPPED | OUTPATIENT
Start: 2022-06-03

## 2022-10-25 ENCOUNTER — OFFICE VISIT (OUTPATIENT)
Dept: PRIMARY CARE CLINIC | Age: 67
End: 2022-10-25
Payer: MEDICARE

## 2022-10-25 ENCOUNTER — HOSPITAL ENCOUNTER (OUTPATIENT)
Age: 67
Setting detail: SPECIMEN
Discharge: HOME | End: 2022-10-25

## 2022-10-25 VITALS
RESPIRATION RATE: 16 BRPM | BODY MASS INDEX: 42.69 KG/M2 | OXYGEN SATURATION: 98 % | WEIGHT: 241 LBS | SYSTOLIC BLOOD PRESSURE: 124 MMHG | DIASTOLIC BLOOD PRESSURE: 72 MMHG | HEART RATE: 66 BPM

## 2022-10-25 DIAGNOSIS — Z23 NEED FOR INFLUENZA VACCINATION: ICD-10-CM

## 2022-10-25 DIAGNOSIS — K59.00 CONSTIPATION, UNSPECIFIED CONSTIPATION TYPE: Primary | ICD-10-CM

## 2022-10-25 DIAGNOSIS — R53.82 CHRONIC FATIGUE: ICD-10-CM

## 2022-10-25 DIAGNOSIS — D50.9 MICROCYTIC ANEMIA: ICD-10-CM

## 2022-10-25 LAB
ABSOLUTE EOS #: 0.43 K/UL (ref 0–0.44)
ABSOLUTE IMMATURE GRANULOCYTE: 0.04 K/UL (ref 0–0.3)
ABSOLUTE LYMPH #: 1.58 K/UL (ref 1.1–3.7)
ABSOLUTE MONO #: 0.7 K/UL (ref 0.1–1.2)
ALBUMIN SERPL-MCNC: 3.6 G/DL (ref 3.5–5.2)
ALBUMIN/GLOBULIN RATIO: 1 (ref 1–2.5)
ALP BLD-CCNC: 184 U/L (ref 35–104)
ALT SERPL-CCNC: 13 U/L (ref 5–33)
ANION GAP SERPL CALCULATED.3IONS-SCNC: 19 MMOL/L (ref 9–17)
AST SERPL-CCNC: 15 U/L
BASOPHILS # BLD: 1 % (ref 0–2)
BASOPHILS ABSOLUTE: 0.07 K/UL (ref 0–0.2)
BILIRUB SERPL-MCNC: 0.4 MG/DL (ref 0.3–1.2)
BUN BLDV-MCNC: 22 MG/DL (ref 8–23)
CALCIUM SERPL-MCNC: 9.2 MG/DL (ref 8.6–10.4)
CHLORIDE BLD-SCNC: 106 MMOL/L (ref 98–107)
CO2: 20 MMOL/L (ref 20–31)
CREAT SERPL-MCNC: 1.16 MG/DL (ref 0.5–0.9)
EOSINOPHILS RELATIVE PERCENT: 4 % (ref 1–4)
FERRITIN: 19 NG/ML (ref 13–150)
GFR SERPL CREATININE-BSD FRML MDRD: 52 ML/MIN/1.73M2
GLUCOSE BLD-MCNC: 77 MG/DL (ref 70–99)
HCT VFR BLD CALC: 38 % (ref 36.3–47.1)
HEMOGLOBIN: 11.4 G/DL (ref 11.9–15.1)
IMMATURE GRANULOCYTES: 0 %
IRON SATURATION: 10 % (ref 20–55)
IRON: 34 UG/DL (ref 37–145)
LYMPHOCYTES # BLD: 16 % (ref 24–43)
MCH RBC QN AUTO: 29.1 PG (ref 25.2–33.5)
MCHC RBC AUTO-ENTMCNC: 30 G/DL (ref 28.4–34.8)
MCV RBC AUTO: 96.9 FL (ref 82.6–102.9)
MONOCYTES # BLD: 7 % (ref 3–12)
NRBC AUTOMATED: 0 PER 100 WBC
PDW BLD-RTO: 12.8 % (ref 11.8–14.4)
PLATELET # BLD: 403 K/UL (ref 138–453)
PMV BLD AUTO: 10.7 FL (ref 8.1–13.5)
POTASSIUM SERPL-SCNC: 4.7 MMOL/L (ref 3.7–5.3)
RBC # BLD: 3.92 M/UL (ref 3.95–5.11)
SEG NEUTROPHILS: 72 % (ref 36–65)
SEGMENTED NEUTROPHILS ABSOLUTE COUNT: 6.99 K/UL (ref 1.5–8.1)
SODIUM BLD-SCNC: 145 MMOL/L (ref 135–144)
TOTAL IRON BINDING CAPACITY: 325 UG/DL (ref 250–450)
TOTAL PROTEIN: 7.2 G/DL (ref 6.4–8.3)
TSH SERPL DL<=0.05 MIU/L-ACNC: 2.63 UIU/ML (ref 0.3–5)
UNSATURATED IRON BINDING CAPACITY: 291 UG/DL (ref 112–347)
WBC # BLD: 9.8 K/UL (ref 3.5–11.3)

## 2022-10-25 PROCEDURE — 3017F COLORECTAL CA SCREEN DOC REV: CPT | Performed by: STUDENT IN AN ORGANIZED HEALTH CARE EDUCATION/TRAINING PROGRAM

## 2022-10-25 PROCEDURE — G0008 ADMIN INFLUENZA VIRUS VAC: HCPCS | Performed by: STUDENT IN AN ORGANIZED HEALTH CARE EDUCATION/TRAINING PROGRAM

## 2022-10-25 PROCEDURE — G8417 CALC BMI ABV UP PARAM F/U: HCPCS | Performed by: STUDENT IN AN ORGANIZED HEALTH CARE EDUCATION/TRAINING PROGRAM

## 2022-10-25 PROCEDURE — 1036F TOBACCO NON-USER: CPT | Performed by: STUDENT IN AN ORGANIZED HEALTH CARE EDUCATION/TRAINING PROGRAM

## 2022-10-25 PROCEDURE — G8484 FLU IMMUNIZE NO ADMIN: HCPCS | Performed by: STUDENT IN AN ORGANIZED HEALTH CARE EDUCATION/TRAINING PROGRAM

## 2022-10-25 PROCEDURE — G8399 PT W/DXA RESULTS DOCUMENT: HCPCS | Performed by: STUDENT IN AN ORGANIZED HEALTH CARE EDUCATION/TRAINING PROGRAM

## 2022-10-25 PROCEDURE — 99213 OFFICE O/P EST LOW 20 MIN: CPT | Performed by: STUDENT IN AN ORGANIZED HEALTH CARE EDUCATION/TRAINING PROGRAM

## 2022-10-25 PROCEDURE — 90694 VACC AIIV4 NO PRSRV 0.5ML IM: CPT | Performed by: STUDENT IN AN ORGANIZED HEALTH CARE EDUCATION/TRAINING PROGRAM

## 2022-10-25 PROCEDURE — 1090F PRES/ABSN URINE INCON ASSESS: CPT | Performed by: STUDENT IN AN ORGANIZED HEALTH CARE EDUCATION/TRAINING PROGRAM

## 2022-10-25 PROCEDURE — G8427 DOCREV CUR MEDS BY ELIG CLIN: HCPCS | Performed by: STUDENT IN AN ORGANIZED HEALTH CARE EDUCATION/TRAINING PROGRAM

## 2022-10-25 PROCEDURE — 1123F ACP DISCUSS/DSCN MKR DOCD: CPT | Performed by: STUDENT IN AN ORGANIZED HEALTH CARE EDUCATION/TRAINING PROGRAM

## 2022-10-25 RX ORDER — LANOLIN ALCOHOL/MO/W.PET/CERES
325 CREAM (GRAM) TOPICAL
Qty: 30 TABLET | Refills: 0 | Status: SHIPPED | OUTPATIENT
Start: 2022-10-25

## 2022-10-25 RX ORDER — POLYETHYLENE GLYCOL 3350 17 G/17G
17 POWDER, FOR SOLUTION ORAL DAILY
Qty: 507 G | Refills: 0 | Status: SHIPPED | OUTPATIENT
Start: 2022-10-25 | End: 2022-11-24

## 2022-10-25 SDOH — ECONOMIC STABILITY: FOOD INSECURITY: WITHIN THE PAST 12 MONTHS, YOU WORRIED THAT YOUR FOOD WOULD RUN OUT BEFORE YOU GOT MONEY TO BUY MORE.: NEVER TRUE

## 2022-10-25 SDOH — ECONOMIC STABILITY: FOOD INSECURITY: WITHIN THE PAST 12 MONTHS, THE FOOD YOU BOUGHT JUST DIDN'T LAST AND YOU DIDN'T HAVE MONEY TO GET MORE.: NEVER TRUE

## 2022-10-25 ASSESSMENT — ENCOUNTER SYMPTOMS
BACK PAIN: 0
ABDOMINAL DISTENTION: 0
EYE DISCHARGE: 0
ABDOMINAL PAIN: 0
EYE ITCHING: 0
CONSTIPATION: 1
SHORTNESS OF BREATH: 0
WHEEZING: 0
COUGH: 0
RHINORRHEA: 0

## 2022-10-25 ASSESSMENT — PATIENT HEALTH QUESTIONNAIRE - PHQ9
1. LITTLE INTEREST OR PLEASURE IN DOING THINGS: 0
SUM OF ALL RESPONSES TO PHQ QUESTIONS 1-9: 0
2. FEELING DOWN, DEPRESSED OR HOPELESS: 0
SUM OF ALL RESPONSES TO PHQ9 QUESTIONS 1 & 2: 0
SUM OF ALL RESPONSES TO PHQ QUESTIONS 1-9: 0

## 2022-10-25 ASSESSMENT — SOCIAL DETERMINANTS OF HEALTH (SDOH): HOW HARD IS IT FOR YOU TO PAY FOR THE VERY BASICS LIKE FOOD, HOUSING, MEDICAL CARE, AND HEATING?: NOT HARD AT ALL

## 2022-10-25 NOTE — PROGRESS NOTES
[facility] PRIMARY CARE  [facility] [address] [facility] [address]  [address] [address]  Dept: [phone]  Dept Fax: [phone]    Emily Hopson is a 74 y.o. female who presents today for her medical conditions/complaints as noted below. Chief Complaint   Patient presents with    6 Month Follow-Up    Constipation       HPI:     75 y/o female presented today for follow-up visit. Her home concern was constipation that she has been experiencing since few months. Has been taking OTC laxatives. Refer to gastroenterology for further recommendations. He is concerned about her hair fall which started after her hospitalization in December. On iron supplements. Advised to take them every other day until further recommendations per GI. Les Pimple Other chronic comorbidities stable. Follows up with cardiology for chronic heart issues. .  Patient declined all vaccinations. Her vital signs are stable this visit. Medications reviewed and refilled as appropriate, problem list updated. All concerns discussed in details and all questions answered to patient satisfaction. Hemoglobin A1C (%)   Date Value   2022 6.0   2021 5.2   10/18/2021 5.3             ( goal A1C is < 7)   Microalb/Crt.  Ratio (mcg/mg creat)   Date Value   2022 17     LDL Cholesterol (mg/dL)   Date Value   2022 59   2020 59   2019 187 (H)       (goal LDL is <100)   AST (U/L)   Date Value   2022 22     ALT (U/L)   Date Value   2022 15     BUN (mg/dL)   Date Value   2022 24 (H)     BP Readings from Last 3 Encounters:   10/25/22 124/72   22 127/80   22 138/76          (goal 120/80)    Past Medical History:   Diagnosis Date    COVID-19 virus infection 2021    Hyperlipidemia     Hypertension       Past Surgical History:   Procedure Laterality Date    BLADDER SUSPENSION      BLADDER SUSPENSION         Family History   Problem Relation Age of Onset Elevated Lipids Mother     Cancer Mother     High Blood Pressure Mother     Cancer Maternal Grandmother        Social History     Tobacco Use    Smoking status: Former     Packs/day: 1.00     Years: 10.00     Pack years: 10.00     Types: Cigarettes     Quit date: 1989     Years since quittin.4    Smokeless tobacco: Never   Substance Use Topics    Alcohol use: Never      Current Outpatient Medications   Medication Sig Dispense Refill    polyethylene glycol (GLYCOLAX) 17 GM/SCOOP powder Take 17 g by mouth daily 507 g 0    ferrous sulfate (FE TABS 325) 325 (65 Fe) MG EC tablet Take 1 tablet by mouth daily (with breakfast) 30 tablet 0    loratadine (CLARITIN) 10 MG tablet TAKE 1 TABLET BY MOUTH EVERY DAY 60 tablet 3    docusate sodium (COLACE) 100 mg capsule TAKE 1 CAPSULE BY MOUTH TWO TIMES A DAY 60 capsule 3    pantoprazole (PROTONIX) 40 MG tablet TAKE 1 TABLET BY MOUTH TWO TIMES A DAY 90 tablet 3    losartan (COZAAR) 50 MG tablet TAKE 1 TABLET BY MOUTH EVERY DAY      albuterol sulfate HFA (VENTOLIN HFA) 108 (90 Base) MCG/ACT inhaler Inhale 2 puffs into the lungs 4 times daily as needed for Wheezing 54 g 1    Vitamin D (CHOLECALCIFEROL) 50 MCG (2000 UT) TABS tablet Take 1 tablet by mouth daily 60 tablet 0    metFORMIN (GLUCOPHAGE-XR) 500 MG extended release tablet TAKE 1 TABLET BY MOUTH IN THE MORNING WITH breakfast 90 tablet 3    levothyroxine (SYNTHROID) 50 MCG tablet TAKE 1 TABLET BY MOUTH EVERY DAY 90 tablet 3    Handicap Placard MISC by Does not apply route For 1 year 1 each 0    aspirin 81 MG EC tablet Take 1 tablet by mouth daily 30 tablet 3    atorvastatin (LIPITOR) 80 MG tablet Take 1 tablet by mouth nightly 30 tablet 3    metoprolol succinate (TOPROL XL) 100 MG extended release tablet Take 1 tablet by mouth daily 30 tablet 3     No current facility-administered medications for this visit.      No Known Allergies    Health Maintenance   Topic Date Due    COVID-19 Vaccine (1) Never done Pneumococcal 65+ years Vaccine (1 - PCV) Never done    DTaP/Tdap/Td vaccine (1 - Tdap) Never done    Cervical cancer screen  Never done    Shingles vaccine (1 of 2) Never done    Annual Wellness Visit (AWV)  Never done    Hepatitis C screen  10/25/2023 (Originally 2/24/1973)    HIV screen  10/25/2023 (Originally 2/24/1970)    Lipids  01/28/2023    A1C test (Diabetic or Prediabetic)  04/19/2023    Depression Screen  10/25/2023    Breast cancer screen  05/23/2024    Colorectal Cancer Screen  01/18/2025    DEXA (modify frequency per FRAX score)  Completed    Flu vaccine  Completed    Hepatitis A vaccine  Aged Out    Hib vaccine  Aged Out    Meningococcal (ACWY) vaccine  Aged Out       Subjective:      Review of Systems   Constitutional:  Negative for chills and fever. Obese   HENT:  Negative for congestion, hearing loss and rhinorrhea. Eyes:  Negative for discharge and itching. Respiratory:  Negative for cough, shortness of breath and wheezing. Cardiovascular:  Negative for palpitations and leg swelling. Gastrointestinal:  Positive for constipation. Negative for abdominal distention and abdominal pain. Genitourinary:  Negative for difficulty urinating and dysuria. Musculoskeletal:  Negative for arthralgias and back pain. Skin:  Negative for rash and wound. Neurological:  Negative for light-headedness. Psychiatric/Behavioral:  Negative for agitation and behavioral problems. Objective:     Physical Exam  Constitutional:       Appearance: She is well-developed. She is obese. HENT:      Head: Normocephalic and atraumatic. Eyes:      Conjunctiva/sclera: Conjunctivae normal.      Pupils: Pupils are equal, round, and reactive to light. Neck:      Thyroid: No thyromegaly. Vascular: No JVD. Cardiovascular:      Rate and Rhythm: Normal rate and regular rhythm. Heart sounds: Normal heart sounds. No murmur heard.   Pulmonary:      Effort: Pulmonary effort is normal.      Breath sounds: Normal breath sounds. No wheezing. Abdominal:      General: Bowel sounds are normal. There is no distension. Palpations: Abdomen is soft. Tenderness: There is no abdominal tenderness. There is no rebound. Musculoskeletal:         General: No tenderness. Normal range of motion. Cervical back: Normal range of motion and neck supple. Skin:     General: Skin is warm. Findings: No erythema. Neurological:      Mental Status: She is alert and oriented to person, place, and time. Cranial Nerves: No cranial nerve deficit. Psychiatric:         Behavior: Behavior normal.     /72   Pulse 66   Resp 16   Wt 241 lb (109.3 kg)   SpO2 98%   BMI 42.69 kg/m²     Assessment:      Baltazar Mercado was seen today for 6 month follow-up and constipation. Diagnoses and all orders for this visit:    Constipation, unspecified constipation type  -     polyethylene glycol (GLYCOLAX) 17 GM/SCOOP powder; Take 17 g by mouth daily  -     Marnie Suazo MD, Gastroenterology, [place]    Need for influenza vaccination  -     Influenza, FLUAD, (age 72 y+), IM, PF, 0.5 mL    Microcytic anemia  -     CBC with Auto Differential; Future  -     Ferritin; Future  -     Iron and TIBC; Future    Chronic fatigue  -     Comprehensive Metabolic Panel; Future  -     TSH With Reflex Ft4; Future    Other orders  -     ferrous sulfate (FE TABS 325) 325 (65 Fe) MG EC tablet; Take 1 tablet by mouth daily (with breakfast)       Plan:      Return in about 4 weeks (around 11/22/2022) for AWV .     Orders Placed This Encounter   Procedures    Influenza, FLUAD, (age 72 y+), IM, PF, 0.5 mL    CBC with Auto Differential     Standing Status:   Future     Number of Occurrences:   1     Standing Expiration Date:   10/25/2023    Ferritin     Standing Status:   Future     Number of Occurrences:   1     Standing Expiration Date:   10/25/2023    Iron and TIBC     Standing Status:   Future     Number of Occurrences:   1 Standing Expiration Date:   10/25/2023    Comprehensive Metabolic Panel     Standing Status:   Future     Number of Occurrences:   1     Standing Expiration Date:   10/25/2023    TSH With Reflex Ft4     Standing Status:   Future     Number of Occurrences:   1     Standing Expiration Date:   10/25/2023    Lawson Noe MD, Gastroenterology, [place]     Referral Priority:   Routine     Referral Type:   Eval and Treat     Referral Reason:   Specialty Services Required     Referred to Provider:   Elie Duran MD     Requested Specialty:   Gastroenterology     Number of Visits Requested:   1     Orders Placed This Encounter   Medications    polyethylene glycol (GLYCOLAX) 17 GM/SCOOP powder     Sig: Take 17 g by mouth daily     Dispense:  507 g     Refill:  0    ferrous sulfate (FE TABS 325) 325 (65 Fe) MG EC tablet     Sig: Take 1 tablet by mouth daily (with breakfast)     Dispense:  30 tablet     Refill:  0         Patient given educational materials - see patient instructions. Discussed use, benefit, and side effects of prescribedmedications. All patient questions answered. Pt voiced understanding. Reviewed health maintenance. Instructed to continue current medications, diet and exercise. Patient agreed with treatment plan. Follow up as directed. Electronically signed by   Kathrine Washington MD on 10/25/2022 at 1:30 PM  [facility]. Please note that this chart was generated using voice recognition Dragon dictation software. Although every effort was made to ensure the accuracy of this automatedtranscription, some errors in transcription may have occurred.

## 2022-10-28 NOTE — RESULT ENCOUNTER NOTE
Creatinine mild elevation of creatinine. Low hemoglobin, iron saturation and iron. Will advise patient to continue iron supplements. Advise to limit use of NSAIDs.

## 2022-11-26 DIAGNOSIS — E03.2 DRUG-INDUCED HYPOTHYROIDISM: ICD-10-CM

## 2022-11-26 RX ORDER — PANTOPRAZOLE SODIUM 40 MG/1
TABLET, DELAYED RELEASE ORAL
Qty: 90 TABLET | Refills: 0 | Status: SHIPPED | OUTPATIENT
Start: 2022-11-26

## 2022-11-28 RX ORDER — LEVOTHYROXINE SODIUM 0.05 MG/1
TABLET ORAL
Qty: 90 TABLET | Refills: 0 | Status: SHIPPED | OUTPATIENT
Start: 2022-11-28

## 2022-12-19 RX ORDER — PANTOPRAZOLE SODIUM 40 MG/1
TABLET, DELAYED RELEASE ORAL
Qty: 90 TABLET | Refills: 0 | Status: SHIPPED | OUTPATIENT
Start: 2022-12-19

## 2023-01-03 RX ORDER — METFORMIN HYDROCHLORIDE 500 MG/1
TABLET, EXTENDED RELEASE ORAL
Qty: 90 TABLET | Refills: 0 | Status: SHIPPED | OUTPATIENT
Start: 2023-01-03

## 2023-01-09 ENCOUNTER — OFFICE VISIT (OUTPATIENT)
Dept: PRIMARY CARE CLINIC | Age: 68
End: 2023-01-09
Payer: MEDICARE

## 2023-01-09 VITALS
HEART RATE: 72 BPM | OXYGEN SATURATION: 98 % | SYSTOLIC BLOOD PRESSURE: 118 MMHG | DIASTOLIC BLOOD PRESSURE: 64 MMHG | WEIGHT: 233.2 LBS | HEIGHT: 63 IN | RESPIRATION RATE: 16 BRPM | BODY MASS INDEX: 41.32 KG/M2

## 2023-01-09 DIAGNOSIS — I10 ESSENTIAL (PRIMARY) HYPERTENSION: ICD-10-CM

## 2023-01-09 DIAGNOSIS — Z00.00 INITIAL MEDICARE ANNUAL WELLNESS VISIT: Primary | ICD-10-CM

## 2023-01-09 DIAGNOSIS — E06.3 HYPOTHYROIDISM DUE TO HASHIMOTO'S THYROIDITIS: ICD-10-CM

## 2023-01-09 DIAGNOSIS — K59.09 OTHER CONSTIPATION: ICD-10-CM

## 2023-01-09 DIAGNOSIS — E03.8 HYPOTHYROIDISM DUE TO HASHIMOTO'S THYROIDITIS: ICD-10-CM

## 2023-01-09 PROCEDURE — 1123F ACP DISCUSS/DSCN MKR DOCD: CPT | Performed by: STUDENT IN AN ORGANIZED HEALTH CARE EDUCATION/TRAINING PROGRAM

## 2023-01-09 PROCEDURE — 3074F SYST BP LT 130 MM HG: CPT | Performed by: STUDENT IN AN ORGANIZED HEALTH CARE EDUCATION/TRAINING PROGRAM

## 2023-01-09 PROCEDURE — G8484 FLU IMMUNIZE NO ADMIN: HCPCS | Performed by: STUDENT IN AN ORGANIZED HEALTH CARE EDUCATION/TRAINING PROGRAM

## 2023-01-09 PROCEDURE — G0438 PPPS, INITIAL VISIT: HCPCS | Performed by: STUDENT IN AN ORGANIZED HEALTH CARE EDUCATION/TRAINING PROGRAM

## 2023-01-09 PROCEDURE — 3078F DIAST BP <80 MM HG: CPT | Performed by: STUDENT IN AN ORGANIZED HEALTH CARE EDUCATION/TRAINING PROGRAM

## 2023-01-09 PROCEDURE — 3017F COLORECTAL CA SCREEN DOC REV: CPT | Performed by: STUDENT IN AN ORGANIZED HEALTH CARE EDUCATION/TRAINING PROGRAM

## 2023-01-09 ASSESSMENT — PATIENT HEALTH QUESTIONNAIRE - PHQ9
2. FEELING DOWN, DEPRESSED OR HOPELESS: 0
SUM OF ALL RESPONSES TO PHQ9 QUESTIONS 1 & 2: 0
SUM OF ALL RESPONSES TO PHQ QUESTIONS 1-9: 0
1. LITTLE INTEREST OR PLEASURE IN DOING THINGS: 0
SUM OF ALL RESPONSES TO PHQ QUESTIONS 1-9: 0

## 2023-01-09 ASSESSMENT — LIFESTYLE VARIABLES
HOW OFTEN DO YOU HAVE A DRINK CONTAINING ALCOHOL: NEVER
HOW MANY STANDARD DRINKS CONTAINING ALCOHOL DO YOU HAVE ON A TYPICAL DAY: PATIENT DOES NOT DRINK

## 2023-01-09 NOTE — PROGRESS NOTES
Medicare Annual Wellness Visit    Andrea Solomon is here for Medicare AWV and Constipation    Assessment & Plan   Initial Medicare annual wellness visit  Other constipation-advised to stop taking iron supplements. Continue Colace and MiraLAX. If her symptoms do not get better I would advise patient to follow-up with gastroenterology for colonoscopy. Essential (primary) hypertension-continued on current regimen  Hypothyroidism due to Hashimoto's thyroiditis-continued on Synthroid    Recommendations for Preventive Services Due: see orders and patient instructions/AVS.  Recommended screening schedule for the next 5-10 years is provided to the patient in written form: see Patient Instructions/AVS.     Return for Medicare Annual Wellness Visit in 1 year. Subjective   The following acute and/or chronic problems were also addressed today: Complain about constipation. Has been using over-the-counter MiraLAX. Advised to start using Colace. Hold iron supplements. Encouraged increased intake of veggies and salads. Encouraged hydration. Patient's complete Health Risk Assessment and screening values have been reviewed and are found in Flowsheets. The following problems were reviewed today and where indicated follow up appointments were made and/or referrals ordered.     Positive Risk Factor Screenings with Interventions:               General HRA Questions:  Select all that apply: (!) New or Increased Pain    Pain Interventions:  Patient declined any further interventions or treatment       Weight and Activity:  Physical Activity: Insufficiently Active    Days of Exercise per Week: 1 day    Minutes of Exercise per Session: 30 min     On average, how many days per week do you engage in moderate to strenuous exercise (like a brisk walk)?: 1 day  Have you lost any weight without trying in the past 3 months?: No  Body mass index: (!) 41.31  Obesity Interventions:  Patient declines any further evaluation or treatment          Dentist Screen:  Have you seen the dentist within the past year?: (!) No    Intervention:  Patient declines any further evaluation or treatment     Vision Screen:  Do you have difficulty driving, watching TV, or doing any of your daily activities because of your eyesight?: No  Have you had an eye exam within the past year?: (!) No  No results found. Interventions:   Patient declines any further evaluation or treatment    Safety:  Do you always fasten your seatbelt when you are in a car?: (!) No  Interventions:  Patient declined any further interventions or treatment                     Objective   Vitals:    01/09/23 1101   BP: 118/64   Pulse: 72   Resp: 16   SpO2: 98%   Weight: 233 lb 3.2 oz (105.8 kg)   Height: 5' 3\" (1.6 m)      Body mass index is 41.31 kg/m². General Appearance: alert and oriented to person, place and time, well developed and well- nourished, in no acute distress  Skin: warm and dry, no rash or erythema  Head: normocephalic and atraumatic  Neck: supple and non-tender without mass  Pulmonary/Chest: clear to auscultation bilaterally- no wheezes  Cardiovascular: normal rate, regular rhythm, normal S1 and S2, no murmurs  Abdomen: Nondistended  Extremities: no cyanosis, clubbing or edema  Musculoskeletal: normal range of motion, no joint swelling, deformity or tenderness  Neurologic: No focal neurological deficits      No Known Allergies  Prior to Visit Medications    Medication Sig Taking?  Authorizing Provider   metFORMIN (GLUCOPHAGE-XR) 500 MG extended release tablet TAKE 1 TABLET BY MOUTH IN THE MORNING with breakfast Yes Felipe Gunderson MD   pantoprazole (PROTONIX) 40 MG tablet TAKE 1 TABLET BY MOUTH TWO TIMES A DAY Yes Felipe Gunderson MD   levothyroxine (SYNTHROID) 50 MCG tablet TAKE 1 TABLET BY MOUTH EVERY DAY Yes Candelaria Roberts MD   ferrous sulfate (FE TABS 325) 325 (65 Fe) MG EC tablet Take 1 tablet by mouth daily (with breakfast) Yes Felipe Gunderson MD   loratadine (CLARITIN) 10 MG tablet TAKE 1 TABLET BY MOUTH EVERY DAY Yes Angie Moya MD   docusate sodium (COLACE) 100 mg capsule TAKE 1 CAPSULE BY MOUTH TWO TIMES A DAY Yes Angie Moya MD   losartan (COZAAR) 50 MG tablet TAKE 1 TABLET BY MOUTH EVERY DAY Yes Historical Provider, MD   Vitamin D (CHOLECALCIFEROL) 50 MCG (2000 UT) TABS tablet Take 1 tablet by mouth daily Yes Yimi Escobar MD   Handicap Placard MISC by Does not apply route For 1 year Yes Angie Moya MD   aspirin 81 MG EC tablet Take 1 tablet by mouth daily Yes Paola Burden DO   atorvastatin (LIPITOR) 80 MG tablet Take 1 tablet by mouth nightly Yes Paola Burden,    metoprolol succinate (TOPROL XL) 100 MG extended release tablet Take 1 tablet by mouth daily Yes [facility], DO       CareTeam (Including outside providers/suppliers regularly involved in providing care):   Patient Care Team:  Angie Moya MD as PCP - General (Internal Medicine)  Angie M[facility]  Kimberly Goncalves MD as Consulting Physician (Gastroenterology)  Sergey Rangel MD as Consulting Physician (Cardiology)     Reviewed and updated this visit:  Tobacco  Allergies  Meds  Problems  Med Hx  Surg Hx  Soc Hx  Fam Hx        Angie Moya MD  [facility] [address]  [address] [address]

## 2023-01-09 NOTE — PATIENT INSTRUCTIONS
Learning About Dental Care for Older Adults  Dental care for older adults: Overview  Dental care for older people is much the same as for younger adults. But older adults do have concerns that younger adults do not. Older adults may have problems with gum disease and decay on the roots of their teeth. They may need missing teeth replaced or broken fillings fixed. Or they may have dentures that need to be cared for. Some older adults may have trouble holding a toothbrush. You can help remind the person you are caring for to brush and floss their teeth or to clean their dentures. In some cases, you may need to do the brushing and other dental care tasks. People who have trouble using their hands or who have dementia may need this extra help. How can you help with dental care? Normal dental care  To keep the teeth and gums healthy:  Brush the teeth with fluoride toothpaste twice a day--in the morning and at night--and floss at least once a day. Plaque can quickly build up on the teeth of older adults. Watch for the signs of gum disease. These signs include gums that bleed after brushing or after eating hard foods, such as apples. See a dentist regularly. Many experts recommend checkups every 6 months. Keep the dentist up to date on any new medications the person is taking. Encourage a balanced diet that includes whole grains, vegetables, and fruits, and that is low in saturated fat and sodium. Encourage the person you're caring for not to use tobacco products. They can affect dental and general health. Many older adults have a fixed income and feel that they can't afford dental care. But most [facility] and [facility] have programs in which dentists help older adults by lowering fees. Contact your area's public health offices or  for information about dental care in your area.   Using a toothbrush  Older adults with arthritis sometimes have trouble brushing their teeth because they can't easily hold the toothbrush. Their hands and fingers may be stiff, painful, or weak. If this is the case, you can: Offer an electric toothbrush. Enlarge the handle of a non-electric toothbrush by wrapping a sponge, an elastic bandage, or adhesive tape around it. Push the toothbrush handle through a ball made of rubber or soft foam.  Make the handle longer and thicker by taping Popsicle sticks or tongue depressors to it. You may also be able to buy special toothbrushes, toothpaste dispensers, and floss holders. Your doctor may recommend a soft-bristle toothbrush if the person you care for bleeds easily. Bleeding can happen because of a health problem or from certain medicines. A toothpaste for sensitive teeth may help if the person you care for has sensitive teeth. How do you brush and floss someone's teeth? If the person you are caring for has a hard time cleaning their teeth on their own, you may need to brush and floss their teeth for them. It may be easiest to have the person sit and face away from you, and to sit or stand behind them. That way you can steady their head against your arm as you reach around to floss and brush their teeth. Choose a place that has good lighting and is comfortable for both of you. Before you begin, gather your supplies. You will need gloves, floss, a toothbrush, and a container to hold water if you are not near a sink. Wash and dry your hands well and put on gloves. Start by flossing:  Gently work a piece of floss between each of the teeth toward the gums. A plastic flossing tool may make this easier, and they are available at most [facility]. Curve the floss around each tooth into a U-shape and gently slide it under the gum line. Move the floss firmly up and down several times to scrape off the plaque. After you've finished flossing, throw away the used floss and begin brushing:  Wet the brush and apply toothpaste. Place the brush at a 45-degree angle where the teeth meet the gums. Press firmly, and move the brush in small circles over the surface of the teeth. Be careful not to brush too hard. Vigorous brushing can make the gums pull away from the teeth and can scratch the tooth enamel. Brush all surfaces of the teeth, on the tongue side and on the cheek side. Pay special attention to the front teeth and all surfaces of the back teeth. Brush chewing surfaces with short back-and-forth strokes. After you've finished, help the person rinse the remaining toothpaste from their mouth. Where can you learn more? Go to http://www.woods.com/ and enter F944 to learn more about \"Learning About Dental Care for Older Adults. \"  Current as of: June 16, 2022               Content Version: 13.5  © 2006-2022 Healthwise, [employer]. Care instructions adapted under license by [facility] ([facility]). If you have questions about a medical condition or this instruction, always ask your healthcare professional. [place] [address] any warranty or liability for your use of this information. Learning About Vision Tests  What are vision tests? The four most common vision tests are visual acuity tests, refraction, visual field tests, and color vision tests. Visual acuity (sharpness) tests  These tests are used: To see if you need glasses or contact lenses. To monitor an eye problem. To check an eye injury. Visual acuity tests are done as part of routine exams. You may also have this test when you get your 's license or apply for some types of jobs. Visual field tests  These tests are used: To check for vision loss in any area of your range of vision. To screen for certain eye diseases. To look for nerve damage after a stroke, head injury, or other problem that could reduce blood flow to the brain. Refraction and color tests  A refraction test is done to find the right prescription for glasses and contact lenses.   A color vision test is done to check for color blindness. Color vision is often tested as part of a routine exam. You may also have this test when you apply for a job where recognizing different colors is important, such as , electronics, or the [employer]. How are vision tests done? Visual acuity test   You cover one eye at a time. You read aloud from a wall chart across the room. You read aloud from a small card that you hold in your hand. Refraction   You look into a special device. The device puts lenses of different strengths in front of each eye to see how strong your glasses or contact lenses need to be. Visual field tests   Your doctor may have you look through special machines. Or your doctor may simply have you stare straight ahead while they move a finger into and out of your field of vision. Color vision test   You look at pieces of printed test patterns in various colors. You say what number or symbol you see. Your doctor may have you trace the number or symbol using a pointer. How do these tests feel? There is very little chance of having a problem from this test. If dilating drops are used for a vision test, they may make the eyes sting and cause a medicine taste in the mouth. Follow-up care is a key part of your treatment and safety. Be sure to make and go to all appointments, and call your doctor if you are having problems. It's also a good idea to know your test results and keep a list of the medicines you take. Where can you learn more? Go to http://www.lara.com/ and enter G551 to learn more about \"Learning About Vision Tests. \"  Current as of: October 12, 2022               Content Version: 13.5  © 1571-7210 Healthwise, Incorporated. Care instructions adapted under license by [facility] ([facility]). If you have questions about a medical condition or this instruction, always ask your healthcare professional. [place] [address] any warranty or liability for your use of this information. Advance Directives: Care Instructions  Overview  An advance directive is a legal way to state your wishes at the end of your life. It tells your family and your doctor what to do if you can't say what you want. There are two main types of advance directives. You can change them any time your wishes change. Living will. This form tells your family and your doctor your wishes about life support and other treatment. The form is also called a declaration. Medical power of . This form lets you name a person to make treatment decisions for you when you can't speak for yourself. This person is called a health care agent (health care proxy, health care surrogate). The form is also called a durable power of  for health care. If you do not have an advance directive, decisions about your medical care may be made by a family member, or by a doctor or a  who doesn't know you. It may help to think of an advance directive as a gift to the people who care for you. If you have one, they won't have to make tough decisions by themselves. For more information, including forms for your state, see the [address] website (www.caringinfo.org/planning/advance-directives/). Follow-up care is a key part of your treatment and safety. Be sure to make and go to all appointments, and call your doctor if you are having problems. It's also a good idea to know your test results and keep a list of the medicines you take. What should you include in an advance directive? Many states have a unique advance directive form. (It may ask you to address specific issues.) Or you might use a universal form that's approved by many states. If your form doesn't tell you what to address, it may be hard to know what to include in your advance directive. Use the questions below to help you get started. Who do you want to make decisions about your medical care if you are not able to?   What life-support measures do you want if you have a serious illness that gets worse over time or can't be cured? What are you most afraid of that might happen? (Maybe you're afraid of having pain, losing your independence, or being kept alive by machines.)  Where would you prefer to die? (Your home? A hospital? A nursing home?)  Do you want to donate your organs when you die? Do you want certain Syn[employer] practices performed before you die? When should you call for help? Be sure to contact your doctor if you have any questions. Where can you learn more? Go to http://www.lara.com/ and enter R264 to learn more about \"Advance Directives: Care Instructions. \"  Current as of: June 16, 2022               Content Version: 13.5  © 4713-6614 Healthwise, Incorporated. Care instructions adapted under license by [facility] ([facility]). If you have questions about a medical condition or this instruction, always ask your healthcare professional. [place] [address] any warranty or liability for your use of this information. Personalized Preventive Plan for Lata Wright - 1/9/2023  Medicare offers a range of preventive health benefits. Some of the tests and screenings are paid in full while other may be subject to a deductible, co-insurance, and/or copay. Some of these benefits include a comprehensive review of your medical history including lifestyle, illnesses that may run in your family, and various assessments and screenings as appropriate. After reviewing your medical record and screening and assessments performed today your provider may have ordered immunizations, labs, imaging, and/or referrals for you. A list of these orders (if applicable) as well as your Preventive Care list are included within your After Visit Summary for your review.     Other Preventive Recommendations:    A preventive eye exam performed by an eye specialist is recommended every 1-2 years to screen for glaucoma; cataracts, macular degeneration, and other eye disorders. A preventive dental visit is recommended every 6 months. Try to get at least 150 minutes of exercise per week or 10,000 steps per day on a pedometer . Order or download the FREE \"Exercise & Physical Activity: Your Everyday Guide\" from [employer]. Call 3-901.192.2257 or search [employer] online. You need 4843-1933 mg of calcium and 0830-1358 IU of vitamin D per day. It is possible to meet your calcium requirement with diet alone, but a vitamin D supplement is usually necessary to meet this goal.  When exposed to the sun, use a sunscreen that protects against both UVA and UVB radiation with an SPF of 30 or greater. Reapply every 2 to 3 hours or after sweating, drying off with a towel, or swimming. Always wear a seat belt when traveling in a car. Always wear a helmet when riding a bicycle or motorcycle.

## 2023-02-06 RX ORDER — LORATADINE 10 MG/1
TABLET ORAL
Qty: 60 TABLET | Refills: 0 | Status: SHIPPED | OUTPATIENT
Start: 2023-02-06

## 2023-02-06 RX ORDER — PANTOPRAZOLE SODIUM 40 MG/1
TABLET, DELAYED RELEASE ORAL
Qty: 90 TABLET | Refills: 0 | Status: SHIPPED | OUTPATIENT
Start: 2023-02-06

## 2023-02-24 RX ORDER — METFORMIN HYDROCHLORIDE 500 MG/1
TABLET, EXTENDED RELEASE ORAL
Qty: 90 TABLET | Refills: 0 | Status: SHIPPED | OUTPATIENT
Start: 2023-02-24

## 2023-02-27 RX ORDER — PANTOPRAZOLE SODIUM 40 MG/1
TABLET, DELAYED RELEASE ORAL
Qty: 90 TABLET | Refills: 0 | Status: SHIPPED | OUTPATIENT
Start: 2023-02-27

## 2023-02-27 RX ORDER — LORATADINE 10 MG/1
TABLET ORAL
Qty: 60 TABLET | Refills: 0 | Status: SHIPPED | OUTPATIENT
Start: 2023-02-27

## 2023-03-02 DIAGNOSIS — E03.2 DRUG-INDUCED HYPOTHYROIDISM: ICD-10-CM

## 2023-03-02 RX ORDER — LEVOTHYROXINE SODIUM 0.05 MG/1
TABLET ORAL
Qty: 90 TABLET | Refills: 1 | Status: SHIPPED | OUTPATIENT
Start: 2023-03-02

## 2023-05-24 RX ORDER — PANTOPRAZOLE SODIUM 40 MG/1
TABLET, DELAYED RELEASE ORAL
Qty: 90 TABLET | Refills: 0 | Status: SHIPPED | OUTPATIENT
Start: 2023-05-24

## 2023-07-10 ENCOUNTER — OFFICE VISIT (OUTPATIENT)
Dept: PRIMARY CARE CLINIC | Age: 68
End: 2023-07-10
Payer: MEDICARE

## 2023-07-10 ENCOUNTER — TELEPHONE (OUTPATIENT)
Dept: PRIMARY CARE CLINIC | Age: 68
End: 2023-07-10

## 2023-07-10 ENCOUNTER — HOSPITAL ENCOUNTER (OUTPATIENT)
Age: 68
Setting detail: SPECIMEN
Discharge: HOME | End: 2023-07-10

## 2023-07-10 VITALS
DIASTOLIC BLOOD PRESSURE: 60 MMHG | BODY MASS INDEX: 38.33 KG/M2 | HEART RATE: 75 BPM | SYSTOLIC BLOOD PRESSURE: 112 MMHG | OXYGEN SATURATION: 99 % | WEIGHT: 216.4 LBS | RESPIRATION RATE: 14 BRPM

## 2023-07-10 DIAGNOSIS — I21.3 ST ELEVATION MYOCARDIAL INFARCTION (STEMI), UNSPECIFIED ARTERY (HCC): ICD-10-CM

## 2023-07-10 DIAGNOSIS — Z13.220 ENCOUNTER FOR LIPID SCREENING FOR CARDIOVASCULAR DISEASE: ICD-10-CM

## 2023-07-10 DIAGNOSIS — I47.29 NSVT (NONSUSTAINED VENTRICULAR TACHYCARDIA) (HCC): ICD-10-CM

## 2023-07-10 DIAGNOSIS — Z13.0 SCREENING FOR DEFICIENCY ANEMIA: ICD-10-CM

## 2023-07-10 DIAGNOSIS — E06.3 HYPOTHYROIDISM DUE TO HASHIMOTO'S THYROIDITIS: ICD-10-CM

## 2023-07-10 DIAGNOSIS — K59.04 CHRONIC IDIOPATHIC CONSTIPATION: ICD-10-CM

## 2023-07-10 DIAGNOSIS — I10 ESSENTIAL (PRIMARY) HYPERTENSION: ICD-10-CM

## 2023-07-10 DIAGNOSIS — Z12.31 BREAST CANCER SCREENING BY MAMMOGRAM: ICD-10-CM

## 2023-07-10 DIAGNOSIS — I26.99 BILATERAL PULMONARY EMBOLISM (HCC): ICD-10-CM

## 2023-07-10 DIAGNOSIS — E03.8 HYPOTHYROIDISM DUE TO HASHIMOTO'S THYROIDITIS: ICD-10-CM

## 2023-07-10 DIAGNOSIS — Z76.89 ENCOUNTER TO ESTABLISH CARE: Primary | ICD-10-CM

## 2023-07-10 DIAGNOSIS — Z13.6 ENCOUNTER FOR LIPID SCREENING FOR CARDIOVASCULAR DISEASE: ICD-10-CM

## 2023-07-10 DIAGNOSIS — I26.93 SINGLE SUBSEGMENTAL PULMONARY EMBOLISM WITHOUT ACUTE COR PULMONALE (HCC): ICD-10-CM

## 2023-07-10 DIAGNOSIS — N18.31 STAGE 3A CHRONIC KIDNEY DISEASE (HCC): ICD-10-CM

## 2023-07-10 DIAGNOSIS — E66.01 OBESITY, MORBID, BMI 40.0-49.9 (HCC): Chronic | ICD-10-CM

## 2023-07-10 LAB
ALBUMIN SERPL-MCNC: 3.6 G/DL (ref 3.5–5.2)
ALBUMIN/GLOB SERPL: 1.2 {RATIO} (ref 1–2.5)
ALP SERPL-CCNC: 162 U/L (ref 35–104)
ALT SERPL-CCNC: 7 U/L (ref 5–33)
ANION GAP SERPL CALCULATED.3IONS-SCNC: 14 MMOL/L (ref 9–17)
AST SERPL-CCNC: 12 U/L
BILIRUB SERPL-MCNC: 0.6 MG/DL (ref 0.3–1.2)
BUN SERPL-MCNC: 21 MG/DL (ref 8–23)
CALCIUM SERPL-MCNC: 8.5 MG/DL (ref 8.6–10.4)
CHLORIDE SERPL-SCNC: 106 MMOL/L (ref 98–107)
CHOLEST SERPL-MCNC: 79 MG/DL
CHOLESTEROL/HDL RATIO: 2.8
CO2 SERPL-SCNC: 22 MMOL/L (ref 20–31)
CREAT SERPL-MCNC: 1.2 MG/DL (ref 0.5–0.9)
ERYTHROCYTE [DISTWIDTH] IN BLOOD BY AUTOMATED COUNT: 18.8 % (ref 11.8–14.4)
FERRITIN SERPL-MCNC: 6 NG/ML (ref 13–150)
GFR SERPL CREATININE-BSD FRML MDRD: 50 ML/MIN/1.73M2
GLUCOSE SERPL-MCNC: 82 MG/DL (ref 70–99)
HCT VFR BLD AUTO: 22.4 % (ref 36.3–47.1)
HDLC SERPL-MCNC: 28 MG/DL
HGB BLD-MCNC: 5.6 G/DL (ref 11.9–15.1)
IRON SATN MFR SERPL: 6 % (ref 20–55)
IRON SERPL-MCNC: 21 UG/DL (ref 37–145)
LDLC SERPL CALC-MCNC: 34 MG/DL (ref 0–130)
MCH RBC QN AUTO: 18.2 PG (ref 25.2–33.5)
MCHC RBC AUTO-ENTMCNC: 25 G/DL (ref 28.4–34.8)
MCV RBC AUTO: 73 FL (ref 82.6–102.9)
NRBC BLD-RTO: 0 PER 100 WBC
PLATELET # BLD AUTO: 443 K/UL (ref 138–453)
PMV BLD AUTO: 10.7 FL (ref 8.1–13.5)
POTASSIUM SERPL-SCNC: 4.8 MMOL/L (ref 3.7–5.3)
PROT SERPL-MCNC: 6.6 G/DL (ref 6.4–8.3)
RBC # BLD AUTO: 3.07 M/UL (ref 3.95–5.11)
SODIUM SERPL-SCNC: 142 MMOL/L (ref 135–144)
TIBC SERPL-MCNC: 333 UG/DL (ref 250–450)
TRIGL SERPL-MCNC: 87 MG/DL
UNSATURATED IRON BINDING CAPACITY: 312 UG/DL (ref 112–347)
WBC OTHER # BLD: 10.5 K/UL (ref 3.5–11.3)

## 2023-07-10 PROCEDURE — 3074F SYST BP LT 130 MM HG: CPT | Performed by: NURSE PRACTITIONER

## 2023-07-10 PROCEDURE — G8427 DOCREV CUR MEDS BY ELIG CLIN: HCPCS | Performed by: NURSE PRACTITIONER

## 2023-07-10 PROCEDURE — 1036F TOBACCO NON-USER: CPT | Performed by: NURSE PRACTITIONER

## 2023-07-10 PROCEDURE — G8417 CALC BMI ABV UP PARAM F/U: HCPCS | Performed by: NURSE PRACTITIONER

## 2023-07-10 PROCEDURE — 1090F PRES/ABSN URINE INCON ASSESS: CPT | Performed by: NURSE PRACTITIONER

## 2023-07-10 PROCEDURE — 3078F DIAST BP <80 MM HG: CPT | Performed by: NURSE PRACTITIONER

## 2023-07-10 PROCEDURE — 99204 OFFICE O/P NEW MOD 45 MIN: CPT | Performed by: NURSE PRACTITIONER

## 2023-07-10 PROCEDURE — 3017F COLORECTAL CA SCREEN DOC REV: CPT | Performed by: NURSE PRACTITIONER

## 2023-07-10 PROCEDURE — 1123F ACP DISCUSS/DSCN MKR DOCD: CPT | Performed by: NURSE PRACTITIONER

## 2023-07-10 PROCEDURE — G8399 PT W/DXA RESULTS DOCUMENT: HCPCS | Performed by: NURSE PRACTITIONER

## 2023-07-10 RX ORDER — BUMETANIDE 1 MG/1
1 TABLET ORAL DAILY
COMMUNITY
Start: 2023-06-16

## 2023-07-10 SDOH — ECONOMIC STABILITY: INCOME INSECURITY: HOW HARD IS IT FOR YOU TO PAY FOR THE VERY BASICS LIKE FOOD, HOUSING, MEDICAL CARE, AND HEATING?: NOT HARD AT ALL

## 2023-07-10 SDOH — ECONOMIC STABILITY: FOOD INSECURITY: WITHIN THE PAST 12 MONTHS, YOU WORRIED THAT YOUR FOOD WOULD RUN OUT BEFORE YOU GOT MONEY TO BUY MORE.: NEVER TRUE

## 2023-07-10 SDOH — ECONOMIC STABILITY: FOOD INSECURITY: WITHIN THE PAST 12 MONTHS, THE FOOD YOU BOUGHT JUST DIDN'T LAST AND YOU DIDN'T HAVE MONEY TO GET MORE.: NEVER TRUE

## 2023-07-10 ASSESSMENT — PATIENT HEALTH QUESTIONNAIRE - PHQ9
SUM OF ALL RESPONSES TO PHQ QUESTIONS 1-9: 0
SUM OF ALL RESPONSES TO PHQ QUESTIONS 1-9: 0
SUM OF ALL RESPONSES TO PHQ9 QUESTIONS 1 & 2: 0
SUM OF ALL RESPONSES TO PHQ QUESTIONS 1-9: 0
2. FEELING DOWN, DEPRESSED OR HOPELESS: 0
1. LITTLE INTEREST OR PLEASURE IN DOING THINGS: 0
SUM OF ALL RESPONSES TO PHQ QUESTIONS 1-9: 0

## 2023-07-10 ASSESSMENT — ENCOUNTER SYMPTOMS
ALLERGIC/IMMUNOLOGIC NEGATIVE: 1
EYES NEGATIVE: 1
CONSTIPATION: 1
RESPIRATORY NEGATIVE: 1

## 2023-07-10 NOTE — PROGRESS NOTES
[address] [facility] CARE  [address] [address]  Dept: [phone]  Dept Fax: [phone]    Evie Bose is a 79 y.o. female who presents today for her medical conditions/complaintsas noted below. Evie Bose is c/o of Establish Care (Dr. Julien Homans pt) and Constipation (X2 months, also having issues with bloating and gas)        HPI:     80-year-old female patient who presented to the clinic today to establish care (previous patient of Dr. Julien Homans), and discuss constipation. Previous history and labs reviewed. Reviewed need for further screening labs, and updated mammogram.     Blood pressure reviewed and stable. Noted weight loss of 17 lbs since January of 2023. Patient states that she has not been trying to lose weight, but states that she thinks this is related to not being able to eat a lot. Patient states that when she eats she gets a full feeling very quickly. Patient expresses concerns about constipation, bloating, and gas. Patient stated that she may have 1 bowel movement weekly, and feels as though she does not get a lot of stool out when she goes. Stools are looser. States that she has tried Colace and Miralax for her constipation, but they have not given her relief. Patient does endorse that she does not drink a lot of water and feels that this may be a factor to her constipation. Discussed ways to manage constipation including increasing fiber in her diet, increasing water intake, and other OTC medications, metamucil, which may help to provide relief. Discussed medication options for chronic constipation. When reviewing lab work, it was noted that patient has had an elevated creatinine and reduced GFR over the past few times her BMP has been drawn. Discussed decreased renal function with patient who was unaware. Discussed treatment options for managing chronic kidney disease.      Hx of RUBY  She tried iron but it worsened

## 2023-07-11 NOTE — TELEPHONE ENCOUNTER
Unable to LVM, VM full. Spoke with niece again, she states she was not able to get in touch with her yesterday and assumed it was because she was at work so she sent her a text to call her, patient did not call. She is going to try to call her again and call her work as well to see if she is there.

## 2023-07-12 RX ORDER — PANTOPRAZOLE SODIUM 40 MG/1
TABLET, DELAYED RELEASE ORAL
Qty: 90 TABLET | Refills: 0 | Status: SHIPPED | OUTPATIENT
Start: 2023-07-12

## 2023-07-12 NOTE — TELEPHONE ENCOUNTER
PER NOTE FROM TEREZA ON RESULT NOTE;  Patient notified. Patient is willing to see a hematologist and would like to go to [place]. Patient states she used to take iron but it made her constipated and it was discontinued. Patient notified to go to ER due to low Hemoglobin, patient states she is not sure when she will be able to go due to work. Writer reiterated that PCP wanted her to go to ER on Monday, it would be important for her to prioritize going to ER since it is now Wednesday.  She verbalized understanding

## 2023-07-17 ENCOUNTER — HOSPITAL ENCOUNTER (OUTPATIENT)
Dept: MAMMOGRAPHY | Age: 68
Discharge: HOME | End: 2023-07-19
Payer: MEDICARE

## 2023-07-17 DIAGNOSIS — Z12.31 BREAST CANCER SCREENING BY MAMMOGRAM: ICD-10-CM

## 2023-07-17 PROCEDURE — 77067 SCR MAMMO BI INCL CAD: CPT

## 2023-08-21 ENCOUNTER — HOSPITAL ENCOUNTER (OUTPATIENT)
Age: 68
Setting detail: SPECIMEN
Discharge: HOME | End: 2023-08-21

## 2023-08-21 ENCOUNTER — OFFICE VISIT (OUTPATIENT)
Dept: PRIMARY CARE CLINIC | Age: 68
End: 2023-08-21
Payer: MEDICARE

## 2023-08-21 VITALS
DIASTOLIC BLOOD PRESSURE: 56 MMHG | HEART RATE: 81 BPM | RESPIRATION RATE: 16 BRPM | SYSTOLIC BLOOD PRESSURE: 114 MMHG | WEIGHT: 214.2 LBS | OXYGEN SATURATION: 98 % | BODY MASS INDEX: 37.94 KG/M2

## 2023-08-21 DIAGNOSIS — K59.00 CONSTIPATION, UNSPECIFIED CONSTIPATION TYPE: ICD-10-CM

## 2023-08-21 DIAGNOSIS — K92.1 HEMATOCHEZIA: Primary | ICD-10-CM

## 2023-08-21 DIAGNOSIS — D64.9 ANEMIA, UNSPECIFIED TYPE: ICD-10-CM

## 2023-08-21 DIAGNOSIS — K92.1 HEMATOCHEZIA: ICD-10-CM

## 2023-08-21 LAB
ANION GAP SERPL CALCULATED.3IONS-SCNC: 14 MMOL/L (ref 9–17)
BUN SERPL-MCNC: 13 MG/DL (ref 8–23)
CALCIUM SERPL-MCNC: 7.9 MG/DL (ref 8.6–10.4)
CHLORIDE SERPL-SCNC: 106 MMOL/L (ref 98–107)
CO2 SERPL-SCNC: 21 MMOL/L (ref 20–31)
CREAT SERPL-MCNC: 1 MG/DL (ref 0.5–0.9)
ERYTHROCYTE [DISTWIDTH] IN BLOOD BY AUTOMATED COUNT: 19.9 % (ref 11.8–14.4)
GFR SERPL CREATININE-BSD FRML MDRD: >60 ML/MIN/1.73M2
GLUCOSE SERPL-MCNC: 93 MG/DL (ref 70–99)
HCT VFR BLD AUTO: 21.4 % (ref 36.3–47.1)
HGB BLD-MCNC: 5 G/DL (ref 11.9–15.1)
MCH RBC QN AUTO: 17.1 PG (ref 25.2–33.5)
MCHC RBC AUTO-ENTMCNC: 23.4 G/DL (ref 28.4–34.8)
MCV RBC AUTO: 73.3 FL (ref 82.6–102.9)
NRBC BLD-RTO: 0 PER 100 WBC
PLATELET # BLD AUTO: 518 K/UL (ref 138–453)
PMV BLD AUTO: 10.8 FL (ref 8.1–13.5)
POTASSIUM SERPL-SCNC: 3.9 MMOL/L (ref 3.7–5.3)
RBC # BLD AUTO: 2.92 M/UL (ref 3.95–5.11)
SODIUM SERPL-SCNC: 141 MMOL/L (ref 135–144)
WBC OTHER # BLD: 10.5 K/UL (ref 3.5–11.3)

## 2023-08-21 PROCEDURE — 3078F DIAST BP <80 MM HG: CPT | Performed by: NURSE PRACTITIONER

## 2023-08-21 PROCEDURE — 1123F ACP DISCUSS/DSCN MKR DOCD: CPT | Performed by: NURSE PRACTITIONER

## 2023-08-21 PROCEDURE — 3017F COLORECTAL CA SCREEN DOC REV: CPT | Performed by: NURSE PRACTITIONER

## 2023-08-21 PROCEDURE — G8399 PT W/DXA RESULTS DOCUMENT: HCPCS | Performed by: NURSE PRACTITIONER

## 2023-08-21 PROCEDURE — 1090F PRES/ABSN URINE INCON ASSESS: CPT | Performed by: NURSE PRACTITIONER

## 2023-08-21 PROCEDURE — G8417 CALC BMI ABV UP PARAM F/U: HCPCS | Performed by: NURSE PRACTITIONER

## 2023-08-21 PROCEDURE — 1036F TOBACCO NON-USER: CPT | Performed by: NURSE PRACTITIONER

## 2023-08-21 PROCEDURE — 99214 OFFICE O/P EST MOD 30 MIN: CPT | Performed by: NURSE PRACTITIONER

## 2023-08-21 PROCEDURE — G8427 DOCREV CUR MEDS BY ELIG CLIN: HCPCS | Performed by: NURSE PRACTITIONER

## 2023-08-21 PROCEDURE — 3074F SYST BP LT 130 MM HG: CPT | Performed by: NURSE PRACTITIONER

## 2023-08-21 ASSESSMENT — ENCOUNTER SYMPTOMS
SINUS PRESSURE: 0
TROUBLE SWALLOWING: 0
CONSTIPATION: 1
SHORTNESS OF BREATH: 0
EYE REDNESS: 0
ABDOMINAL PAIN: 0
WHEEZING: 0
COUGH: 0
CHEST TIGHTNESS: 0
SINUS PAIN: 0
EYE ITCHING: 0
DIARRHEA: 0
EYE DISCHARGE: 0
NAUSEA: 0
SORE THROAT: 0
VOMITING: 0
BLOOD IN STOOL: 1

## 2023-08-21 ASSESSMENT — PATIENT HEALTH QUESTIONNAIRE - PHQ9: DEPRESSION UNABLE TO ASSESS: PT REFUSES

## 2023-08-23 ENCOUNTER — TELEPHONE (OUTPATIENT)
Dept: SURGERY | Age: 68
End: 2023-08-23

## 2023-08-23 NOTE — TELEPHONE ENCOUNTER
ATTEMPT 1- Unable to LVM, mailbox full to schedule OV with Dr. Mary Holley for Hematochezia and constipation. Referral in workque.

## 2023-08-24 ENCOUNTER — HOSPITAL ENCOUNTER (INPATIENT)
Age: 68
LOS: 5 days | Discharge: HOME | DRG: 357 | End: 2023-08-29
Attending: EMERGENCY MEDICINE | Admitting: STUDENT IN AN ORGANIZED HEALTH CARE EDUCATION/TRAINING PROGRAM
Payer: MEDICARE

## 2023-08-24 ENCOUNTER — APPOINTMENT (OUTPATIENT)
Dept: CT IMAGING | Age: 68
DRG: 357 | End: 2023-08-24
Payer: MEDICARE

## 2023-08-24 DIAGNOSIS — D50.0 BLOOD LOSS ANEMIA: ICD-10-CM

## 2023-08-24 DIAGNOSIS — K62.5 RECTAL BLEEDING: ICD-10-CM

## 2023-08-24 DIAGNOSIS — K80.10 CHRONIC CHOLECYSTITIS WITH CALCULUS: ICD-10-CM

## 2023-08-24 DIAGNOSIS — K92.1 GASTROINTESTINAL HEMORRHAGE WITH MELENA: Primary | ICD-10-CM

## 2023-08-24 DIAGNOSIS — K81.0 ACUTE CHOLECYSTITIS: ICD-10-CM

## 2023-08-24 LAB
ALBUMIN SERPL-MCNC: 3.5 G/DL (ref 3.5–5.2)
ALBUMIN/GLOB SERPL: 1.2 {RATIO} (ref 1–2.5)
ALP SERPL-CCNC: 154 U/L (ref 35–104)
ALT SERPL-CCNC: 8 U/L (ref 5–33)
ANION GAP SERPL CALCULATED.3IONS-SCNC: 12 MMOL/L (ref 9–17)
AST SERPL-CCNC: 13 U/L
BACTERIA URNS QL MICRO: ABNORMAL
BASOPHILS # BLD: 0.08 K/UL (ref 0–0.2)
BASOPHILS NFR BLD: 1 % (ref 0–2)
BILIRUB DIRECT SERPL-MCNC: 0.2 MG/DL
BILIRUB INDIRECT SERPL-MCNC: 0.4 MG/DL (ref 0–1)
BILIRUB SERPL-MCNC: 0.6 MG/DL (ref 0.3–1.2)
BILIRUB UR QL STRIP: NEGATIVE
BUN SERPL-MCNC: 17 MG/DL (ref 8–23)
CALCIUM SERPL-MCNC: 8.3 MG/DL (ref 8.6–10.4)
CHARACTER UR: ABNORMAL
CHLORIDE SERPL-SCNC: 104 MMOL/L (ref 98–107)
CLARITY UR: CLEAR
CO2 SERPL-SCNC: 23 MMOL/L (ref 20–31)
COLOR UR: YELLOW
CREAT SERPL-MCNC: 1.2 MG/DL (ref 0.5–0.9)
EOSINOPHIL # BLD: 0.08 K/UL (ref 0–0.4)
EOSINOPHILS RELATIVE PERCENT: 1 % (ref 1–4)
EPI CELLS #/AREA URNS HPF: ABNORMAL /HPF (ref 0–5)
ERYTHROCYTE [DISTWIDTH] IN BLOOD BY AUTOMATED COUNT: 19.9 % (ref 12.5–15.4)
GFR SERPL CREATININE-BSD FRML MDRD: 50 ML/MIN/1.73M2
GLUCOSE SERPL-MCNC: 102 MG/DL (ref 70–99)
GLUCOSE UR STRIP-MCNC: ABNORMAL MG/DL
HCT VFR BLD AUTO: 18.8 % (ref 36–46)
HCT VFR BLD AUTO: 20 % (ref 36–46)
HGB BLD-MCNC: 5.1 G/DL (ref 12–16)
HGB BLD-MCNC: 5.7 G/DL (ref 12–16)
HGB UR QL STRIP.AUTO: NEGATIVE
KETONES UR STRIP-MCNC: NEGATIVE MG/DL
LEUKOCYTE ESTERASE UR QL STRIP: ABNORMAL
LIPASE SERPL-CCNC: 37 U/L (ref 13–60)
LYMPHOCYTES NFR BLD: 1.26 K/UL (ref 1–4.8)
LYMPHOCYTES RELATIVE PERCENT: 15 % (ref 24–44)
MCH RBC QN AUTO: 17.3 PG (ref 26–34)
MCHC RBC AUTO-ENTMCNC: 27 G/DL (ref 31–37)
MCV RBC AUTO: 63.9 FL (ref 80–100)
MONOCYTES NFR BLD: 0.76 K/UL (ref 0.1–1.2)
MONOCYTES NFR BLD: 9 % (ref 2–11)
MORPHOLOGY: ABNORMAL
NEUTROPHILS NFR BLD: 74 % (ref 36–66)
NEUTS SEG NFR BLD: 6.22 K/UL (ref 1.8–7.7)
NITRITE UR QL STRIP: NEGATIVE
PH UR STRIP: 6 [PH] (ref 5–8)
PLATELET # BLD AUTO: 460 K/UL (ref 140–450)
PMV BLD AUTO: 8 FL (ref 6–12)
POTASSIUM SERPL-SCNC: 3.8 MMOL/L (ref 3.7–5.3)
PROT SERPL-MCNC: 6.5 G/DL (ref 6.4–8.3)
PROT UR STRIP-MCNC: NEGATIVE MG/DL
RBC # BLD AUTO: 2.94 M/UL (ref 4–5.2)
RBC #/AREA URNS HPF: ABNORMAL /HPF (ref 0–2)
SODIUM SERPL-SCNC: 139 MMOL/L (ref 135–144)
SP GR UR STRIP: 1.01 (ref 1–1.03)
UROBILINOGEN UR STRIP-ACNC: NORMAL EU/DL (ref 0–1)
WBC #/AREA URNS HPF: ABNORMAL /HPF (ref 0–5)
WBC OTHER # BLD: 8.4 K/UL (ref 3.5–11)

## 2023-08-24 PROCEDURE — 83690 ASSAY OF LIPASE: CPT

## 2023-08-24 PROCEDURE — 74177 CT ABD & PELVIS W/CONTRAST: CPT

## 2023-08-24 PROCEDURE — 87086 URINE CULTURE/COLONY COUNT: CPT

## 2023-08-24 PROCEDURE — 86901 BLOOD TYPING SEROLOGIC RH(D): CPT

## 2023-08-24 PROCEDURE — 85025 COMPLETE CBC W/AUTO DIFF WBC: CPT

## 2023-08-24 PROCEDURE — 36415 COLL VENOUS BLD VENIPUNCTURE: CPT

## 2023-08-24 PROCEDURE — 86920 COMPATIBILITY TEST SPIN: CPT

## 2023-08-24 PROCEDURE — 1200000000 HC SEMI PRIVATE

## 2023-08-24 PROCEDURE — P9016 RBC LEUKOCYTES REDUCED: HCPCS

## 2023-08-24 PROCEDURE — 6360000004 HC RX CONTRAST MEDICATION: Performed by: PHYSICIAN ASSISTANT

## 2023-08-24 PROCEDURE — 81001 URINALYSIS AUTO W/SCOPE: CPT

## 2023-08-24 PROCEDURE — 80048 BASIC METABOLIC PNL TOTAL CA: CPT

## 2023-08-24 PROCEDURE — 99285 EMERGENCY DEPT VISIT HI MDM: CPT

## 2023-08-24 PROCEDURE — 85014 HEMATOCRIT: CPT

## 2023-08-24 PROCEDURE — 83036 HEMOGLOBIN GLYCOSYLATED A1C: CPT

## 2023-08-24 PROCEDURE — 86900 BLOOD TYPING SEROLOGIC ABO: CPT

## 2023-08-24 PROCEDURE — 2580000003 HC RX 258: Performed by: PHYSICIAN ASSISTANT

## 2023-08-24 PROCEDURE — 86850 RBC ANTIBODY SCREEN: CPT

## 2023-08-24 PROCEDURE — 2580000003 HC RX 258: Performed by: EMERGENCY MEDICINE

## 2023-08-24 PROCEDURE — 85018 HEMOGLOBIN: CPT

## 2023-08-24 PROCEDURE — 80076 HEPATIC FUNCTION PANEL: CPT

## 2023-08-24 PROCEDURE — 6360000002 HC RX W HCPCS: Performed by: EMERGENCY MEDICINE

## 2023-08-24 RX ORDER — INSULIN LISPRO 100 [IU]/ML
0-4 INJECTION, SOLUTION INTRAVENOUS; SUBCUTANEOUS EVERY 4 HOURS
Status: DISCONTINUED | OUTPATIENT
Start: 2023-08-24 | End: 2023-08-28

## 2023-08-24 RX ORDER — SODIUM CHLORIDE 0.9 % (FLUSH) 0.9 %
10 SYRINGE (ML) INJECTION PRN
Status: DISCONTINUED | OUTPATIENT
Start: 2023-08-24 | End: 2023-08-29 | Stop reason: HOSPADM

## 2023-08-24 RX ORDER — DEXTROSE MONOHYDRATE 100 MG/ML
INJECTION, SOLUTION INTRAVENOUS CONTINUOUS PRN
Status: DISCONTINUED | OUTPATIENT
Start: 2023-08-24 | End: 2023-08-29 | Stop reason: HOSPADM

## 2023-08-24 RX ORDER — ONDANSETRON 2 MG/ML
4 INJECTION INTRAMUSCULAR; INTRAVENOUS EVERY 6 HOURS PRN
Status: DISCONTINUED | OUTPATIENT
Start: 2023-08-24 | End: 2023-08-29 | Stop reason: HOSPADM

## 2023-08-24 RX ORDER — 0.9 % SODIUM CHLORIDE 0.9 %
80 INTRAVENOUS SOLUTION INTRAVENOUS ONCE
Status: DISCONTINUED | OUTPATIENT
Start: 2023-08-24 | End: 2023-08-29 | Stop reason: HOSPADM

## 2023-08-24 RX ORDER — ACETAMINOPHEN 325 MG/1
650 TABLET ORAL EVERY 6 HOURS PRN
Status: DISCONTINUED | OUTPATIENT
Start: 2023-08-24 | End: 2023-08-29 | Stop reason: HOSPADM

## 2023-08-24 RX ORDER — CETIRIZINE HYDROCHLORIDE 10 MG/1
10 TABLET ORAL DAILY
Status: DISCONTINUED | OUTPATIENT
Start: 2023-08-25 | End: 2023-08-29 | Stop reason: HOSPADM

## 2023-08-24 RX ORDER — PANTOPRAZOLE SODIUM 40 MG/1
40 TABLET, DELAYED RELEASE ORAL 2 TIMES DAILY
Status: DISCONTINUED | OUTPATIENT
Start: 2023-08-24 | End: 2023-08-24

## 2023-08-24 RX ORDER — SODIUM CHLORIDE 0.9 % (FLUSH) 0.9 %
5-40 SYRINGE (ML) INJECTION PRN
Status: DISCONTINUED | OUTPATIENT
Start: 2023-08-24 | End: 2023-08-29 | Stop reason: HOSPADM

## 2023-08-24 RX ORDER — GLUCAGON 1 MG/ML
1 KIT INJECTION PRN
Status: DISCONTINUED | OUTPATIENT
Start: 2023-08-24 | End: 2023-08-29 | Stop reason: HOSPADM

## 2023-08-24 RX ORDER — ATORVASTATIN CALCIUM 40 MG/1
80 TABLET, FILM COATED ORAL NIGHTLY
Status: DISCONTINUED | OUTPATIENT
Start: 2023-08-24 | End: 2023-08-29 | Stop reason: HOSPADM

## 2023-08-24 RX ORDER — SODIUM CHLORIDE 9 MG/ML
INJECTION, SOLUTION INTRAVENOUS CONTINUOUS
Status: DISCONTINUED | OUTPATIENT
Start: 2023-08-24 | End: 2023-08-25

## 2023-08-24 RX ORDER — SODIUM CHLORIDE 9 MG/ML
INJECTION, SOLUTION INTRAVENOUS PRN
Status: DISCONTINUED | OUTPATIENT
Start: 2023-08-24 | End: 2023-08-29 | Stop reason: HOSPADM

## 2023-08-24 RX ORDER — ACETAMINOPHEN 650 MG/1
650 SUPPOSITORY RECTAL EVERY 6 HOURS PRN
Status: DISCONTINUED | OUTPATIENT
Start: 2023-08-24 | End: 2023-08-29 | Stop reason: HOSPADM

## 2023-08-24 RX ORDER — SODIUM CHLORIDE 0.9 % (FLUSH) 0.9 %
5-40 SYRINGE (ML) INJECTION EVERY 12 HOURS SCHEDULED
Status: DISCONTINUED | OUTPATIENT
Start: 2023-08-24 | End: 2023-08-29 | Stop reason: HOSPADM

## 2023-08-24 RX ORDER — BUMETANIDE 1 MG/1
1 TABLET ORAL DAILY
Status: DISCONTINUED | OUTPATIENT
Start: 2023-08-25 | End: 2023-08-29 | Stop reason: HOSPADM

## 2023-08-24 RX ORDER — ONDANSETRON 4 MG/1
4 TABLET, ORALLY DISINTEGRATING ORAL EVERY 8 HOURS PRN
Status: DISCONTINUED | OUTPATIENT
Start: 2023-08-24 | End: 2023-08-29 | Stop reason: HOSPADM

## 2023-08-24 RX ORDER — SODIUM CHLORIDE 9 MG/ML
INJECTION, SOLUTION INTRAVENOUS PRN
Status: COMPLETED | OUTPATIENT
Start: 2023-08-24 | End: 2023-08-24

## 2023-08-24 RX ORDER — ASPIRIN 81 MG/1
81 TABLET ORAL DAILY
Status: DISCONTINUED | OUTPATIENT
Start: 2023-08-25 | End: 2023-08-25

## 2023-08-24 RX ORDER — LEVOTHYROXINE SODIUM 0.05 MG/1
50 TABLET ORAL DAILY
Status: DISCONTINUED | OUTPATIENT
Start: 2023-08-25 | End: 2023-08-29 | Stop reason: HOSPADM

## 2023-08-24 RX ORDER — METOPROLOL SUCCINATE 50 MG/1
100 TABLET, EXTENDED RELEASE ORAL DAILY
Status: DISCONTINUED | OUTPATIENT
Start: 2023-08-25 | End: 2023-08-29 | Stop reason: HOSPADM

## 2023-08-24 RX ORDER — LOSARTAN POTASSIUM 50 MG/1
50 TABLET ORAL DAILY
Status: DISCONTINUED | OUTPATIENT
Start: 2023-08-25 | End: 2023-08-29 | Stop reason: HOSPADM

## 2023-08-24 RX ADMIN — SODIUM CHLORIDE: 9 INJECTION, SOLUTION INTRAVENOUS at 17:43

## 2023-08-24 RX ADMIN — PIPERACILLIN AND TAZOBACTAM 3375 MG: 3; .375 INJECTION, POWDER, LYOPHILIZED, FOR SOLUTION INTRAVENOUS at 22:09

## 2023-08-24 RX ADMIN — IOPAMIDOL 75 ML: 755 INJECTION, SOLUTION INTRAVENOUS at 18:56

## 2023-08-24 RX ADMIN — SODIUM CHLORIDE, PRESERVATIVE FREE 10 ML: 5 INJECTION INTRAVENOUS at 18:57

## 2023-08-24 RX ADMIN — Medication 80 ML: at 18:58

## 2023-08-24 ASSESSMENT — PAIN SCALES - GENERAL
PAINLEVEL_OUTOF10: 0
PAINLEVEL_OUTOF10: 2

## 2023-08-24 ASSESSMENT — LIFESTYLE VARIABLES
HOW MANY STANDARD DRINKS CONTAINING ALCOHOL DO YOU HAVE ON A TYPICAL DAY: PATIENT DOES NOT DRINK
HOW OFTEN DO YOU HAVE A DRINK CONTAINING ALCOHOL: NEVER
HOW OFTEN DO YOU HAVE A DRINK CONTAINING ALCOHOL: NEVER

## 2023-08-24 ASSESSMENT — PAIN - FUNCTIONAL ASSESSMENT: PAIN_FUNCTIONAL_ASSESSMENT: 0-10

## 2023-08-24 NOTE — CONSENT
Informed Consent for Blood Component Transfusion Note    I have discussed with the patient the rationale for blood component transfusion; its benefits in treating or preventing fatigue, organ damage, or death; and its risk which includes mild transfusion reactions, rare risk of blood borne infection, or more serious but rare reactions. I have discussed the alternatives to transfusion, including the risk and consequences of not receiving transfusion. The patient had an opportunity to ask questions and had agreed to proceed with transfusion of blood components.     Electronically signed by Savana Lloyd MD on 8/24/23 at 5:05 PM EDT

## 2023-08-25 PROBLEM — K81.0 ACUTE CHOLECYSTITIS: Status: ACTIVE | Noted: 2023-08-25

## 2023-08-25 PROBLEM — K62.89 MASS IN RECTUM: Status: ACTIVE | Noted: 2023-08-25

## 2023-08-25 PROBLEM — N30.00 ACUTE CYSTITIS WITHOUT HEMATURIA: Status: ACTIVE | Noted: 2023-08-25

## 2023-08-25 LAB
ANION GAP SERPL CALCULATED.3IONS-SCNC: 8 MMOL/L (ref 9–17)
BUN SERPL-MCNC: 16 MG/DL (ref 8–23)
CALCIUM SERPL-MCNC: 7.9 MG/DL (ref 8.6–10.4)
CHLORIDE SERPL-SCNC: 108 MMOL/L (ref 98–107)
CO2 SERPL-SCNC: 24 MMOL/L (ref 20–31)
CREAT SERPL-MCNC: 1.1 MG/DL (ref 0.5–0.9)
EST. AVERAGE GLUCOSE BLD GHB EST-MCNC: 114 MG/DL
GFR SERPL CREATININE-BSD FRML MDRD: 55 ML/MIN/1.73M2
GLUCOSE BLD-MCNC: 112 MG/DL (ref 65–105)
GLUCOSE BLD-MCNC: 66 MG/DL (ref 65–105)
GLUCOSE BLD-MCNC: 69 MG/DL (ref 65–105)
GLUCOSE BLD-MCNC: 75 MG/DL (ref 65–105)
GLUCOSE BLD-MCNC: 86 MG/DL (ref 65–105)
GLUCOSE BLD-MCNC: 87 MG/DL (ref 65–105)
GLUCOSE SERPL-MCNC: 88 MG/DL (ref 70–99)
HBA1C MFR BLD: 5.6 % (ref 4–6)
HCT VFR BLD AUTO: 23.1 % (ref 36–46)
HCT VFR BLD AUTO: 27 % (ref 36–46)
HCT VFR BLD AUTO: 28 % (ref 36–46)
HCT VFR BLD AUTO: 30.4 % (ref 36–46)
HGB BLD-MCNC: 6.7 G/DL (ref 12–16)
HGB BLD-MCNC: 8.1 G/DL (ref 12–16)
HGB BLD-MCNC: 8.2 G/DL (ref 12–16)
HGB BLD-MCNC: 8.6 G/DL (ref 12–16)
INR PPP: 1
IRON SATN MFR SERPL: 5 % (ref 20–55)
IRON SERPL-MCNC: 14 UG/DL (ref 37–145)
MICROORGANISM SPEC CULT: NORMAL
PARTIAL THROMBOPLASTIN TIME: <20 SEC (ref 21.3–31.3)
POTASSIUM SERPL-SCNC: 3.6 MMOL/L (ref 3.7–5.3)
PROTHROMBIN TIME: 10.8 SEC (ref 9.4–12.6)
SODIUM SERPL-SCNC: 140 MMOL/L (ref 135–144)
SPECIMEN DESCRIPTION: NORMAL
TIBC SERPL-MCNC: 303 UG/DL (ref 250–450)
UNSATURATED IRON BINDING CAPACITY: 289 UG/DL (ref 112–347)

## 2023-08-25 PROCEDURE — 6370000000 HC RX 637 (ALT 250 FOR IP): Performed by: NURSE PRACTITIONER

## 2023-08-25 PROCEDURE — P9016 RBC LEUKOCYTES REDUCED: HCPCS

## 2023-08-25 PROCEDURE — 2580000003 HC RX 258: Performed by: PHYSICIAN ASSISTANT

## 2023-08-25 PROCEDURE — 85730 THROMBOPLASTIN TIME PARTIAL: CPT

## 2023-08-25 PROCEDURE — 36430 TRANSFUSION BLD/BLD COMPNT: CPT

## 2023-08-25 PROCEDURE — 85014 HEMATOCRIT: CPT

## 2023-08-25 PROCEDURE — 1200000000 HC SEMI PRIVATE

## 2023-08-25 PROCEDURE — 83540 ASSAY OF IRON: CPT

## 2023-08-25 PROCEDURE — 99221 1ST HOSP IP/OBS SF/LOW 40: CPT | Performed by: INTERNAL MEDICINE

## 2023-08-25 PROCEDURE — 6360000002 HC RX W HCPCS: Performed by: NURSE PRACTITIONER

## 2023-08-25 PROCEDURE — 2580000003 HC RX 258: Performed by: STUDENT IN AN ORGANIZED HEALTH CARE EDUCATION/TRAINING PROGRAM

## 2023-08-25 PROCEDURE — 83550 IRON BINDING TEST: CPT

## 2023-08-25 PROCEDURE — 82947 ASSAY GLUCOSE BLOOD QUANT: CPT

## 2023-08-25 PROCEDURE — C9113 INJ PANTOPRAZOLE SODIUM, VIA: HCPCS | Performed by: NURSE PRACTITIONER

## 2023-08-25 PROCEDURE — 85018 HEMOGLOBIN: CPT

## 2023-08-25 PROCEDURE — 99222 1ST HOSP IP/OBS MODERATE 55: CPT | Performed by: STUDENT IN AN ORGANIZED HEALTH CARE EDUCATION/TRAINING PROGRAM

## 2023-08-25 PROCEDURE — 2580000003 HC RX 258: Performed by: NURSE PRACTITIONER

## 2023-08-25 PROCEDURE — 86900 BLOOD TYPING SEROLOGIC ABO: CPT

## 2023-08-25 PROCEDURE — 6360000002 HC RX W HCPCS: Performed by: STUDENT IN AN ORGANIZED HEALTH CARE EDUCATION/TRAINING PROGRAM

## 2023-08-25 PROCEDURE — 36415 COLL VENOUS BLD VENIPUNCTURE: CPT

## 2023-08-25 PROCEDURE — 85610 PROTHROMBIN TIME: CPT

## 2023-08-25 PROCEDURE — 99221 1ST HOSP IP/OBS SF/LOW 40: CPT | Performed by: SURGERY

## 2023-08-25 PROCEDURE — A4216 STERILE WATER/SALINE, 10 ML: HCPCS | Performed by: NURSE PRACTITIONER

## 2023-08-25 PROCEDURE — 80048 BASIC METABOLIC PNL TOTAL CA: CPT

## 2023-08-25 RX ORDER — SODIUM CHLORIDE 9 MG/ML
INJECTION, SOLUTION INTRAVENOUS PRN
Status: DISCONTINUED | OUTPATIENT
Start: 2023-08-25 | End: 2023-08-25

## 2023-08-25 RX ORDER — SODIUM CHLORIDE 9 MG/ML
INJECTION, SOLUTION INTRAVENOUS PRN
Status: DISCONTINUED | OUTPATIENT
Start: 2023-08-25 | End: 2023-08-29 | Stop reason: HOSPADM

## 2023-08-25 RX ADMIN — CEFTRIAXONE SODIUM 1000 MG: 1 INJECTION, POWDER, FOR SOLUTION INTRAMUSCULAR; INTRAVENOUS at 11:26

## 2023-08-25 RX ADMIN — CETIRIZINE HYDROCHLORIDE 10 MG: 10 TABLET, FILM COATED ORAL at 08:22

## 2023-08-25 RX ADMIN — SODIUM CHLORIDE, PRESERVATIVE FREE 40 MG: 5 INJECTION INTRAVENOUS at 08:27

## 2023-08-25 RX ADMIN — BUMETANIDE 1 MG: 1 TABLET ORAL at 08:22

## 2023-08-25 RX ADMIN — ATORVASTATIN CALCIUM 80 MG: 40 TABLET, FILM COATED ORAL at 00:31

## 2023-08-25 RX ADMIN — SODIUM CHLORIDE, PRESERVATIVE FREE 10 ML: 5 INJECTION INTRAVENOUS at 08:29

## 2023-08-25 RX ADMIN — ATORVASTATIN CALCIUM 80 MG: 40 TABLET, FILM COATED ORAL at 20:50

## 2023-08-25 RX ADMIN — SODIUM CHLORIDE: 9 INJECTION, SOLUTION INTRAVENOUS at 00:14

## 2023-08-25 RX ADMIN — METOPROLOL SUCCINATE 100 MG: 50 TABLET, EXTENDED RELEASE ORAL at 08:27

## 2023-08-25 RX ADMIN — SODIUM CHLORIDE, PRESERVATIVE FREE 10 ML: 5 INJECTION INTRAVENOUS at 00:14

## 2023-08-25 RX ADMIN — SODIUM CHLORIDE, PRESERVATIVE FREE 10 ML: 5 INJECTION INTRAVENOUS at 20:50

## 2023-08-25 RX ADMIN — LOSARTAN POTASSIUM 50 MG: 50 TABLET, FILM COATED ORAL at 08:27

## 2023-08-25 RX ADMIN — PIPERACILLIN AND TAZOBACTAM 3375 MG: 3; .375 INJECTION, POWDER, LYOPHILIZED, FOR SOLUTION INTRAVENOUS at 04:39

## 2023-08-25 RX ADMIN — LEVOTHYROXINE SODIUM 50 MCG: 50 TABLET ORAL at 08:27

## 2023-08-25 ASSESSMENT — PAIN SCALES - GENERAL
PAINLEVEL_OUTOF10: 0

## 2023-08-25 NOTE — PROGRESS NOTES
Consulted [facility] NP S. Waterhouse-  Critical Hgb 5.7    Orders received. @ Consulted [facility] NP MARIAK Oshea-  Critical Hgb 6.7    Orders received.

## 2023-08-25 NOTE — H&P
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   -Consult gastroenterology and general surgery; appreciate recommendations     Chronic cholecystitis   -Discussed case with general surgery; the patient will eventually require cholecystectomy when acute issues resolve     Acute cystitis without hematuria   -Ceftriaxone 1 gram q24h   -Urine culture pending     DM2   -Sliding scale insulin   -Hypoglycemia protocol     Hypothyroidism   -Continue home levothyroxine     Hypertension   -Continue home metoprolol     Hyperlipidemia   -Continue home atorvastatin     Patient is admitted as inpatient status because of co-morbidities listed above, severity of signs and symptoms as outlined, requirement for current medical therapies and most importantly because of direct risk to patient if care not provided in a hospital setting. Expected length of stay > 48 hours.  Patient is admitted in the Progressive Unit/Step down      Bella Oliver MD  8/25/2023  7:30 AM    Copy sent to Dr. Tyler Sage, APRN - CNP

## 2023-08-25 NOTE — PLAN OF CARE
Problem: Pain  Goal: Verbalizes/displays adequate comfort level or baseline comfort level  8/25/2023 1113 by Connie Brambila RN  Outcome: Progressing  8/25/2023 0444 by Marzena Aviles RN  Outcome: Progressing     Problem: ABCDS Injury Assessment  Goal: Absence of physical injury  8/25/2023 0444 by Marzena Aviles RN  Outcome: Progressing     Problem: Hematologic - Adult  Goal: Maintains hematologic stability  8/25/2023 1113 by Connie Brambila RN  Outcome: Progressing  8/25/2023 0444 by Marzena Aviles RN  Outcome: Progressing

## 2023-08-25 NOTE — PROGRESS NOTES
[address] [facility]  PROGRESS NOTE    Room # 016/493-35   Name: Alexei Syed              Reason for visit: Routine    I visited the patient. Admit Date & Time: 8/24/2023  4:19 PM    Assessment:  Alexei Syed is a 79 y.o. female in the hospital because \"rectal bleeding\". Upon entering the room patient was siting in chair. Intervention:  I introduced myself and my title as {Blank single:48175::\"\",\"spiritual care provider\"} {Blank single:67398::\"I offered space for ***  to express feelings, needs, and concerns and provided a ministry presence. \",\" \"} ***    Outcome:  ***    Plan:  {Blank single:28487::\" will follow up by ***\",\"Chaplains will remain available to offer spiritual and emotional support as needed. \"}    Electronically signed by Anne Chambers on 8/25/2023 at 12:57 PM.  [address]       08/25/23 1255   Encounter Summary   Encounter Overview/Reason  Initial Encounter   Service Provided For: Patient   Referral/Consult From:  [address] Family members   Last Encounter  08/25/23   Complexity of Encounter Moderate   Begin Time 1245   End Time  1255   Total Time Calculated 10 min   Spiritual/Emotional needs   Type Spiritual Support   Assessment/Intervention/Outcome   Assessment Anxious;Calm   Intervention Active listening;Sustaining Presence/Ministry of presence;Prayer (assurance of)/[place];Explored/Affirmed feelings, thoughts, concerns   Outcome Engaged in conversation;Expressed feelings, needs, and concerns;Expressed Gratitude;Receptive

## 2023-08-25 NOTE — PLAN OF CARE
Problem: Discharge Planning  Goal: Discharge to home or other facility with appropriate resources  Outcome: Progressing   Problem: Pain  Goal: Verbalizes/displays adequate comfort level or baseline comfort level  Outcome: Progressing   No new signs/symptoms of pain noted, pain rating < 3 on scale of 0-10, pain controlled with medication/ repositioning   Problem: ABCDS Injury Assessment  Goal: Absence of physical injury  Outcome: Progressing   No falls/ injuries this shift, bed in lowest position, brakes on, bed alarm on, call light in reach, side rails up x2   Problem: Hematologic - Adult  Goal: Maintains hematologic stability  Outcome: Progressing   VS & H&H monitored per physician orders, fall risk interventions in place, blood products administered per physician orders

## 2023-08-26 PROBLEM — K81.0 ACUTE CHOLECYSTITIS: Status: ACTIVE | Noted: 2023-08-26

## 2023-08-26 PROBLEM — K80.10 CHRONIC CHOLECYSTITIS WITH CALCULUS: Status: ACTIVE | Noted: 2023-08-25

## 2023-08-26 LAB
ABO/RH: NORMAL
ANTIBODY SCREEN: NEGATIVE
ARM BAND NUMBER: NORMAL
BLOOD BANK BLOOD PRODUCT EXPIRATION DATE: NORMAL
BLOOD BANK DISPENSE STATUS: NORMAL
BLOOD BANK ISBT PRODUCT BLOOD TYPE: 5100
BLOOD BANK PRODUCT CODE: NORMAL
BLOOD BANK SAMPLE EXPIRATION: NORMAL
BLOOD BANK UNIT TYPE AND RH: NORMAL
BPU ID: NORMAL
COMPONENT: NORMAL
CROSSMATCH RESULT: NORMAL
GLUCOSE BLD-MCNC: 75 MG/DL (ref 65–105)
GLUCOSE BLD-MCNC: 77 MG/DL (ref 65–105)
GLUCOSE BLD-MCNC: 83 MG/DL (ref 65–105)
GLUCOSE BLD-MCNC: 85 MG/DL (ref 65–105)
GLUCOSE BLD-MCNC: 91 MG/DL (ref 65–105)
HCT VFR BLD AUTO: 27.1 % (ref 36–46)
HCT VFR BLD AUTO: 28.9 % (ref 36–46)
HCT VFR BLD AUTO: 31.4 % (ref 36–46)
HCT VFR BLD AUTO: 31.8 % (ref 36–46)
HGB BLD-MCNC: 8.2 G/DL (ref 12–16)
HGB BLD-MCNC: 8.4 G/DL (ref 12–16)
HGB BLD-MCNC: 8.5 G/DL (ref 12–16)
HGB BLD-MCNC: 8.6 G/DL (ref 12–16)
TRANSFUSION STATUS: NORMAL
UNIT DIVISION: 0
UNIT ISSUE DATE/TIME: NORMAL

## 2023-08-26 PROCEDURE — 1200000000 HC SEMI PRIVATE

## 2023-08-26 PROCEDURE — C9113 INJ PANTOPRAZOLE SODIUM, VIA: HCPCS | Performed by: NURSE PRACTITIONER

## 2023-08-26 PROCEDURE — 99232 SBSQ HOSP IP/OBS MODERATE 35: CPT | Performed by: STUDENT IN AN ORGANIZED HEALTH CARE EDUCATION/TRAINING PROGRAM

## 2023-08-26 PROCEDURE — 99232 SBSQ HOSP IP/OBS MODERATE 35: CPT | Performed by: INTERNAL MEDICINE

## 2023-08-26 PROCEDURE — 85018 HEMOGLOBIN: CPT

## 2023-08-26 PROCEDURE — 6360000002 HC RX W HCPCS: Performed by: NURSE PRACTITIONER

## 2023-08-26 PROCEDURE — 99231 SBSQ HOSP IP/OBS SF/LOW 25: CPT | Performed by: SURGERY

## 2023-08-26 PROCEDURE — 36415 COLL VENOUS BLD VENIPUNCTURE: CPT

## 2023-08-26 PROCEDURE — 6370000000 HC RX 637 (ALT 250 FOR IP): Performed by: NURSE PRACTITIONER

## 2023-08-26 PROCEDURE — A4216 STERILE WATER/SALINE, 10 ML: HCPCS | Performed by: NURSE PRACTITIONER

## 2023-08-26 PROCEDURE — 85014 HEMATOCRIT: CPT

## 2023-08-26 PROCEDURE — 82947 ASSAY GLUCOSE BLOOD QUANT: CPT

## 2023-08-26 PROCEDURE — 2580000003 HC RX 258: Performed by: NURSE PRACTITIONER

## 2023-08-26 RX ADMIN — SODIUM CHLORIDE, PRESERVATIVE FREE 40 MG: 5 INJECTION INTRAVENOUS at 08:23

## 2023-08-26 RX ADMIN — LEVOTHYROXINE SODIUM 50 MCG: 50 TABLET ORAL at 06:31

## 2023-08-26 RX ADMIN — CETIRIZINE HYDROCHLORIDE 10 MG: 10 TABLET, FILM COATED ORAL at 08:23

## 2023-08-26 RX ADMIN — ATORVASTATIN CALCIUM 80 MG: 40 TABLET, FILM COATED ORAL at 20:00

## 2023-08-26 RX ADMIN — SODIUM CHLORIDE, PRESERVATIVE FREE 10 ML: 5 INJECTION INTRAVENOUS at 08:24

## 2023-08-26 RX ADMIN — LOSARTAN POTASSIUM 50 MG: 50 TABLET, FILM COATED ORAL at 08:23

## 2023-08-26 RX ADMIN — BUMETANIDE 1 MG: 1 TABLET ORAL at 08:23

## 2023-08-26 RX ADMIN — METOPROLOL SUCCINATE 100 MG: 50 TABLET, EXTENDED RELEASE ORAL at 08:23

## 2023-08-26 RX ADMIN — SODIUM CHLORIDE, PRESERVATIVE FREE 10 ML: 5 INJECTION INTRAVENOUS at 20:00

## 2023-08-26 NOTE — PLAN OF CARE
Problem: Discharge Planning  Goal: Discharge to home or other facility with appropriate resources  Outcome: Progressing   Problem: Chronic Conditions and Co-morbidities  Goal: Patient's chronic conditions and co-morbidity symptoms are monitored and maintained or improved  Outcome: Progressing   Problem: Pain  Goal: Verbalizes/displays adequate comfort level or baseline comfort level  Outcome: Progressing   No new signs/symptoms of pain noted, pain rating < 3 on scale of 0-10, pain controlled with medication/ repositioning   Problem: Hematologic - Adult  Goal: Maintains hematologic stability  Outcome: Progressing   VS & H&H monitored per physician orders  Problem: ABCDS Injury Assessment  Goal: Absence of physical injury  Outcome: Progressing   No falls/ injuries this shift, bed in lowest position, brakes on, bed alarm on, call light in reach, side rails up x2

## 2023-08-26 NOTE — PROGRESS NOTES
[facility]  Office: [address], DO, Jl Uriarte, DO, Jayy Salas, DO, Wei Tejeda Blood, DO, Riddhi Alaniz MD, Daniel Hooper MD, Xochilt Evans MD, Marixa Snyder MD,  Carlos Stoner MD, Ambika Castellano MD, Davian Eastman, DO, Mona Oliveira MD,  Lolita Diaz MD, Marybel Villegas MD, Santiago Ojeda DO, Padilla Snow MD,  Keenan Reagan DO, Leona Eisenmenger, MD, Mimi Richmond MD, Gali Rascon MD, Cheryle Spillers, MD,  Bandar Duran MD, Marni Georges MD, Connie Tuttle MD, Karen Rodriguez DO, Andria Gasca MD,  Tommie Josue MD, Rita Angulo, CNP,  Chaim Abad, CNP,, Adela Love, CNP,  Dana Fine [facility], Lera Sandhoff, CNP, Louisa Cole, CNP, Marc Lundberg, CNP, Lelo Orlando, CNP, Aide Jimenez, CNP, Aruna Glass, CNP, Leti Campos PA-C, Arnaud Somers, SKIP, Willian Serna CNP, Kristen Marie, [address] [place] [address]    Progress Note    8/26/2023    9:22 AM    Name:   Pablo Cuellar  MRN:     [id number]     Acct:      [de-identified]   Room:   [address] [place] [address] Day:  2  Admit Date:  8/24/2023  4:19 PM    PCP:   JOSEPH Bueno CNP  Code Status:  Full Code    Subjective:     Patient was seen and examined at bedside this AM. She continues to complain of rectal bleeding but states that it is slowing down. Gastroenterology and general surgery following; plan for inpatient colonoscopy and cholecystectomy. Medications:      Allergies:  No Known Allergies    Current Meds:   Scheduled Meds:    cefTRIAXone (ROCEPHIN) IV  1,000 mg IntraVENous Q24H    sodium chloride  80 mL IntraVENous Once    atorvastatin  80 mg Oral Nightly    bumetanide  1 mg Oral Daily    levothyroxine  50 mcg Oral Daily    cetirizine  10 mg Oral Daily    losartan  50 mg Oral Daily    metoprolol succinate  100 mg Oral Daily    sodium chloride flush  5-40 mL IntraVENous 2 times per day    insulin lispro  0-4 Units SubCUTAneous

## 2023-08-27 ENCOUNTER — ANESTHESIA EVENT (OUTPATIENT)
Dept: OPERATING ROOM | Age: 68
DRG: 357 | End: 2023-08-27
Payer: MEDICARE

## 2023-08-27 LAB
GLUCOSE BLD-MCNC: 101 MG/DL (ref 65–105)
GLUCOSE BLD-MCNC: 126 MG/DL (ref 65–105)
GLUCOSE BLD-MCNC: 137 MG/DL (ref 65–105)
GLUCOSE BLD-MCNC: 77 MG/DL (ref 65–105)
GLUCOSE BLD-MCNC: 81 MG/DL (ref 65–105)
GLUCOSE BLD-MCNC: 81 MG/DL (ref 65–105)
GLUCOSE BLD-MCNC: 86 MG/DL (ref 65–105)
HCT VFR BLD AUTO: 26.5 % (ref 36–46)
HCT VFR BLD AUTO: 29.2 % (ref 36–46)
HCT VFR BLD AUTO: 31.3 % (ref 36–46)
HCT VFR BLD AUTO: 32.5 % (ref 36–46)
HGB BLD-MCNC: 7.8 G/DL (ref 12–16)
HGB BLD-MCNC: 8.3 G/DL (ref 12–16)
HGB BLD-MCNC: 8.5 G/DL (ref 12–16)
HGB BLD-MCNC: 8.7 G/DL (ref 12–16)

## 2023-08-27 PROCEDURE — 85014 HEMATOCRIT: CPT

## 2023-08-27 PROCEDURE — 99231 SBSQ HOSP IP/OBS SF/LOW 25: CPT | Performed by: SURGERY

## 2023-08-27 PROCEDURE — 36415 COLL VENOUS BLD VENIPUNCTURE: CPT

## 2023-08-27 PROCEDURE — 2580000003 HC RX 258: Performed by: NURSE PRACTITIONER

## 2023-08-27 PROCEDURE — 6360000002 HC RX W HCPCS: Performed by: NURSE PRACTITIONER

## 2023-08-27 PROCEDURE — 82947 ASSAY GLUCOSE BLOOD QUANT: CPT

## 2023-08-27 PROCEDURE — 6370000000 HC RX 637 (ALT 250 FOR IP): Performed by: NURSE PRACTITIONER

## 2023-08-27 PROCEDURE — 99232 SBSQ HOSP IP/OBS MODERATE 35: CPT | Performed by: STUDENT IN AN ORGANIZED HEALTH CARE EDUCATION/TRAINING PROGRAM

## 2023-08-27 PROCEDURE — 1200000000 HC SEMI PRIVATE

## 2023-08-27 PROCEDURE — 6370000000 HC RX 637 (ALT 250 FOR IP): Performed by: INTERNAL MEDICINE

## 2023-08-27 PROCEDURE — 85018 HEMOGLOBIN: CPT

## 2023-08-27 PROCEDURE — C9113 INJ PANTOPRAZOLE SODIUM, VIA: HCPCS | Performed by: NURSE PRACTITIONER

## 2023-08-27 PROCEDURE — A4216 STERILE WATER/SALINE, 10 ML: HCPCS | Performed by: NURSE PRACTITIONER

## 2023-08-27 RX ORDER — BISACODYL 5 MG/1
20 TABLET, DELAYED RELEASE ORAL ONCE
Status: COMPLETED | OUTPATIENT
Start: 2023-08-27 | End: 2023-08-27

## 2023-08-27 RX ORDER — INDOCYANINE GREEN AND WATER 25 MG
5 KIT INJECTION
Status: ACTIVE | OUTPATIENT
Start: 2023-08-27 | End: 2023-08-27

## 2023-08-27 RX ADMIN — METOPROLOL SUCCINATE 100 MG: 50 TABLET, EXTENDED RELEASE ORAL at 08:23

## 2023-08-27 RX ADMIN — SODIUM CHLORIDE, PRESERVATIVE FREE 10 ML: 5 INJECTION INTRAVENOUS at 21:11

## 2023-08-27 RX ADMIN — BISACODYL 20 MG: 5 TABLET, COATED ORAL at 13:18

## 2023-08-27 RX ADMIN — LEVOTHYROXINE SODIUM 50 MCG: 50 TABLET ORAL at 06:45

## 2023-08-27 RX ADMIN — LOSARTAN POTASSIUM 50 MG: 50 TABLET, FILM COATED ORAL at 08:23

## 2023-08-27 RX ADMIN — SODIUM CHLORIDE, PRESERVATIVE FREE 10 ML: 5 INJECTION INTRAVENOUS at 08:23

## 2023-08-27 RX ADMIN — ATORVASTATIN CALCIUM 80 MG: 40 TABLET, FILM COATED ORAL at 21:11

## 2023-08-27 RX ADMIN — BUMETANIDE 1 MG: 1 TABLET ORAL at 08:23

## 2023-08-27 RX ADMIN — SODIUM CHLORIDE, PRESERVATIVE FREE 40 MG: 5 INJECTION INTRAVENOUS at 08:23

## 2023-08-27 RX ADMIN — CETIRIZINE HYDROCHLORIDE 10 MG: 10 TABLET, FILM COATED ORAL at 08:23

## 2023-08-27 RX ADMIN — POLYETHYLENE GLYCOL 3350, SODIUM SULFATE ANHYDROUS, SODIUM BICARBONATE, SODIUM CHLORIDE, POTASSIUM CHLORIDE 4000 ML: 236; 22.74; 6.74; 5.86; 2.97 POWDER, FOR SOLUTION ORAL at 13:19

## 2023-08-27 NOTE — ANESTHESIA PRE PROCEDURE
Plan      general     ASA 3     (Global left ventricular systolic function is hyperdynamic with an estimated  ejection fraction of > 65 % .)        Anesthetic plan and risks discussed with patient. Plan discussed with CRNA.                     Quincy Reyes MD   8/27/2023

## 2023-08-27 NOTE — PLAN OF CARE
Problem: Discharge Planning  Goal: Discharge to home or other facility with appropriate resources  Outcome: Progressing     Problem: Pain  Goal: Verbalizes/displays adequate comfort level or baseline comfort level  Outcome: Progressing     Problem: ABCDS Injury Assessment  Goal: Absence of physical injury  Outcome: Progressing     Problem: Hematologic - Adult  Goal: Maintains hematologic stability  Outcome: Progressing     Problem: Chronic Conditions and Co-morbidities  Goal: Patient's chronic conditions and co-morbidity symptoms are monitored and maintained or improved  Outcome: Progressing

## 2023-08-27 NOTE — PLAN OF CARE
Problem: Discharge Planning  Goal: Discharge to home or other facility with appropriate resources  Outcome: Progressing   Problem: Chronic Conditions and Co-morbidities  Goal: Patient's chronic conditions and co-morbidity symptoms are monitored and maintained or improved  Outcome: Progressing   Problem: Pain  Goal: Verbalizes/displays adequate comfort level or baseline comfort level  Outcome: Progressing   No new signs/symptoms of pain noted, pain rating < 3 on scale of 0-10, pain controlled with medication/ repositioning   Problem: ABCDS Injury Assessment  Goal: Absence of physical injury  Outcome: Progressing   No falls/ injuries this shift, bed in lowest position, brakes on, bed alarm on, call light in reach, side rails up x2   Problem: Hematologic - Adult  Goal: Maintains hematologic stability  Outcome: Progressing   VS & H&H monitored per physician orders, VS stable this shift

## 2023-08-27 NOTE — PROGRESS NOTES
[facility]  Office: [address], DO, Cecy Benz, DO, Lety Vega, DO, Kaylee Guerin Blood, DO, Emma Jacobs MD, Enmanuel Martinez MD, Arianne Melgar MD, Aurelia Solis MD,  Shirley Tellez MD, Irene Hdez MD, César Wyman, DO, Adrian Palacio MD,  Judson Marte MD, Kaite Masterson MD, Charlesetta Burkitt, DO, Tyshawn Cruz MD,  Hira Kimble DO, Tiffany Lackey MD, Jessica Velasco MD, Mark Gonzalez MD, Blessing Torres MD,  Yong Bamberger, MD, Tha Garrison MD, Najma Márquez MD, Ashley Serrano DO, Keisha Mcintyre MD,  Indiana Beltre MD, Rachel Felder, CNP,  Donna Her, CNP,, Pricilla Meneses, CNP,  Cristina Arroyo, TRINH, Wendy Ontiveros CNP, Betsey Hernandez CNP, Sharon Maier CNP, Ragena Rubinstein, CNP, Starr Hills, CNP, Ace Bailey, CNP, Mary Ann Cardona PA-C, Gregory Bloom, SKIP, Keisha Del Castillo, CNP, Liz Suarez, [address] [place] [address]    Progress Note    8/27/2023    8:26 AM    Name:   Maximilian Guerrier  MRN:     [id number]     Acct:      [de-identified]   Room:   [address] [place] [address] Day:  3  Admit Date:  8/24/2023  4:19 PM    PCP:   JOSEPH Hopper CNP  Code Status:  Full Code    Subjective:     Patient was seen and examined at bedside this AM. She reports feeling well and is without complaint. Hemoglobin is stable. Gastroenterology and general surgery following; plan for inpatient colonoscopy and cholecystectomy tomorrow AM.     Medications:      Allergies:  No Known Allergies    Current Meds:   Scheduled Meds:    sodium chloride  80 mL IntraVENous Once    atorvastatin  80 mg Oral Nightly    bumetanide  1 mg Oral Daily    levothyroxine  50 mcg Oral Daily    cetirizine  10 mg Oral Daily    losartan  50 mg Oral Daily    metoprolol succinate  100 mg Oral Daily    sodium chloride flush  5-40 mL IntraVENous 2 times per day    insulin lispro  0-4 Units SubCUTAneous Q4H    pantoprazole (PROTONIX) 40 mg injection  40

## 2023-08-28 ENCOUNTER — ANESTHESIA (OUTPATIENT)
Dept: OPERATING ROOM | Age: 68
DRG: 357 | End: 2023-08-28
Payer: MEDICARE

## 2023-08-28 PROBLEM — K62.89 RECTAL MASS: Status: ACTIVE | Noted: 2023-08-28

## 2023-08-28 LAB — GLUCOSE BLD-MCNC: 101 MG/DL (ref 65–105)

## 2023-08-28 PROCEDURE — 0DBP8ZX EXCISION OF RECTUM, VIA NATURAL OR ARTIFICIAL OPENING ENDOSCOPIC, DIAGNOSTIC: ICD-10-PCS | Performed by: INTERNAL MEDICINE

## 2023-08-28 PROCEDURE — 88304 TISSUE EXAM BY PATHOLOGIST: CPT

## 2023-08-28 PROCEDURE — 2580000003 HC RX 258: Performed by: ANESTHESIOLOGY

## 2023-08-28 PROCEDURE — 2580000003 HC RX 258: Performed by: NURSE PRACTITIONER

## 2023-08-28 PROCEDURE — 2709999900 HC NON-CHARGEABLE SUPPLY: Performed by: INTERNAL MEDICINE

## 2023-08-28 PROCEDURE — 88342 IMHCHEM/IMCYTCHM 1ST ANTB: CPT

## 2023-08-28 PROCEDURE — 2580000003 HC RX 258: Performed by: SURGERY

## 2023-08-28 PROCEDURE — 1200000000 HC SEMI PRIVATE

## 2023-08-28 PROCEDURE — 6370000000 HC RX 637 (ALT 250 FOR IP): Performed by: NURSE PRACTITIONER

## 2023-08-28 PROCEDURE — 6360000002 HC RX W HCPCS

## 2023-08-28 PROCEDURE — 0FT44ZZ RESECTION OF GALLBLADDER, PERCUTANEOUS ENDOSCOPIC APPROACH: ICD-10-PCS | Performed by: SURGERY

## 2023-08-28 PROCEDURE — 2500000003 HC RX 250 WO HCPCS: Performed by: SURGERY

## 2023-08-28 PROCEDURE — 7100000000 HC PACU RECOVERY - FIRST 15 MIN: Performed by: INTERNAL MEDICINE

## 2023-08-28 PROCEDURE — 99223 1ST HOSP IP/OBS HIGH 75: CPT | Performed by: INTERNAL MEDICINE

## 2023-08-28 PROCEDURE — 3609010600 HC COLONOSCOPY POLYPECTOMY SNARE/COLD BIOPSY: Performed by: INTERNAL MEDICINE

## 2023-08-28 PROCEDURE — 88341 IMHCHEM/IMCYTCHM EA ADD ANTB: CPT

## 2023-08-28 PROCEDURE — 88305 TISSUE EXAM BY PATHOLOGIST: CPT

## 2023-08-28 PROCEDURE — 99232 SBSQ HOSP IP/OBS MODERATE 35: CPT | Performed by: STUDENT IN AN ORGANIZED HEALTH CARE EDUCATION/TRAINING PROGRAM

## 2023-08-28 PROCEDURE — 6360000002 HC RX W HCPCS: Performed by: NURSE PRACTITIONER

## 2023-08-28 PROCEDURE — 3700000001 HC ADD 15 MINUTES (ANESTHESIA): Performed by: INTERNAL MEDICINE

## 2023-08-28 PROCEDURE — 3700000000 HC ANESTHESIA ATTENDED CARE: Performed by: INTERNAL MEDICINE

## 2023-08-28 PROCEDURE — C9113 INJ PANTOPRAZOLE SODIUM, VIA: HCPCS | Performed by: NURSE PRACTITIONER

## 2023-08-28 PROCEDURE — C1889 IMPLANT/INSERT DEVICE, NOC: HCPCS | Performed by: INTERNAL MEDICINE

## 2023-08-28 PROCEDURE — 2500000003 HC RX 250 WO HCPCS: Performed by: NURSE ANESTHETIST, CERTIFIED REGISTERED

## 2023-08-28 PROCEDURE — 94760 N-INVAS EAR/PLS OXIMETRY 1: CPT

## 2023-08-28 PROCEDURE — C9399 UNCLASSIFIED DRUGS OR BIOLOG: HCPCS | Performed by: NURSE ANESTHETIST, CERTIFIED REGISTERED

## 2023-08-28 PROCEDURE — 6360000002 HC RX W HCPCS: Performed by: SURGERY

## 2023-08-28 PROCEDURE — 6360000002 HC RX W HCPCS: Performed by: NURSE ANESTHETIST, CERTIFIED REGISTERED

## 2023-08-28 PROCEDURE — 8E0W4CZ ROBOTIC ASSISTED PROCEDURE OF TRUNK REGION, PERCUTANEOUS ENDOSCOPIC APPROACH: ICD-10-PCS | Performed by: SURGERY

## 2023-08-28 PROCEDURE — 7100000001 HC PACU RECOVERY - ADDTL 15 MIN: Performed by: INTERNAL MEDICINE

## 2023-08-28 PROCEDURE — 2580000003 HC RX 258: Performed by: NURSE ANESTHETIST, CERTIFIED REGISTERED

## 2023-08-28 PROCEDURE — 6370000000 HC RX 637 (ALT 250 FOR IP): Performed by: ANESTHESIOLOGY

## 2023-08-28 PROCEDURE — 47562 LAPAROSCOPIC CHOLECYSTECTOMY: CPT | Performed by: SURGERY

## 2023-08-28 PROCEDURE — 82947 ASSAY GLUCOSE BLOOD QUANT: CPT

## 2023-08-28 DEVICE — CLIP INT L POLYMER LOK LIG HEM O LOK: Type: IMPLANTABLE DEVICE | Status: FUNCTIONAL

## 2023-08-28 RX ORDER — ONDANSETRON 2 MG/ML
4 INJECTION INTRAMUSCULAR; INTRAVENOUS
Status: ACTIVE | OUTPATIENT
Start: 2023-08-28 | End: 2023-08-29

## 2023-08-28 RX ORDER — SODIUM CHLORIDE 0.9 % (FLUSH) 0.9 %
5-40 SYRINGE (ML) INJECTION EVERY 12 HOURS SCHEDULED
Status: DISCONTINUED | OUTPATIENT
Start: 2023-08-28 | End: 2023-08-29 | Stop reason: HOSPADM

## 2023-08-28 RX ORDER — INDOCYANINE GREEN AND WATER 25 MG
KIT INJECTION
Status: COMPLETED
Start: 2023-08-28 | End: 2023-08-28

## 2023-08-28 RX ORDER — OXYCODONE HYDROCHLORIDE 5 MG/1
10 TABLET ORAL PRN
Status: COMPLETED | OUTPATIENT
Start: 2023-08-28 | End: 2023-08-28

## 2023-08-28 RX ORDER — LIDOCAINE HYDROCHLORIDE 10 MG/ML
INJECTION, SOLUTION INFILTRATION; PERINEURAL
Status: DISPENSED
Start: 2023-08-28 | End: 2023-08-28

## 2023-08-28 RX ORDER — SODIUM CHLORIDE, SODIUM LACTATE, POTASSIUM CHLORIDE, CALCIUM CHLORIDE 600; 310; 30; 20 MG/100ML; MG/100ML; MG/100ML; MG/100ML
INJECTION, SOLUTION INTRAVENOUS CONTINUOUS PRN
Status: DISCONTINUED | OUTPATIENT
Start: 2023-08-28 | End: 2023-08-28 | Stop reason: SDUPTHER

## 2023-08-28 RX ORDER — OXYCODONE HYDROCHLORIDE 5 MG/1
5 TABLET ORAL PRN
Status: COMPLETED | OUTPATIENT
Start: 2023-08-28 | End: 2023-08-28

## 2023-08-28 RX ORDER — ROCURONIUM BROMIDE 10 MG/ML
INJECTION, SOLUTION INTRAVENOUS PRN
Status: DISCONTINUED | OUTPATIENT
Start: 2023-08-28 | End: 2023-08-28 | Stop reason: SDUPTHER

## 2023-08-28 RX ORDER — METOCLOPRAMIDE HYDROCHLORIDE 5 MG/ML
10 INJECTION INTRAMUSCULAR; INTRAVENOUS
Status: ACTIVE | OUTPATIENT
Start: 2023-08-28 | End: 2023-08-29

## 2023-08-28 RX ORDER — LIDOCAINE HYDROCHLORIDE 10 MG/ML
INJECTION, SOLUTION INFILTRATION; PERINEURAL PRN
Status: DISCONTINUED | OUTPATIENT
Start: 2023-08-28 | End: 2023-08-28 | Stop reason: SDUPTHER

## 2023-08-28 RX ORDER — MIDAZOLAM HYDROCHLORIDE 2 MG/2ML
2 INJECTION, SOLUTION INTRAMUSCULAR; INTRAVENOUS
Status: ACTIVE | OUTPATIENT
Start: 2023-08-28 | End: 2023-08-29

## 2023-08-28 RX ORDER — BUPIVACAINE HYDROCHLORIDE 2.5 MG/ML
INJECTION, SOLUTION EPIDURAL; INFILTRATION; INTRACAUDAL
Status: DISPENSED
Start: 2023-08-28 | End: 2023-08-28

## 2023-08-28 RX ORDER — DEXAMETHASONE SODIUM PHOSPHATE 10 MG/ML
INJECTION, SOLUTION INTRAMUSCULAR; INTRAVENOUS PRN
Status: DISCONTINUED | OUTPATIENT
Start: 2023-08-28 | End: 2023-08-28 | Stop reason: SDUPTHER

## 2023-08-28 RX ORDER — SODIUM CHLORIDE, SODIUM LACTATE, POTASSIUM CHLORIDE, CALCIUM CHLORIDE 600; 310; 30; 20 MG/100ML; MG/100ML; MG/100ML; MG/100ML
INJECTION, SOLUTION INTRAVENOUS CONTINUOUS
Status: DISCONTINUED | OUTPATIENT
Start: 2023-08-28 | End: 2023-08-29 | Stop reason: HOSPADM

## 2023-08-28 RX ORDER — FENTANYL CITRATE 50 UG/ML
INJECTION, SOLUTION INTRAMUSCULAR; INTRAVENOUS PRN
Status: DISCONTINUED | OUTPATIENT
Start: 2023-08-28 | End: 2023-08-28 | Stop reason: SDUPTHER

## 2023-08-28 RX ORDER — SODIUM CHLORIDE 0.9 % (FLUSH) 0.9 %
5-40 SYRINGE (ML) INJECTION PRN
Status: DISCONTINUED | OUTPATIENT
Start: 2023-08-28 | End: 2023-08-29 | Stop reason: HOSPADM

## 2023-08-28 RX ORDER — INDOCYANINE GREEN AND WATER 25 MG
5 KIT INJECTION ONCE
Status: COMPLETED | OUTPATIENT
Start: 2023-08-28 | End: 2023-08-28

## 2023-08-28 RX ORDER — DIPHENHYDRAMINE HYDROCHLORIDE 50 MG/ML
12.5 INJECTION INTRAMUSCULAR; INTRAVENOUS
Status: ACTIVE | OUTPATIENT
Start: 2023-08-28 | End: 2023-08-29

## 2023-08-28 RX ORDER — LIDOCAINE HYDROCHLORIDE 10 MG/ML
1 INJECTION, SOLUTION EPIDURAL; INFILTRATION; INTRACAUDAL; PERINEURAL
Status: ACTIVE | OUTPATIENT
Start: 2023-08-28 | End: 2023-08-29

## 2023-08-28 RX ORDER — MIDAZOLAM HYDROCHLORIDE 1 MG/ML
INJECTION INTRAMUSCULAR; INTRAVENOUS PRN
Status: DISCONTINUED | OUTPATIENT
Start: 2023-08-28 | End: 2023-08-28 | Stop reason: SDUPTHER

## 2023-08-28 RX ORDER — ONDANSETRON 2 MG/ML
INJECTION INTRAMUSCULAR; INTRAVENOUS PRN
Status: DISCONTINUED | OUTPATIENT
Start: 2023-08-28 | End: 2023-08-28 | Stop reason: SDUPTHER

## 2023-08-28 RX ORDER — HYDRALAZINE HYDROCHLORIDE 20 MG/ML
10 INJECTION INTRAMUSCULAR; INTRAVENOUS
Status: DISCONTINUED | OUTPATIENT
Start: 2023-08-28 | End: 2023-08-29 | Stop reason: HOSPADM

## 2023-08-28 RX ORDER — SODIUM CHLORIDE 9 MG/ML
INJECTION, SOLUTION INTRAVENOUS PRN
Status: DISCONTINUED | OUTPATIENT
Start: 2023-08-28 | End: 2023-08-29 | Stop reason: HOSPADM

## 2023-08-28 RX ORDER — PROPOFOL 10 MG/ML
INJECTION, EMULSION INTRAVENOUS PRN
Status: DISCONTINUED | OUTPATIENT
Start: 2023-08-28 | End: 2023-08-28 | Stop reason: SDUPTHER

## 2023-08-28 RX ORDER — LABETALOL HYDROCHLORIDE 5 MG/ML
10 INJECTION, SOLUTION INTRAVENOUS
Status: DISCONTINUED | OUTPATIENT
Start: 2023-08-28 | End: 2023-08-29 | Stop reason: HOSPADM

## 2023-08-28 RX ORDER — CEFAZOLIN 2 G/1
INJECTION, POWDER, FOR SOLUTION INTRAMUSCULAR; INTRAVENOUS
Status: DISPENSED
Start: 2023-08-28 | End: 2023-08-28

## 2023-08-28 RX ORDER — MORPHINE SULFATE 2 MG/ML
1 INJECTION, SOLUTION INTRAMUSCULAR; INTRAVENOUS EVERY 5 MIN PRN
Status: DISCONTINUED | OUTPATIENT
Start: 2023-08-28 | End: 2023-08-29 | Stop reason: HOSPADM

## 2023-08-28 RX ADMIN — BUMETANIDE 1 MG: 1 TABLET ORAL at 12:29

## 2023-08-28 RX ADMIN — ROCURONIUM BROMIDE 50 MG: 10 INJECTION, SOLUTION INTRAVENOUS at 08:02

## 2023-08-28 RX ADMIN — SODIUM CHLORIDE, POTASSIUM CHLORIDE, SODIUM LACTATE AND CALCIUM CHLORIDE: 600; 310; 30; 20 INJECTION, SOLUTION INTRAVENOUS at 08:54

## 2023-08-28 RX ADMIN — ONDANSETRON 4 MG: 2 INJECTION INTRAMUSCULAR; INTRAVENOUS at 09:47

## 2023-08-28 RX ADMIN — SODIUM CHLORIDE, PRESERVATIVE FREE 10 ML: 5 INJECTION INTRAVENOUS at 20:26

## 2023-08-28 RX ADMIN — SODIUM CHLORIDE, POTASSIUM CHLORIDE, SODIUM LACTATE AND CALCIUM CHLORIDE: 600; 310; 30; 20 INJECTION, SOLUTION INTRAVENOUS at 13:54

## 2023-08-28 RX ADMIN — PROPOFOL 25 MG: 10 INJECTION, EMULSION INTRAVENOUS at 08:21

## 2023-08-28 RX ADMIN — Medication 0.5 MG: at 10:41

## 2023-08-28 RX ADMIN — LEVOTHYROXINE SODIUM 50 MCG: 50 TABLET ORAL at 12:29

## 2023-08-28 RX ADMIN — PROPOFOL 25 MG: 10 INJECTION, EMULSION INTRAVENOUS at 08:31

## 2023-08-28 RX ADMIN — MIDAZOLAM 1 MG: 1 INJECTION INTRAMUSCULAR; INTRAVENOUS at 07:57

## 2023-08-28 RX ADMIN — DEXAMETHASONE SODIUM PHOSPHATE 4 MG: 10 INJECTION, SOLUTION INTRAMUSCULAR; INTRAVENOUS at 09:39

## 2023-08-28 RX ADMIN — ATORVASTATIN CALCIUM 80 MG: 40 TABLET, FILM COATED ORAL at 20:26

## 2023-08-28 RX ADMIN — INDOCYANINE GREEN AND WATER 5 MG: KIT at 08:15

## 2023-08-28 RX ADMIN — FENTANYL CITRATE 25 MCG: 50 INJECTION, SOLUTION INTRAMUSCULAR; INTRAVENOUS at 07:55

## 2023-08-28 RX ADMIN — FENTANYL CITRATE 25 MCG: 50 INJECTION, SOLUTION INTRAMUSCULAR; INTRAVENOUS at 07:58

## 2023-08-28 RX ADMIN — ROCURONIUM BROMIDE 10 MG: 10 INJECTION, SOLUTION INTRAVENOUS at 09:02

## 2023-08-28 RX ADMIN — FENTANYL CITRATE 50 MCG: 50 INJECTION, SOLUTION INTRAMUSCULAR; INTRAVENOUS at 08:47

## 2023-08-28 RX ADMIN — SUGAMMADEX 400 MG: 100 INJECTION, SOLUTION INTRAVENOUS at 09:47

## 2023-08-28 RX ADMIN — PROPOFOL 25 MG: 10 INJECTION, EMULSION INTRAVENOUS at 08:26

## 2023-08-28 RX ADMIN — PROPOFOL 25 MG: 10 INJECTION, EMULSION INTRAVENOUS at 08:15

## 2023-08-28 RX ADMIN — MIDAZOLAM 1 MG: 1 INJECTION INTRAMUSCULAR; INTRAVENOUS at 07:55

## 2023-08-28 RX ADMIN — LOSARTAN POTASSIUM 50 MG: 50 TABLET, FILM COATED ORAL at 12:29

## 2023-08-28 RX ADMIN — CEFAZOLIN 2000 MG: 2 INJECTION, POWDER, FOR SOLUTION INTRAMUSCULAR; INTRAVENOUS at 08:11

## 2023-08-28 RX ADMIN — FENTANYL CITRATE 50 MCG: 50 INJECTION, SOLUTION INTRAMUSCULAR; INTRAVENOUS at 09:46

## 2023-08-28 RX ADMIN — HYDROMORPHONE HYDROCHLORIDE 0.5 MG: 1 INJECTION, SOLUTION INTRAMUSCULAR; INTRAVENOUS; SUBCUTANEOUS at 10:41

## 2023-08-28 RX ADMIN — PROPOFOL 25 MG: 10 INJECTION, EMULSION INTRAVENOUS at 08:11

## 2023-08-28 RX ADMIN — SODIUM CHLORIDE, PRESERVATIVE FREE 40 MG: 5 INJECTION INTRAVENOUS at 12:21

## 2023-08-28 RX ADMIN — SODIUM CHLORIDE, POTASSIUM CHLORIDE, SODIUM LACTATE AND CALCIUM CHLORIDE: 600; 310; 30; 20 INJECTION, SOLUTION INTRAVENOUS at 07:43

## 2023-08-28 RX ADMIN — PROPOFOL 150 MG: 10 INJECTION, EMULSION INTRAVENOUS at 08:02

## 2023-08-28 RX ADMIN — METOPROLOL SUCCINATE 100 MG: 50 TABLET, EXTENDED RELEASE ORAL at 12:29

## 2023-08-28 RX ADMIN — CETIRIZINE HYDROCHLORIDE 10 MG: 10 TABLET, FILM COATED ORAL at 12:29

## 2023-08-28 RX ADMIN — SODIUM CHLORIDE, POTASSIUM CHLORIDE, SODIUM LACTATE AND CALCIUM CHLORIDE: 600; 310; 30; 20 INJECTION, SOLUTION INTRAVENOUS at 07:56

## 2023-08-28 RX ADMIN — LIDOCAINE HYDROCHLORIDE 50 MG: 10 INJECTION, SOLUTION INFILTRATION; PERINEURAL at 08:00

## 2023-08-28 RX ADMIN — SODIUM CHLORIDE, POTASSIUM CHLORIDE, SODIUM LACTATE AND CALCIUM CHLORIDE: 600; 310; 30; 20 INJECTION, SOLUTION INTRAVENOUS at 22:56

## 2023-08-28 RX ADMIN — OXYCODONE HYDROCHLORIDE 10 MG: 5 TABLET ORAL at 12:34

## 2023-08-28 RX ADMIN — FENTANYL CITRATE 50 MCG: 50 INJECTION, SOLUTION INTRAMUSCULAR; INTRAVENOUS at 09:15

## 2023-08-28 RX ADMIN — DEXAMETHASONE SODIUM PHOSPHATE 5 MG: 10 INJECTION, SOLUTION INTRAMUSCULAR; INTRAVENOUS at 08:08

## 2023-08-28 ASSESSMENT — PAIN DESCRIPTION - LOCATION
LOCATION: BACK

## 2023-08-28 ASSESSMENT — PAIN SCALES - GENERAL
PAINLEVEL_OUTOF10: 6
PAINLEVEL_OUTOF10: 7
PAINLEVEL_OUTOF10: 7

## 2023-08-28 ASSESSMENT — PAIN DESCRIPTION - DESCRIPTORS
DESCRIPTORS: ACHING;SORE
DESCRIPTORS: ACHING;SORE
DESCRIPTORS: NAGGING

## 2023-08-28 ASSESSMENT — PAIN - FUNCTIONAL ASSESSMENT: PAIN_FUNCTIONAL_ASSESSMENT: 0-10

## 2023-08-28 ASSESSMENT — PAIN DESCRIPTION - ORIENTATION: ORIENTATION: LOWER;MID

## 2023-08-28 NOTE — PLAN OF CARE
Problem: Discharge Planning  Goal: Discharge to home or other facility with appropriate resources  8/28/2023 0424 by Floyd Barton RN  Outcome: Progressing   Problem: Chronic Conditions and Co-morbidities  Goal: Patient's chronic conditions and co-morbidity symptoms are monitored and maintained or improved  8/28/2023 0424 by Floyd Barton RN  Outcome: Progressing   Problem: Pain  Goal: Verbalizes/displays adequate comfort level or baseline comfort level  8/28/2023 0424 by Floyd Barton RN  Outcome: Progressing   No new signs/symptoms of pain noted, pain rating < 3 on scale of 0-10, pain controlled with medication/ repositioning   Problem: ABCDS Injury Assessment  Goal: Absence of physical injury  8/28/2023 0424 by Floyd Barton RN  Outcome: Progressing   No falls/ injuries this shift, bed in lowest position, brakes on, bed alarm on, call light in reach, side rails up x2

## 2023-08-28 NOTE — OP NOTE
Operative Note      Patient: Alexei Syed  YOB: 1955  MRN: [id number]    Date of Procedure: 8/28/2023    Pre-Op Diagnosis Codes:     * Rectal bleeding [K62.5]     * Chronic cholecystitis with calculus [K80.10]    Post-Op Diagnosis: Same       Procedure(s):  COLONOSCOPY POLYPECTOMY COLD BIOPSY RECTAL MASS  LAPAROSCOPIC ROBOTIC CHOLECYSTECTOMY WITH FIREFLY    Surgeon(s):  DO Susan Maldonado MD    Assistant:   Resident: Monica Munson DO    Anesthesia: Monitor Anesthesia Care    Estimated Blood Loss (mL): Minimal    Complications: None    Specimens:   ID Type Source Tests Collected by Time Destination   A : BIOPSY(S) OF RECTAL MASS Tissue Rectum [address]ovani Gomez MD 8/28/2023 0818    B : GALLBLADDER AND CONTENTS Tissue Gallbladder [address]ani Gomez MD 8/28/2023 4681        Implants:  Implant Name Type Inv. Item Serial No.  Lot No. LRB No. Used Action   CLIP INT L POLYMER ROE LIG HEM O ROE - [id number]  CLIP INT L POLYMER ROE LIG HEM O ROE  [employer] [id number] N/A 1 Implanted         Drains: * No LDAs found *    Findings: as above wound class 2        Detailed Description of Procedure:         HISTORY: The patient is a 79y.o. year old female with history of above preop diagnosis. The risk, benefits, expected outcome, and alternatives to the procedure were explained to the patient's understanding and written informed consent was obtained. DESCRIPTION OF PROCEDURE:  Patient was brought to the operating suite and placed on the operating table in supine position. Timeout was performed verifying correct patient, position, equipment and procedure to be performed. EPC cuffs were applied and preoperative antibiotics were infused. General anesthesia administered and the patient was endotracheally intubated. The patient's abdomen was prepped and draped in the usual sterile fashion.  Scalpel was used to make a transverse incision 1cm at

## 2023-08-28 NOTE — PLAN OF CARE
Problem: Discharge Planning  Goal: Discharge to home or other facility with appropriate resources  Outcome: Progressing  Flowsheets (Taken 8/28/2023 1105)  Discharge to home or other facility with appropriate resources:   Identify barriers to discharge with patient and caregiver   Arrange for needed discharge resources and transportation as appropriate   Identify discharge learning needs (meds, wound care, etc)   Refer to discharge planning if patient needs post-hospital services based on physician order or complex needs related to functional status, cognitive ability or social support system     Problem: Pain  Goal: Verbalizes/displays adequate comfort level or baseline comfort level  Outcome: Progressing   - Medicated for pain as ordered, as needed. Problem: ABCDS Injury Assessment  Goal: Absence of physical injury  Outcome: Progressing  Flowsheets (Taken 8/28/2023 1105)  Absence of Physical Injury: Implement safety measures based on patient assessment     Problem: Hematologic - Adult  Goal: Maintains hematologic stability  Outcome: Progressing  Flowsheets (Taken 8/28/2023 1105)  Maintains hematologic stability:   Assess for signs and symptoms of bleeding or hemorrhage   Monitor labs for bleeding or clotting disorders     Problem: Chronic Conditions and Co-morbidities  Goal: Patient's chronic conditions and co-morbidity symptoms are monitored and maintained or improved  Outcome: Progressing  Flowsheets (Taken 8/28/2023 1105)  Care Plan - Patient's Chronic Conditions and Co-Morbidity Symptoms are Monitored and Maintained or Improved: Monitor and assess patient's chronic conditions and comorbid symptoms for stability, deterioration, or improvement     Problem: Safety - Adult  Goal: Free from fall injury  Outcome: Progressing   - Safety maintained.

## 2023-08-28 NOTE — PROGRESS NOTES
[facility]  Office: [address], DO, Ace Cabral, DO, Stacey Vu, DO, Rubin Santiago Blood, DO, Pedro Montilla MD, Maria E Garcia MD, Osiel Pierce MD, Yennifer Toro MD,  Paul Macario MD, Carson Drummond MD, Mary Ann Gonsales DO, Oscar Sullivan MD,  Hung Olmedo MD, Barry Maxwell MD, Blayne Nye DO, Yazmin Matos MD,  Ulises Johnson DO, Luis Alberto Allen MD, Carola Sorensen MD, Odilon Saldivar MD, Montez Jefferson MD,  Laura Banuelos MD, Delano Lima MD, Manuel Robertson MD, Joana Dorsey DO, Shilpi Bennett MD,  Erasmo Portillo MD, Phoenix Murray, CNP,  Zunilda Jeffrey, CNP,, Zaira Brower, CNP,  Florencio Bowman, [facility], Adore Cuello, CNP, Delgado Kebede, CNP, Ilya Klein, CNP, Keny Bennett, CNP, Monique Christiansen, CNP, Nandini Cedillo, CNP, Geetha Mckeon PA-C, Shara Alvarado, SKIP, Yandy Hodges, CNP, Lyndon Posey, [address] [place] [address]    Progress Note    8/28/2023    5:43 AM    Name:   Chani Gordon  MRN:     [id number]     Acct:      [de-identified]   Room:   [address] [place] [address] Day:  4  Admit Date:  8/24/2023  4:19 PM    PCP:   JOSEPH Calvo CNP  Code Status:  Full Code    Subjective:     Patient was seen and examined at bedside this AM. She reports feeling well and is without complaint. Hemoglobin is stable. Gastroenterology and general surgery following; plan for inpatient colonoscopy followed by cholecystectomy today. Medications:      Allergies:  No Known Allergies    Current Meds:   Scheduled Meds:    sodium chloride  80 mL IntraVENous Once    atorvastatin  80 mg Oral Nightly    bumetanide  1 mg Oral Daily    levothyroxine  50 mcg Oral Daily    cetirizine  10 mg Oral Daily    losartan  50 mg Oral Daily    metoprolol succinate  100 mg Oral Daily    sodium chloride flush  5-40 mL IntraVENous 2 times per day    insulin lispro  0-4 Units SubCUTAneous Q4H    pantoprazole (PROTONIX) 40 mg injection

## 2023-08-29 ENCOUNTER — TELEPHONE (OUTPATIENT)
Dept: ONCOLOGY | Age: 68
End: 2023-08-29

## 2023-08-29 VITALS
DIASTOLIC BLOOD PRESSURE: 69 MMHG | BODY MASS INDEX: 38.09 KG/M2 | SYSTOLIC BLOOD PRESSURE: 129 MMHG | OXYGEN SATURATION: 100 % | HEIGHT: 63 IN | TEMPERATURE: 97.9 F | RESPIRATION RATE: 16 BRPM | WEIGHT: 214.95 LBS | HEART RATE: 66 BPM

## 2023-08-29 PROBLEM — C20 RECTAL ADENOCARCINOMA (HCC): Status: ACTIVE | Noted: 2023-08-29

## 2023-08-29 LAB
ANION GAP SERPL CALCULATED.3IONS-SCNC: 7 MMOL/L (ref 9–17)
BUN SERPL-MCNC: 6 MG/DL (ref 8–23)
CALCIUM SERPL-MCNC: 7.8 MG/DL (ref 8.6–10.4)
CHLORIDE SERPL-SCNC: 110 MMOL/L (ref 98–107)
CO2 SERPL-SCNC: 25 MMOL/L (ref 20–31)
CREAT SERPL-MCNC: 0.9 MG/DL (ref 0.5–0.9)
EKG ATRIAL RATE: 80 BPM
EKG P AXIS: 55 DEGREES
EKG P-R INTERVAL: 206 MS
EKG Q-T INTERVAL: 408 MS
EKG QRS DURATION: 80 MS
EKG QTC CALCULATION (BAZETT): 470 MS
EKG R AXIS: 3 DEGREES
EKG T AXIS: 16 DEGREES
EKG VENTRICULAR RATE: 80 BPM
ERYTHROCYTE [DISTWIDTH] IN BLOOD BY AUTOMATED COUNT: 28.3 % (ref 12.5–15.4)
GFR SERPL CREATININE-BSD FRML MDRD: >60 ML/MIN/1.73M2
GLUCOSE SERPL-MCNC: 105 MG/DL (ref 70–99)
HCT VFR BLD AUTO: 27.6 % (ref 36–46)
HGB BLD-MCNC: 7.7 G/DL (ref 12–16)
MCH RBC QN AUTO: 21.2 PG (ref 26–34)
MCHC RBC AUTO-ENTMCNC: 28 G/DL (ref 31–37)
MCV RBC AUTO: 75.8 FL (ref 80–100)
PLATELET # BLD AUTO: 261 K/UL (ref 140–450)
PMV BLD AUTO: 8.1 FL (ref 6–12)
POTASSIUM SERPL-SCNC: 3.9 MMOL/L (ref 3.7–5.3)
RBC # BLD AUTO: 3.64 M/UL (ref 4–5.2)
SODIUM SERPL-SCNC: 142 MMOL/L (ref 135–144)
SURGICAL PATHOLOGY REPORT: NORMAL
WBC OTHER # BLD: 13.6 K/UL (ref 3.5–11)

## 2023-08-29 PROCEDURE — 6360000002 HC RX W HCPCS: Performed by: NURSE PRACTITIONER

## 2023-08-29 PROCEDURE — 36415 COLL VENOUS BLD VENIPUNCTURE: CPT

## 2023-08-29 PROCEDURE — 99233 SBSQ HOSP IP/OBS HIGH 50: CPT | Performed by: INTERNAL MEDICINE

## 2023-08-29 PROCEDURE — 6370000000 HC RX 637 (ALT 250 FOR IP): Performed by: NURSE PRACTITIONER

## 2023-08-29 PROCEDURE — 99232 SBSQ HOSP IP/OBS MODERATE 35: CPT | Performed by: HOSPITALIST

## 2023-08-29 PROCEDURE — 2580000003 HC RX 258: Performed by: NURSE PRACTITIONER

## 2023-08-29 PROCEDURE — 85027 COMPLETE CBC AUTOMATED: CPT

## 2023-08-29 PROCEDURE — 2580000003 HC RX 258: Performed by: ANESTHESIOLOGY

## 2023-08-29 PROCEDURE — 80048 BASIC METABOLIC PNL TOTAL CA: CPT

## 2023-08-29 PROCEDURE — 6370000000 HC RX 637 (ALT 250 FOR IP): Performed by: HOSPITALIST

## 2023-08-29 PROCEDURE — C9113 INJ PANTOPRAZOLE SODIUM, VIA: HCPCS | Performed by: NURSE PRACTITIONER

## 2023-08-29 PROCEDURE — 99024 POSTOP FOLLOW-UP VISIT: CPT | Performed by: SURGERY

## 2023-08-29 RX ORDER — HYDROCODONE BITARTRATE AND ACETAMINOPHEN 5; 325 MG/1; MG/1
1 TABLET ORAL EVERY 6 HOURS PRN
Qty: 15 TABLET | Refills: 0 | Status: SHIPPED | OUTPATIENT
Start: 2023-08-29 | End: 2023-09-05

## 2023-08-29 RX ORDER — HYDROCODONE BITARTRATE AND ACETAMINOPHEN 5; 325 MG/1; MG/1
1 TABLET ORAL EVERY 4 HOURS PRN
Status: DISCONTINUED | OUTPATIENT
Start: 2023-08-29 | End: 2023-08-29 | Stop reason: HOSPADM

## 2023-08-29 RX ORDER — HYDROCODONE BITARTRATE AND ACETAMINOPHEN 5; 325 MG/1; MG/1
2 TABLET ORAL EVERY 4 HOURS PRN
Status: DISCONTINUED | OUTPATIENT
Start: 2023-08-29 | End: 2023-08-29 | Stop reason: HOSPADM

## 2023-08-29 RX ORDER — PANTOPRAZOLE SODIUM 40 MG/1
40 TABLET, DELAYED RELEASE ORAL DAILY
Qty: 90 TABLET | Refills: 0 | Status: SHIPPED | OUTPATIENT
Start: 2023-08-29

## 2023-08-29 RX ORDER — DOCUSATE SODIUM 100 MG/1
100 CAPSULE, LIQUID FILLED ORAL 2 TIMES DAILY
Qty: 60 CAPSULE | Refills: 5 | Status: SHIPPED | OUTPATIENT
Start: 2023-08-29 | End: 2023-09-28

## 2023-08-29 RX ADMIN — LOSARTAN POTASSIUM 50 MG: 50 TABLET, FILM COATED ORAL at 08:22

## 2023-08-29 RX ADMIN — HYDROCODONE BITARTRATE AND ACETAMINOPHEN 1 TABLET: 5; 325 TABLET ORAL at 12:42

## 2023-08-29 RX ADMIN — SODIUM CHLORIDE, POTASSIUM CHLORIDE, SODIUM LACTATE AND CALCIUM CHLORIDE: 600; 310; 30; 20 INJECTION, SOLUTION INTRAVENOUS at 06:27

## 2023-08-29 RX ADMIN — LEVOTHYROXINE SODIUM 50 MCG: 50 TABLET ORAL at 06:25

## 2023-08-29 RX ADMIN — METOPROLOL SUCCINATE 100 MG: 50 TABLET, EXTENDED RELEASE ORAL at 08:23

## 2023-08-29 RX ADMIN — BUMETANIDE 1 MG: 1 TABLET ORAL at 08:22

## 2023-08-29 RX ADMIN — SODIUM CHLORIDE, PRESERVATIVE FREE 40 MG: 5 INJECTION INTRAVENOUS at 08:21

## 2023-08-29 RX ADMIN — SODIUM CHLORIDE, PRESERVATIVE FREE 10 ML: 5 INJECTION INTRAVENOUS at 09:00

## 2023-08-29 RX ADMIN — CETIRIZINE HYDROCHLORIDE 10 MG: 10 TABLET, FILM COATED ORAL at 08:22

## 2023-08-29 ASSESSMENT — PAIN DESCRIPTION - LOCATION: LOCATION: ABDOMEN

## 2023-08-29 ASSESSMENT — PAIN DESCRIPTION - DESCRIPTORS: DESCRIPTORS: DISCOMFORT

## 2023-08-29 ASSESSMENT — PAIN SCALES - GENERAL: PAINLEVEL_OUTOF10: 4

## 2023-08-29 NOTE — DISCHARGE INSTRUCTIONS
Patient Discharge Instructions  Discharge Date:  8/29/2023    HYGEINE: Arely Martinez to shower 8/29/23, no soaking in a tub/pool until seen at your follow up appointment. Clean incision daily with gentle soap and water, do not use alcohol/peroxide or any harsh cleansers. DRIVING: No driving while taking narcotic medications or while in pain    ACTIVITY: No lifting greater than 20lbs for 6 weeks or any strenuous activity. DIET: No fatty foods for one week then resume normal diet after. MEDICATIONS: Take all medications as prescribed. Take Colace until you have your first bowel movement, continue to take if you are using narcotics for pain control    SPECIAL INSTRUCTIONS:     Follow up with Dr. Britni Mcadams in 10-14 days, call the clinic for appointment. Call sooner if fever above 100.4 degrees Farenheit, increase in swelling or redness, thick purulent discharge or pain not controlled with medications.

## 2023-08-29 NOTE — PROGRESS NOTES
Discharge instructions and medications reviewed with patient. Questions answered to her satisfaction. IV removed without complication. Patient packed up her belongings. Patient discharged with all her belongings via wheelchair, escorted by [facility] PCT.

## 2023-08-29 NOTE — CARE COORDINATION
Case Management Assessment  Initial Evaluation    Date/Time of Evaluation: 8/29/2023 4:24 PM  Assessment Completed by: Humberto Harvey RN    If patient is discharged prior to next notation, then this note serves as note for discharge by case management. Patient Name: Esa Perez                   YOB: 1955  Diagnosis: Rectal bleeding [K62.5]  Blood loss anemia [D50.0]  Gastrointestinal hemorrhage with melena [K92.1]                   Date / Time: 8/24/2023  4:19 PM    Patient Admission Status: Inpatient   Readmission Risk (Low < 19, Mod (19-27), High > 27): Readmission Risk Score: 12.1    Current PCP: JOSEPH Choi CNP  PCP verified by CM? Yes    Chart Reviewed: Yes      History Provided by:    Patient Orientation: Alert and Oriented    Patient Cognition: Alert    Hospitalization in the last 30 days (Readmission):  No    If yes, Readmission Assessment in CM Navigator will be completed. Advance Directives:      Code Status: Full Code   Patient's Primary Decision Maker is:      Primary Decision Maker: Lieutenant Nava - Brother/Sister - [phone]    Discharge Planning:    Patient lives with: Alone Type of Home: Apartment  Primary Care Giver: Self  Patient Support Systems include: Family Members, Friends/Neighbors   Current Financial resources: None  Current community resources: None  Current services prior to admission: None            Current DME:              Type of Home Care services:  None    ADLS  Prior functional level: Independent in ADLs/IADLs  Current functional level: Independent in ADLs/IADLs    PT AM-PAC:   /24  OT AM-PAC:   /24    Family can provide assistance at DC: Yes  Would you like Case Management to discuss the discharge plan with any other family members/significant others, and if so, who?  No  Plans to Return to Present Housing: Yes  Other Identified Issues/Barriers to RETURNING to current housing: none  Potential Assistance needed at discharge: N/A

## 2023-08-29 NOTE — DISCHARGE SUMMARY
[facility]  Office: [address], DO, Kelly Sharpe, DO, Evangelina Walters, DO, Mike Bourgeois, DO, Cristy Worley MD, Ese Lewis MD, Patrica Colvin MD, Braden Abrams MD,  Flaca Garrido MD, Granville Aschoff, MD, Darlene Vazquez, DO, Antonio Iqbal MD,  Ilsa Argueta, DO, Eloina Marquez MD, Amilcar Schaffer MD, Krupa Walsh DO, Miroslava Light MD,  Yannick Christopher DO, Rosana Ruiz MD, Estrada Og MD, Jamarcus Santos MD, Kathy Marquez MD,  Khalida Flor MD, Pipe Diaz MD, Kelsey Lisa MD, Darian Childs, DO, Dilshad Saldivar MD,  Jessi Butts MD, Leida Duncan, CNP,  Delma Galvez, CNP,, Cece nSow, CNP,   [facility], [facility], Luz Hodges, CNP, Cuong Lainez, CNP, [facility], CNP, Sy Issa, CNP, Esa Moore, CNP, Vicki Mascorro, CNP, Ana Torrez PA-C, Bam Vásquez, CNS, Sylvester Staples, CNP, Robert Chester, [facility]    Discharge Summary     Patient ID: Kem Devlin  :  1955   MRN: [id number]     ACCOUNT:  [de-identified]   Patient's PCP: JOSEPH Mathur CNP  Admit Date: 2023   Discharge Date: 2023  Length of Stay: 5  Code Status:  Full Code  Admitting Physician: Amilcar Schaffer MD  Discharge Physician: Nicola Zuñiga DO     Active Discharge Diagnoses:     Hospital Problem Lists:  Principal Problem:    Rectal bleeding  Active Problems:    Essential (primary) hypertension    Obesity, morbid, BMI 40.0-49.9 ([address])    Gastrointestinal hemorrhage with melena    Hypothyroidism due to Hashimoto's thyroiditis    Blood loss anemia    Chronic cholecystitis with calculus    Acute cystitis without hematuria    Mass in rectum    Acute cholecystitis    Rectal mass  Resolved Problems:    * No resolved hospital problems.  *      Admission Condition:  fair     Discharged Condition: good    Hospital Stay:     Hospital Course:  Kem Devlin is a 79 y.o. female who was admitted for

## 2023-08-29 NOTE — PLAN OF CARE
Problem: Discharge Planning  Goal: Discharge to home or other facility with appropriate resources  8/28/2023 2328 by Lynda Mancia RN  Outcome: Progressing     Problem: Pain  Goal: Verbalizes/displays adequate comfort level or baseline comfort level  8/28/2023 2328 by Lynda Mancia RN  Outcome: Progressing     Problem: ABCDS Injury Assessment  Goal: Absence of physical injury  8/28/2023 2328 by Lynda Mancia RN  Outcome: Progressing     Problem: Hematologic - Adult  Goal: Maintains hematologic stability  8/28/2023 2328 by Lynda Mancia RN  Outcome: Progressing     Problem: Chronic Conditions and Co-morbidities  Goal: Patient's chronic conditions and co-morbidity symptoms are monitored and maintained or improved  8/28/2023 2328 by Lynda Mancia RN  Outcome: Progressing     Problem: Safety - Adult  Goal: Free from fall injury  8/28/2023 2328 by Lynda Mancia RN  Outcome: Progressing

## 2023-08-29 NOTE — PROGRESS NOTES
[facility]  Office: [address], DO, Sameer Rendon, DO, Bj Correa, DO, Kathleen Hui Blood, DO, Hiram Hill MD, Ambrosio Blanco MD, Andria Piña MD, Ebbie Meigs, MD,  Sea Do MD, Jeremy Arenas MD, Nam Fabian, DO, Kanwal Farah MD,  Jose Martin Valdez MD, Malcolm New MD, Noble Toth DO, Li Shah MD,  Mary Baird DO, Verner Crandall, MD, Lauri Rivers MD, Karis Dodson MD, Hans Breaux MD,  Braden Marroquin MD, Lata Jones MD, Marce Truong MD, Chelly Le DO, Yen Linda MD,  John Kelly MD, Victorina Vega, CNP,  Melvin Pope, CNP,, Padilla Melton, CNP,  Jazz Wilson [facility], Anthony Das, CNP, Jose Alberto Salter, CNP, Bushra Rodriguez, CNP, Carolee Hackett, CNP, Marcela Gunderson, CNP, Angelito Hobbs, CNP, Anna Valero PA-C, Laury Andre, SKIP, Martha Velázquez, CNP, Jacobo Bartlett, [address] [place] [address]    Progress Note    8/29/2023    12:15 PM    Name:   Mallory Lorenzo  MRN:     [id number]     Acct:      [de-identified]   Room:   [address] [place] [address] Day:  5  Admit Date:  8/24/2023  4:19 PM    PCP:   JOSEPH Villa CNP  Code Status:  Full Code    Subjective:     Patient seen in follow-up for rectal bleeding secondary to suspected malignancy, patient states \"I am feeling okay\". Patient underwent colonoscopy yesterday with biopsy of a rectal mass in addition to a laparoscopic cholecystectomy. She is doing reasonably well. When she had met with oncology yesterday she was still under the effects of anesthetic and does not remember much of the conversation. I had a long discussion with her regarding treatment for the rectal mass. I explained to her that we need to wait for pathology however clinically this does appear to be a malignancy.   We discussed the current standard regarding preoperative chemotherapy and radiation versus surgical resection with adjuvant

## 2023-08-29 NOTE — PROGRESS NOTES
Discharge delayed due to retention. Bladder scan of  590mL Prior to performing straight cath, patient voided with PVR of 353mL. Per Dr. Munroe Stands, give patient time and OK to be discharged if patient able to void and has low PVR by dinner time. Patient's 2nd void at 1502 - she voided 225mL with PVR of 193mL. Dr. Jayjay Laguna with D/C at this time.

## 2023-08-29 NOTE — TELEPHONE ENCOUNTER
Name: Yi Chavarria  : 1955  MRN: [id number]    Oncology Navigation- Initial Note:    Intake-  Contact Type: Inpatient    Continuum of Care: Diagnosis/Active Treatment    Notes: Writer received a referral per Dr. Sinan Syed for navigation. Pt remains admitted, Claudeen Musty will track discharge and f/u with pt at that time.     Electronically signed by Miranda Jay RN on 2023 at 9:19 AM

## 2023-08-30 ENCOUNTER — TELEPHONE (OUTPATIENT)
Dept: PRIMARY CARE CLINIC | Age: 68
End: 2023-08-30

## 2023-08-30 DIAGNOSIS — C20 RECTAL ADENOCARCINOMA (HCC): Primary | ICD-10-CM

## 2023-08-30 NOTE — PROGRESS NOTES
Today's Date: 8/29/2023  Patient Name: Maggie Cloud  Date of admission: 8/24/2023  4:19 PM  Patient's age: 79 y.o., 1955  Admission Dx: Rectal bleeding [K62.5]  Blood loss anemia [D50.0]  Gastrointestinal hemorrhage with melena [K92.1]    Reason for Consult: rectal mass  Requesting Physician: Robin Dos Santos MD    CHIEF COMPLAINT:  GI bleed    History Obtained From:  patient  Biopsy came back positive for adenocarcinoma  Patient had no bleed    HISTORY OF PRESENT ILLNESS:      The patient is a 79 y.o.  female who is admitted to the hospital for rectal bleeding. The patient states that her symptoms began several months ago and have progressively worsened. In the ED, the patient was anemic with a hemoglobin of 5.1. Fecal occult blood test was positive. CT scan of the abdomen and pelvis was done and was remarkable for a left-sided rectal mass concerning for malignancy. Colonoscopy was done and did show  malignant appearing circumferential mass with friable mucosa extending from the anal verge to 10 cm in the rectum. The mass was involved two thirds of the rectum from the anal verge and was noted to cause significant luminal narrowing  Oncology has been consulted the pathologist still pending at this time  Patient also underwent cholecystectomy  Did have history of pulm embolism not on anticoagulation    Past Medical History:   has a past medical history of COVID-19 virus infection, Hyperlipidemia, and Hypertension. Past Surgical History:   has a past surgical history that includes bladder suspension; bladder suspension (2004); Colonoscopy (08/28/2023); Cholecystectomy, laparoscopic (08/28/2023); Colonoscopy (N/A, 8/28/2023); and Cholecystectomy, laparoscopic (N/A, 8/28/2023). Medications:    Reviewed in Epic     Allergies:  Patient has no known allergies. Social History:   reports that she quit smoking about 34 years ago. Her smoking use included cigarettes.  She has a 10.00 pack-year smoking

## 2023-08-30 NOTE — TELEPHONE ENCOUNTER
Care Transitions Initial Follow Up Call    Outreach made within 2 business days of discharge: Yes    Patient: Maximilian Guerrier   Patient : 1955   MRN: [id number]    Reason for Admission: Rectal Bleeding  Discharge Date: 23       Spoke with: DAVE is full    Discharge department/facility: [facility] Interactive Patient Contact:  Was patient able to fill all prescriptions: No:   Was patient instructed to bring all medications to the follow-up visit: No:   Is patient taking all medications as directed in the discharge summary?  No  Does patient understand their discharge instructions: No:   Does patient have questions or concerns that need addressed prior to 7-14 day follow up office visit: no    Scheduled appointment with PCP within 7-14 days    Follow Up  Future Appointments   Date Time Provider [address] [place] [address]   10/9/2023  3:30 PM JOSEPH Hopper CNP [id number] [place], [place]

## 2023-09-06 ENCOUNTER — TELEPHONE (OUTPATIENT)
Dept: ONCOLOGY | Age: 68
End: 2023-09-06

## 2023-09-06 ENCOUNTER — TELEPHONE (OUTPATIENT)
Dept: INFUSION THERAPY | Age: 68
End: 2023-09-06

## 2023-09-06 ENCOUNTER — HOSPITAL ENCOUNTER (OUTPATIENT)
Age: 68
Discharge: HOME | End: 2023-09-06
Payer: MEDICARE

## 2023-09-06 ENCOUNTER — OFFICE VISIT (OUTPATIENT)
Dept: ONCOLOGY | Age: 68
End: 2023-09-06
Payer: MEDICARE

## 2023-09-06 VITALS
HEART RATE: 76 BPM | RESPIRATION RATE: 18 BRPM | BODY MASS INDEX: 38.71 KG/M2 | OXYGEN SATURATION: 98 % | DIASTOLIC BLOOD PRESSURE: 72 MMHG | WEIGHT: 218.5 LBS | SYSTOLIC BLOOD PRESSURE: 114 MMHG | TEMPERATURE: 97.1 F

## 2023-09-06 DIAGNOSIS — D50.0 BLOOD LOSS ANEMIA: ICD-10-CM

## 2023-09-06 DIAGNOSIS — I26.99 BILATERAL PULMONARY EMBOLISM (HCC): ICD-10-CM

## 2023-09-06 DIAGNOSIS — C20 RECTAL ADENOCARCINOMA (HCC): Primary | ICD-10-CM

## 2023-09-06 DIAGNOSIS — C20 RECTAL ADENOCARCINOMA (HCC): ICD-10-CM

## 2023-09-06 PROCEDURE — 3074F SYST BP LT 130 MM HG: CPT | Performed by: INTERNAL MEDICINE

## 2023-09-06 PROCEDURE — 1036F TOBACCO NON-USER: CPT | Performed by: INTERNAL MEDICINE

## 2023-09-06 PROCEDURE — 36415 COLL VENOUS BLD VENIPUNCTURE: CPT

## 2023-09-06 PROCEDURE — 99215 OFFICE O/P EST HI 40 MIN: CPT | Performed by: INTERNAL MEDICINE

## 2023-09-06 PROCEDURE — G8399 PT W/DXA RESULTS DOCUMENT: HCPCS | Performed by: INTERNAL MEDICINE

## 2023-09-06 PROCEDURE — 1111F DSCHRG MED/CURRENT MED MERGE: CPT | Performed by: INTERNAL MEDICINE

## 2023-09-06 PROCEDURE — 1090F PRES/ABSN URINE INCON ASSESS: CPT | Performed by: INTERNAL MEDICINE

## 2023-09-06 PROCEDURE — G8427 DOCREV CUR MEDS BY ELIG CLIN: HCPCS | Performed by: INTERNAL MEDICINE

## 2023-09-06 PROCEDURE — 3078F DIAST BP <80 MM HG: CPT | Performed by: INTERNAL MEDICINE

## 2023-09-06 PROCEDURE — 3017F COLORECTAL CA SCREEN DOC REV: CPT | Performed by: INTERNAL MEDICINE

## 2023-09-06 PROCEDURE — G8417 CALC BMI ABV UP PARAM F/U: HCPCS | Performed by: INTERNAL MEDICINE

## 2023-09-06 PROCEDURE — 1123F ACP DISCUSS/DSCN MKR DOCD: CPT | Performed by: INTERNAL MEDICINE

## 2023-09-06 NOTE — TELEPHONE ENCOUNTER
Name: Francis Camacho  : 1955  MRN: [id number]    Oncology Navigation- Initial Note:    Intake-  Contact Type: Telephone (1st attempt)    Continuum of Care: Diagnosis/Active Treatment    Notes: Writer called pt to introduce self as navigator. No answer, VM full. Will try again later.     Electronically signed by Paulo Payan RN on 2023 at 1:47 PM

## 2023-09-06 NOTE — PROGRESS NOTES
generate a document that actually reflects the content of the visit, the document can still have some errors including those of syntax and sound a like substitutions which may escape proof reading. It such instances, actual meaning can be extrapolated by contextual diversion.

## 2023-09-07 LAB
SEND OUT REPORT: NORMAL
TEST NAME: NORMAL

## 2023-09-11 ENCOUNTER — OFFICE VISIT (OUTPATIENT)
Dept: SURGERY | Age: 68
End: 2023-09-11

## 2023-09-11 VITALS
TEMPERATURE: 97.5 F | BODY MASS INDEX: 37.88 KG/M2 | DIASTOLIC BLOOD PRESSURE: 70 MMHG | HEIGHT: 63 IN | WEIGHT: 213.8 LBS | HEART RATE: 72 BPM | SYSTOLIC BLOOD PRESSURE: 125 MMHG

## 2023-09-11 DIAGNOSIS — C20 RECTAL CANCER (HCC): Primary | ICD-10-CM

## 2023-09-11 DIAGNOSIS — Z90.49 STATUS POST LAPAROSCOPIC CHOLECYSTECTOMY: ICD-10-CM

## 2023-09-11 PROCEDURE — 99024 POSTOP FOLLOW-UP VISIT: CPT | Performed by: SURGERY

## 2023-09-11 RX ORDER — POLYETHYLENE GLYCOL 3350 17 G/17G
17 POWDER, FOR SOLUTION ORAL 2 TIMES DAILY
Qty: 238 G | Refills: 11 | Status: SHIPPED | OUTPATIENT
Start: 2023-09-11

## 2023-09-12 ENCOUNTER — TELEPHONE (OUTPATIENT)
Dept: ONCOLOGY | Age: 68
End: 2023-09-12

## 2023-09-12 RX ORDER — LORATADINE 10 MG/1
TABLET ORAL
Qty: 60 TABLET | Refills: 0 | Status: SHIPPED | OUTPATIENT
Start: 2023-09-12

## 2023-09-12 NOTE — TELEPHONE ENCOUNTER
Name: Micah Saavedra  : 1955  MRN: [id number]    Oncology Navigation- Initial Note:    Intake-  Contact Type: Telephone    Diagnosis: GI- malignant    Home Disposition: Lives alone    Patient needs and barriers to care: Financial Concerns/ Disability     Referral Source: Health Professional    Receptive to Advanced Care Planning/ Palliative Care:  yes    Interventions-   General Interventions: none     Education/Screenings:  no     Currently on HRT: no     Referrals: Assistance Program+     Biopsy site status: rectum       Continuum of Care: Diagnosis/Active Treatment    Notes: Writer called patient and introduced self as navigator. Name and contact number provided and writer explained my role in her care moving forward. Pt lives alone and is currently off on sick leave without pay. [employer] referral placed. Will also route this note to [place], [place], for a financial assessment. Counseling services maybe needed in future but pt wants to hold off right now until her tx plan is complete. Pt has PET and MRI soon, referral was placed for Dr. Renee Poe and RO. Writer encouraged pt to reach out to me for any needs or questions, if not, Id meet her at her f/u with Dr. Hanna Fernandez on 10/11. Pt appreciative of call and support. Will continue to follow.     Electronically signed by Elihue Skiff, RN on 2023 at 10:14 AM

## 2023-09-15 ENCOUNTER — TELEPHONE (OUTPATIENT)
Dept: ONCOLOGY | Age: 68
End: 2023-09-15

## 2023-09-19 ENCOUNTER — TELEPHONE (OUTPATIENT)
Dept: ONCOLOGY | Age: 68
End: 2023-09-19

## 2023-09-19 NOTE — TELEPHONE ENCOUNTER
called patient to discuss financial concerns. Patient states she is on leave from work but looking into getting her Social Security benefits going. Patient reports no concerns at this time.  will continue to follow and encouraged patient to reach out if needed.

## 2023-09-24 DIAGNOSIS — E03.2 DRUG-INDUCED HYPOTHYROIDISM: ICD-10-CM

## 2023-09-25 ENCOUNTER — TELEPHONE (OUTPATIENT)
Dept: SURGERY | Age: 68
End: 2023-09-25

## 2023-09-25 RX ORDER — PANTOPRAZOLE SODIUM 40 MG/1
40 TABLET, DELAYED RELEASE ORAL 2 TIMES DAILY
Qty: 90 TABLET | Refills: 0 | Status: SHIPPED | OUTPATIENT
Start: 2023-09-25

## 2023-09-25 RX ORDER — LEVOTHYROXINE SODIUM 0.05 MG/1
TABLET ORAL
Qty: 90 TABLET | Refills: 0 | Status: SHIPPED | OUTPATIENT
Start: 2023-09-25

## 2023-09-25 NOTE — TELEPHONE ENCOUNTER
Received new referral for this patient from Dr. Bakari Fishman called to schedule OV with Dr. Israel Esquivel.  Pt was confused and unaware of this referral.

## 2023-09-26 ENCOUNTER — HOSPITAL ENCOUNTER (OUTPATIENT)
Dept: NUCLEAR MEDICINE | Age: 68
Discharge: HOME | End: 2023-09-28
Attending: INTERNAL MEDICINE
Payer: MEDICARE

## 2023-09-26 ENCOUNTER — HOSPITAL ENCOUNTER (OUTPATIENT)
Dept: MRI IMAGING | Age: 68
Discharge: HOME | End: 2023-09-28
Attending: INTERNAL MEDICINE
Payer: MEDICARE

## 2023-09-26 DIAGNOSIS — C20 RECTAL ADENOCARCINOMA (HCC): ICD-10-CM

## 2023-09-26 LAB
CREAT SERPL-MCNC: 0.9 MG/DL (ref 0.5–0.9)
GFR SERPL CREATININE-BSD FRML MDRD: >60 ML/MIN/1.73M2

## 2023-09-26 PROCEDURE — 72197 MRI PELVIS W/O & W/DYE: CPT

## 2023-09-26 PROCEDURE — 78815 PET IMAGE W/CT SKULL-THIGH: CPT

## 2023-09-26 PROCEDURE — 2580000003 HC RX 258: Performed by: INTERNAL MEDICINE

## 2023-09-26 PROCEDURE — 6360000004 HC RX CONTRAST MEDICATION: Performed by: INTERNAL MEDICINE

## 2023-09-26 PROCEDURE — 82565 ASSAY OF CREATININE: CPT

## 2023-09-26 PROCEDURE — A9579 GAD-BASE MR CONTRAST NOS,1ML: HCPCS | Performed by: INTERNAL MEDICINE

## 2023-09-26 PROCEDURE — 3430000000 HC RX DIAGNOSTIC RADIOPHARMACEUTICAL: Performed by: INTERNAL MEDICINE

## 2023-09-26 PROCEDURE — A9552 F18 FDG: HCPCS | Performed by: INTERNAL MEDICINE

## 2023-09-26 RX ORDER — SODIUM CHLORIDE 0.9 % (FLUSH) 0.9 %
10 SYRINGE (ML) INJECTION PRN
Status: DISCONTINUED | OUTPATIENT
Start: 2023-09-26 | End: 2023-09-29 | Stop reason: HOSPADM

## 2023-09-26 RX ORDER — FLUDEOXYGLUCOSE F 18 200 MCI/ML
10 INJECTION, SOLUTION INTRAVENOUS
Status: COMPLETED | OUTPATIENT
Start: 2023-09-26 | End: 2023-09-26

## 2023-09-26 RX ORDER — SODIUM CHLORIDE 0.9 % (FLUSH) 0.9 %
10 SYRINGE (ML) INJECTION ONCE
Status: COMPLETED | OUTPATIENT
Start: 2023-09-26 | End: 2023-09-26

## 2023-09-26 RX ADMIN — FLUDEOXYGLUCOSE F 18 12.5 MILLICURIE: 200 INJECTION, SOLUTION INTRAVENOUS at 10:01

## 2023-09-26 RX ADMIN — SODIUM CHLORIDE, PRESERVATIVE FREE 10 ML: 5 INJECTION INTRAVENOUS at 10:01

## 2023-09-26 RX ADMIN — GADOTERIDOL 20 ML: 279.3 INJECTION, SOLUTION INTRAVENOUS at 08:34

## 2023-09-26 RX ADMIN — SODIUM CHLORIDE, PRESERVATIVE FREE 10 ML: 5 INJECTION INTRAVENOUS at 08:35

## 2023-09-29 ENCOUNTER — OFFICE VISIT (OUTPATIENT)
Dept: SURGERY | Age: 68
End: 2023-09-29

## 2023-09-29 VITALS
HEIGHT: 63 IN | BODY MASS INDEX: 38.09 KG/M2 | SYSTOLIC BLOOD PRESSURE: 126 MMHG | OXYGEN SATURATION: 100 % | HEART RATE: 77 BPM | DIASTOLIC BLOOD PRESSURE: 79 MMHG | RESPIRATION RATE: 16 BRPM | WEIGHT: 215 LBS

## 2023-09-29 DIAGNOSIS — C20 RECTAL CANCER (HCC): Primary | ICD-10-CM

## 2023-09-29 NOTE — PROGRESS NOTES
[address] [facility] SURGICAL SPECIALISTS  [facility] [address]  Dept: [phone]    Patient:  Maye Phoenix  YOB: 1955  Date: 2023     The patient is a 79 y.o. female who presents today for consult of the following problems:     Chief Complaint: Rectal cancer. HPI:   This 72-year-old  female patient presents with a newly discovered rectal cancer. Per colonoscopy report it extends from the anal verge up to 10 cm proximally. Patient never had a colonoscopy previously.   History:     Past Medical History:   Diagnosis Date    COVID-19 virus infection 2021    Hyperlipidemia     Hypertension      Past Surgical History:   Procedure Laterality Date    BLADDER SUSPENSION      BLADDER SUSPENSION      CHOLECYSTECTOMY, LAPAROSCOPIC  2023    LAPAROSCOPIC ROBOTIC CHOLECYSTECTOMY WITH FIREFLY    CHOLECYSTECTOMY, LAPAROSCOPIC N/A 2023    LAPAROSCOPIC ROBOTIC CHOLECYSTECTOMY WITH FIREFLY performed by Rossi Driscoll DO at [facility] OR    COLONOSCOPY  2023    COLONOSCOPY N/A 2023    COLONOSCOPY POLYPECTOMY COLD BIOPSY RECTAL MASS performed by Gricelda Donovan MD at [facility]     Family History   Problem Relation Age of Onset    Elevated Lipids Mother     Cancer Mother     High Blood Pressure Mother     Cancer Maternal Grandmother      Social History     Tobacco Use    Smoking status: Former     Packs/day: 1.00     Years: 10.00     Additional pack years: 0.00     Total pack years: 10.00     Types: Cigarettes     Quit date: 1989     Years since quittin.3    Smokeless tobacco: Never   Vaping Use    Vaping Use: Never used   Substance Use Topics    Alcohol use: Never    Drug use: Never     Current Outpatient Medications   Medication Sig Dispense Refill    levothyroxine (SYNTHROID) 50 MCG tablet TAKE 1 TABLET BY MOUTH EVERY DAY 90 tablet 0    pantoprazole (PROTONIX)

## 2023-10-03 ENCOUNTER — TELEPHONE (OUTPATIENT)
Dept: RADIATION ONCOLOGY | Age: 68
End: 2023-10-03

## 2023-10-03 NOTE — TELEPHONE ENCOUNTER
Pt called to cancel her rad/onc consult today, stating she is in the bathroom all morning. Appt rescheduled for 10/5/23.

## 2023-10-05 ENCOUNTER — TELEPHONE (OUTPATIENT)
Dept: ONCOLOGY | Age: 68
End: 2023-10-05

## 2023-10-05 ENCOUNTER — HOSPITAL ENCOUNTER (OUTPATIENT)
Dept: RADIATION ONCOLOGY | Age: 68
Discharge: HOME | End: 2023-10-05
Payer: MEDICARE

## 2023-10-05 VITALS
WEIGHT: 209.6 LBS | BODY MASS INDEX: 37.13 KG/M2 | HEART RATE: 82 BPM | TEMPERATURE: 98.3 F | DIASTOLIC BLOOD PRESSURE: 70 MMHG | SYSTOLIC BLOOD PRESSURE: 119 MMHG | OXYGEN SATURATION: 99 % | RESPIRATION RATE: 16 BRPM

## 2023-10-05 DIAGNOSIS — N85.9 LESION OF UTERUS: Primary | ICD-10-CM

## 2023-10-05 PROCEDURE — 99205 OFFICE O/P NEW HI 60 MIN: CPT | Performed by: RADIOLOGY

## 2023-10-05 PROCEDURE — 99213 OFFICE O/P EST LOW 20 MIN: CPT | Performed by: RADIOLOGY

## 2023-10-05 ASSESSMENT — PATIENT HEALTH QUESTIONNAIRE - PHQ9
1. LITTLE INTEREST OR PLEASURE IN DOING THINGS: 0
SUM OF ALL RESPONSES TO PHQ QUESTIONS 1-9: 0
2. FEELING DOWN, DEPRESSED OR HOPELESS: 0
SUM OF ALL RESPONSES TO PHQ QUESTIONS 1-9: 0
SUM OF ALL RESPONSES TO PHQ9 QUESTIONS 1 & 2: 0
SUM OF ALL RESPONSES TO PHQ QUESTIONS 1-9: 0
SUM OF ALL RESPONSES TO PHQ QUESTIONS 1-9: 0

## 2023-10-05 ASSESSMENT — PAIN DESCRIPTION - ORIENTATION: ORIENTATION: LOWER

## 2023-10-05 ASSESSMENT — PAIN DESCRIPTION - LOCATION: LOCATION: ABDOMEN

## 2023-10-05 ASSESSMENT — PAIN DESCRIPTION - DESCRIPTORS: DESCRIPTORS: CRAMPING

## 2023-10-05 ASSESSMENT — PAIN SCALES - GENERAL: PAINLEVEL_OUTOF10: 2

## 2023-10-05 NOTE — PROGRESS NOTES
2       3.  Ambulatory: use of assistive devices (walker, cane, off-loading devices),        attached to equipment (IV pole, oxygen) 0     4. Sensory Limitations: dizziness, vertigo, impaired vision 0     5. Age less than 65        0     6. Age 72 or greater 1     7. Medication: diuretics, strong analgesics, hypnotics, sedatives,        antihypertensive agents 0   8. Falls:  recent history of falls within the last 3 months (not to include slipping or        tripping) 0   TOTAL 3    If score of 4 or greater was education given? No       TABLE 2   Risk Score Risk Level Plan of Care   0-3 Little or  No Risk 1. Provide assistance as indicated for ambulation activities  2. Reorient confused/cognitively impaired patient  3. Call-light/bell within patient's reach  4. Chair/bed in low position, stretcher/bed with siderails up except when performing patient care activities  5. Educate patient/family/caregiver on falls prevention  6.  Reassess in 12 weeks or with any noted change in patient condition which places them at a risk for a fall   4-6 Moderate Risk 1. Provide assistance as indicated for ambulation activities  2. Reorient confused/cognitively impaired patient  3. Call-light/bell within patient's reach  4. Chair/bed in low position, stretcher/bed with siderails up except when performing patient care activities  5. Educate patient/family/caregiver on falls prevention     7 or   Higher High Risk 1. Place patient in easily observable treatment room  2. Patient attended at all times by family member or staff  3. Provide assistance as indicated for ambulation activities  4. Reorient confused/cognitively impaired patient  5. Call-light/bell within patient's reach  6. Chair/bed in low position, stretcher/bed with siderails up except when performing patient care activities  7. Educate patient/family/caregiver on falls prevention             Assessment/Plan: Patient was seen today for consultation.  She

## 2023-10-05 NOTE — TELEPHONE ENCOUNTER
Name: Merissa Suggs  : 1955  MRN: [id number]    Oncology Navigation Follow-Up Note    Contact Type:  Telephone    Notes: Writer received a call from patient stating that her [facility] delivery was set up for  and the delivery never came. She states she has tried to call them and has been unsuccessful. Writer called Justin Lara and left detailed VM regarding above and requesting an update to writer or pt. Pt informed of writer plan and appreciative of follow up. Will continue to follow.     Electronically signed by Joelle Santiago RN on 10/5/2023 at 1:30 PM

## 2023-10-05 NOTE — ACP (ADVANCE CARE PLANNING)
Advance Care Planning     Hospice Services: Patient is not currently receiving hospice services/has not received hospice care within the performance year.     Advance Care Planning was discussed with patient, and the patient's Advance Care Plan is as follows:ACP on file

## 2023-10-05 NOTE — CONSULTS
neoadjuvant therapy. Component of KANE is a course of concurrent chemotherapy and radiation therapy. After patient completes KANE therapy, she will be evaluated by colorectal surgery for resection. Plan will be in coordination with medical oncology and colorectal surgery. I discussed the logistics, goals, and side effects of radiation. Radiation will be given daily Monday through Friday for a total of 5-1/2 weeks. With regards to radiation to the rectum and pelvic lymph nodes, I discussed the possible short-term side effects of skin irritation (causing redness, dryness, or peeling), tiredness, low blood counts (causing infection or bleeding), diarrhea, nausea/vomiting, rectal bleeding, pain with urination, urgency and increased frequency of urination and blood in the urine. Possible long-term side effects discussed included hyperpigmentation of the skin, damage to the bowel or intestines (resulting in bleeding or obstruction that may require surgery), damage to the bladder (resulting in decreased capacity and bleeding that may require surgery), shrinkage and dryness of the vagina, infertility, damage to the nerves causing numbness or weakness, damage to the bone, and swelling of the lower extremities. Patient expressed understanding of risks and benefit and is agreeable to proceed with treatment. Informed consent was signed. Patient does have an abnormal endometrial lesion noted on PET scan as well as MRI pelvis. She is to follow-up with the GYN oncology for further evaluation. Patient was in agreement with my recommendations. All questions were answered to their satisfaction. Patient was advised to contact us anytime should they have any questions or concerns.      Electronically signed by Jose Griffith MD on 10/5/2023 at 2:31 PM      Drugs Prescribed:  New Prescriptions    No medications on file       Orders Placed:   No orders of the defined types were placed in this

## 2023-10-06 ENCOUNTER — TELEPHONE (OUTPATIENT)
Dept: ONCOLOGY | Age: 68
End: 2023-10-06

## 2023-10-06 NOTE — TELEPHONE ENCOUNTER
Name: Aron Lewis  : 1955  MRN: [id number]    Oncology Navigation Follow-Up Note    Contact Type:  Telephone    Notes: Writer called patient to touch base with her regarding referral to Dr. Sekou Stephens. Pt is aware of referral. Pt states she didn't hear from [facility] yesterday either. Writer informed her I would f/u with her on Monday next week regarding referrals. Pt appreciative.      Electronically signed by Debra Billy RN on 10/6/2023 at 7:46 AM

## 2023-10-09 ENCOUNTER — OFFICE VISIT (OUTPATIENT)
Dept: PRIMARY CARE CLINIC | Age: 68
End: 2023-10-09
Payer: MEDICARE

## 2023-10-09 VITALS
HEART RATE: 79 BPM | WEIGHT: 204 LBS | OXYGEN SATURATION: 99 % | RESPIRATION RATE: 16 BRPM | BODY MASS INDEX: 36.14 KG/M2 | HEIGHT: 63 IN | DIASTOLIC BLOOD PRESSURE: 60 MMHG | SYSTOLIC BLOOD PRESSURE: 118 MMHG

## 2023-10-09 DIAGNOSIS — C20 RECTAL ADENOCARCINOMA (HCC): Primary | ICD-10-CM

## 2023-10-09 DIAGNOSIS — I10 ESSENTIAL (PRIMARY) HYPERTENSION: ICD-10-CM

## 2023-10-09 DIAGNOSIS — K62.5 RECTAL BLEEDING: ICD-10-CM

## 2023-10-09 DIAGNOSIS — D50.0 BLOOD LOSS ANEMIA: ICD-10-CM

## 2023-10-09 DIAGNOSIS — K59.00 CONSTIPATION, UNSPECIFIED CONSTIPATION TYPE: ICD-10-CM

## 2023-10-09 PROCEDURE — 3017F COLORECTAL CA SCREEN DOC REV: CPT | Performed by: NURSE PRACTITIONER

## 2023-10-09 PROCEDURE — G0008 ADMIN INFLUENZA VIRUS VAC: HCPCS | Performed by: NURSE PRACTITIONER

## 2023-10-09 PROCEDURE — 3078F DIAST BP <80 MM HG: CPT | Performed by: NURSE PRACTITIONER

## 2023-10-09 PROCEDURE — 1036F TOBACCO NON-USER: CPT | Performed by: NURSE PRACTITIONER

## 2023-10-09 PROCEDURE — G8399 PT W/DXA RESULTS DOCUMENT: HCPCS | Performed by: NURSE PRACTITIONER

## 2023-10-09 PROCEDURE — 90694 VACC AIIV4 NO PRSRV 0.5ML IM: CPT | Performed by: NURSE PRACTITIONER

## 2023-10-09 PROCEDURE — 1123F ACP DISCUSS/DSCN MKR DOCD: CPT | Performed by: NURSE PRACTITIONER

## 2023-10-09 PROCEDURE — G8427 DOCREV CUR MEDS BY ELIG CLIN: HCPCS | Performed by: NURSE PRACTITIONER

## 2023-10-09 PROCEDURE — G8484 FLU IMMUNIZE NO ADMIN: HCPCS | Performed by: NURSE PRACTITIONER

## 2023-10-09 PROCEDURE — G8417 CALC BMI ABV UP PARAM F/U: HCPCS | Performed by: NURSE PRACTITIONER

## 2023-10-09 PROCEDURE — 99214 OFFICE O/P EST MOD 30 MIN: CPT | Performed by: NURSE PRACTITIONER

## 2023-10-09 PROCEDURE — 1090F PRES/ABSN URINE INCON ASSESS: CPT | Performed by: NURSE PRACTITIONER

## 2023-10-09 PROCEDURE — 3074F SYST BP LT 130 MM HG: CPT | Performed by: NURSE PRACTITIONER

## 2023-10-09 RX ORDER — DOCUSATE SODIUM 100 MG/1
100 CAPSULE, LIQUID FILLED ORAL 2 TIMES DAILY
COMMUNITY

## 2023-10-09 NOTE — PROGRESS NOTES
[address] [facility] CARE  [place] [address]  Dept: [phone]  Dept Fax: [phone]    Joya Siemens is a 79 y.o. female who presents today for her medical conditions/complaintsas noted below. Joya Siemens is c/o of Constipation and Follow-up        HPI:     Patient presents for follow-up  Blood pressure stable  Her weight is down 14 pounds since the beginning of September    Patient presents for follow-up. She did see the radiation oncologist.  They will be doing radiation 5 days a week for 5 weeks. She does have an appointment this Wednesday with oncology. They have not laid out her full treatment yet. She does follow-up with a colorectal surgeon at the end the month. She continues to have issues with having a bowel movement. She tried miralax x 4 with minimal relief. Admits she has not been good drinking water since she has not been at work. She will have diarrhea but will have a lower abdominal pressure. She is unable to have a large bowel movement. She is still having bloody stools. Otherwise she is doing okay.   She is waiting for a plan with the oncology team        Past Medical History:   Diagnosis Date    COVID-19 virus infection 12/23/2021    Hyperlipidemia     Hypertension       Past Surgical History:   Procedure Laterality Date    BLADDER SUSPENSION      BLADDER SUSPENSION  2004    CHOLECYSTECTOMY, LAPAROSCOPIC  08/28/2023    LAPAROSCOPIC ROBOTIC CHOLECYSTECTOMY WITH FIREFLY    CHOLECYSTECTOMY, LAPAROSCOPIC N/A 8/28/2023    LAPAROSCOPIC ROBOTIC CHOLECYSTECTOMY WITH FIREFLY performed by Derrick Guthrie DO at [facility] OR    COLONOSCOPY  08/28/2023    COLONOSCOPY N/A 8/28/2023    COLONOSCOPY POLYPECTOMY COLD BIOPSY RECTAL MASS performed by Brandt Rodriguez MD at [facility] OR       Family History   Problem Relation Age of Onset    Elevated Lipids Mother     Cancer Mother     High Blood Pressure Mother     Cancer

## 2023-10-10 PROBLEM — K62.89 RECTAL MASS: Status: RESOLVED | Noted: 2023-08-28 | Resolved: 2023-10-10

## 2023-10-10 PROBLEM — K62.89 MASS IN RECTUM: Status: RESOLVED | Noted: 2023-08-25 | Resolved: 2023-10-10

## 2023-10-10 PROBLEM — N30.00 ACUTE CYSTITIS WITHOUT HEMATURIA: Status: RESOLVED | Noted: 2023-08-25 | Resolved: 2023-10-10

## 2023-10-10 ASSESSMENT — ENCOUNTER SYMPTOMS
EYE REDNESS: 0
EYE DISCHARGE: 0
TROUBLE SWALLOWING: 0
SORE THROAT: 0
SINUS PAIN: 0
VOMITING: 0
RECTAL PAIN: 1
COUGH: 0
EYE ITCHING: 0
CHEST TIGHTNESS: 0
SINUS PRESSURE: 0
WHEEZING: 0
SHORTNESS OF BREATH: 0
DIARRHEA: 0
ABDOMINAL PAIN: 1
NAUSEA: 0
CONSTIPATION: 1

## 2023-10-11 ENCOUNTER — OFFICE VISIT (OUTPATIENT)
Dept: ONCOLOGY | Age: 68
End: 2023-10-11
Payer: MEDICARE

## 2023-10-11 ENCOUNTER — HOSPITAL ENCOUNTER (OUTPATIENT)
Dept: RADIATION ONCOLOGY | Age: 68
Discharge: HOME | End: 2023-10-11
Payer: MEDICARE

## 2023-10-11 ENCOUNTER — TELEPHONE (OUTPATIENT)
Dept: ONCOLOGY | Age: 68
End: 2023-10-11

## 2023-10-11 VITALS
OXYGEN SATURATION: 99 % | BODY MASS INDEX: 36.33 KG/M2 | TEMPERATURE: 96.8 F | DIASTOLIC BLOOD PRESSURE: 71 MMHG | SYSTOLIC BLOOD PRESSURE: 119 MMHG | HEART RATE: 86 BPM | RESPIRATION RATE: 18 BRPM | WEIGHT: 205.1 LBS

## 2023-10-11 DIAGNOSIS — C20 RECTAL ADENOCARCINOMA (HCC): Primary | ICD-10-CM

## 2023-10-11 PROCEDURE — 99211 OFF/OP EST MAY X REQ PHY/QHP: CPT | Performed by: INTERNAL MEDICINE

## 2023-10-11 PROCEDURE — 1123F ACP DISCUSS/DSCN MKR DOCD: CPT | Performed by: INTERNAL MEDICINE

## 2023-10-11 PROCEDURE — G8427 DOCREV CUR MEDS BY ELIG CLIN: HCPCS | Performed by: INTERNAL MEDICINE

## 2023-10-11 PROCEDURE — 99214 OFFICE O/P EST MOD 30 MIN: CPT | Performed by: INTERNAL MEDICINE

## 2023-10-11 PROCEDURE — 1090F PRES/ABSN URINE INCON ASSESS: CPT | Performed by: INTERNAL MEDICINE

## 2023-10-11 PROCEDURE — 3078F DIAST BP <80 MM HG: CPT | Performed by: INTERNAL MEDICINE

## 2023-10-11 PROCEDURE — G8484 FLU IMMUNIZE NO ADMIN: HCPCS | Performed by: INTERNAL MEDICINE

## 2023-10-11 PROCEDURE — G8399 PT W/DXA RESULTS DOCUMENT: HCPCS | Performed by: INTERNAL MEDICINE

## 2023-10-11 PROCEDURE — 77334 RADIATION TREATMENT AID(S): CPT | Performed by: RADIOLOGY

## 2023-10-11 PROCEDURE — 1036F TOBACCO NON-USER: CPT | Performed by: INTERNAL MEDICINE

## 2023-10-11 PROCEDURE — 77470 SPECIAL RADIATION TREATMENT: CPT | Performed by: RADIOLOGY

## 2023-10-11 PROCEDURE — 3017F COLORECTAL CA SCREEN DOC REV: CPT | Performed by: INTERNAL MEDICINE

## 2023-10-11 PROCEDURE — 3074F SYST BP LT 130 MM HG: CPT | Performed by: INTERNAL MEDICINE

## 2023-10-11 PROCEDURE — G8417 CALC BMI ABV UP PARAM F/U: HCPCS | Performed by: INTERNAL MEDICINE

## 2023-10-11 RX ORDER — CAPECITABINE 500 MG/1
1500 TABLET, FILM COATED ORAL 2 TIMES DAILY
Qty: 84 TABLET | Refills: 0 | Status: ACTIVE | OUTPATIENT
Start: 2023-10-11 | End: 2023-10-25

## 2023-10-11 RX ORDER — CAPECITABINE 150 MG/1
1050 TABLET, FILM COATED ORAL 2 TIMES DAILY
Qty: 196 TABLET | Refills: 0 | Status: SHIPPED | OUTPATIENT
Start: 2023-10-11 | End: 2023-10-11

## 2023-10-11 RX ORDER — CAPECITABINE 500 MG/1
1500 TABLET, FILM COATED ORAL 2 TIMES DAILY
Qty: 84 TABLET | Refills: 0 | Status: SHIPPED | OUTPATIENT
Start: 2023-10-11 | End: 2023-10-11

## 2023-10-11 RX ORDER — CAPECITABINE 150 MG/1
1050 TABLET, FILM COATED ORAL 2 TIMES DAILY
Qty: 196 TABLET | Refills: 0 | Status: ACTIVE | OUTPATIENT
Start: 2023-10-11 | End: 2023-10-25

## 2023-10-11 NOTE — DISCHARGE INSTRUCTIONS
liquid. Rectal area. If you have radiation therapy to the rectal area, you are likely to have skin problems. These problems are often worse after a bowel movement. Clean yourself with a baby wipe or squirt of water from a spray bottle. Ask your nurse if sitz baths might help you. Sitz baths are warm-water baths taken in a sitting position that covers only the hips and buttocks. Talk with your doctor or nurse. Some skin changes can be very serious. Your treatment team will check for skin changes each time you have radiation therapy. Make sure to report any skin changes that you notice. Medicine. Medicines can help with some skin changes. These include lotions for dry or itchy skin, antibiotics to treat infection, and drug to reduce swelling or itching. Urinary and Bladder Changes  What they are:Radiation therapy can cause urinary and bladder problems, which can include:  Burning or pain when you begin to urinate or after you urinate (empty your bladder)  Trouble starting to urinate  Trouble emptying your bladder completely  Frequent, urgent need to urinate  Cystitis, a swelling (inflammation) in your urinary tract  Incontinence, when you cannot control the flow of urine from your bladder, especially when coughing or sneezing  Waking frequently to urinate  Blood in your urine  Bladder spasms, which are like painful muscle cramps    Ways to Manage Urinary and Bladder Changes  Drink lots of fluids. Drink 6 to 8 cups of fluids each day, enough so that your urine is clear to light yellow in color  Avoid coffee, black tea, alcohol, spices, and all tobacco products  Talk with your doctor or nurse if you think you have urinary or bladder problems. You may need to provide a urine sample to check whether you have an infection  Talk with your doctor or nurse if you have incontinence. He or she may refer you to a physical therapist who will assess your problem.  The therapist can give you exercises to improve bladder

## 2023-10-11 NOTE — TELEPHONE ENCOUNTER
AVS From 10/11/23    Start xeloda with radiation  Chemo teaching  Nurse navigator  Rtc with day 1 radiation      AVS given to  to follow precert      Pt was given AVS and appointment schedule    Electronically signed by Enmanuel Gaytan on 10/11/2023 at 11:53 AM

## 2023-10-11 NOTE — PROGRESS NOTES
Patient ID: Chani Gordon, 1955, [id number], 79 y.o. Referred by :  No ref. provider found   Reason for consultation: Rectal adenocarcinoma      HISTORY OF PRESENT ILLNESS:    Oncologic History:    Chani Gordon is a very pleasant 79 y.o. female. who admitted to the hospital on August 24th 2023 for rectal bleeding. The patient states that her symptoms began several months ago and have progressively worsened. In the ED, the patient was anemic with a hemoglobin of 5.1. Fecal occult blood test was positive. CT scan of the abdomen and pelvis was done and was remarkable for a left-sided rectal mass concerning for malignancy. Colonoscopy was done and did show  malignant appearing circumferential mass with friable mucosa extending from the anal verge to 10 cm in the rectum.   The mass was involved two thirds of the rectum from the anal verge and was noted to cause significant luminal narrowing  Oncology has been consulted the pathology adenocarcinoma   Patient also underwent cholecystectomy  Did have history of pulm embolism not on anticoagulation    Oncology history  Lower rectal cancer MRI staging T4b N+    Interval history  Patient was seen and examined  Reviewed PET/CT and MRI  Was evaluated by surgery and the plan for total neoadjuvant  Also discussed with radiation oncology and the plan for concurrent chemo RT first and then chemotherapy      Past Medical History:   Diagnosis Date    COVID-19 virus infection 12/23/2021    Hyperlipidemia     Hypertension        Past Surgical History:   Procedure Laterality Date    BLADDER SUSPENSION      BLADDER SUSPENSION  2004    CHOLECYSTECTOMY, LAPAROSCOPIC  08/28/2023    LAPAROSCOPIC ROBOTIC CHOLECYSTECTOMY WITH FIREFLY    CHOLECYSTECTOMY, LAPAROSCOPIC N/A 8/28/2023    LAPAROSCOPIC ROBOTIC CHOLECYSTECTOMY WITH FIREFLY performed by Opal Carrera DO at [facility] [address]    COLONOSCOPY  08/28/2023    COLONOSCOPY N/A 8/28/2023    COLONOSCOPY POLYPECTOMY COLD BIOPSY 01-Mar-2019 08:15

## 2023-10-11 NOTE — PROGRESS NOTES
Pt here today for teach and simulation scan. Radiation and You information provided along with additional education regarding radiation therapy and what to expect. Pt verbalizes understanding and all questions answered to the best of my knowledge. Pt escorted to CT room without any difficulty. Reviewed treatment plan of 28 radiation treatments to her pelvis with concurrent Xeloda. Xeloda prescribed today and so patient does not physically have this. Patient states she plans to  herself to appointments and aware referral could be placed to  if pt needing assistance.

## 2023-10-16 ENCOUNTER — TELEPHONE (OUTPATIENT)
Dept: RADIATION ONCOLOGY | Age: 68
End: 2023-10-16

## 2023-10-16 ENCOUNTER — TELEPHONE (OUTPATIENT)
Dept: ONCOLOGY | Age: 68
End: 2023-10-16

## 2023-10-16 DIAGNOSIS — C20 RECTAL ADENOCARCINOMA (HCC): Primary | ICD-10-CM

## 2023-10-16 RX ORDER — CAPECITABINE 500 MG/1
2500 TABLET, FILM COATED ORAL 2 TIMES DAILY
Qty: 280 TABLET | Refills: 0 | Status: ACTIVE | OUTPATIENT
Start: 2023-10-16 | End: 2023-11-13

## 2023-10-16 NOTE — TELEPHONE ENCOUNTER
Called pt to see if she had heard anything about a delivery date for her Xeloda. Patient states small box recently delivered (not currently at home). Patient has questions about how to take this and told her will ask navigator for guidance on this and if chemo teach is needed as patient didn't know if she had this or not.

## 2023-10-16 NOTE — TELEPHONE ENCOUNTER
Name: Olimpia Horton  : 1955  MRN: [id number]    Oncology Navigation Follow-Up Note    Contact Type:  Telephone    Notes: Writer received a message from Marky in [facility] stating pt hasn't received her oral Xeloda teach. After reviewing chart, writer notes that referral for oral compliance wasn't placed. Order placed today and email sent to Molly Casas, pharmacist, to alert of need for oral teach prior to radiation starting. Will continue to follow.     Electronically signed by Christen Ni RN on 10/16/2023 at 12:54 PM

## 2023-10-17 ENCOUNTER — HOSPITAL ENCOUNTER (OUTPATIENT)
Dept: RADIATION ONCOLOGY | Age: 68
Discharge: HOME | End: 2023-10-17
Payer: MEDICARE

## 2023-10-18 ENCOUNTER — TELEPHONE (OUTPATIENT)
Dept: INFUSION THERAPY | Age: 68
End: 2023-10-18

## 2023-10-18 NOTE — TELEPHONE ENCOUNTER
Received email from pharmacist at [facility] stating there is a DDI with xeloda and protonix. Notified Dr. Susannah Sharif and he states ok to still fill xeloda with protonix. Pharmacist notified of above.

## 2023-10-27 ENCOUNTER — OFFICE VISIT (OUTPATIENT)
Dept: SURGERY | Age: 68
End: 2023-10-27
Payer: MEDICARE

## 2023-10-27 ENCOUNTER — HOSPITAL ENCOUNTER (OUTPATIENT)
Dept: RADIATION ONCOLOGY | Age: 68
Discharge: HOME | End: 2023-10-27
Payer: MEDICARE

## 2023-10-27 VITALS
SYSTOLIC BLOOD PRESSURE: 103 MMHG | BODY MASS INDEX: 35.26 KG/M2 | OXYGEN SATURATION: 100 % | WEIGHT: 199 LBS | HEIGHT: 63 IN | DIASTOLIC BLOOD PRESSURE: 58 MMHG | HEART RATE: 87 BPM

## 2023-10-27 DIAGNOSIS — D49.59 ENDOMETRIAL NEOPLASM: ICD-10-CM

## 2023-10-27 DIAGNOSIS — C20 RECTAL CANCER (HCC): Primary | ICD-10-CM

## 2023-10-27 PROCEDURE — G8417 CALC BMI ABV UP PARAM F/U: HCPCS | Performed by: COLON & RECTAL SURGERY

## 2023-10-27 PROCEDURE — 1090F PRES/ABSN URINE INCON ASSESS: CPT | Performed by: COLON & RECTAL SURGERY

## 2023-10-27 PROCEDURE — G8427 DOCREV CUR MEDS BY ELIG CLIN: HCPCS | Performed by: COLON & RECTAL SURGERY

## 2023-10-27 PROCEDURE — 3074F SYST BP LT 130 MM HG: CPT | Performed by: COLON & RECTAL SURGERY

## 2023-10-27 PROCEDURE — G8484 FLU IMMUNIZE NO ADMIN: HCPCS | Performed by: COLON & RECTAL SURGERY

## 2023-10-27 PROCEDURE — G8399 PT W/DXA RESULTS DOCUMENT: HCPCS | Performed by: COLON & RECTAL SURGERY

## 2023-10-27 PROCEDURE — 1123F ACP DISCUSS/DSCN MKR DOCD: CPT | Performed by: COLON & RECTAL SURGERY

## 2023-10-27 PROCEDURE — 1036F TOBACCO NON-USER: CPT | Performed by: COLON & RECTAL SURGERY

## 2023-10-27 PROCEDURE — 99213 OFFICE O/P EST LOW 20 MIN: CPT | Performed by: COLON & RECTAL SURGERY

## 2023-10-27 PROCEDURE — 3078F DIAST BP <80 MM HG: CPT | Performed by: COLON & RECTAL SURGERY

## 2023-10-27 PROCEDURE — 3017F COLORECTAL CA SCREEN DOC REV: CPT | Performed by: COLON & RECTAL SURGERY

## 2023-10-27 RX ORDER — PSYLLIUM SEED (WITH DEXTROSE)
3.4 POWDER (GRAM) ORAL DAILY
Qty: 1 EACH | Refills: 3 | Status: SHIPPED | OUTPATIENT
Start: 2023-10-27

## 2023-10-27 ASSESSMENT — ENCOUNTER SYMPTOMS
STRIDOR: 0
COLOR CHANGE: 0
BLOOD IN STOOL: 1
COUGH: 0
RECTAL PAIN: 1
VOMITING: 0
CHEST TIGHTNESS: 0
NAUSEA: 0
APNEA: 0
ABDOMINAL DISTENTION: 1
ANAL BLEEDING: 1
BACK PAIN: 1
ABDOMINAL PAIN: 0
SHORTNESS OF BREATH: 0
WHEEZING: 0
CONSTIPATION: 0
DIARRHEA: 0

## 2023-10-27 NOTE — PATIENT INSTRUCTIONS
Use Metamucil (the canned variety) each night. Mix 1 Tbsp with a glass (6-10 oz) of water and drink quickly.

## 2023-10-27 NOTE — PROGRESS NOTES
[address] SURGICAL SPECIALISTS  [facility] [address]  Dept: [phone]    Patient:  Maggie Cloud  YOB: 1955  Date: 10/27/2023     The patient is a 79 y.o. female who presents today for consult of the following problems:     Chief Complaint: 4-week follow-up after initial visit for rectal cancer. HPI:   This pleasant but unfortunate 73-year-old  female patient presents for scheduled follow-up after initial visit regarding new diagnosis of rectal cancer. Since the initial encounter the patient has had multiple imaging studies done. MRI and PET scan demonstrated not only the rectal neoplasm but also a uterine tumor which is most likely cancer. The patient reports no change in his status. She has not started on neoadjuvant therapy yet. She remains strongly opposed to any thought of colostomy.   History:     Past Medical History:   Diagnosis Date    COVID-19 virus infection 12/23/2021    Hyperlipidemia     Hypertension      Past Surgical History:   Procedure Laterality Date    BLADDER SUSPENSION      BLADDER SUSPENSION  2004    CHOLECYSTECTOMY, LAPAROSCOPIC  08/28/2023    LAPAROSCOPIC ROBOTIC CHOLECYSTECTOMY WITH FIREFLY    CHOLECYSTECTOMY, LAPAROSCOPIC N/A 8/28/2023    LAPAROSCOPIC ROBOTIC CHOLECYSTECTOMY WITH FIREFLY performed by Jody Aschoff, DO at [facility] OR    COLONOSCOPY  08/28/2023    COLONOSCOPY N/A 8/28/2023    COLONOSCOPY POLYPECTOMY COLD BIOPSY RECTAL MASS performed by Ervin Seip, MD at [place] [address] [address] [facility]     Family History   Problem Relation Age of Onset    Elevated Lipids Mother     Cancer Mother     High Blood Pressure Mother     Cancer Maternal Grandmother      Social History     Tobacco Use    Smoking status: Former     Packs/day: 1.00     Years: 10.00     Additional pack years: 0.00     Total pack years: 10.00     Types: Cigarettes     Quit date: 5/22/1989     Years

## 2023-10-30 ENCOUNTER — TELEPHONE (OUTPATIENT)
Dept: GYNECOLOGIC ONCOLOGY | Age: 68
End: 2023-10-30

## 2023-10-30 PROCEDURE — 77336 RADIATION PHYSICS CONSULT: CPT | Performed by: RADIOLOGY

## 2023-10-30 NOTE — TELEPHONE ENCOUNTER
Called patient this morning and offered [facility]. She declines this appt stating that she will not go to [facility] . She only wants appt at [address] [place] [address]. Provider notified and ok to schedule on 11/3/23 at 1:00 pm. Left message for patient notifying could move appt to this date.

## 2023-10-31 ENCOUNTER — TELEPHONE (OUTPATIENT)
Dept: ONCOLOGY | Age: 68
End: 2023-10-31

## 2023-10-31 NOTE — TELEPHONE ENCOUNTER
Name: Wesley Cabrales  : 1955  MRN: [id number]    Oncology Navigation Follow-Up Note    Contact Type:  Telephone    Notes: Writer called patient to see is her oral chemo teach was completed. No answer, VM left regarding above and requesting a return call.       Electronically signed by Lillie Peraza RN on 10/31/2023 at 3:02 PM

## 2023-11-01 NOTE — TELEPHONE ENCOUNTER
Called pt to follow up if she decided if she wanted to move appt up to 11/3/23.  She is agreeable to schedule with Dr. Kimberly Gilbert on 11/3/23 at 1:00 pm

## 2023-11-02 ENCOUNTER — TELEPHONE (OUTPATIENT)
Dept: RADIATION ONCOLOGY | Age: 68
End: 2023-11-02

## 2023-11-02 NOTE — TELEPHONE ENCOUNTER
Left voicemail asking pt if she has her oral Xeloda medication. Requested return call only if she does not have this.

## 2023-11-03 ENCOUNTER — TELEPHONE (OUTPATIENT)
Dept: GYNECOLOGIC ONCOLOGY | Age: 68
End: 2023-11-03

## 2023-11-03 DIAGNOSIS — K59.04 CHRONIC IDIOPATHIC CONSTIPATION: ICD-10-CM

## 2023-11-03 RX ORDER — PLECANATIDE 3 MG/1
1 TABLET ORAL DAILY
Qty: 30 TABLET | Refills: 0 | Status: SHIPPED | OUTPATIENT
Start: 2023-11-03

## 2023-11-03 NOTE — TELEPHONE ENCOUNTER
Pt missed her 11/3/23 appt and needs to reschedule asap. Pt refused to be scheduled at our [facility] location and will only be seen in [place]. Please advise.

## 2023-11-03 NOTE — TELEPHONE ENCOUNTER
Called patient to inquire if coming to appt scheduled at 1300. Patient states she needs to reschedule due to not being able to stay out of bathroom. Dr. Lambert Hearing informed writer patient can't wait 6 weeks to see him and to schedule with PA or other surgeon ASAP. Patient transferred to office staff for reschedule.

## 2023-11-07 ENCOUNTER — HOSPITAL ENCOUNTER (OUTPATIENT)
Dept: RADIATION ONCOLOGY | Age: 68
Discharge: HOME | End: 2023-11-07
Payer: MEDICARE

## 2023-11-07 PROCEDURE — 77280 THER RAD SIMULAJ FIELD SMPL: CPT | Performed by: RADIOLOGY

## 2023-11-07 PROCEDURE — 77412 RADIATION TX DELIVERY LVL 3: CPT | Performed by: RADIOLOGY

## 2023-11-08 ENCOUNTER — HOSPITAL ENCOUNTER (OUTPATIENT)
Age: 68
Discharge: HOME | End: 2023-11-08
Payer: MEDICARE

## 2023-11-08 ENCOUNTER — HOSPITAL ENCOUNTER (OUTPATIENT)
Dept: RADIATION ONCOLOGY | Age: 68
Discharge: HOME | End: 2023-11-08
Payer: MEDICARE

## 2023-11-08 ENCOUNTER — CLINICAL DOCUMENTATION (OUTPATIENT)
Dept: ONCOLOGY | Age: 68
End: 2023-11-08

## 2023-11-08 ENCOUNTER — TELEPHONE (OUTPATIENT)
Dept: INFUSION THERAPY | Age: 68
End: 2023-11-08

## 2023-11-08 VITALS
HEART RATE: 82 BPM | TEMPERATURE: 98 F | RESPIRATION RATE: 16 BRPM | BODY MASS INDEX: 35.11 KG/M2 | SYSTOLIC BLOOD PRESSURE: 112 MMHG | DIASTOLIC BLOOD PRESSURE: 70 MMHG | OXYGEN SATURATION: 100 % | WEIGHT: 198.2 LBS

## 2023-11-08 DIAGNOSIS — C20 RECTAL ADENOCARCINOMA (HCC): Primary | ICD-10-CM

## 2023-11-08 DIAGNOSIS — C20 RECTAL ADENOCARCINOMA (HCC): ICD-10-CM

## 2023-11-08 LAB
ALBUMIN SERPL-MCNC: 3.4 G/DL (ref 3.5–5.2)
ALBUMIN/GLOB SERPL: 1.2 {RATIO} (ref 1–2.5)
ALP SERPL-CCNC: 130 U/L (ref 35–104)
ALT SERPL-CCNC: 6 U/L (ref 5–33)
ANION GAP SERPL CALCULATED.3IONS-SCNC: 13 MMOL/L (ref 9–17)
AST SERPL-CCNC: 10 U/L
BASOPHILS # BLD: 0 K/UL (ref 0–0.2)
BASOPHILS NFR BLD: 0 % (ref 0–2)
BILIRUB SERPL-MCNC: 0.7 MG/DL (ref 0.3–1.2)
BUN SERPL-MCNC: 23 MG/DL (ref 8–23)
CALCIUM SERPL-MCNC: 7.9 MG/DL (ref 8.6–10.4)
CHLORIDE SERPL-SCNC: 103 MMOL/L (ref 98–107)
CO2 SERPL-SCNC: 22 MMOL/L (ref 20–31)
CREAT SERPL-MCNC: 1.3 MG/DL (ref 0.5–0.9)
EOSINOPHIL # BLD: 0 K/UL (ref 0–0.4)
EOSINOPHILS RELATIVE PERCENT: 0 % (ref 1–4)
ERYTHROCYTE [DISTWIDTH] IN BLOOD BY AUTOMATED COUNT: 20.9 % (ref 12.5–15.4)
GFR SERPL CREATININE-BSD FRML MDRD: 45 ML/MIN/1.73M2
GLUCOSE SERPL-MCNC: 104 MG/DL (ref 70–99)
HCT VFR BLD AUTO: 14.9 % (ref 36–46)
HGB BLD-MCNC: 4.2 G/DL (ref 12–16)
LYMPHOCYTES NFR BLD: 1.04 K/UL (ref 1–4.8)
LYMPHOCYTES RELATIVE PERCENT: 16 % (ref 24–44)
MCH RBC QN AUTO: 18.5 PG (ref 26–34)
MCHC RBC AUTO-ENTMCNC: 27.9 G/DL (ref 31–37)
MCV RBC AUTO: 66.2 FL (ref 80–100)
MONOCYTES NFR BLD: 0.46 K/UL (ref 0.1–0.8)
MONOCYTES NFR BLD: 7 % (ref 1–7)
MORPHOLOGY: ABNORMAL
NEUTROPHILS NFR BLD: 77 % (ref 36–66)
NEUTS SEG NFR BLD: 5 K/UL (ref 1.8–7.7)
PLATELET # BLD AUTO: 406 K/UL (ref 140–450)
PMV BLD AUTO: 7.6 FL (ref 6–12)
POTASSIUM SERPL-SCNC: 4.2 MMOL/L (ref 3.7–5.3)
PROT SERPL-MCNC: 6.3 G/DL (ref 6.4–8.3)
RBC # BLD AUTO: 2.25 M/UL (ref 4–5.2)
SODIUM SERPL-SCNC: 138 MMOL/L (ref 135–144)
WBC OTHER # BLD: 6.5 K/UL (ref 3.5–11)

## 2023-11-08 PROCEDURE — 77336 RADIATION PHYSICS CONSULT: CPT | Performed by: RADIOLOGY

## 2023-11-08 PROCEDURE — 80053 COMPREHEN METABOLIC PANEL: CPT

## 2023-11-08 PROCEDURE — 77387 GUIDANCE FOR RADJ TX DLVR: CPT | Performed by: RADIOLOGY

## 2023-11-08 PROCEDURE — 85025 COMPLETE CBC W/AUTO DIFF WBC: CPT

## 2023-11-08 PROCEDURE — 82378 CARCINOEMBRYONIC ANTIGEN: CPT

## 2023-11-08 PROCEDURE — 77412 RADIATION TX DELIVERY LVL 3: CPT | Performed by: RADIOLOGY

## 2023-11-08 PROCEDURE — 36415 COLL VENOUS BLD VENIPUNCTURE: CPT

## 2023-11-08 NOTE — TELEPHONE ENCOUNTER
Patient came in for labs and radiation. Lab called with critical hgb 4.2  RN attempted to call the patient to have her go to the ER had to leave VM to call us back ASAP. RN called patient and told her to go to the ER ASAP regarding above. She said that she will try to get there today but it might be tomorrow. RN stressed the importance of going to the ER ASAP due to low hgb. RN went over the symptoms of low hgb and explained that a 4.2 hgb has a blood level too low.

## 2023-11-08 NOTE — PROGRESS NOTES
[address] [facility] [place] [place] NUTRITION THERAPY     Visit Type: Initial  Reason for Assessment: MD Consult-Dr Powers Sensor re: unintentional weight loss. NUTRITION RECOMMENDATIONS / MONITORING / EVALUATION  Encourage nutrient dense, scheduled meals with use of ONS as needed. Will monitor PO tolerance, adequacy of intake, weight, and care plans. Subjective/Current Data:  Maggie Cloud is a 79 y.o. female with rectal adenocarcinoma undergoing radiation treatment and oral chemo. RD consulted d/t unintentional weight loss recently. Writer met with patient after RO appt. Pt reports she is tolerating diet well, but does not have an appetite. Typically eats cereal for breakfast and a medium sized lunch (food varies). States she does not snack much. Writer discussed nutrient dense diet, scheduled meals, and use of oral nutrition supplements if/as needed.  Ensure samples and written education materials provided, per pt request.     Objective Data:  Patient Active Problem List    Diagnosis Date Noted    Gastrointestinal hemorrhage associated with peptic ulcer 12/23/2021    Rectal adenocarcinoma ([address]) 08/29/2023    Acute cholecystitis 08/26/2023    Chronic cholecystitis with calculus 08/25/2023    Rectal bleeding 08/24/2023    Single subsegmental pulmonary embolism without acute cor pulmonale ([address]) 01/17/2022    Lymphedema of both lower extremities 01/17/2022    Blood loss anemia     Bilateral pulmonary embolism ([address]) 12/22/2021    Vitamin D deficiency 12/29/2020    Prediabetes 12/29/2020    Hypothyroidism due to Hashimoto's thyroiditis 12/29/2020    Chronic venous insufficiency 12/29/2020    S/P drug eluting coronary stent placement 02/19/2020    STEMI (ST elevation myocardial infarction) ([address]) 05/11/2019    Essential (primary) hypertension 05/11/2019    Obesity, morbid, BMI 40.0-49.9 ([address]) 05/11/2019    Gastrointestinal hemorrhage with melena 05/11/2019    NSVT (nonsustained ventricular tachycardia) ([address]) 05/11/2019

## 2023-11-09 ENCOUNTER — TELEPHONE (OUTPATIENT)
Dept: ONCOLOGY | Age: 68
End: 2023-11-09

## 2023-11-09 ENCOUNTER — TELEPHONE (OUTPATIENT)
Dept: RADIATION ONCOLOGY | Age: 68
End: 2023-11-09

## 2023-11-09 ENCOUNTER — HOSPITAL ENCOUNTER (OUTPATIENT)
Dept: RADIATION ONCOLOGY | Age: 68
End: 2023-11-09
Payer: MEDICARE

## 2023-11-09 ENCOUNTER — HOSPITAL ENCOUNTER (INPATIENT)
Age: 68
LOS: 1 days | Discharge: HOME | DRG: 812 | End: 2023-11-10
Attending: EMERGENCY MEDICINE | Admitting: STUDENT IN AN ORGANIZED HEALTH CARE EDUCATION/TRAINING PROGRAM
Payer: MEDICARE

## 2023-11-09 DIAGNOSIS — D64.9 ANEMIA, UNSPECIFIED TYPE: Primary | ICD-10-CM

## 2023-11-09 DIAGNOSIS — D50.0 BLOOD LOSS ANEMIA: ICD-10-CM

## 2023-11-09 DIAGNOSIS — K62.89 RECTAL MASS: ICD-10-CM

## 2023-11-09 PROBLEM — K62.5 RECTAL BLEED: Status: ACTIVE | Noted: 2023-11-09

## 2023-11-09 LAB
ALBUMIN SERPL-MCNC: 3.5 G/DL (ref 3.5–5.2)
ALBUMIN/GLOB SERPL: 1.3 {RATIO} (ref 1–2.5)
ALP SERPL-CCNC: 128 U/L (ref 35–104)
ALT SERPL-CCNC: 5 U/L (ref 5–33)
ANION GAP SERPL CALCULATED.3IONS-SCNC: 13 MMOL/L (ref 9–17)
AST SERPL-CCNC: 12 U/L
BASOPHILS # BLD: 0.08 K/UL (ref 0–0.2)
BASOPHILS NFR BLD: 1 % (ref 0–2)
BILIRUB SERPL-MCNC: 0.5 MG/DL (ref 0.3–1.2)
BUN SERPL-MCNC: 33 MG/DL (ref 8–23)
CALCIUM SERPL-MCNC: 8 MG/DL (ref 8.6–10.4)
CEA SERPL-MCNC: 4.7 NG/ML
CHLORIDE SERPL-SCNC: 100 MMOL/L (ref 98–107)
CO2 SERPL-SCNC: 22 MMOL/L (ref 20–31)
CREAT SERPL-MCNC: 1.1 MG/DL (ref 0.5–0.9)
EOSINOPHIL # BLD: 0 K/UL (ref 0–0.4)
EOSINOPHILS RELATIVE PERCENT: 0 % (ref 1–4)
ERYTHROCYTE [DISTWIDTH] IN BLOOD BY AUTOMATED COUNT: 21.1 % (ref 12.5–15.4)
GFR SERPL CREATININE-BSD FRML MDRD: 55 ML/MIN/1.73M2
GLUCOSE SERPL-MCNC: 99 MG/DL (ref 70–99)
HCT VFR BLD AUTO: 13.6 % (ref 36–46)
HGB BLD-MCNC: 3.8 G/DL (ref 12–16)
INR PPP: 1.1
LIPASE SERPL-CCNC: 54 U/L (ref 13–60)
LYMPHOCYTES NFR BLD: 0.77 K/UL (ref 1–4.8)
LYMPHOCYTES RELATIVE PERCENT: 10 % (ref 24–44)
MCH RBC QN AUTO: 18.5 PG (ref 26–34)
MCHC RBC AUTO-ENTMCNC: 28.2 G/DL (ref 31–37)
MCV RBC AUTO: 65.7 FL (ref 80–100)
MONOCYTES NFR BLD: 0.23 K/UL (ref 0.1–0.8)
MONOCYTES NFR BLD: 3 % (ref 1–7)
MORPHOLOGY: ABNORMAL
NEUTROPHILS NFR BLD: 86 % (ref 36–66)
NEUTS SEG NFR BLD: 6.62 K/UL (ref 1.8–7.7)
NUCLEATED RED BLOOD CELLS: 1 PER 100 WBC
PLATELET # BLD AUTO: 387 K/UL (ref 140–450)
PMV BLD AUTO: 8.2 FL (ref 6–12)
POTASSIUM SERPL-SCNC: 3.9 MMOL/L (ref 3.7–5.3)
PROT SERPL-MCNC: 6.3 G/DL (ref 6.4–8.3)
PROTHROMBIN TIME: 11.3 SEC (ref 9.4–12.6)
RBC # BLD AUTO: 2.08 M/UL (ref 4–5.2)
SODIUM SERPL-SCNC: 135 MMOL/L (ref 135–144)
TROPONIN I SERPL HS-MCNC: 22 NG/L (ref 0–14)
WBC OTHER # BLD: 7.7 K/UL (ref 3.5–11)

## 2023-11-09 PROCEDURE — 2580000003 HC RX 258: Performed by: STUDENT IN AN ORGANIZED HEALTH CARE EDUCATION/TRAINING PROGRAM

## 2023-11-09 PROCEDURE — 86900 BLOOD TYPING SEROLOGIC ABO: CPT

## 2023-11-09 PROCEDURE — 86920 COMPATIBILITY TEST SPIN: CPT

## 2023-11-09 PROCEDURE — 84484 ASSAY OF TROPONIN QUANT: CPT

## 2023-11-09 PROCEDURE — 6370000000 HC RX 637 (ALT 250 FOR IP): Performed by: STUDENT IN AN ORGANIZED HEALTH CARE EDUCATION/TRAINING PROGRAM

## 2023-11-09 PROCEDURE — 36415 COLL VENOUS BLD VENIPUNCTURE: CPT

## 2023-11-09 PROCEDURE — 99285 EMERGENCY DEPT VISIT HI MDM: CPT

## 2023-11-09 PROCEDURE — 83690 ASSAY OF LIPASE: CPT

## 2023-11-09 PROCEDURE — 85610 PROTHROMBIN TIME: CPT

## 2023-11-09 PROCEDURE — 1210000000 HC MED SURG R&B

## 2023-11-09 PROCEDURE — 2580000003 HC RX 258: Performed by: NURSE PRACTITIONER

## 2023-11-09 PROCEDURE — 93005 ELECTROCARDIOGRAM TRACING: CPT | Performed by: NURSE PRACTITIONER

## 2023-11-09 PROCEDURE — 80053 COMPREHEN METABOLIC PANEL: CPT

## 2023-11-09 PROCEDURE — 86901 BLOOD TYPING SEROLOGIC RH(D): CPT

## 2023-11-09 PROCEDURE — 36430 TRANSFUSION BLD/BLD COMPNT: CPT

## 2023-11-09 PROCEDURE — 85025 COMPLETE CBC W/AUTO DIFF WBC: CPT

## 2023-11-09 PROCEDURE — 86850 RBC ANTIBODY SCREEN: CPT

## 2023-11-09 PROCEDURE — P9016 RBC LEUKOCYTES REDUCED: HCPCS

## 2023-11-09 RX ORDER — ONDANSETRON 2 MG/ML
4 INJECTION INTRAMUSCULAR; INTRAVENOUS EVERY 6 HOURS PRN
Status: DISCONTINUED | OUTPATIENT
Start: 2023-11-09 | End: 2023-11-10 | Stop reason: HOSPADM

## 2023-11-09 RX ORDER — SODIUM CHLORIDE 9 MG/ML
INJECTION, SOLUTION INTRAVENOUS PRN
Status: DISCONTINUED | OUTPATIENT
Start: 2023-11-09 | End: 2023-11-10 | Stop reason: HOSPADM

## 2023-11-09 RX ORDER — LOSARTAN POTASSIUM 50 MG/1
50 TABLET ORAL DAILY
Status: DISCONTINUED | OUTPATIENT
Start: 2023-11-10 | End: 2023-11-10 | Stop reason: HOSPADM

## 2023-11-09 RX ORDER — ONDANSETRON 4 MG/1
4 TABLET, ORALLY DISINTEGRATING ORAL EVERY 8 HOURS PRN
Status: DISCONTINUED | OUTPATIENT
Start: 2023-11-09 | End: 2023-11-10 | Stop reason: HOSPADM

## 2023-11-09 RX ORDER — PANTOPRAZOLE SODIUM 40 MG/1
40 TABLET, DELAYED RELEASE ORAL 2 TIMES DAILY
Status: DISCONTINUED | OUTPATIENT
Start: 2023-11-09 | End: 2023-11-10 | Stop reason: HOSPADM

## 2023-11-09 RX ORDER — SODIUM CHLORIDE 0.9 % (FLUSH) 0.9 %
5-40 SYRINGE (ML) INJECTION PRN
Status: DISCONTINUED | OUTPATIENT
Start: 2023-11-09 | End: 2023-11-10 | Stop reason: HOSPADM

## 2023-11-09 RX ORDER — 0.9 % SODIUM CHLORIDE 0.9 %
1000 INTRAVENOUS SOLUTION INTRAVENOUS ONCE
Status: COMPLETED | OUTPATIENT
Start: 2023-11-09 | End: 2023-11-09

## 2023-11-09 RX ORDER — METOPROLOL SUCCINATE 100 MG/1
100 TABLET, EXTENDED RELEASE ORAL DAILY
Status: DISCONTINUED | OUTPATIENT
Start: 2023-11-10 | End: 2023-11-10 | Stop reason: HOSPADM

## 2023-11-09 RX ORDER — POTASSIUM CHLORIDE 7.45 MG/ML
10 INJECTION INTRAVENOUS PRN
Status: DISCONTINUED | OUTPATIENT
Start: 2023-11-09 | End: 2023-11-10 | Stop reason: HOSPADM

## 2023-11-09 RX ORDER — POTASSIUM CHLORIDE 20 MEQ/1
40 TABLET, EXTENDED RELEASE ORAL PRN
Status: DISCONTINUED | OUTPATIENT
Start: 2023-11-09 | End: 2023-11-10 | Stop reason: HOSPADM

## 2023-11-09 RX ORDER — POLYETHYLENE GLYCOL 3350 17 G/17G
17 POWDER, FOR SOLUTION ORAL DAILY PRN
Status: DISCONTINUED | OUTPATIENT
Start: 2023-11-09 | End: 2023-11-10 | Stop reason: HOSPADM

## 2023-11-09 RX ORDER — ACETAMINOPHEN 650 MG/1
650 SUPPOSITORY RECTAL EVERY 6 HOURS PRN
Status: DISCONTINUED | OUTPATIENT
Start: 2023-11-09 | End: 2023-11-10 | Stop reason: HOSPADM

## 2023-11-09 RX ORDER — MAGNESIUM SULFATE IN WATER 40 MG/ML
2000 INJECTION, SOLUTION INTRAVENOUS PRN
Status: DISCONTINUED | OUTPATIENT
Start: 2023-11-09 | End: 2023-11-10 | Stop reason: HOSPADM

## 2023-11-09 RX ORDER — FUROSEMIDE 10 MG/ML
20 INJECTION INTRAMUSCULAR; INTRAVENOUS SEE ADMIN INSTRUCTIONS
Status: DISCONTINUED | OUTPATIENT
Start: 2023-11-09 | End: 2023-11-10 | Stop reason: HOSPADM

## 2023-11-09 RX ORDER — ATORVASTATIN CALCIUM 40 MG/1
80 TABLET, FILM COATED ORAL NIGHTLY
Status: DISCONTINUED | OUTPATIENT
Start: 2023-11-09 | End: 2023-11-10 | Stop reason: HOSPADM

## 2023-11-09 RX ORDER — SODIUM CHLORIDE 0.9 % (FLUSH) 0.9 %
5-40 SYRINGE (ML) INJECTION EVERY 12 HOURS SCHEDULED
Status: DISCONTINUED | OUTPATIENT
Start: 2023-11-09 | End: 2023-11-10 | Stop reason: HOSPADM

## 2023-11-09 RX ORDER — ACETAMINOPHEN 325 MG/1
650 TABLET ORAL EVERY 6 HOURS PRN
Status: DISCONTINUED | OUTPATIENT
Start: 2023-11-09 | End: 2023-11-10 | Stop reason: HOSPADM

## 2023-11-09 RX ORDER — LEVOTHYROXINE SODIUM 0.05 MG/1
50 TABLET ORAL DAILY
Status: DISCONTINUED | OUTPATIENT
Start: 2023-11-10 | End: 2023-11-10 | Stop reason: HOSPADM

## 2023-11-09 RX ADMIN — PANTOPRAZOLE SODIUM 40 MG: 40 TABLET, DELAYED RELEASE ORAL at 21:44

## 2023-11-09 RX ADMIN — ATORVASTATIN CALCIUM 80 MG: 40 TABLET, FILM COATED ORAL at 21:44

## 2023-11-09 RX ADMIN — SODIUM CHLORIDE 1000 ML: 9 INJECTION, SOLUTION INTRAVENOUS at 18:56

## 2023-11-09 RX ADMIN — SODIUM CHLORIDE, PRESERVATIVE FREE 10 ML: 5 INJECTION INTRAVENOUS at 21:46

## 2023-11-09 ASSESSMENT — PAIN - FUNCTIONAL ASSESSMENT
PAIN_FUNCTIONAL_ASSESSMENT: NONE - DENIES PAIN
PAIN_FUNCTIONAL_ASSESSMENT: 0-10

## 2023-11-09 ASSESSMENT — PAIN SCALES - GENERAL: PAINLEVEL_OUTOF10: 0

## 2023-11-09 NOTE — ED PROVIDER NOTES
[address] [facility]  EMERGENCY DEPARTMENT ENCOUNTER      Pt Name: Saul Givens  MRN: [id number]  [facility] 1955  Date of evaluation: 11/9/2023  Provider: JOSEPH Garber CNP    CHIEF COMPLAINT       Chief Complaint   Patient presents with    Abnormal Lab     Patient states she was told to come in due to abnormal lab result, states her hemoglobin is around a 4         HISTORY OF PRESENT ILLNESS   (Location/Symptom, Timing/Onset, Context/Setting, Quality, Duration, Modifying Factors, Severity)  Note limiting factors. Saul Givens is a 79 y.o. female who presents to the emergency department      Nontoxic but chronically ill-appearing 53-year-old female presenting to the emergency department due to outpatient abnormal labs completed yesterday with a hemoglobin of 4.2, patient's hemoglobin was 7.7 in August 2023, patient diagnosed with rectal cancerous mass, follows with colorectal surgery Dr. Jose Alarcon, reports starting radiation therapy, last radiation completed yesterday patient has no acute abdominal pain reports that she has been having increasing fatigue denies chest pain or breathing difficulty denies fevers, does have bright red blood loose stool diarrhea the patient states which has been ongoing. Patient is not anticoagulated. The history is provided by the patient and medical records. Nursing Notes were reviewed. REVIEW OF SYSTEMS    (2-9 systems for level 4, 10 or more for level 5)     Review of Systems   Constitutional:  Positive for fatigue. Negative for chills and fever. HENT:  Negative for congestion and sore throat. Respiratory:  Negative for cough and shortness of breath. Cardiovascular:  Negative for chest pain and leg swelling. Gastrointestinal:  Positive for blood in stool. Negative for abdominal pain, diarrhea, nausea and vomiting. Genitourinary:  Negative for dysuria and hematuria. Musculoskeletal:  Negative for back pain and neck pain.    Skin:

## 2023-11-09 NOTE — CONSENT
Informed Consent for Blood Component Transfusion Note    I have discussed with the patient the rationale for blood component transfusion; its benefits in treating or preventing fatigue, organ damage, or death; and its risk which includes mild transfusion reactions, rare risk of blood borne infection, or more serious but rare reactions. I have discussed the alternatives to transfusion, including the risk and consequences of not receiving transfusion. The patient had an opportunity to ask questions and had agreed to proceed with transfusion of blood components.     Electronically signed by JOSEPH Maciel CNP on 11/9/23 at 6:39 PM EST

## 2023-11-09 NOTE — TELEPHONE ENCOUNTER
Returned phone call to patient who called earlier stating she was not coming to radiation therapy today due to diarrhea. Writer called patient and explained that she has a low hemoglobin and that we can't continue radiation therapy until she has a transfusion. She voices understanding. Patient call transferred to Medical Oncology so that she could set up her transfusion.

## 2023-11-09 NOTE — TELEPHONE ENCOUNTER
Spoke with pt to notify pt MD wants pt to go to the ER due to low hemoglobin pt understand and will try to get there

## 2023-11-10 ENCOUNTER — TELEPHONE (OUTPATIENT)
Dept: GYNECOLOGIC ONCOLOGY | Age: 68
End: 2023-11-10

## 2023-11-10 ENCOUNTER — HOSPITAL ENCOUNTER (OUTPATIENT)
Age: 68
Setting detail: SPECIMEN
Discharge: HOME | End: 2023-11-10

## 2023-11-10 ENCOUNTER — HOSPITAL ENCOUNTER (OUTPATIENT)
Dept: RADIATION ONCOLOGY | Age: 68
Discharge: HOME | End: 2023-11-10
Payer: MEDICARE

## 2023-11-10 ENCOUNTER — TELEPHONE (OUTPATIENT)
Dept: RADIATION ONCOLOGY | Age: 68
End: 2023-11-10

## 2023-11-10 ENCOUNTER — OFFICE VISIT (OUTPATIENT)
Dept: GYNECOLOGIC ONCOLOGY | Age: 68
End: 2023-11-10
Payer: MEDICARE

## 2023-11-10 VITALS
OXYGEN SATURATION: 100 % | HEIGHT: 63 IN | DIASTOLIC BLOOD PRESSURE: 71 MMHG | RESPIRATION RATE: 18 BRPM | SYSTOLIC BLOOD PRESSURE: 135 MMHG | BODY MASS INDEX: 35.08 KG/M2 | HEART RATE: 72 BPM | WEIGHT: 198 LBS | TEMPERATURE: 97.6 F

## 2023-11-10 VITALS
BODY MASS INDEX: 35.9 KG/M2 | DIASTOLIC BLOOD PRESSURE: 73 MMHG | OXYGEN SATURATION: 100 % | HEIGHT: 63 IN | SYSTOLIC BLOOD PRESSURE: 133 MMHG | TEMPERATURE: 97.6 F | WEIGHT: 202.6 LBS | HEART RATE: 69 BPM

## 2023-11-10 DIAGNOSIS — Z23 NEED FOR PNEUMOCOCCAL VACCINE: ICD-10-CM

## 2023-11-10 DIAGNOSIS — N63.0 BREAST NODULE: ICD-10-CM

## 2023-11-10 DIAGNOSIS — N63.25 UNSPECIFIED LUMP IN THE LEFT BREAST, OVERLAPPING QUADRANTS: ICD-10-CM

## 2023-11-10 DIAGNOSIS — D50.0 IRON DEFICIENCY ANEMIA DUE TO CHRONIC BLOOD LOSS: ICD-10-CM

## 2023-11-10 DIAGNOSIS — Z23 NEED FOR COVID-19 VACCINE: ICD-10-CM

## 2023-11-10 DIAGNOSIS — C20 RECTAL ADENOCARCINOMA (HCC): ICD-10-CM

## 2023-11-10 DIAGNOSIS — Z12.4 ENCOUNTER FOR SCREENING FOR MALIGNANT NEOPLASM OF CERVIX: ICD-10-CM

## 2023-11-10 DIAGNOSIS — Z23 NEED FOR SHINGLES VACCINE: ICD-10-CM

## 2023-11-10 DIAGNOSIS — N95.0 POSTMENOPAUSAL BLEEDING: Primary | ICD-10-CM

## 2023-11-10 DIAGNOSIS — Z23 NEED FOR DIPHTHERIA-TETANUS-PERTUSSIS (TDAP) VACCINE: ICD-10-CM

## 2023-11-10 DIAGNOSIS — E66.01 OBESITY, MORBID, BMI 40.0-49.9 (HCC): Chronic | ICD-10-CM

## 2023-11-10 DIAGNOSIS — N64.89 OTHER SPECIFIED DISORDERS OF BREAST: ICD-10-CM

## 2023-11-10 DIAGNOSIS — Z12.31 ENCOUNTER FOR SCREENING MAMMOGRAM FOR MALIGNANT NEOPLASM OF BREAST: ICD-10-CM

## 2023-11-10 DIAGNOSIS — R93.89 THICKENED ENDOMETRIUM: ICD-10-CM

## 2023-11-10 PROBLEM — K62.5 RECTAL BLEED: Status: RESOLVED | Noted: 2023-11-09 | Resolved: 2023-11-10

## 2023-11-10 LAB
ANION GAP SERPL CALCULATED.3IONS-SCNC: 8 MMOL/L (ref 9–17)
BUN SERPL-MCNC: 30 MG/DL (ref 8–23)
CALCIUM SERPL-MCNC: 7.9 MG/DL (ref 8.6–10.4)
CHLORIDE SERPL-SCNC: 107 MMOL/L (ref 98–107)
CO2 SERPL-SCNC: 25 MMOL/L (ref 20–31)
CREAT SERPL-MCNC: 1 MG/DL (ref 0.5–0.9)
EKG ATRIAL RATE: 92 BPM
EKG P AXIS: 48 DEGREES
EKG P-R INTERVAL: 174 MS
EKG Q-T INTERVAL: 350 MS
EKG QRS DURATION: 78 MS
EKG QTC CALCULATION (BAZETT): 432 MS
EKG R AXIS: -2 DEGREES
EKG T AXIS: 88 DEGREES
EKG VENTRICULAR RATE: 92 BPM
GFR SERPL CREATININE-BSD FRML MDRD: >60 ML/MIN/1.73M2
GLUCOSE SERPL-MCNC: 122 MG/DL (ref 70–99)
HCT VFR BLD AUTO: 18.7 % (ref 36–46)
HCT VFR BLD AUTO: 27.4 % (ref 36–46)
HGB BLD-MCNC: 5.7 G/DL (ref 12–16)
HGB BLD-MCNC: 9 G/DL (ref 12–16)
POTASSIUM SERPL-SCNC: 3.7 MMOL/L (ref 3.7–5.3)
SODIUM SERPL-SCNC: 140 MMOL/L (ref 135–144)

## 2023-11-10 PROCEDURE — 99222 1ST HOSP IP/OBS MODERATE 55: CPT | Performed by: STUDENT IN AN ORGANIZED HEALTH CARE EDUCATION/TRAINING PROGRAM

## 2023-11-10 PROCEDURE — G8417 CALC BMI ABV UP PARAM F/U: HCPCS | Performed by: OBSTETRICS & GYNECOLOGY

## 2023-11-10 PROCEDURE — 36430 TRANSFUSION BLD/BLD COMPNT: CPT

## 2023-11-10 PROCEDURE — G8399 PT W/DXA RESULTS DOCUMENT: HCPCS | Performed by: OBSTETRICS & GYNECOLOGY

## 2023-11-10 PROCEDURE — G8427 DOCREV CUR MEDS BY ELIG CLIN: HCPCS | Performed by: OBSTETRICS & GYNECOLOGY

## 2023-11-10 PROCEDURE — G2212 PROLONG OUTPT/OFFICE VIS: HCPCS | Performed by: OBSTETRICS & GYNECOLOGY

## 2023-11-10 PROCEDURE — 58100 BIOPSY OF UTERUS LINING: CPT | Performed by: OBSTETRICS & GYNECOLOGY

## 2023-11-10 PROCEDURE — P9016 RBC LEUKOCYTES REDUCED: HCPCS

## 2023-11-10 PROCEDURE — 3075F SYST BP GE 130 - 139MM HG: CPT | Performed by: OBSTETRICS & GYNECOLOGY

## 2023-11-10 PROCEDURE — 77387 GUIDANCE FOR RADJ TX DLVR: CPT | Performed by: RADIOLOGY

## 2023-11-10 PROCEDURE — 6370000000 HC RX 637 (ALT 250 FOR IP): Performed by: STUDENT IN AN ORGANIZED HEALTH CARE EDUCATION/TRAINING PROGRAM

## 2023-11-10 PROCEDURE — 3078F DIAST BP <80 MM HG: CPT | Performed by: OBSTETRICS & GYNECOLOGY

## 2023-11-10 PROCEDURE — 85018 HEMOGLOBIN: CPT

## 2023-11-10 PROCEDURE — 99205 OFFICE O/P NEW HI 60 MIN: CPT | Performed by: OBSTETRICS & GYNECOLOGY

## 2023-11-10 PROCEDURE — 1036F TOBACCO NON-USER: CPT | Performed by: OBSTETRICS & GYNECOLOGY

## 2023-11-10 PROCEDURE — 85014 HEMATOCRIT: CPT

## 2023-11-10 PROCEDURE — 1123F ACP DISCUSS/DSCN MKR DOCD: CPT | Performed by: OBSTETRICS & GYNECOLOGY

## 2023-11-10 PROCEDURE — 36415 COLL VENOUS BLD VENIPUNCTURE: CPT

## 2023-11-10 PROCEDURE — 77412 RADIATION TX DELIVERY LVL 3: CPT | Performed by: RADIOLOGY

## 2023-11-10 PROCEDURE — 1111F DSCHRG MED/CURRENT MED MERGE: CPT | Performed by: OBSTETRICS & GYNECOLOGY

## 2023-11-10 PROCEDURE — 80048 BASIC METABOLIC PNL TOTAL CA: CPT

## 2023-11-10 PROCEDURE — 1090F PRES/ABSN URINE INCON ASSESS: CPT | Performed by: OBSTETRICS & GYNECOLOGY

## 2023-11-10 PROCEDURE — G8484 FLU IMMUNIZE NO ADMIN: HCPCS | Performed by: OBSTETRICS & GYNECOLOGY

## 2023-11-10 PROCEDURE — 3017F COLORECTAL CA SCREEN DOC REV: CPT | Performed by: OBSTETRICS & GYNECOLOGY

## 2023-11-10 RX ORDER — SODIUM CHLORIDE 9 MG/ML
INJECTION, SOLUTION INTRAVENOUS PRN
Status: DISCONTINUED | OUTPATIENT
Start: 2023-11-10 | End: 2023-11-10 | Stop reason: HOSPADM

## 2023-11-10 RX ADMIN — LEVOTHYROXINE SODIUM 50 MCG: 50 TABLET ORAL at 05:02

## 2023-11-10 RX ADMIN — PANTOPRAZOLE SODIUM 40 MG: 40 TABLET, DELAYED RELEASE ORAL at 10:22

## 2023-11-10 RX ADMIN — METOPROLOL SUCCINATE 100 MG: 100 TABLET, EXTENDED RELEASE ORAL at 10:22

## 2023-11-10 RX ADMIN — LOSARTAN POTASSIUM 50 MG: 50 TABLET, FILM COATED ORAL at 10:22

## 2023-11-10 RX ADMIN — PSYLLIUM HUSK 1 PACKET: 3.4 POWDER ORAL at 10:22

## 2023-11-10 NOTE — DISCHARGE INSTRUCTIONS
Patient was on aspirin, advised to stop taking it on discharge due to concern for bleed and follow-up outpatient with PCP. Follow-up on CBC in 2 days. Results CC to PCP     Call 911 anytime you think you may need emergency care. For example, call if:    You have symptoms of a heart attack. These may include:  Chest pain or pressure, or a strange feeling in the chest.  Sweating. Shortness of breath. Nausea or vomiting. Pain, pressure, or a strange feeling in the back, neck, jaw, or upper belly or in one or both shoulders or arms. Lightheadedness or sudden weakness. A fast or irregular heartbeat. After you call 911, the  may tell you to chew 1 adult-strength or 2 to 4 low-dose aspirin. Wait for an ambulance. Do not try to drive yourself. You passed out (lost consciousness). You have severe shortness of breath. Call your doctor now or seek immediate medical care if:    You have new or increased shortness of breath. You are dizzy or lightheaded, or you feel like you may faint. You have new or worse nausea and vomiting. Your fatigue and weakness continue or get worse. You have any abnormal bleeding, such as:  Nosebleeds. Vaginal bleeding that is different (heavier, more frequent, at a different time of the month) than what you are used to. Bloody or black stools, or rectal bleeding. Bloody or pink urine. Watch closely for changes in your health, and be sure to contact your doctor if:    You do not get better as expected.

## 2023-11-10 NOTE — CARE COORDINATION
Case Management Assessment  Initial Evaluation    Date/Time of Evaluation: 11/10/2023 2:06 PM  Assessment Completed by: Thien Andres    If patient is discharged prior to next notation, then this note serves as note for discharge by case management. Patient Name: Chani Gordon                   YOB: 1955  Diagnosis: Rectal mass [K62.89]  Rectal bleed [K62.5]  Anemia, unspecified type [D64.9]                   Date / Time: 11/9/2023  4:59 PM    Patient Admission Status: Inpatient   Readmission Risk (Low < 19, Mod (19-27), High > 27): Readmission Risk Score: 22    Current PCP: JOSEPH Calvo CNP  PCP verified by CM? Yes    Chart Reviewed: Yes      History Provided by: Patient  Patient Orientation: Alert and Oriented    Patient Cognition: Alert    Hospitalization in the last 30 days (Readmission):  No    If yes, Readmission Assessment in CM Navigator will be completed. Advance Directives:      Code Status: Full Code   Patient's Primary Decision Maker is: Legal Next of Kin    Primary Decision Maker: Kristi Dickerson - Brother/Sister - [phone]    Discharge Planning:    Patient lives with: Alone Type of Home: Apartment  Primary Care Giver: Self  Patient Support Systems include: Family Members   Current Financial resources: Medicare, Medicaid  Current community resources: None  Current services prior to admission: None            Current DME:              Type of Home Care services:  None    ADLS  Prior functional level: Independent in ADLs/IADLs  Current functional level: Independent in ADLs/IADLs    PT AM-PAC:   /24  OT AM-PAC:   /24    Family can provide assistance at DC: Yes  Would you like Case Management to discuss the discharge plan with any other family members/significant others, and if so, who?  No  Plans to Return to Present Housing: Yes  Other Identified Issues/Barriers to RETURNING to current housing: None  Potential Assistance needed at discharge: N/A            Potential DME:

## 2023-11-10 NOTE — PLAN OF CARE
Problem: Discharge Planning  Goal: Discharge to home or other facility with appropriate resources  Outcome: Progressing   Patient actively participates in ADLs, and decision making regarding plan of care   Problem: Safety - Adult  Goal: Free from fall injury  Outcome: Progressing   No falls/ injuries this shift, bed in lowest position, brakes on, bed alarm on, call light in reach, side rails up x2   Problem: Hematologic - Adult  Goal: Maintains hematologic stability  Outcome: Progressing   VS & labs monitored per physician order, hemoglobin rechecked after 2 units PRBCs, additional 2 units ordered, pt asymptomatic on low hemoglobin

## 2023-11-10 NOTE — PROGRESS NOTES
Review of Systems   Constitutional:  Positive for appetite change (dereased appetite) and unexpected weight change (lost weight). Negative for chills, diaphoresis, fatigue and fever.   HENT:   Negative for hearing loss, lump/mass, mouth sores, nosebleeds, sore throat, tinnitus, trouble swallowing and voice change.    Eyes:  Negative for eye problems and icterus.   Respiratory:  Negative for chest tightness, cough, hemoptysis, shortness of breath and wheezing.    Cardiovascular:  Negative for chest pain, leg swelling and palpitations.   Gastrointestinal:  Positive for abdominal distention (bloating), abdominal pain, blood in stool, constipation and diarrhea. Negative for nausea, rectal pain and vomiting.   Endocrine: Negative for hot flashes.   Genitourinary:  Positive for hematuria and vaginal bleeding. Negative for bladder incontinence, difficulty urinating, dyspareunia, dysuria, frequency, menstrual problem, nocturia, pelvic pain and vaginal discharge.    Musculoskeletal:  Negative for arthralgias, back pain, flank pain, gait problem, myalgias, neck pain and neck stiffness.   Skin:  Negative for itching, rash and wound.   Neurological:  Negative for dizziness, extremity weakness, gait problem, headaches, light-headedness, numbness, seizures and speech difficulty.   Hematological:  Negative for adenopathy. Does not bruise/bleed easily.   Psychiatric/Behavioral:  Positive for depression. Negative for confusion, decreased concentration, sleep disturbance and suicidal ideas. The patient is nervous/anxious.        
Hemoglobin 11.4 (L) (10/25/22) => Hemoglobin 5.0 (L), Hematocrit 21.4 (L) (08/21/23) => Hemoglobin 3.8 (L), Hematocrit 13.6 (L) (11/09/23)  CMP (11/08/23): Glucose 104 (H), Calcium 7.9 (L), Total Protein 6.3 (L, lower limit of normal 6.4), Albumin 3.4 (L), AlkPhos 130 (H), eGFR 45 (L) and Creatinine 1.3 (H) =>  CMP (11/09/23): Total Protein 6.3 (L, lower limit of normal 6.4), Calcium 8 (L), AlkPhos 128 (H), eGFR 55 (L). Creatinine 1.1  Iron panel (08/25/23): Iron 14 (L) and Iron Sat 5% (L)  Ferritin (10/25/22): 19 (normal) => 6 (L) (07/10/23)  TSH (02/26/20): 20.5 (H) and Free T4 0.95 (normal) => Normal (10/25/22)   Vitamin D (02/26/20): 16.2 (L) => Normal (04/19/22)  CMP (12/23/21): Potassium 3.6 (L), Calcium 8.2 (L), Albumin 2.9 (L), ALkPhos 166 (H), Total Protein 6.1 (L), Creatinine 1.37 (H), eGFR 39 (L)  HgBA1C (02/26/20): 6.4% (H) => 5.6% (08/24/23)  Lipid panel (07/10/23): HDL 28 (L)    PAST MEDICAL HISTORY:     CAD, with anterolateral STEMI (2019): Currently treated with ASA, Lipitor, Metoprolol  Treated with emergency cardiac angiography, with PCI to LAD (2019), complicated by Pulmonary Edema, which required diuresis  Inpatient Consult, Dr. Anival Solano (05/11/19) [Cardiology]  Anterolateral STEMI. Will proceed with emergency coronary angiography  Inpatient Consult, Dr. Clint Charlton (05/11/19) [Pulmonary Medicine]  Acute hypoxic resp failure - NIPPV; wean O2, break from BPAP  Pulm edema - diuresis, CXR in AM  STEMI s/p cath - s/p cath and stent 5/11/19    Congestive Heart Failure: Treated with Lasix  BNP (12/22/21): 7985 (H)    Hypertension: Treated with _    Dyslipidemia: Treated with Lipitor  Lipid panel (07/10/23): HDL 28 (L)    Stage III CKD  CMP (11/08/23): eGFR 45 (L) and Creatinine 1.3 (H) => eGFR 55 (L). Creatinine 1.1 (11/09/23)    GERD: Treated with Protonix    Hypothyroidism, attributed to Hashimoto's Thyroiditis: Treated with Synthroid  TSH (02/26/20): 20.5 (H) and Free T4 0.95 (normal) => Normal

## 2023-11-10 NOTE — TELEPHONE ENCOUNTER
Dr. Brittany Chapin and Dr. Chiqui Thompson discussed pt's plan of care. Ok to for pt to proceed with scheduled radiation treatment to her rectum. Patient reluctant due to incontinence and urgency with bowel movements but ultimately agreeable. Patient transported to [email or web address] via wheelchair by [address]. Her RN on 3rd floor aware also.

## 2023-11-10 NOTE — TELEPHONE ENCOUNTER
PT needs phone follow up on 11/17    1130 is a good time for her, as she has radiation in the early afternoons.

## 2023-11-10 NOTE — PROGRESS NOTES
Discharge instructions reviewed with the patient. No questions at this time. IV removed without complication. ARIS Arrieta packed by patient's belongings and assisted patient with getting dressed. Patient discharged and taken to her Gynecologic Oncology appointment. Patient drove herself to the ER and her vehicle is in the parking lot.

## 2023-11-10 NOTE — DISCHARGE SUMMARY
[facility]  Office: [address], DO, Mayur Ortiz, DO, Dhiraj Beck, DO, Radha Forbes Blood, DO, Eliz Vogt MD, Abbe Quintana MD, Leatha Gomes MD, Troy Feldman MD,  Fidel Villarreal MD, Rose Tamez MD, Yulia Arriaga, DO, Alok Linares MD,  Pretty Ward, DO, Maggi Baeza MD, Rene Dooley MD, Lloyd Vera DO, Veronica Bustillo MD,  Mitra Sher DO, Flash Stone MD, Analia Nguyen MD, Osiel Pearson MD, Kami Osorio MD,  Judah Carmichael MD, Priyanka Willson MD, Rosaline Fonseca MD, Ca Bartholomew, DO, Wallace Foster MD,  Yannick Vaz MD, Alok Callejas, [facility],  Dilshad Ochoa, CNP, Kamaljit Schmidt, CNP, John Limon, CNP,  Martha Patricio, [place], Danish Bauer, [facility], Luz Marina Gilbert, CNP, Veronica Valverde, CNP, Anthony Young, CNP, Dolly Dill, CNP, Jimmy Moon PA-C, Brown Saeed, CNS, Marty Jain [facility], German Marshall, [address] [place] [address]    Discharge Summary     Patient ID: Francis Camacho  :  1955   MRN: [id number]     ACCOUNT:  [de-identified]   Patient's PCP: JOSEPH Rogers CNP  Admit Date: 2023   Discharge Date: 11/10/2023  Length of Stay: 1  Code Status:  Full Code  Admitting Physician: Mandy Pugh MD  Discharge Physician: Mandy Pugh MD     Active Discharge Diagnoses:     Hospital Problem Lists:  Principal Problem (Resolved):    Rectal bleed  Active Problems:    Essential (primary) hypertension    S/P drug eluting coronary stent placement    Hypothyroidism due to Hashimoto's thyroiditis    Bilateral pulmonary embolism ([address])    Rectal adenocarcinoma ([address])  Resolved Problems:    Blood loss anemia      Admission Condition:  fair     Discharged Condition: good    Hospital Stay:     Hospital Course:  Francis Camacho is a 79 y.o. female who was admitted for the management of Rectal bleed , presented to ER with Abnormal Lab (Patient states she was told to come in due to abnormal lab \"ELDO8UHG\", \"H7OUUAAE\", \"O2SAT\", \"FIO2\"  Lab Results   Component Value Date/Time    SPECIAL NOT REPORTED 01/20/2022 11:07 PM     Lab Results   Component Value Date/Time    CULTURE NO SIGNIFICANT GROWTH 08/24/2023 07:23 PM       Radiology:  No results found. Consultations:    Consults:     Final Specialist Recommendations/Findings:   IP CONSULT TO CASE MANAGEMENT  IP CONSULT TO COLORECTAL SURGERY  IP CONSULT TO RADIATION ONCOLOGY  IP CONSULT TO GYNECOLOGIC ONCOLOGY      The patient was seen and examined on day of discharge and this discharge summary is in conjunction with any daily progress note from day of discharge. Discharge plan:     Disposition: Home    Physician Follow Up:   Rodrick Varela, [address] [phone]    Follow up today      Lesley Gutierres MD  [facility].  [place] [address] [facility]  [phone]    Follow up today      Paola Mccallluis, [address]  [address] [facility]  [address] [place] [address]  [phone]    Follow up in 1 week(s)      JOSEPH Lee - [facility]. Dr. SOLITARIO [address] [place] [id number] [phone]    Follow up         Requiring Further Evaluation/Follow Up POST HOSPITALIZATION/Incidental Findings: follow up with her scheduled Gyn Onc appointment at 3 pm.    Diet: regular diet    Activity: As tolerated    Instructions to Patient:   Patient was on aspirin, advised to stop taking it on discharge due to concern for bleed and follow-up outpatient with PCP. Follow-up on CBC in 2 days.   Results CC to PCP    Discharge Medications:      Medication List        CONTINUE taking these medications      atorvastatin 80 MG tablet  Commonly known as: LIPITOR  Take 1 tablet by mouth nightly     bumetanide 1 MG tablet  Commonly known as: BUMEX     dapagliflozin 10 MG tablet  Commonly known as: Farxiga  Take 1 tablet by mouth every morning     docusate sodium 100 MG capsule  Commonly known as: COLACE     Handicap Placard

## 2023-11-10 NOTE — H&P
[facility]  Office: [address], DO, Alvaro Arroyo, DO, Boo Wade, DO, Nikiajono Marroquin Blood, DO, Sona Hussein MD, Dmitry Maxwell MD, Alejandro Hernandez MD, Brian Hargrove MD,  Saima Rosenberg MD, Florencio Allen MD, Pawan Moe, DO, Rich Curry MD,  Casey Cooper MD, Scarlet Geronimo MD, Bascom Osler, DO, Aster Leonard MD,  America Tsang, DO, Brenna Morales MD, Geoff Higgins MD, Dorene Cherry MD, Shimon Jacobs MD,  Lexi Saavedra MD, Markell Benoit MD, Roe Lama MD, Devi Fan DO, Meliton Bloch, MD,  Lali Izaguirre MD, Shazia Mariscal, CNP,  Severo Rous, CNP, Segundo Cruz CNP, Abdi Calixto CNP,  Mika Arcos [facility], Isadora Skinner, CNP, Aide Viveros CNP, Noreen Sahu, CNP, Sb Shipman, CNP, Lindsey Delgado, CNP, Cynthia Fang PA-C, Isiah Mccarthy, CNS, Nba Horn CNP, Hayley Allison, [address] [place] [address] Drive    HISTORY AND PHYSICAL EXAMINATION            Date:   11/10/2023  Patient name:  Maura Larsen  Date of admission:  11/9/2023  4:59 PM  MRN:   [id number]  Account:  [de-identified]  YOB: 1955  PCP:    JOSEPH Weber CNP  Room:   883/515-85  Code Status:    Full Code      History Obtained From:     patient, electronic medical record    History of Present Illness:     Maura Larsen is a 79 y.o. Non- / non  female who presents with Abnormal Lab (Patient states she was told to come in due to abnormal lab result, states her hemoglobin is around a 4)   and is admitted to the hospital for the management of Rectal bleed. Pt has PMH of HTN and rectal cancer. She has been noncompliant with her medication and appointments with cancer dr. Now admitted after she got her lab work at outpatient lab and found that her Hb is 3.8. pt received multiple transfusion overnight. Colorectal surgeon, radiation oncologist and Dominic Aguilar were consulted.       Past

## 2023-11-10 NOTE — CONSULTS
General Surgery:  Consult Note        PATIENT NAME: Yoshi Lizarraga   YOB: 1955    ADMISSION DATE: 11/9/2023  4:59 PM     Admitting Provider: Loyda Linares Physician: Tabitha Echols DATE: 11/10/2023    Chief Complaint: Blood loss anemia  Consult Regarding: Rectal adenocarcinoma    HISTORY OF PRESENT ILLNESS:  The patient is a 79 y.o. female  who was admitted on 11/9/2023 for management of low hemoglobin and blood transfusions. Patient completed outpatient lab work on 11/8, which demonstrated hemoglobin level of 4.2. She was contacted and told to present to the emergency room, where her repeat hemoglobin level was 3.8. She received transfusions of packed red cells while in the ER and was subsequently admitted. Patient reports bloody diarrhea for several months. She states that 75% of her bowel movements are bloody. She has also lost 15 pounds over the past month. She weighed 290 pounds in 2020, and today's weight is 198 pounds. Patient has known history of rectal adenocarcinoma which was diagnosed on 8/28/2023 at the time of a colonoscopy and laparoscopic cholecystectomy. The mass was noted to occupy two thirds of her rectal space. Patient has been able to continue having bowel movements despite this mass. MRI and PET scans also demonstrated a uterine tumor in addition to the rectal mass. She is known to the colorectal surgery service and has previously declined surgical intervention due to her concerns about colostomy. Patient is in process of neoadjuvant chemo  and radiation. Patient has received 2 doses of radiation, which was paused yesterday due to her hemoglobin level. She is scheduled to have 5 weeks of radiation for 5 days/ week.     Past Medical History:        Diagnosis Date    COVID-19 virus infection 12/23/2021    Hyperlipidemia     Hypertension        Past Surgical History:        Procedure Laterality Date    BLADDER SUSPENSION      BLADDER SUSPENSION  2004 clinic, and we will be available for any surgical emergencies while admitted.   Plan discussed with Dr. Marguerite Langston      Electronically signed by Lexus Dela Cruz DO  on 11/10/2023 at 11:14 AM

## 2023-11-11 LAB
ABO/RH: NORMAL
ANTIBODY SCREEN: NEGATIVE
ARM BAND NUMBER: NORMAL
BLOOD BANK BLOOD PRODUCT EXPIRATION DATE: NORMAL
BLOOD BANK DISPENSE STATUS: NORMAL
BLOOD BANK ISBT PRODUCT BLOOD TYPE: 5100
BLOOD BANK PRODUCT CODE: NORMAL
BLOOD BANK SAMPLE EXPIRATION: NORMAL
BLOOD BANK UNIT TYPE AND RH: NORMAL
BPU ID: NORMAL
COMPONENT: NORMAL
CROSSMATCH RESULT: NORMAL
TRANSFUSION STATUS: NORMAL
UNIT DIVISION: 0
UNIT ISSUE DATE/TIME: NORMAL

## 2023-11-13 ENCOUNTER — HOSPITAL ENCOUNTER (OUTPATIENT)
Dept: RADIATION ONCOLOGY | Age: 68
Discharge: HOME | End: 2023-11-13
Payer: MEDICARE

## 2023-11-13 ENCOUNTER — TELEPHONE (OUTPATIENT)
Dept: PRIMARY CARE CLINIC | Age: 68
End: 2023-11-13

## 2023-11-13 PROCEDURE — 77387 GUIDANCE FOR RADJ TX DLVR: CPT | Performed by: RADIOLOGY

## 2023-11-13 PROCEDURE — 77412 RADIATION TX DELIVERY LVL 3: CPT | Performed by: RADIOLOGY

## 2023-11-13 NOTE — TELEPHONE ENCOUNTER
Care Transitions Initial Follow Up Call    Outreach made within 2 business days of discharge: Yes    Patient: Ken Mcclure   Patient : 1955   MRN: [id number]    Reason for Admission: Hypertension   Discharge Date: 11/10/23       Spoke with: Left VM    Discharge department/facility: [place]       Scheduled appointment with PCP within 7-14 days    Follow Up  Future Appointments   Date Time Provider [address]   2023  1:45 PM [facility]   2023  1:45 PM [facility]   11/15/2023  1:45 PM STVZ PBURG VERSA [facility]   11/15/2023  2:00 PM Shoaib Gill MD [facility] [place] Sat   2023  1:45 PM STVZ PBURG VERSA [facility] [place] Sat   2023  1:45 PM STVZ PBURG VERSA [facility] [place] Sat   2023 12:00 PM [facility]   2023  1:45 PM STVZ PBURG VERSA [facility] [place] Sat   2023  1:45 PM STVZ PBURG VERSA [facility]   2023  1:45 PM STVZ PBURG VERSA [facility]   2023  2:00 PM Shoaib Gill MD [facility] [place] Sat   2023  1:45 PM STVZ PBURG VERSA [facility] [place] Sat   2023  1:45 PM STVZ PBURG VERSA [facility] [place] Sat   2023  1:45 PM [facility]   2023  2:00 PM Shoaib Gill MD [facility] [place] Sat   2023  1:45 PM STVZ PBURG VERSA [facility] [place] Sat   2023  1:45 PM STVZ PBURG VERSA [facility] [place] Sat   2023  1:45 PM STVZ PBURG VERSA [facility]   2023  1:45 PM STVZ PBURG VERSA [facility]   2023  1:45 PM [facility]   2023  2:00 PM Shoaib Gill MD [facility] [place] Sat   2023  1:45 PM STVZ PBURG VERSA [facility]   2023  1:45 PM STVZ PBURG VERSA [facility] [place] Sat   2023  2:00 PM Katarina Davalos MD

## 2023-11-14 ENCOUNTER — APPOINTMENT (OUTPATIENT)
Dept: RADIATION ONCOLOGY | Age: 68
End: 2023-11-14
Payer: MEDICARE

## 2023-11-14 ENCOUNTER — TELEPHONE (OUTPATIENT)
Dept: RADIATION ONCOLOGY | Age: 68
End: 2023-11-14

## 2023-11-14 NOTE — TELEPHONE ENCOUNTER
Incoming call from pt to cancel her radiation therapy treatment today, due to pt having \"bathroom issues\". RN/Gage notified.

## 2023-11-15 ENCOUNTER — HOSPITAL ENCOUNTER (OUTPATIENT)
Dept: RADIATION ONCOLOGY | Age: 68
Discharge: HOME | End: 2023-11-15
Payer: MEDICARE

## 2023-11-15 ENCOUNTER — HOSPITAL ENCOUNTER (OUTPATIENT)
Age: 68
Discharge: HOME | End: 2023-11-15

## 2023-11-15 VITALS
HEART RATE: 68 BPM | TEMPERATURE: 97.3 F | RESPIRATION RATE: 16 BRPM | BODY MASS INDEX: 35.83 KG/M2 | OXYGEN SATURATION: 100 % | SYSTOLIC BLOOD PRESSURE: 126 MMHG | DIASTOLIC BLOOD PRESSURE: 82 MMHG | WEIGHT: 202.2 LBS

## 2023-11-15 DIAGNOSIS — C20 RECTAL ADENOCARCINOMA (HCC): Primary | ICD-10-CM

## 2023-11-15 DIAGNOSIS — C20 RECTAL ADENOCARCINOMA (HCC): ICD-10-CM

## 2023-11-15 LAB
ALBUMIN SERPL-MCNC: 3.4 G/DL (ref 3.5–5.2)
ALBUMIN/GLOB SERPL: 1.1 {RATIO} (ref 1–2.5)
ALP SERPL-CCNC: 133 U/L (ref 35–104)
ALT SERPL-CCNC: 7 U/L (ref 5–33)
ANION GAP SERPL CALCULATED.3IONS-SCNC: 11 MMOL/L (ref 9–17)
AST SERPL-CCNC: 16 U/L
BASOPHILS # BLD: 0.06 K/UL (ref 0–0.2)
BASOPHILS NFR BLD: 1 % (ref 0–2)
BILIRUB SERPL-MCNC: 1 MG/DL (ref 0.3–1.2)
BUN SERPL-MCNC: 16 MG/DL (ref 8–23)
CALCIUM SERPL-MCNC: 8.2 MG/DL (ref 8.6–10.4)
CHLORIDE SERPL-SCNC: 104 MMOL/L (ref 98–107)
CO2 SERPL-SCNC: 24 MMOL/L (ref 20–31)
CREAT SERPL-MCNC: 0.9 MG/DL (ref 0.5–0.9)
EOSINOPHIL # BLD: 0.11 K/UL (ref 0–0.4)
EOSINOPHILS RELATIVE PERCENT: 2 % (ref 1–4)
ERYTHROCYTE [DISTWIDTH] IN BLOOD BY AUTOMATED COUNT: 26.8 % (ref 12.5–15.4)
GFR SERPL CREATININE-BSD FRML MDRD: >60 ML/MIN/1.73M2
GLUCOSE SERPL-MCNC: 104 MG/DL (ref 70–99)
HCT VFR BLD AUTO: 27.8 % (ref 36–46)
HGB BLD-MCNC: 8.8 G/DL (ref 12–16)
HPV I/H RISK 4 DNA CVX QL NAA+PROBE: NOT DETECTED
HPV SAMPLE: NORMAL
HPV, INTERPRETATION: NORMAL
HPV16 DNA CVX QL NAA+PROBE: NOT DETECTED
HPV18 DNA CVX QL NAA+PROBE: NOT DETECTED
LYMPHOCYTES NFR BLD: 0.46 K/UL (ref 1–4.8)
LYMPHOCYTES RELATIVE PERCENT: 8 % (ref 24–44)
MCH RBC QN AUTO: 24.9 PG (ref 26–34)
MCHC RBC AUTO-ENTMCNC: 31.6 G/DL (ref 31–37)
MCV RBC AUTO: 78.9 FL (ref 80–100)
MONOCYTES NFR BLD: 0.4 K/UL (ref 0.1–1.2)
MONOCYTES NFR BLD: 7 % (ref 2–11)
MORPHOLOGY: ABNORMAL
NEUTROPHILS NFR BLD: 82 % (ref 36–66)
NEUTS SEG NFR BLD: 4.67 K/UL (ref 1.8–7.7)
PLATELET # BLD AUTO: 240 K/UL (ref 140–450)
PMV BLD AUTO: 6.8 FL (ref 6–12)
POTASSIUM SERPL-SCNC: 4.2 MMOL/L (ref 3.7–5.3)
PROT SERPL-MCNC: 6.5 G/DL (ref 6.4–8.3)
RBC # BLD AUTO: 3.52 M/UL (ref 4–5.2)
SODIUM SERPL-SCNC: 139 MMOL/L (ref 135–144)
SPECIMEN DESCRIPTION: NORMAL
WBC OTHER # BLD: 5.7 K/UL (ref 3.5–11)

## 2023-11-15 PROCEDURE — 85025 COMPLETE CBC W/AUTO DIFF WBC: CPT

## 2023-11-15 PROCEDURE — 80053 COMPREHEN METABOLIC PANEL: CPT

## 2023-11-15 PROCEDURE — 36415 COLL VENOUS BLD VENIPUNCTURE: CPT

## 2023-11-15 PROCEDURE — 77412 RADIATION TX DELIVERY LVL 3: CPT | Performed by: RADIOLOGY

## 2023-11-15 ASSESSMENT — PAIN SCALES - GENERAL: PAINLEVEL_OUTOF10: 2

## 2023-11-15 ASSESSMENT — PAIN DESCRIPTION - DESCRIPTORS: DESCRIPTORS: CRAMPING

## 2023-11-15 ASSESSMENT — PAIN DESCRIPTION - LOCATION: LOCATION: ABDOMEN

## 2023-11-16 ENCOUNTER — HOSPITAL ENCOUNTER (OUTPATIENT)
Dept: RADIATION ONCOLOGY | Age: 68
Discharge: HOME | End: 2023-11-16
Payer: MEDICARE

## 2023-11-16 LAB
CYTOLOGY REPORT: NORMAL
SURGICAL PATHOLOGY REPORT: NORMAL

## 2023-11-16 PROCEDURE — 77412 RADIATION TX DELIVERY LVL 3: CPT | Performed by: RADIOLOGY

## 2023-11-17 ENCOUNTER — HOSPITAL ENCOUNTER (OUTPATIENT)
Dept: RADIATION ONCOLOGY | Age: 68
Discharge: HOME | End: 2023-11-17
Payer: MEDICARE

## 2023-11-17 VITALS
SYSTOLIC BLOOD PRESSURE: 140 MMHG | TEMPERATURE: 98 F | OXYGEN SATURATION: 100 % | DIASTOLIC BLOOD PRESSURE: 81 MMHG | RESPIRATION RATE: 16 BRPM

## 2023-11-17 DIAGNOSIS — C20 RECTAL ADENOCARCINOMA (HCC): Primary | ICD-10-CM

## 2023-11-17 PROCEDURE — 77412 RADIATION TX DELIVERY LVL 3: CPT | Performed by: RADIOLOGY

## 2023-11-17 RX ORDER — NYSTATIN 100000 [USP'U]/G
POWDER TOPICAL
Qty: 60 G | Refills: 2 | Status: SHIPPED | OUTPATIENT
Start: 2023-11-17

## 2023-11-17 NOTE — PROGRESS NOTES
Assess patient while she was here for radiation. Vitals stable and she denies shortness of breath or dizziness. States rectal bleeding is unchanged and continues with diarrhea. She has taken Imodium and states she has to \"push hard,\" to have a bowel movement. She cannot locate antifungal power OTC and so prescription for Nystatin powder sent to her pharmacy. Dr. Kimberly Interiano updated and would like to repeat labs on 11/22/23.

## 2023-11-19 ENCOUNTER — HOSPITAL ENCOUNTER (OUTPATIENT)
Dept: RADIATION ONCOLOGY | Age: 68
End: 2023-11-19
Payer: MEDICARE

## 2023-11-19 PROCEDURE — 77412 RADIATION TX DELIVERY LVL 3: CPT | Performed by: RADIOLOGY

## 2023-11-20 ENCOUNTER — APPOINTMENT (OUTPATIENT)
Dept: RADIATION ONCOLOGY | Age: 68
End: 2023-11-20
Payer: MEDICARE

## 2023-11-20 PROCEDURE — 77412 RADIATION TX DELIVERY LVL 3: CPT | Performed by: RADIOLOGY

## 2023-11-20 RX ORDER — METFORMIN HYDROCHLORIDE 500 MG/1
TABLET, EXTENDED RELEASE ORAL
Qty: 90 TABLET | Refills: 0 | Status: SHIPPED | OUTPATIENT
Start: 2023-11-20

## 2023-11-21 ENCOUNTER — APPOINTMENT (OUTPATIENT)
Dept: RADIATION ONCOLOGY | Age: 68
End: 2023-11-21
Payer: MEDICARE

## 2023-11-21 PROCEDURE — 77336 RADIATION PHYSICS CONSULT: CPT | Performed by: RADIOLOGY

## 2023-11-21 PROCEDURE — 77412 RADIATION TX DELIVERY LVL 3: CPT | Performed by: RADIOLOGY

## 2023-11-21 ASSESSMENT — ENCOUNTER SYMPTOMS
SHORTNESS OF BREATH: 0
ABDOMINAL PAIN: 0
DIARRHEA: 0
NAUSEA: 0
COUGH: 0
BLOOD IN STOOL: 1
BACK PAIN: 0
SORE THROAT: 0
VOMITING: 0

## 2023-11-22 ENCOUNTER — APPOINTMENT (OUTPATIENT)
Dept: RADIATION ONCOLOGY | Age: 68
End: 2023-11-22
Payer: MEDICARE

## 2023-11-22 ENCOUNTER — HOSPITAL ENCOUNTER (OUTPATIENT)
Age: 68
Discharge: HOME | End: 2023-11-22
Payer: MEDICARE

## 2023-11-22 VITALS
DIASTOLIC BLOOD PRESSURE: 78 MMHG | TEMPERATURE: 98.4 F | OXYGEN SATURATION: 100 % | WEIGHT: 194.2 LBS | RESPIRATION RATE: 16 BRPM | SYSTOLIC BLOOD PRESSURE: 135 MMHG | HEART RATE: 16 BPM | BODY MASS INDEX: 34.41 KG/M2

## 2023-11-22 DIAGNOSIS — C20 RECTAL ADENOCARCINOMA (HCC): Primary | ICD-10-CM

## 2023-11-22 DIAGNOSIS — C20 RECTAL ADENOCARCINOMA (HCC): ICD-10-CM

## 2023-11-22 LAB
ALBUMIN SERPL-MCNC: 3.5 G/DL (ref 3.5–5.2)
ALBUMIN/GLOB SERPL: 1.2 {RATIO} (ref 1–2.5)
ALP SERPL-CCNC: 143 U/L (ref 35–104)
ALT SERPL-CCNC: 13 U/L (ref 5–33)
ANION GAP SERPL CALCULATED.3IONS-SCNC: 10 MMOL/L (ref 9–17)
AST SERPL-CCNC: 20 U/L
BASOPHILS # BLD: 0 K/UL (ref 0–0.2)
BASOPHILS NFR BLD: 0 % (ref 0–2)
BILIRUB SERPL-MCNC: 0.8 MG/DL (ref 0.3–1.2)
BUN SERPL-MCNC: 19 MG/DL (ref 8–23)
CALCIUM SERPL-MCNC: 7.9 MG/DL (ref 8.6–10.4)
CHLORIDE SERPL-SCNC: 103 MMOL/L (ref 98–107)
CO2 SERPL-SCNC: 27 MMOL/L (ref 20–31)
CREAT SERPL-MCNC: 0.8 MG/DL (ref 0.5–0.9)
EOSINOPHIL # BLD: 0.09 K/UL (ref 0–0.4)
EOSINOPHILS RELATIVE PERCENT: 2 % (ref 1–4)
ERYTHROCYTE [DISTWIDTH] IN BLOOD BY AUTOMATED COUNT: 26.4 % (ref 12.5–15.4)
GFR SERPL CREATININE-BSD FRML MDRD: >60 ML/MIN/1.73M2
GLUCOSE SERPL-MCNC: 103 MG/DL (ref 70–99)
HCT VFR BLD AUTO: 27.1 % (ref 36–46)
HGB BLD-MCNC: 8.8 G/DL (ref 12–16)
LYMPHOCYTES NFR BLD: 0.4 K/UL (ref 1–4.8)
LYMPHOCYTES RELATIVE PERCENT: 9 % (ref 24–44)
MCH RBC QN AUTO: 26.1 PG (ref 26–34)
MCHC RBC AUTO-ENTMCNC: 32.4 G/DL (ref 31–37)
MCV RBC AUTO: 80.5 FL (ref 80–100)
MONOCYTES NFR BLD: 0.31 K/UL (ref 0.1–1.2)
MONOCYTES NFR BLD: 7 % (ref 2–11)
MORPHOLOGY: ABNORMAL
NEUTROPHILS NFR BLD: 82 % (ref 36–66)
NEUTS SEG NFR BLD: 3.6 K/UL (ref 1.8–7.7)
PLATELET # BLD AUTO: 249 K/UL (ref 140–450)
PMV BLD AUTO: 6.8 FL (ref 6–12)
POTASSIUM SERPL-SCNC: 3.3 MMOL/L (ref 3.7–5.3)
PROT SERPL-MCNC: 6.4 G/DL (ref 6.4–8.3)
RBC # BLD AUTO: 3.37 M/UL (ref 4–5.2)
SODIUM SERPL-SCNC: 140 MMOL/L (ref 135–144)
WBC OTHER # BLD: 4.4 K/UL (ref 3.5–11)

## 2023-11-22 PROCEDURE — 85025 COMPLETE CBC W/AUTO DIFF WBC: CPT

## 2023-11-22 PROCEDURE — 36415 COLL VENOUS BLD VENIPUNCTURE: CPT

## 2023-11-22 PROCEDURE — 80053 COMPREHEN METABOLIC PANEL: CPT

## 2023-11-22 PROCEDURE — 77412 RADIATION TX DELIVERY LVL 3: CPT | Performed by: RADIOLOGY

## 2023-11-24 RX ORDER — LORATADINE 10 MG/1
TABLET ORAL
Qty: 60 TABLET | Refills: 0 | Status: SHIPPED | OUTPATIENT
Start: 2023-11-24

## 2023-11-27 ENCOUNTER — APPOINTMENT (OUTPATIENT)
Dept: RADIATION ONCOLOGY | Age: 68
End: 2023-11-27
Payer: MEDICARE

## 2023-11-27 PROCEDURE — 77412 RADIATION TX DELIVERY LVL 3: CPT | Performed by: RADIOLOGY

## 2023-11-28 ENCOUNTER — APPOINTMENT (OUTPATIENT)
Dept: RADIATION ONCOLOGY | Age: 68
End: 2023-11-28
Payer: MEDICARE

## 2023-11-28 PROCEDURE — 77412 RADIATION TX DELIVERY LVL 3: CPT | Performed by: RADIOLOGY

## 2023-11-29 ENCOUNTER — APPOINTMENT (OUTPATIENT)
Dept: RADIATION ONCOLOGY | Age: 68
End: 2023-11-29
Payer: MEDICARE

## 2023-11-29 ENCOUNTER — CLINICAL DOCUMENTATION (OUTPATIENT)
Dept: ONCOLOGY | Age: 68
End: 2023-11-29

## 2023-11-29 VITALS
TEMPERATURE: 97.6 F | SYSTOLIC BLOOD PRESSURE: 119 MMHG | RESPIRATION RATE: 16 BRPM | BODY MASS INDEX: 32.92 KG/M2 | DIASTOLIC BLOOD PRESSURE: 79 MMHG | HEART RATE: 16 BPM | WEIGHT: 185.8 LBS

## 2023-11-29 DIAGNOSIS — C20 RECTAL ADENOCARCINOMA (HCC): Primary | ICD-10-CM

## 2023-11-29 PROCEDURE — 77336 RADIATION PHYSICS CONSULT: CPT | Performed by: RADIOLOGY

## 2023-11-29 PROCEDURE — 77412 RADIATION TX DELIVERY LVL 3: CPT | Performed by: RADIOLOGY

## 2023-11-29 ASSESSMENT — PAIN SCALES - GENERAL: PAINLEVEL_OUTOF10: 3

## 2023-11-29 ASSESSMENT — PAIN DESCRIPTION - LOCATION: LOCATION: RECTUM

## 2023-11-29 NOTE — PROGRESS NOTES
91.7 kg (202 lb 3.2 oz)   11/09/23 89.8 kg (198 lb)   11/10/23 91.9 kg (202 lb 9.6 oz)   11/08/23 89.9 kg (198 lb 3.2 oz)   10/27/23 90.3 kg (199 lb)   10/11/23 93 kg (205 lb 1.6 oz)   10/09/23 92.5 kg (204 lb)   10/05/23 95.1 kg (209 lb 9.6 oz)   09/29/23 97.5 kg (215 lb)   09/11/23 97 kg (213 lb 12.8 oz)   09/26/23 97.5 kg (215 lb)   09/06/23 99.1 kg (218 lb 8 oz)   08/28/23 97.5 kg (214 lb 15.2 oz)   08/21/23 97.2 kg (214 lb 3.2 oz)   07/10/23 98.2 kg (216 lb 6.4 oz)   01/09/23 105.8 kg (233 lb 3.2 oz)   10/25/22 109.3 kg (241 lb)   04/19/22 109.3 kg (241 lb)     Comparative Standards  Estimated Calorie Needs: 2200 kcal/day   Estimated Protein Needs:80 g pro/day     Nutrition Prescription  Oral Diet: Regular     Nutrition-focused Findings           Poor appetite    Nutrition Diagnosis and Goal  Problem: unintentional weight loss   Etiology/related to: insufficient energy intake  Symptoms/Signs/as evidenced by:h/o 8% loss over 1.5 months       Goal: Adequate nutrient intake for weight maintenance and to optimize nutrition status while undergoing cancer treatment    Progress towards goal: declining      Jose Guadalupe Sigala RD, LD

## 2023-11-30 ENCOUNTER — APPOINTMENT (OUTPATIENT)
Dept: RADIATION ONCOLOGY | Age: 68
End: 2023-11-30
Payer: MEDICARE

## 2023-11-30 ENCOUNTER — SCHEDULED TELEPHONE ENCOUNTER (OUTPATIENT)
Dept: GYNECOLOGIC ONCOLOGY | Age: 68
End: 2023-11-30
Payer: MEDICARE

## 2023-11-30 DIAGNOSIS — N63.0 BREAST NODULE: ICD-10-CM

## 2023-11-30 DIAGNOSIS — N63.25 UNSPECIFIED LUMP IN THE LEFT BREAST, OVERLAPPING QUADRANTS: ICD-10-CM

## 2023-11-30 DIAGNOSIS — Z23 NEED FOR PNEUMOCOCCAL VACCINE: ICD-10-CM

## 2023-11-30 DIAGNOSIS — Z23 NEED FOR SHINGLES VACCINE: ICD-10-CM

## 2023-11-30 DIAGNOSIS — C54.1 MALIGNANT NEOPLASM OF ENDOMETRIUM (HCC): Primary | ICD-10-CM

## 2023-11-30 DIAGNOSIS — Z12.31 ENCOUNTER FOR SCREENING MAMMOGRAM FOR MALIGNANT NEOPLASM OF BREAST: ICD-10-CM

## 2023-11-30 DIAGNOSIS — D50.0 IRON DEFICIENCY ANEMIA DUE TO CHRONIC BLOOD LOSS: ICD-10-CM

## 2023-11-30 DIAGNOSIS — K59.00 CONSTIPATION, UNSPECIFIED CONSTIPATION TYPE: ICD-10-CM

## 2023-11-30 DIAGNOSIS — E66.01 OBESITY, MORBID, BMI 40.0-49.9 (HCC): ICD-10-CM

## 2023-11-30 DIAGNOSIS — Z23 NEED FOR COVID-19 VACCINE: ICD-10-CM

## 2023-11-30 DIAGNOSIS — Z23 NEED FOR DIPHTHERIA-TETANUS-PERTUSSIS (TDAP) VACCINE: ICD-10-CM

## 2023-11-30 DIAGNOSIS — C20 RECTAL ADENOCARCINOMA (HCC): ICD-10-CM

## 2023-11-30 PROCEDURE — 99443 PR PHYS/QHP TELEPHONE EVALUATION 21-30 MIN: CPT | Performed by: OBSTETRICS & GYNECOLOGY

## 2023-11-30 PROCEDURE — 77412 RADIATION TX DELIVERY LVL 3: CPT | Performed by: RADIOLOGY

## 2023-11-30 RX ORDER — CAPECITABINE 500 MG/1
2500 TABLET, FILM COATED ORAL 2 TIMES DAILY
COMMUNITY

## 2023-11-30 RX ORDER — SENNOSIDES A AND B 8.6 MG/1
1 TABLET, FILM COATED ORAL 2 TIMES DAILY
Qty: 60 TABLET | Refills: 11 | Status: ON HOLD | OUTPATIENT
Start: 2023-11-30 | End: 2023-12-15 | Stop reason: HOSPADM

## 2023-12-01 ENCOUNTER — HOSPITAL ENCOUNTER (OUTPATIENT)
Dept: RADIATION ONCOLOGY | Age: 68
Discharge: HOME | End: 2023-12-01
Payer: MEDICARE

## 2023-12-01 PROCEDURE — 77412 RADIATION TX DELIVERY LVL 3: CPT | Performed by: RADIOLOGY

## 2023-12-04 ENCOUNTER — HOSPITAL ENCOUNTER (OUTPATIENT)
Dept: RADIATION ONCOLOGY | Age: 68
Discharge: HOME | End: 2023-12-04
Payer: MEDICARE

## 2023-12-04 PROCEDURE — 77412 RADIATION TX DELIVERY LVL 3: CPT | Performed by: RADIOLOGY

## 2023-12-05 ENCOUNTER — HOSPITAL ENCOUNTER (OUTPATIENT)
Dept: RADIATION ONCOLOGY | Age: 68
Discharge: HOME | End: 2023-12-05
Payer: MEDICARE

## 2023-12-05 PROCEDURE — 77412 RADIATION TX DELIVERY LVL 3: CPT | Performed by: RADIOLOGY

## 2023-12-06 ENCOUNTER — APPOINTMENT (OUTPATIENT)
Dept: RADIATION ONCOLOGY | Age: 68
End: 2023-12-06
Payer: MEDICARE

## 2023-12-07 ENCOUNTER — HOSPITAL ENCOUNTER (OUTPATIENT)
Dept: RADIATION ONCOLOGY | Age: 68
Discharge: HOME | End: 2023-12-07
Payer: MEDICARE

## 2023-12-07 ENCOUNTER — TELEPHONE (OUTPATIENT)
Dept: RADIATION ONCOLOGY | Age: 68
End: 2023-12-07

## 2023-12-07 ENCOUNTER — HOSPITAL ENCOUNTER (OUTPATIENT)
Age: 68
Discharge: HOME | End: 2023-12-07
Payer: MEDICARE

## 2023-12-07 ENCOUNTER — TELEPHONE (OUTPATIENT)
Dept: ONCOLOGY | Age: 68
End: 2023-12-07

## 2023-12-07 VITALS
SYSTOLIC BLOOD PRESSURE: 106 MMHG | HEART RATE: 84 BPM | BODY MASS INDEX: 32.99 KG/M2 | WEIGHT: 186.2 LBS | RESPIRATION RATE: 16 BRPM | TEMPERATURE: 98.2 F | DIASTOLIC BLOOD PRESSURE: 72 MMHG

## 2023-12-07 DIAGNOSIS — C20 RECTAL ADENOCARCINOMA (HCC): ICD-10-CM

## 2023-12-07 DIAGNOSIS — C20 RECTAL ADENOCARCINOMA (HCC): Primary | ICD-10-CM

## 2023-12-07 LAB
ALBUMIN SERPL-MCNC: 3.7 G/DL (ref 3.5–5.2)
ALBUMIN/GLOB SERPL: 1.2 {RATIO} (ref 1–2.5)
ALP SERPL-CCNC: 135 U/L (ref 35–104)
ALT SERPL-CCNC: 18 U/L (ref 5–33)
ANION GAP SERPL CALCULATED.3IONS-SCNC: 15 MMOL/L (ref 9–17)
AST SERPL-CCNC: 27 U/L
BASOPHILS # BLD: 0 K/UL (ref 0–0.2)
BASOPHILS NFR BLD: 0 % (ref 0–2)
BILIRUB SERPL-MCNC: 1 MG/DL (ref 0.3–1.2)
BUN SERPL-MCNC: 20 MG/DL (ref 8–23)
CALCIUM SERPL-MCNC: 6.5 MG/DL (ref 8.6–10.4)
CHLORIDE SERPL-SCNC: 100 MMOL/L (ref 98–107)
CO2 SERPL-SCNC: 24 MMOL/L (ref 20–31)
CREAT SERPL-MCNC: 1 MG/DL (ref 0.5–0.9)
EOSINOPHIL # BLD: 0.11 K/UL (ref 0–0.4)
EOSINOPHILS RELATIVE PERCENT: 2 % (ref 1–4)
ERYTHROCYTE [DISTWIDTH] IN BLOOD BY AUTOMATED COUNT: 29.7 % (ref 12.5–15.4)
GFR SERPL CREATININE-BSD FRML MDRD: >60 ML/MIN/1.73M2
GLUCOSE SERPL-MCNC: 115 MG/DL (ref 70–99)
HCT VFR BLD AUTO: 30 % (ref 36–46)
HGB BLD-MCNC: 9.9 G/DL (ref 12–16)
LYMPHOCYTES NFR BLD: 0.06 K/UL (ref 1–4.8)
LYMPHOCYTES RELATIVE PERCENT: 1 % (ref 24–44)
MCH RBC QN AUTO: 28.1 PG (ref 26–34)
MCHC RBC AUTO-ENTMCNC: 32.9 G/DL (ref 31–37)
MCV RBC AUTO: 85.2 FL (ref 80–100)
MONOCYTES NFR BLD: 0.11 K/UL (ref 0.1–0.8)
MONOCYTES NFR BLD: 2 % (ref 1–7)
MORPHOLOGY: ABNORMAL
MORPHOLOGY: ABNORMAL
NEUTROPHILS NFR BLD: 95 % (ref 36–66)
NEUTS SEG NFR BLD: 5.42 K/UL (ref 1.8–7.7)
PLATELET # BLD AUTO: 242 K/UL (ref 140–450)
PMV BLD AUTO: 6.3 FL (ref 6–12)
POTASSIUM SERPL-SCNC: 2.8 MMOL/L (ref 3.7–5.3)
PROT SERPL-MCNC: 6.9 G/DL (ref 6.4–8.3)
RBC # BLD AUTO: 3.52 M/UL (ref 4–5.2)
SODIUM SERPL-SCNC: 139 MMOL/L (ref 135–144)
WBC OTHER # BLD: 5.7 K/UL (ref 3.5–11)

## 2023-12-07 PROCEDURE — 85025 COMPLETE CBC W/AUTO DIFF WBC: CPT

## 2023-12-07 PROCEDURE — 36415 COLL VENOUS BLD VENIPUNCTURE: CPT

## 2023-12-07 PROCEDURE — 77412 RADIATION TX DELIVERY LVL 3: CPT | Performed by: RADIOLOGY

## 2023-12-07 PROCEDURE — 80053 COMPREHEN METABOLIC PANEL: CPT

## 2023-12-07 RX ORDER — DIPHENOXYLATE HYDROCHLORIDE AND ATROPINE SULFATE 2.5; .025 MG/1; MG/1
1 TABLET ORAL 4 TIMES DAILY PRN
Qty: 28 TABLET | Refills: 0 | Status: SHIPPED | OUTPATIENT
Start: 2023-12-07 | End: 2023-12-14

## 2023-12-07 ASSESSMENT — PAIN SCALES - GENERAL: PAINLEVEL_OUTOF10: 0

## 2023-12-07 NOTE — TELEPHONE ENCOUNTER
Name: Lonnie Jackson  : 1955  MRN: [id number]    Oncology Navigation Follow-Up Note    Contact Type:  Telephone    Notes: Writer called patient to see how her radiation tx's are going. Pt states overall they are going well and denies any issues or questions. Writer also updated her on TB discussion and the possibility for tertiary consult for a 2nd opinion. Pt states she may have trouble getting to [facility] [employer] unless her sister would agree to take her. Pt has f/u with Dr. Mayco Nickerson tomorrow. Will route this note to Dr. Dioni Galvez to inquire on f/u with her also.       Electronically signed by Etta Hammer RN on 2023  t 1:18 PM

## 2023-12-07 NOTE — TELEPHONE ENCOUNTER
Lab called with potasium level of 2.9. Dr. Praneeth Bonilla notified of potassium level. At the direction of Dr. Praneeth Bonilla, K-Lyte 50meq total dose x1 ordered and sent to patient pharmacy. Patient notified of lab result and prescription for potassium replacement. Patient having diarrhea. Writer recommended that she eat a banana daily both for her potassium level and is well tolerated when having loose stools. She voices understanding. Dr. Praneeth Bonilla to evaluate patient tomorrow when she presents for radiation therapy and schedule her for IV hydration/potassium replacement.

## 2023-12-08 ENCOUNTER — HOSPITAL ENCOUNTER (OUTPATIENT)
Dept: INFUSION THERAPY | Age: 68
Discharge: HOME | End: 2023-12-08
Payer: MEDICARE

## 2023-12-08 ENCOUNTER — TELEPHONE (OUTPATIENT)
Dept: ONCOLOGY | Age: 68
End: 2023-12-08

## 2023-12-08 ENCOUNTER — TELEPHONE (OUTPATIENT)
Dept: RADIATION ONCOLOGY | Age: 68
End: 2023-12-08

## 2023-12-08 ENCOUNTER — HOSPITAL ENCOUNTER (OUTPATIENT)
Dept: RADIATION ONCOLOGY | Age: 68
Discharge: HOME | End: 2023-12-08
Payer: MEDICARE

## 2023-12-08 VITALS
TEMPERATURE: 98 F | DIASTOLIC BLOOD PRESSURE: 61 MMHG | HEART RATE: 90 BPM | SYSTOLIC BLOOD PRESSURE: 94 MMHG | RESPIRATION RATE: 18 BRPM

## 2023-12-08 PROCEDURE — 96360 HYDRATION IV INFUSION INIT: CPT

## 2023-12-08 PROCEDURE — 6360000002 HC RX W HCPCS: Performed by: RADIOLOGY

## 2023-12-08 PROCEDURE — 2580000003 HC RX 258: Performed by: RADIOLOGY

## 2023-12-08 PROCEDURE — 77412 RADIATION TX DELIVERY LVL 3: CPT | Performed by: RADIOLOGY

## 2023-12-08 PROCEDURE — 96361 HYDRATE IV INFUSION ADD-ON: CPT

## 2023-12-08 RX ADMIN — POTASSIUM CHLORIDE: 149 INJECTION, SOLUTION, CONCENTRATE INTRAVENOUS at 10:41

## 2023-12-08 NOTE — TELEPHONE ENCOUNTER
Received message that Potassium prescribed yesterday by Dr. Penelope Ma needed prior authorization. Dr. Penelope Ma requesting IV hydration with IV Potassium replacement. Spoke with infusion nurse and orders placed in media for this. Patient agreeable to come in for this as well. Prior authorization was initiated for oral potassium however Dr. Penelope Ma canceling this script since pt is getting IV supplemented. Dr. Penelope Ma also updating Dr. Norberto Guido. Therapists also updated and will work in pt's radiation treatment while she is here receiving her infusion.

## 2023-12-08 NOTE — PROGRESS NOTES
Per Dr. Allan Velasquez and Dr. Enola Boxer, pt notified to stop her Xeloda until she sees Dr. Enola Boxer on Wednesday next week. Schedule printed and given to patient as well.

## 2023-12-08 NOTE — TELEPHONE ENCOUNTER
Name: Wade Jessica  : 1955  MRN: [id number]    Oncology Navigation Follow-Up Note    Contact Type:  Telephone    Notes: Writer obtained f/u with Dr. Effie Prieto for 23 at [facility] per his request at 1115am. Pt informed and appreciative of assistance. Will continue to follow.       Electronically signed by Kayleigh Sky RN on 2023 at 8:01 AM

## 2023-12-08 NOTE — PROGRESS NOTES
Patient arrived for hydration + 40 meq potassium chloride. Arrives ambulatory. Denies any complaints. Hydration complete without incident. Discharged in stable condition. Returns 12/13/2023 for office visit with Dr. Winifred Hodges.

## 2023-12-11 ENCOUNTER — APPOINTMENT (OUTPATIENT)
Dept: RADIATION ONCOLOGY | Age: 68
End: 2023-12-11
Payer: MEDICARE

## 2023-12-11 ENCOUNTER — TELEPHONE (OUTPATIENT)
Dept: RADIATION ONCOLOGY | Age: 68
End: 2023-12-11

## 2023-12-11 NOTE — TELEPHONE ENCOUNTER
Called pt to check on her since she canceled her radiation treatment. Patient states \"I don't know what to tell you, I'm just not feeling good. \"  States diarrhea continues and not as bad as last week. She has not picked up her Lomotil. Told pt will plan to have her return to treatment tomorrow hopefully.

## 2023-12-11 NOTE — TELEPHONE ENCOUNTER
Pt called stating she is not feeling well and will not be here for her radiation therapy today. I offered for nurse to call her back, pt states \"not necessary\". Techs advised.

## 2023-12-12 ENCOUNTER — TELEPHONE (OUTPATIENT)
Dept: RADIATION ONCOLOGY | Age: 68
End: 2023-12-12

## 2023-12-12 ENCOUNTER — HOSPITAL ENCOUNTER (INPATIENT)
Age: 68
LOS: 3 days | Discharge: HOME | End: 2023-12-15
Attending: EMERGENCY MEDICINE | Admitting: STUDENT IN AN ORGANIZED HEALTH CARE EDUCATION/TRAINING PROGRAM
Payer: MEDICARE

## 2023-12-12 ENCOUNTER — HOSPITAL ENCOUNTER (OUTPATIENT)
Dept: RADIATION ONCOLOGY | Age: 68
End: 2023-12-12
Payer: MEDICARE

## 2023-12-12 DIAGNOSIS — E87.6 HYPOKALEMIA: Primary | ICD-10-CM

## 2023-12-12 DIAGNOSIS — R19.7 DIARRHEA, UNSPECIFIED TYPE: ICD-10-CM

## 2023-12-12 DIAGNOSIS — C20 RECTAL CANCER (HCC): ICD-10-CM

## 2023-12-12 DIAGNOSIS — E83.51 HYPOCALCEMIA: ICD-10-CM

## 2023-12-12 LAB
ALBUMIN SERPL-MCNC: 3.3 G/DL (ref 3.5–5.2)
ALBUMIN/GLOB SERPL: 1.1 {RATIO} (ref 1–2.5)
ALP SERPL-CCNC: 135 U/L (ref 35–104)
ALT SERPL-CCNC: 19 U/L (ref 5–33)
ANION GAP SERPL CALCULATED.3IONS-SCNC: 19 MMOL/L (ref 9–17)
AST SERPL-CCNC: 33 U/L
BASOPHILS # BLD: 0 K/UL (ref 0–0.2)
BASOPHILS NFR BLD: 0 % (ref 0–2)
BILIRUB SERPL-MCNC: 1 MG/DL (ref 0.3–1.2)
BUN SERPL-MCNC: 27 MG/DL (ref 8–23)
CALCIUM SERPL-MCNC: 5.4 MG/DL (ref 8.6–10.4)
CHLORIDE SERPL-SCNC: 98 MMOL/L (ref 98–107)
CO2 SERPL-SCNC: 19 MMOL/L (ref 20–31)
CREAT SERPL-MCNC: 1.5 MG/DL (ref 0.5–0.9)
EOSINOPHIL # BLD: 0.27 K/UL (ref 0–0.4)
EOSINOPHILS RELATIVE PERCENT: 4 % (ref 1–4)
ERYTHROCYTE [DISTWIDTH] IN BLOOD BY AUTOMATED COUNT: 33.5 % (ref 12.5–15.4)
GFR SERPL CREATININE-BSD FRML MDRD: 38 ML/MIN/1.73M2
GLUCOSE SERPL-MCNC: 87 MG/DL (ref 70–99)
HCT VFR BLD AUTO: 28.2 % (ref 36–46)
HGB BLD-MCNC: 9.3 G/DL (ref 12–16)
LIPASE SERPL-CCNC: 26 U/L (ref 13–60)
LYMPHOCYTES NFR BLD: 0.27 K/UL (ref 1–4.8)
LYMPHOCYTES RELATIVE PERCENT: 4 % (ref 24–44)
MAGNESIUM SERPL-MCNC: 0.5 MG/DL (ref 1.6–2.6)
MCH RBC QN AUTO: 28.8 PG (ref 26–34)
MCHC RBC AUTO-ENTMCNC: 32.8 G/DL (ref 31–37)
MCV RBC AUTO: 87.6 FL (ref 80–100)
MONOCYTES NFR BLD: 0.74 K/UL (ref 0.1–0.8)
MONOCYTES NFR BLD: 11 % (ref 1–7)
MORPHOLOGY: ABNORMAL
NEUTROPHILS NFR BLD: 81 % (ref 36–66)
NEUTS SEG NFR BLD: 5.42 K/UL (ref 1.8–7.7)
PLATELET # BLD AUTO: 241 K/UL (ref 140–450)
PMV BLD AUTO: 6.7 FL (ref 6–12)
POTASSIUM SERPL-SCNC: 2.8 MMOL/L (ref 3.7–5.3)
PROT SERPL-MCNC: 6.2 G/DL (ref 6.4–8.3)
RBC # BLD AUTO: 3.22 M/UL (ref 4–5.2)
SODIUM SERPL-SCNC: 136 MMOL/L (ref 135–144)
TROPONIN I SERPL HS-MCNC: 46 NG/L (ref 0–14)
TROPONIN I SERPL HS-MCNC: 49 NG/L (ref 0–14)
WBC OTHER # BLD: 6.7 K/UL (ref 3.5–11)

## 2023-12-12 PROCEDURE — 83690 ASSAY OF LIPASE: CPT

## 2023-12-12 PROCEDURE — 99285 EMERGENCY DEPT VISIT HI MDM: CPT

## 2023-12-12 PROCEDURE — 6370000000 HC RX 637 (ALT 250 FOR IP): Performed by: EMERGENCY MEDICINE

## 2023-12-12 PROCEDURE — 1210000000 HC MED SURG R&B

## 2023-12-12 PROCEDURE — 2580000003 HC RX 258: Performed by: NURSE PRACTITIONER

## 2023-12-12 PROCEDURE — 85025 COMPLETE CBC W/AUTO DIFF WBC: CPT

## 2023-12-12 PROCEDURE — 2500000003 HC RX 250 WO HCPCS: Performed by: NURSE PRACTITIONER

## 2023-12-12 PROCEDURE — 80053 COMPREHEN METABOLIC PANEL: CPT

## 2023-12-12 PROCEDURE — 83735 ASSAY OF MAGNESIUM: CPT

## 2023-12-12 PROCEDURE — 96374 THER/PROPH/DIAG INJ IV PUSH: CPT

## 2023-12-12 PROCEDURE — 93005 ELECTROCARDIOGRAM TRACING: CPT | Performed by: NURSE PRACTITIONER

## 2023-12-12 PROCEDURE — 6360000002 HC RX W HCPCS: Performed by: NURSE PRACTITIONER

## 2023-12-12 PROCEDURE — 84484 ASSAY OF TROPONIN QUANT: CPT

## 2023-12-12 RX ORDER — SODIUM CHLORIDE 0.9 % (FLUSH) 0.9 %
5-40 SYRINGE (ML) INJECTION EVERY 12 HOURS SCHEDULED
Status: DISCONTINUED | OUTPATIENT
Start: 2023-12-12 | End: 2023-12-15 | Stop reason: HOSPADM

## 2023-12-12 RX ORDER — CALCIUM CARBONATE 500 MG/1
1000 TABLET, CHEWABLE ORAL ONCE
Status: DISCONTINUED | OUTPATIENT
Start: 2023-12-12 | End: 2023-12-12

## 2023-12-12 RX ORDER — SODIUM CHLORIDE 0.9 % (FLUSH) 0.9 %
10 SYRINGE (ML) INJECTION PRN
Status: DISCONTINUED | OUTPATIENT
Start: 2023-12-12 | End: 2023-12-15 | Stop reason: HOSPADM

## 2023-12-12 RX ORDER — SODIUM CHLORIDE 9 MG/ML
INJECTION, SOLUTION INTRAVENOUS CONTINUOUS
Status: DISCONTINUED | OUTPATIENT
Start: 2023-12-12 | End: 2023-12-13

## 2023-12-12 RX ORDER — MAGNESIUM SULFATE 1 G/100ML
1000 INJECTION INTRAVENOUS PRN
Status: DISCONTINUED | OUTPATIENT
Start: 2023-12-12 | End: 2023-12-15 | Stop reason: HOSPADM

## 2023-12-12 RX ORDER — ENOXAPARIN SODIUM 100 MG/ML
40 INJECTION SUBCUTANEOUS DAILY
Status: DISCONTINUED | OUTPATIENT
Start: 2023-12-13 | End: 2023-12-15 | Stop reason: HOSPADM

## 2023-12-12 RX ORDER — 0.9 % SODIUM CHLORIDE 0.9 %
1000 INTRAVENOUS SOLUTION INTRAVENOUS ONCE
Status: COMPLETED | OUTPATIENT
Start: 2023-12-12 | End: 2023-12-12

## 2023-12-12 RX ORDER — CALCIUM GLUCONATE 94 MG/ML
1000 INJECTION, SOLUTION INTRAVENOUS ONCE
Status: DISCONTINUED | OUTPATIENT
Start: 2023-12-12 | End: 2023-12-12

## 2023-12-12 RX ORDER — MAGNESIUM SULFATE IN WATER 40 MG/ML
2000 INJECTION, SOLUTION INTRAVENOUS ONCE
Status: COMPLETED | OUTPATIENT
Start: 2023-12-12 | End: 2023-12-12

## 2023-12-12 RX ORDER — POTASSIUM CHLORIDE 20 MEQ/1
40 TABLET, EXTENDED RELEASE ORAL PRN
Status: DISCONTINUED | OUTPATIENT
Start: 2023-12-12 | End: 2023-12-15 | Stop reason: HOSPADM

## 2023-12-12 RX ORDER — ACETAMINOPHEN 650 MG/1
650 SUPPOSITORY RECTAL EVERY 6 HOURS PRN
Status: DISCONTINUED | OUTPATIENT
Start: 2023-12-12 | End: 2023-12-15 | Stop reason: HOSPADM

## 2023-12-12 RX ORDER — ACETAMINOPHEN 325 MG/1
650 TABLET ORAL EVERY 6 HOURS PRN
Status: DISCONTINUED | OUTPATIENT
Start: 2023-12-12 | End: 2023-12-15 | Stop reason: HOSPADM

## 2023-12-12 RX ORDER — SODIUM CHLORIDE 9 MG/ML
INJECTION, SOLUTION INTRAVENOUS PRN
Status: DISCONTINUED | OUTPATIENT
Start: 2023-12-12 | End: 2023-12-15 | Stop reason: HOSPADM

## 2023-12-12 RX ORDER — ONDANSETRON 4 MG/1
4 TABLET, ORALLY DISINTEGRATING ORAL EVERY 8 HOURS PRN
Status: DISCONTINUED | OUTPATIENT
Start: 2023-12-12 | End: 2023-12-15 | Stop reason: HOSPADM

## 2023-12-12 RX ORDER — POLYETHYLENE GLYCOL 3350 17 G/17G
17 POWDER, FOR SOLUTION ORAL DAILY PRN
Status: DISCONTINUED | OUTPATIENT
Start: 2023-12-12 | End: 2023-12-15 | Stop reason: HOSPADM

## 2023-12-12 RX ORDER — ONDANSETRON 2 MG/ML
4 INJECTION INTRAMUSCULAR; INTRAVENOUS EVERY 6 HOURS PRN
Status: DISCONTINUED | OUTPATIENT
Start: 2023-12-12 | End: 2023-12-15 | Stop reason: HOSPADM

## 2023-12-12 RX ORDER — POTASSIUM CHLORIDE 7.45 MG/ML
10 INJECTION INTRAVENOUS PRN
Status: DISCONTINUED | OUTPATIENT
Start: 2023-12-12 | End: 2023-12-15 | Stop reason: HOSPADM

## 2023-12-12 RX ORDER — CALCIUM GLUCONATE 20 MG/ML
1000 INJECTION, SOLUTION INTRAVENOUS ONCE
Status: COMPLETED | OUTPATIENT
Start: 2023-12-12 | End: 2023-12-12

## 2023-12-12 RX ORDER — POTASSIUM CHLORIDE 20 MEQ/1
40 TABLET, EXTENDED RELEASE ORAL ONCE
Status: COMPLETED | OUTPATIENT
Start: 2023-12-12 | End: 2023-12-12

## 2023-12-12 RX ADMIN — CALCIUM GLUCONATE 1000 MG: 20 INJECTION, SOLUTION INTRAVENOUS at 20:01

## 2023-12-12 RX ADMIN — SODIUM CHLORIDE: 9 INJECTION, SOLUTION INTRAVENOUS at 23:03

## 2023-12-12 RX ADMIN — POTASSIUM CHLORIDE 40 MEQ: 1500 TABLET, EXTENDED RELEASE ORAL at 19:29

## 2023-12-12 RX ADMIN — SODIUM CHLORIDE 1000 ML: 9 INJECTION, SOLUTION INTRAVENOUS at 20:02

## 2023-12-12 RX ADMIN — MAGNESIUM SULFATE HEPTAHYDRATE 2000 MG: 40 INJECTION, SOLUTION INTRAVENOUS at 20:44

## 2023-12-12 ASSESSMENT — PAIN - FUNCTIONAL ASSESSMENT: PAIN_FUNCTIONAL_ASSESSMENT: NONE - DENIES PAIN

## 2023-12-12 NOTE — ED NOTES
IV attempted several times all unsuccessful even with ultrasound. Dr. Nicko Prasad notified.      Olimpia Benites, RN  12/12/23 2548

## 2023-12-12 NOTE — ED PROVIDER NOTES
[facility]  Emergency Department Encounter  Mid Level Provider     Pt Name: Lev Hylton  MRN: [id number]  [address] [facility] 1955  Date of evaluation: 23  PCP:  JOSEPH Gannon CNP    CHIEF COMPLAINT       Chief Complaint   Patient presents with    Diarrhea     Diarrhea, weakness, dizziness. Onset about 3 days ago. Pt has rectal cancer and was receiving radiation therapy and staff suggested she get checked-out. HISTORY OF PRESENT ILLNESS  (Location/Symptom, Timing/Onset,Context/Setting, Quality, Duration, Modifying Factors, Severity.)      Lev Hylton is a 79 y.o. female who presents with several days of diarrhea. She will admit to generalized weakness and some lightheadedness. She went to get her radiation treatment for rectal cancer that has metastasized and they noticed her blood pressure in the 90s. They felt she should receive IV fluids. Patient denies any recent cough or cold. No chest pain or shortness of breath. No nausea or vomiting. She did go by Imodium as directed and took a dose. PAST MEDICAL /SURGICAL / SOCIAL / FAMILY HISTORY      has a past medical history of COVID-19 virus infection, Hyperlipidemia, and Hypertension. has a past surgical history that includes bladder suspension; bladder suspension (); Colonoscopy (2023); Cholecystectomy, laparoscopic (2023); Colonoscopy (N/A, 2023); and Cholecystectomy, laparoscopic (N/A, 2023).     Social History     Socioeconomic History    Marital status: Single     Spouse name: Not on file    Number of children: Not on file    Years of education: Not on file    Highest education level: Not on file   Occupational History    Not on file   Tobacco Use    Smoking status: Former     Packs/day: 1.00     Years: 10.00     Additional pack years: 0.00     Total pack years: 10.00     Types: Cigarettes     Quit date: 1989     Years since quittin.5    Smokeless tobacco: Never   Vaping Use

## 2023-12-12 NOTE — ED NOTES
Iv access attempted x 4, all unsuccessful. Luba RN to attempt IV ultrasound, unsuccessful.      Monica Carreon RN  12/12/23 6661

## 2023-12-12 NOTE — TELEPHONE ENCOUNTER
Patient presents for radiation therapy. Still having uncontrollable diarrhea. Became lightheaded/dizzy when walking. States picked up Lomotil but unsure if she started taking. Received oral potassium last Thursday and IV fluids with potassium on Friday,12/9/23. VS- Temperature 98.2 (temporal) BP-91/52 (left arm, sitting), Pulse 70, Respiratory rate 16. At the direction of Dr. Cesar Faustin, patient escorted to Emergency Department.

## 2023-12-12 NOTE — ED NOTES
Pt assisted to bedside commode- denies dizziness.  Call light in reach- educated on need to use prior to getting off comode     Valdez Flores RN  12/12/23 3914

## 2023-12-12 NOTE — ED PROVIDER NOTES
[facility] Emergency Department      Pt Name: Mallory Lorenzo  MRN: [id number]  [address] [facility] 1955  Date of evaluation: 12/12/2023    EMERGENCY DEPARTMENT ENCOUNTER      PERTINENT ATTENDING PHYSICIAN COMMENTS:      Faculty Attestation    I performed a history and physical examination of the patient and discussed management with the mid level provideer. I reviewed the mid level provider's note and agree with the documented findings and plan of care. Any areas of disagreement are noted on the chart. I was personally present for the key portions of any procedures. I have documented in the chart those procedures where I was not present during the key portions. I have reviewed the emergency nurses triage note. I agree with the chief complaint, past medical history, past surgical history, allergies, medications, social and family history as documented unless otherwise noted below. Documentation of the HPI, Physical Exam and Medical Decision Making performed by medical students or scribes is based on my personal performance of the HPI, PE and MDM. For Residents/Physician Assistant/ Nurse Practitioner cases/documentation I have personally evaluated this patient and have completed at least one if not all key elements of the E/M (history, physical exam, and MDM). Additional findings are as noted. CHIEF COMPLAINT       Chief Complaint   Patient presents with    Diarrhea     Diarrhea, weakness, dizziness. Onset about 3 days ago. Pt has rectal cancer and was receiving radiation therapy and staff suggested she get checked-out. HISTORY OF PRESENT ILLNESS    Mallory Lorenzo is a 79 y.o. female who presents to the emergency department sent from oncology for generalized weakness and hypotension. Patient has been having diarrhea for the past 3 days. History of rectal cancer diagnosed in August.  Chronic rectal pain. Denies any blood in her stool. No fevers or chills.   On arrival blood pressure in the 90s

## 2023-12-13 ENCOUNTER — CLINICAL DOCUMENTATION (OUTPATIENT)
Dept: RADIATION ONCOLOGY | Age: 68
End: 2023-12-13

## 2023-12-13 ENCOUNTER — HOSPITAL ENCOUNTER (OUTPATIENT)
Dept: RADIATION ONCOLOGY | Age: 68
Discharge: HOME | End: 2023-12-13
Payer: MEDICARE

## 2023-12-13 ENCOUNTER — TELEPHONE (OUTPATIENT)
Dept: RADIATION ONCOLOGY | Age: 68
End: 2023-12-13

## 2023-12-13 ENCOUNTER — APPOINTMENT (OUTPATIENT)
Dept: RADIATION ONCOLOGY | Age: 68
End: 2023-12-13
Payer: MEDICARE

## 2023-12-13 LAB
ANION GAP SERPL CALCULATED.3IONS-SCNC: 13 MMOL/L (ref 9–17)
ANION GAP SERPL CALCULATED.3IONS-SCNC: 15 MMOL/L (ref 9–17)
BUN SERPL-MCNC: 23 MG/DL (ref 8–23)
BUN SERPL-MCNC: 24 MG/DL (ref 8–23)
CA-I BLD-SCNC: 0.76 MMOL/L (ref 1.13–1.33)
CALCIUM SERPL-MCNC: 5.2 MG/DL (ref 8.6–10.4)
CALCIUM SERPL-MCNC: 5.7 MG/DL (ref 8.6–10.4)
CHLORIDE SERPL-SCNC: 103 MMOL/L (ref 98–107)
CHLORIDE SERPL-SCNC: 106 MMOL/L (ref 98–107)
CO2 SERPL-SCNC: 19 MMOL/L (ref 20–31)
CO2 SERPL-SCNC: 20 MMOL/L (ref 20–31)
CREAT SERPL-MCNC: 1.2 MG/DL (ref 0.5–0.9)
CREAT SERPL-MCNC: 1.3 MG/DL (ref 0.5–0.9)
EKG ATRIAL RATE: 75 BPM
EKG P AXIS: 58 DEGREES
EKG P-R INTERVAL: 184 MS
EKG Q-T INTERVAL: 444 MS
EKG QRS DURATION: 88 MS
EKG QTC CALCULATION (BAZETT): 495 MS
EKG R AXIS: -9 DEGREES
EKG T AXIS: 18 DEGREES
EKG VENTRICULAR RATE: 75 BPM
GFR SERPL CREATININE-BSD FRML MDRD: 45 ML/MIN/1.73M2
GFR SERPL CREATININE-BSD FRML MDRD: 50 ML/MIN/1.73M2
GLUCOSE BLD-MCNC: 116 MG/DL (ref 65–105)
GLUCOSE BLD-MCNC: 128 MG/DL (ref 65–105)
GLUCOSE SERPL-MCNC: 112 MG/DL (ref 70–99)
GLUCOSE SERPL-MCNC: 127 MG/DL (ref 70–99)
MAGNESIUM SERPL-MCNC: 1 MG/DL (ref 1.6–2.6)
MAGNESIUM SERPL-MCNC: 2.3 MG/DL (ref 1.6–2.6)
POTASSIUM SERPL-SCNC: 3.1 MMOL/L (ref 3.7–5.3)
POTASSIUM SERPL-SCNC: 3.4 MMOL/L (ref 3.7–5.3)
POTASSIUM SERPL-SCNC: 3.7 MMOL/L (ref 3.7–5.3)
SODIUM SERPL-SCNC: 138 MMOL/L (ref 135–144)
SODIUM SERPL-SCNC: 138 MMOL/L (ref 135–144)

## 2023-12-13 PROCEDURE — 6360000002 HC RX W HCPCS: Performed by: NURSE PRACTITIONER

## 2023-12-13 PROCEDURE — 6370000000 HC RX 637 (ALT 250 FOR IP): Performed by: NURSE PRACTITIONER

## 2023-12-13 PROCEDURE — 2580000003 HC RX 258: Performed by: NURSE PRACTITIONER

## 2023-12-13 PROCEDURE — 84132 ASSAY OF SERUM POTASSIUM: CPT

## 2023-12-13 PROCEDURE — 97116 GAIT TRAINING THERAPY: CPT

## 2023-12-13 PROCEDURE — 97166 OT EVAL MOD COMPLEX 45 MIN: CPT

## 2023-12-13 PROCEDURE — 6360000002 HC RX W HCPCS: Performed by: STUDENT IN AN ORGANIZED HEALTH CARE EDUCATION/TRAINING PROGRAM

## 2023-12-13 PROCEDURE — 1210000000 HC MED SURG R&B

## 2023-12-13 PROCEDURE — 83735 ASSAY OF MAGNESIUM: CPT

## 2023-12-13 PROCEDURE — 97535 SELF CARE MNGMENT TRAINING: CPT

## 2023-12-13 PROCEDURE — 2500000003 HC RX 250 WO HCPCS: Performed by: RADIOLOGY

## 2023-12-13 PROCEDURE — 97162 PT EVAL MOD COMPLEX 30 MIN: CPT

## 2023-12-13 PROCEDURE — 36415 COLL VENOUS BLD VENIPUNCTURE: CPT

## 2023-12-13 PROCEDURE — 82947 ASSAY GLUCOSE BLOOD QUANT: CPT

## 2023-12-13 PROCEDURE — 82330 ASSAY OF CALCIUM: CPT

## 2023-12-13 PROCEDURE — 6370000000 HC RX 637 (ALT 250 FOR IP): Performed by: RADIOLOGY

## 2023-12-13 PROCEDURE — 6370000000 HC RX 637 (ALT 250 FOR IP): Performed by: STUDENT IN AN ORGANIZED HEALTH CARE EDUCATION/TRAINING PROGRAM

## 2023-12-13 PROCEDURE — 6370000000 HC RX 637 (ALT 250 FOR IP): Performed by: EMERGENCY MEDICINE

## 2023-12-13 PROCEDURE — 80048 BASIC METABOLIC PNL TOTAL CA: CPT

## 2023-12-13 RX ORDER — PANTOPRAZOLE SODIUM 40 MG/1
40 TABLET, DELAYED RELEASE ORAL 2 TIMES DAILY
Status: DISCONTINUED | OUTPATIENT
Start: 2023-12-13 | End: 2023-12-15 | Stop reason: HOSPADM

## 2023-12-13 RX ORDER — INSULIN LISPRO 100 [IU]/ML
0-4 INJECTION, SOLUTION INTRAVENOUS; SUBCUTANEOUS NIGHTLY
Status: DISCONTINUED | OUTPATIENT
Start: 2023-12-13 | End: 2023-12-15 | Stop reason: HOSPADM

## 2023-12-13 RX ORDER — GLUCAGON 1 MG/ML
1 KIT INJECTION PRN
Status: DISCONTINUED | OUTPATIENT
Start: 2023-12-13 | End: 2023-12-15 | Stop reason: HOSPADM

## 2023-12-13 RX ORDER — POTASSIUM CHLORIDE 20 MEQ/1
40 TABLET, EXTENDED RELEASE ORAL ONCE
Status: COMPLETED | OUTPATIENT
Start: 2023-12-13 | End: 2023-12-13

## 2023-12-13 RX ORDER — CALCIUM GLUCONATE 20 MG/ML
2000 INJECTION, SOLUTION INTRAVENOUS ONCE
Status: COMPLETED | OUTPATIENT
Start: 2023-12-13 | End: 2023-12-13

## 2023-12-13 RX ORDER — MAGNESIUM HYDROXIDE/ALUMINUM HYDROXICE/SIMETHICONE 120; 1200; 1200 MG/30ML; MG/30ML; MG/30ML
30 SUSPENSION ORAL 4 TIMES DAILY
Status: DISCONTINUED | OUTPATIENT
Start: 2023-12-13 | End: 2023-12-13

## 2023-12-13 RX ORDER — ZINC OXIDE
OINTMENT (GRAM) TOPICAL 4 TIMES DAILY PRN
Status: DISCONTINUED | OUTPATIENT
Start: 2023-12-13 | End: 2023-12-15 | Stop reason: HOSPADM

## 2023-12-13 RX ORDER — MAGNESIUM SULFATE IN WATER 40 MG/ML
2000 INJECTION, SOLUTION INTRAVENOUS ONCE
Status: DISCONTINUED | OUTPATIENT
Start: 2023-12-13 | End: 2023-12-13

## 2023-12-13 RX ORDER — ATORVASTATIN CALCIUM 40 MG/1
80 TABLET, FILM COATED ORAL NIGHTLY
Status: DISCONTINUED | OUTPATIENT
Start: 2023-12-13 | End: 2023-12-15 | Stop reason: HOSPADM

## 2023-12-13 RX ORDER — INSULIN LISPRO 100 [IU]/ML
0-4 INJECTION, SOLUTION INTRAVENOUS; SUBCUTANEOUS
Status: DISCONTINUED | OUTPATIENT
Start: 2023-12-13 | End: 2023-12-15 | Stop reason: HOSPADM

## 2023-12-13 RX ORDER — DEXTROSE MONOHYDRATE 100 MG/ML
INJECTION, SOLUTION INTRAVENOUS CONTINUOUS PRN
Status: DISCONTINUED | OUTPATIENT
Start: 2023-12-13 | End: 2023-12-15 | Stop reason: HOSPADM

## 2023-12-13 RX ORDER — BUMETANIDE 1 MG/1
1 TABLET ORAL DAILY
Status: DISCONTINUED | OUTPATIENT
Start: 2023-12-13 | End: 2023-12-15 | Stop reason: HOSPADM

## 2023-12-13 RX ORDER — LEVOTHYROXINE SODIUM 0.05 MG/1
50 TABLET ORAL DAILY
Status: DISCONTINUED | OUTPATIENT
Start: 2023-12-13 | End: 2023-12-15 | Stop reason: HOSPADM

## 2023-12-13 RX ORDER — CALCIUM GLUCONATE 20 MG/ML
1000 INJECTION, SOLUTION INTRAVENOUS ONCE
Status: DISCONTINUED | OUTPATIENT
Start: 2023-12-13 | End: 2023-12-13

## 2023-12-13 RX ORDER — MAGNESIUM HYDROXIDE/ALUMINUM HYDROXICE/SIMETHICONE 120; 1200; 1200 MG/30ML; MG/30ML; MG/30ML
30 SUSPENSION ORAL EVERY 6 HOURS PRN
Status: DISCONTINUED | OUTPATIENT
Start: 2023-12-13 | End: 2023-12-15 | Stop reason: HOSPADM

## 2023-12-13 RX ADMIN — ATORVASTATIN CALCIUM 80 MG: 40 TABLET, FILM COATED ORAL at 21:45

## 2023-12-13 RX ADMIN — POTASSIUM CHLORIDE 40 MEQ: 1500 TABLET, EXTENDED RELEASE ORAL at 16:36

## 2023-12-13 RX ADMIN — ALUMINUM HYDROXIDE, MAGNESIUM HYDROXIDE, AND SIMETHICONE 30 ML: 1200; 120; 1200 SUSPENSION ORAL at 01:23

## 2023-12-13 RX ADMIN — MAGNESIUM SULFATE HEPTAHYDRATE 1000 MG: 1 INJECTION, SOLUTION INTRAVENOUS at 01:23

## 2023-12-13 RX ADMIN — SILVER SULFADIAZINE: 10 CREAM TOPICAL at 12:04

## 2023-12-13 RX ADMIN — SILVER SULFADIAZINE: 10 CREAM TOPICAL at 21:46

## 2023-12-13 RX ADMIN — CALCIUM GLUCONATE 2000 MG: 20 INJECTION, SOLUTION INTRAVENOUS at 16:36

## 2023-12-13 RX ADMIN — POTASSIUM CHLORIDE 40 MEQ: 1500 TABLET, EXTENDED RELEASE ORAL at 08:05

## 2023-12-13 RX ADMIN — CALCIUM CARBONATE 1000 MG: 500 TABLET, CHEWABLE ORAL at 01:05

## 2023-12-13 RX ADMIN — CALCIUM GLUCONATE 2000 MG: 20 INJECTION, SOLUTION INTRAVENOUS at 08:03

## 2023-12-13 RX ADMIN — PANTOPRAZOLE SODIUM 40 MG: 40 TABLET, DELAYED RELEASE ORAL at 21:46

## 2023-12-13 RX ADMIN — Medication: at 01:24

## 2023-12-13 RX ADMIN — SODIUM CHLORIDE, PRESERVATIVE FREE 10 ML: 5 INJECTION INTRAVENOUS at 09:21

## 2023-12-13 RX ADMIN — WHITE PETROLATUM: 1.75 OINTMENT TOPICAL at 12:05

## 2023-12-13 RX ADMIN — ENOXAPARIN SODIUM 40 MG: 100 INJECTION SUBCUTANEOUS at 09:21

## 2023-12-13 RX ADMIN — SODIUM CHLORIDE, PRESERVATIVE FREE 10 ML: 5 INJECTION INTRAVENOUS at 21:46

## 2023-12-13 RX ADMIN — MAGNESIUM SULFATE HEPTAHYDRATE 1000 MG: 1 INJECTION, SOLUTION INTRAVENOUS at 04:26

## 2023-12-13 RX ADMIN — PANTOPRAZOLE SODIUM 40 MG: 40 TABLET, DELAYED RELEASE ORAL at 09:21

## 2023-12-13 RX ADMIN — MAGNESIUM SULFATE HEPTAHYDRATE 1000 MG: 1 INJECTION, SOLUTION INTRAVENOUS at 02:25

## 2023-12-13 RX ADMIN — MAGNESIUM SULFATE HEPTAHYDRATE 1000 MG: 1 INJECTION, SOLUTION INTRAVENOUS at 03:22

## 2023-12-13 RX ADMIN — SODIUM CHLORIDE: 9 INJECTION, SOLUTION INTRAVENOUS at 10:09

## 2023-12-13 RX ADMIN — LEVOTHYROXINE SODIUM 50 MCG: 50 TABLET ORAL at 09:20

## 2023-12-13 RX ADMIN — POTASSIUM CHLORIDE 40 MEQ: 1500 TABLET, EXTENDED RELEASE ORAL at 01:26

## 2023-12-13 NOTE — H&P
[facility]  Office: [address], DO, Tariq Curet, DO, Marshall Avendanos, DO, Ladonna Nemo Blood, DO, Kandy Tuttle MD, Min Maldonado MD, Mariam Oliveira MD, Wendy Arora MD,  Huy Cisneros MD, Kalia Mejia MD, Sylvain Antoine MD,  Venecia Rivera MD, Ivy Guerrero MD, Eli Miranda, DO, Wild Omer MD,  Luis Carlton DO, Jim Irving MD, Leland Sen MD, Willow Taveras MD, Pretty Nunez MD,  Mode Valdez MD, Dharmesh Smalls MD, Bibi Carlos MD, Jane Delgado MD, Des Luque MD, Jaun Dickey MD, Patrice Hannon, DO, Gladis Lundberg, DO, Philis Halsted, MD,  Angelita Meyers MD, Jace Burnham, CNP,  Tammy Lebron, CNP, Stephen Ornelas, CNP,  Cheryle Balding, [facility], Valdemar Peabody, CNP, Tyrel Elizabeth, CNP, Nara Lynch, CNP, Adelina Gifford, CNP, Susan Donahue, CNP, Mary Hickman PA-C, Mojgan Snyder PA-C, Leisa Urbina, CNP, Geraldine Gerard, CNS, Ace Gross, CNP, Gaviota Juárez, CNP, Patricia Cruz    HISTORY AND PHYSICAL EXAMINATION            Date:   12/13/2023  Patient name:  Wade Jessica  Date of admission:  12/12/2023  2:37 PM  MRN:   [id number]  Account:  [de-identified]  YOB: 1955  PCP:    JOSEPH Burns CNP  Room:   [address] [place] [address]  Code Status:    Full Code    Chief Complaint:     Chief Complaint   Patient presents with    Diarrhea     Diarrhea, weakness, dizziness. Onset about 3 days ago. Pt has rectal cancer and was receiving radiation therapy and staff suggested she get checked-out. History Obtained From:     patient    History of Present Illness:     Wade Jessica is a 79 y.o. Non- / non  female who presents with Diarrhea (Diarrhea, weakness, dizziness. Onset about 3 days ago.   Pt has rectal cancer and was receiving radiation therapy and staff suggested she get checked-out.  )   and is admitted to the hospital for ng/L   Magnesium    Collection Time: 12/12/23  7:48 PM   Result Value Ref Range    Magnesium 0.5 (LL) 1.6 - 2.6 mg/dL   Potassium    Collection Time: 12/13/23 12:26 AM   Result Value Ref Range    Potassium 3.1 (L) 3.7 - 5.3 mmol/L   Magnesium    Collection Time: 12/13/23 12:26 AM   Result Value Ref Range    Magnesium 1.0 (L) 1.6 - 2.6 mg/dL   Basic Metabolic Panel w/ Reflex to MG    Collection Time: 12/13/23  6:03 AM   Result Value Ref Range    Sodium 138 135 - 144 mmol/L    Potassium 3.4 (L) 3.7 - 5.3 mmol/L    Chloride 103 98 - 107 mmol/L    CO2 20 20 - 31 mmol/L    Anion Gap 15 9 - 17 mmol/L    Glucose 112 (H) 70 - 99 mg/dL    BUN 24 (H) 8 - 23 mg/dL    Creatinine 1.3 (H) 0.5 - 0.9 mg/dL    Est, Glom Filt Rate 45 (L) >60 mL/min/1.73m2    Calcium 5.2 (LL) 8.6 - 10.4 mg/dL   Magnesium    Collection Time: 12/13/23  6:03 AM   Result Value Ref Range    Magnesium 2.3 1.6 - 2.6 mg/dL       Imaging/Diagnostics:  No results found.     Assessment :      Hospital Problems             Last Modified POA    * (Principal) Hypokalemia 12/12/2023 Yes       Plan:     Patient status inpatient in the Medical ICU    Severe hypomagnesemia we will replace and monitor  Severe hypokalemia-hypophosphatemia and hypocalcemia likely due to diarrhea we will replace and monitor\, will monitor on telemetry  Hypothyroidism we will continue with levothyroxine  Elevated troponin most likely type II MI, EKG with no ST or T wave changes, patient is chest pain-free  Hypertension we will hold blood pressure medication, her blood pressure in the lower side likely due to volume depletion due to diarrhea  Diabetes will hold p.o. medication, will start insulin sliding scale, diabetic diet and glucose check  GERD continue PPI  DVT prophylaxis      Consultations:   None     Patient is admitted as inpatient status because of co-morbidities listed above, severity of signs and symptoms as outlined, requirement for current medical therapies and most importantly

## 2023-12-13 NOTE — PROGRESS NOTES
Complexity  Requires PT Follow-Up: Yes  Activity Tolerance  Activity Tolerance: Patient limited by fatigue;Patient limited by endurance     Plan   Physical Therapy Plan  General Plan:  (5-6x/week)  Current Treatment Recommendations: Strengthening, Balance training, Functional mobility training, Transfer training, Gait training, Safety education & training, Patient/Caregiver education & training, Therapeutic activities  Safety Devices  Type of Devices: Call light within reach, Chair alarm in place, Gait belt, Left in chair, Nurse notified  Restraints  Restraints Initially in Place: No     Restrictions  Restrictions/Precautions  Restrictions/Precautions: General Precautions  Required Braces or Orthoses?: No  Position Activity Restriction  Other position/activity restrictions: Up with assistance     Subjective   General  Patient assessed for rehabilitation services?: Yes  Family / Caregiver Present: No  Referral Date : 12/12/23  Diagnosis: Hypokalemia  Follows Commands: Within Functional Limits  Subjective  Subjective: Pt supine in bed and agreeable to therapy; nursing agreeable. Pt denies pain at this time.          Social/Functional History  Social/Functional History  Lives With: Alone  Type of Home: Apartment  Home Layout: One level  Home Access: Stairs to enter without rails  Entrance Stairs - Number of Steps: 1  Bathroom Shower/Tub: Tub/Shower unit  Bathroom Toilet: Standard  Bathroom Equipment: Grab bars in shower  Home Equipment:  (Denies DME)  Has the patient had two or more falls in the past year or any fall with injury in the past year?: No  ADL Assistance: [id number] Independent  Homemaking Responsibilities: Yes  Ambulation Assistance: Independent  Transfer Assistance: Independent  Active : Yes  Mode of Transportation: Car  Occupation: Retired  Vision/Hearing  Vision  Vision: Impaired  Vision Exceptions: Wears glasses at all times  Hearing  Hearing: Within functional limits Cognition   Orientation  Overall Orientation Status: Within Functional Limits  Orientation Level: Oriented X4  Cognition  Overall Cognitive Status: Exceptions  Arousal/Alertness: Appropriate responses to stimuli  Following Commands: Follows multistep commands with increased time; Follows multistep commands with repitition  Attention Span: Appears intact  Memory: Appears intact  Safety Judgement: Decreased awareness of need for assistance;Decreased awareness of need for safety  Problem Solving: Assistance required to generate solutions  Insights: Decreased awareness of deficits  Initiation: Requires cues for some  Sequencing: Requires cues for some     Objective           Gross Assessment  AROM: Within functional limits  PROM: Within functional limits  Strength: Generally decreased, functional  Coordination: Within functional limits  Tone: Normal  Sensation: Intact     AROM RLE (degrees)  RLE AROM: WFL  AROM LLE (degrees)  LLE AROM : WFL  Strength RLE  Strength RLE: Exception  Comment: others 4/5  R Hip Flexion: 3+/5  Strength LLE  Strength LLE: Exception  Comment: others 4/5  L Hip Flexion: 3+/5          Bed mobility  Supine to Sit: Moderate assistance  Scooting: Minimal assistance  Bed Mobility Comments: HOB elevated ~45*; Pt utilized R bed rail; Pt required increased time/effort to perform; Pt able to maintain sitting EOB with supervision. Pt retired to chair at end of session. Transfers  Sit to Stand: Contact guard assistance  Stand to Sit: Contact guard assistance  Stand Pivot Transfers: Contact guard assistance  Comment: Transfers with RW. Verbal cues for hand placement. Initial transfer bed > commode with HHA, other transfers with RW. Ambulation  Surface: Level tile  Device: Rolling Walker  Assistance: Contact guard assistance  Gait Deviations: Slow Dunia; Increased REYNA; Decreased step length  Distance: 30' x 1, 15' x 2  Comments: Pt fatigues quickly.   More Ambulation?: No  Stairs/Curb  Stairs?: No

## 2023-12-13 NOTE — PROGRESS NOTES
Patient given Polymem dressings to use to her rectal area and instructed on use of these as well as Silvadene and Aquaphor. Patient verbalized understanding of information and nurse updated as well.

## 2023-12-13 NOTE — PROGRESS NOTES
2300 Patient arrived to unit via wheel chair. Assessment and room orientation completed. Patient bottom extremely excoriated and bleeding due to diarrhea x past 3 days. On call NP Annette Servin updated on patient condition. Order received for fecal management system. Nurse attempted insertion x2, but patient was anxious and painful, bearing down and pushing rectal tube out. Patient refuses any further attempts at this time. 2350 Nurse reached out to Annette KNOTT to update her on unsucesfull rectal tube insertion attempts. Nurse spoke with Pharmacy on options available to cream for patients bottom.  Pharmacy suggested the combination of maalox and zinc oxide paste, advising we would have to mix ourselves until onsite pharmacy availability in am. See new orders

## 2023-12-13 NOTE — PROGRESS NOTES
JOSEPH Lala CNP    acetaminophen (TYLENOL) tablet 650 mg, 650 mg, Oral, Q6H PRN **OR** acetaminophen (TYLENOL) suppository 650 mg, 650 mg, Rectal, Q6H PRN, JOSEPH Lala CNP      ASSESSMENT PLAN:   Treatment setup and plan reviewed. Port images/CBCT images reviewed. Appropriate laboratory work was reviewed. Treatment side effects and toxicities reviewed with the patient, and appropriate management was advised. Will continue radiation treatment as planned, and recommend patient contact us if they have any questions or concerns. Will hold the patient's radiation therapy given her symptoms of diarrhea and moist desquamation of the skin in the treated area. Skin care with Aquaphor, Silvadene, and PolyMem. Appointment will be given to the nurses at the bedside. Diarrhea management and electrolyte replacement per the primary team  Xeloda is currently on hold per medical oncology and patient will follow-up with them regarding restarting therapy. Radiation therapy will be resumed once patient's diarrhea significantly improved as well as her skin dermatitis improved. Patient's diagnosis of endometrial adenocarcinoma will be managed after she completes her course of preoperative chemoradiation to the rectum. Electronically signed by Joshua Onofre MD on 12/13/2023 at 11:36 AM      Drugs Prescribed:  New Prescriptions    No medications on file       Other Orders Placed:  No orders of the defined types were placed in this encounter.

## 2023-12-13 NOTE — PROGRESS NOTES
notified  Restraints  Restraints Initially in Place: No    Plan   Occupational Therapy Plan  Times Per Week: 5-6x/wk  Current Treatment Recommendations: Strengthening, Balance training, Safety education & training, Functional mobility training, Patient/Caregiver education & training, Equipment evaluation, education, & procurement, Positioning, Self-Care / ADL, Coordination training     Restrictions  Restrictions/Precautions  Restrictions/Precautions: General Precautions  Required Braces or Orthoses?: No  Position Activity Restriction  Other position/activity restrictions: Up with assistance    Subjective   General  Patient assessed for rehabilitation services?: Yes  Family / Caregiver Present: No  General Comment  Comments: RN ok'd for therapy this AM. Pt agreeable to participate in session and was pleasant/cooperative throughout. Pt denied pain/numbness/tingling at rest and reported pain in buttocks with bed mobility and toileting; did not quantify. Assisted pt to reposition/increase activity throughout session. Social/Functional History  Social/Functional History  Lives With: Alone  Type of Home: Apartment  Home Layout: One level  Home Access: Stairs to enter without rails  Entrance Stairs - Number of Steps: 1  Bathroom Shower/Tub: Tub/Shower unit  Bathroom Toilet: Standard  Bathroom Equipment: Grab bars in shower  Home Equipment:  (Denies DME)  Has the patient had two or more falls in the past year or any fall with injury in the past year?: No  ADL Assistance: [id number] Independent  Homemaking Responsibilities: Yes  Ambulation Assistance: Independent  Transfer Assistance: Independent  Active : Yes  Mode of Transportation: Car  Occupation: Retired    Objective   Balance  Sitting:  (Static - supervision unsupported at EOB for ~10 minutes; Dynamic - Pt lifted BLE for writer to reina socks and brief with SBA)  Standing:  (Static - CGA-SBA w/ RW for ~2 minutes;  Dynamic - Pt brushed Moderate assistance (Pt required assistance for trunk progression.)  Sit to Supine:  (Pt left in chair at end of session.)  Scooting: Minimal assistance  Bed Mobility Comments: HOB elevated ~45*; Pt utilized R bed rail; Pt required increased time/effort to perform; Pt able to maintain sitting EOB with supervision    Transfers  Sit to stand: Contact guard assistance (From EOB 2x; From chair 1x)  Stand to sit: Contact guard assistance (To EOB 1x; To chair 2x)  Transfer Comments: Utilized RW; Pt required increased time/effort to perform; Pt required VCs for initiation/hand placement    Vision  Vision: Impaired  Vision Exceptions: Wears glasses at all times  Hearing  Hearing: Within functional limits    Cognition  Overall Cognitive Status: Exceptions  Arousal/Alertness: Appropriate responses to stimuli  Following Commands: Follows multistep commands with increased time; Follows multistep commands with repitition  Attention Span: Appears intact  Memory: Appears intact  Safety Judgement: Decreased awareness of need for assistance;Decreased awareness of need for safety  Problem Solving: Assistance required to generate solutions  Insights: Decreased awareness of deficits  Initiation: Requires cues for some  Sequencing: Requires cues for some  Orientation  Overall Orientation Status: Within Functional Limits  Orientation Level: Oriented X4    Education Given To: Patient  Education Provided: Role of Therapy;Plan of Care;ADL Adaptive Strategies;Transfer Training;Energy Conservation; Fall Prevention Strategies; Equipment (Activity promotion, bed mobility techniques, safety with transfers, RW mngt, rest breaks)  Education Method: Verbal  Barriers to Learning: None  Education Outcome: Verbalized understanding    Hand Dominance  Hand Dominance: Right    AM-PAC - ADL  AM-PAC Daily Activity - Inpatient   How much help is needed for putting on and taking off regular lower body clothing?: A Lot  How much help is needed for bathing (which

## 2023-12-13 NOTE — ACP (ADVANCE CARE PLANNING)
Advance Care Planning     Advance Care Planning Activator (Inpatient)  Conversation Note      Date of ACP Conversation: 12/13/2023     Conversation Conducted with: Patient with Decision Making Capacity    ACP Activator: Daren Phelps RN    {When Decision Maker makes decisions on behalf of the incapacitated patient: Decision Maker is asked to consider and make decisions based on patient values, known preferences, or best interests. Health Care Decision Maker:     Current Designated Health Care Decision Maker:     Primary Decision Maker: Sera León - Brother/Sister - [phone]  Click here to complete Healthcare Decision Makers including section of the Healthcare Decision Maker Relationship (ie \"Primary\")      Care Preferences    Ventilation: \"If you were in your present state of health and suddenly became very ill and were unable to breathe on your own, what would your preference be about the use of a ventilator (breathing machine) if it were available to you? \"      Would the patient desire the use of ventilator (breathing machine)?: yes    \"If your health worsens and it becomes clear that your chance of recovery is unlikely, what would your preference be about the use of a ventilator (breathing machine) if it were available to you? \"     Would the patient desire the use of ventilator (breathing machine)?: No      Resuscitation  \"CPR works best to restart the heart when there is a sudden event, like a heart attack, in someone who is otherwise healthy. Unfortunately, CPR does not typically restart the heart for people who have serious health conditions or who are very sick. \"    \"In the event your heart stopped as a result of an underlying serious health condition, would you want attempts to be made to restart your heart (answer \"yes\" for attempt to resuscitate) or would you prefer a natural death (answer \"no\" for do not attempt to resuscitate)? \" yes       [] Yes   [x] No   Educated Patient / Margarito Mejia

## 2023-12-13 NOTE — TELEPHONE ENCOUNTER
Discussed pt being admitted with Dr. Ta Spencer. Received instruction that pt to be hold for radiation the rest of this week due to side effects of treatment. Updated radiation therapists as well.

## 2023-12-13 NOTE — PLAN OF CARE
Problem: Discharge Planning  Goal: Discharge to home or other facility with appropriate resources  12/13/2023 1111 by China Ortega RN  Outcome: Progressing  12/13/2023 0443 by Nancy Leblanc RN  Outcome: Progressing  Flowsheets (Taken 12/12/2023 2300)  Discharge to home or other facility with appropriate resources: Identify barriers to discharge with patient and caregiver     Problem: Safety - Adult  Goal: Free from fall injury  12/13/2023 1111 by China Ortega RN  Outcome: Progressing  12/13/2023 0443 by Nancy Leblanc RN  Outcome: Progressing   Fall assessment performed. Bed in low, locked position with call light and tray table within reach. Education given on call light use. Problem: Skin/Tissue Integrity  Goal: Absence of new skin breakdown  Description: 1. Monitor for areas of redness and/or skin breakdown  2. Assess vascular access sites hourly  3. Every 4-6 hours minimum:  Change oxygen saturation probe site  4. Every 4-6 hours:  If on nasal continuous positive airway pressure, respiratory therapy assess nares and determine need for appliance change or resting period.   12/13/2023 1111 by China Ortega RN  Outcome: Progressing  12/13/2023 0443 by Nancy Leblanc RN  Outcome: Progressing      Problem: Chronic Conditions and Co-morbidities  Goal: Patient's chronic conditions and co-morbidity symptoms are monitored and maintained or improved  Outcome: Progressing  Flowsheets (Taken 12/12/2023 2300 by Nancy Leblanc RN)  Care Plan - Patient's Chronic Conditions and Co-Morbidity Symptoms are Monitored and Maintained or Improved: Monitor and assess patient's chronic conditions and comorbid symptoms for stability, deterioration, or improvement     Problem: Metabolic/Fluid and Electrolytes - Adult  Goal: Electrolytes maintained within normal limits  Outcome: Progressing  Goal: Hemodynamic stability and optimal renal function maintained  Outcome: Progressing  Goal: Glucose maintained within prescribed

## 2023-12-13 NOTE — PLAN OF CARE
Problem: Discharge Planning  Goal: Discharge to home or other facility with appropriate resources  Outcome: Progressing  Flowsheets (Taken 12/12/2023 2300)  Discharge to home or other facility with appropriate resources: Identify barriers to discharge with patient and caregiver     Problem: Safety - Adult  Goal: Free from fall injury  Outcome: Progressing     Problem: Skin/Tissue Integrity  Goal: Absence of new skin breakdown  Description: 1. Monitor for areas of redness and/or skin breakdown  2. Assess vascular access sites hourly  3. Every 4-6 hours minimum:  Change oxygen saturation probe site  4. Every 4-6 hours:  If on nasal continuous positive airway pressure, respiratory therapy assess nares and determine need for appliance change or resting period.   Outcome: Progressing

## 2023-12-14 ENCOUNTER — APPOINTMENT (OUTPATIENT)
Dept: RADIATION ONCOLOGY | Age: 68
End: 2023-12-14
Payer: MEDICARE

## 2023-12-14 LAB
25(OH)D3 SERPL-MCNC: 20.8 NG/ML (ref 30–100)
ALBUMIN SERPL-MCNC: 2.8 G/DL (ref 3.5–5.2)
ANION GAP SERPL CALCULATED.3IONS-SCNC: 14 MMOL/L (ref 9–17)
ANION GAP SERPL CALCULATED.3IONS-SCNC: 9 MMOL/L (ref 9–17)
BACTERIA URNS QL MICRO: ABNORMAL
BILIRUB UR QL STRIP: NEGATIVE
BUN SERPL-MCNC: 21 MG/DL (ref 8–23)
BUN SERPL-MCNC: 23 MG/DL (ref 8–23)
CALCIUM SERPL-MCNC: 6.5 MG/DL (ref 8.6–10.4)
CALCIUM SERPL-MCNC: 7.2 MG/DL (ref 8.6–10.4)
CASTS #/AREA URNS LPF: ABNORMAL /LPF
CHARACTER UR: ABNORMAL
CHLORIDE SERPL-SCNC: 106 MMOL/L (ref 98–107)
CHLORIDE SERPL-SCNC: 107 MMOL/L (ref 98–107)
CLARITY UR: CLEAR
CO2 SERPL-SCNC: 18 MMOL/L (ref 20–31)
CO2 SERPL-SCNC: 22 MMOL/L (ref 20–31)
COLOR UR: YELLOW
CREAT SERPL-MCNC: 1 MG/DL (ref 0.5–0.9)
CREAT SERPL-MCNC: 1.1 MG/DL (ref 0.5–0.9)
EPI CELLS #/AREA URNS HPF: ABNORMAL /HPF (ref 0–5)
ERYTHROCYTE [DISTWIDTH] IN BLOOD BY AUTOMATED COUNT: 33.6 % (ref 12.5–15.4)
GFR SERPL CREATININE-BSD FRML MDRD: 55 ML/MIN/1.73M2
GFR SERPL CREATININE-BSD FRML MDRD: >60 ML/MIN/1.73M2
GLUCOSE BLD-MCNC: 133 MG/DL (ref 65–105)
GLUCOSE BLD-MCNC: 133 MG/DL (ref 65–105)
GLUCOSE SERPL-MCNC: 135 MG/DL (ref 70–99)
GLUCOSE SERPL-MCNC: 150 MG/DL (ref 70–99)
GLUCOSE UR STRIP-MCNC: ABNORMAL MG/DL
HCT VFR BLD AUTO: 27.3 % (ref 36–46)
HGB BLD-MCNC: 9.1 G/DL (ref 12–16)
HGB UR QL STRIP.AUTO: ABNORMAL
KETONES UR STRIP-MCNC: NEGATIVE MG/DL
LEUKOCYTE ESTERASE UR QL STRIP: ABNORMAL
MAGNESIUM SERPL-MCNC: 1.9 MG/DL (ref 1.6–2.6)
MCH RBC QN AUTO: 29.1 PG (ref 26–34)
MCHC RBC AUTO-ENTMCNC: 33.2 G/DL (ref 31–37)
MCV RBC AUTO: 87.7 FL (ref 80–100)
MUCOUS THREADS URNS QL MICRO: ABNORMAL
NITRITE UR QL STRIP: NEGATIVE
PH UR STRIP: 6 [PH] (ref 5–8)
PHOSPHATE SERPL-MCNC: 2.5 MG/DL (ref 2.6–4.5)
PLATELET # BLD AUTO: 305 K/UL (ref 140–450)
PMV BLD AUTO: 6.7 FL (ref 6–12)
POTASSIUM SERPL-SCNC: 3.7 MMOL/L (ref 3.7–5.3)
POTASSIUM SERPL-SCNC: 3.9 MMOL/L (ref 3.7–5.3)
PROT UR STRIP-MCNC: ABNORMAL MG/DL
PTH-INTACT SERPL-MCNC: 156 PG/ML (ref 15–65)
RBC # BLD AUTO: 3.12 M/UL (ref 4–5.2)
RBC #/AREA URNS HPF: ABNORMAL /HPF (ref 0–2)
SODIUM SERPL-SCNC: 138 MMOL/L (ref 135–144)
SODIUM SERPL-SCNC: 138 MMOL/L (ref 135–144)
SP GR UR STRIP: 1.02 (ref 1–1.03)
UROBILINOGEN UR STRIP-ACNC: NORMAL EU/DL (ref 0–1)
WBC #/AREA URNS HPF: ABNORMAL /HPF (ref 0–5)
WBC OTHER # BLD: 4.8 K/UL (ref 3.5–11)

## 2023-12-14 PROCEDURE — 87086 URINE CULTURE/COLONY COUNT: CPT

## 2023-12-14 PROCEDURE — 82040 ASSAY OF SERUM ALBUMIN: CPT

## 2023-12-14 PROCEDURE — 82306 VITAMIN D 25 HYDROXY: CPT

## 2023-12-14 PROCEDURE — 6370000000 HC RX 637 (ALT 250 FOR IP): Performed by: STUDENT IN AN ORGANIZED HEALTH CARE EDUCATION/TRAINING PROGRAM

## 2023-12-14 PROCEDURE — 84100 ASSAY OF PHOSPHORUS: CPT

## 2023-12-14 PROCEDURE — 51798 US URINE CAPACITY MEASURE: CPT

## 2023-12-14 PROCEDURE — 6360000002 HC RX W HCPCS: Performed by: NURSE PRACTITIONER

## 2023-12-14 PROCEDURE — 85027 COMPLETE CBC AUTOMATED: CPT

## 2023-12-14 PROCEDURE — 87449 NOS EACH ORGANISM AG IA: CPT

## 2023-12-14 PROCEDURE — 36415 COLL VENOUS BLD VENIPUNCTURE: CPT

## 2023-12-14 PROCEDURE — 1210000000 HC MED SURG R&B

## 2023-12-14 PROCEDURE — 87088 URINE BACTERIA CULTURE: CPT

## 2023-12-14 PROCEDURE — 2580000003 HC RX 258: Performed by: NURSE PRACTITIONER

## 2023-12-14 PROCEDURE — 87186 SC STD MICRODIL/AGAR DIL: CPT

## 2023-12-14 PROCEDURE — 80048 BASIC METABOLIC PNL TOTAL CA: CPT

## 2023-12-14 PROCEDURE — 83735 ASSAY OF MAGNESIUM: CPT

## 2023-12-14 PROCEDURE — 87324 CLOSTRIDIUM AG IA: CPT

## 2023-12-14 PROCEDURE — 87077 CULTURE AEROBIC IDENTIFY: CPT

## 2023-12-14 PROCEDURE — 6360000002 HC RX W HCPCS: Performed by: STUDENT IN AN ORGANIZED HEALTH CARE EDUCATION/TRAINING PROGRAM

## 2023-12-14 PROCEDURE — 83970 ASSAY OF PARATHORMONE: CPT

## 2023-12-14 PROCEDURE — 81001 URINALYSIS AUTO W/SCOPE: CPT

## 2023-12-14 PROCEDURE — 82947 ASSAY GLUCOSE BLOOD QUANT: CPT

## 2023-12-14 RX ORDER — CALCIUM CARBONATE-CHOLECALCIFEROL TAB 250 MG-125 UNIT 250-125 MG-UNIT
1 TAB ORAL DAILY
Status: DISCONTINUED | OUTPATIENT
Start: 2023-12-14 | End: 2023-12-14 | Stop reason: CLARIF

## 2023-12-14 RX ORDER — MAGNESIUM SULFATE IN WATER 40 MG/ML
2000 INJECTION, SOLUTION INTRAVENOUS ONCE
Status: COMPLETED | OUTPATIENT
Start: 2023-12-14 | End: 2023-12-14

## 2023-12-14 RX ORDER — CALCIUM CARBONATE/VITAMIN D3 600 MG-10
1 TABLET ORAL DAILY
Status: DISCONTINUED | OUTPATIENT
Start: 2023-12-14 | End: 2023-12-15 | Stop reason: HOSPADM

## 2023-12-14 RX ORDER — CALCIUM GLUCONATE 20 MG/ML
2000 INJECTION, SOLUTION INTRAVENOUS ONCE
Status: COMPLETED | OUTPATIENT
Start: 2023-12-14 | End: 2023-12-14

## 2023-12-14 RX ADMIN — SODIUM CHLORIDE: 9 INJECTION, SOLUTION INTRAVENOUS at 09:25

## 2023-12-14 RX ADMIN — SILVER SULFADIAZINE: 10 CREAM TOPICAL at 21:42

## 2023-12-14 RX ADMIN — LEVOTHYROXINE SODIUM 50 MCG: 50 TABLET ORAL at 06:20

## 2023-12-14 RX ADMIN — SILVER SULFADIAZINE: 10 CREAM TOPICAL at 09:09

## 2023-12-14 RX ADMIN — CALCIUM CARBONATE 600 MG (1,500 MG)-VITAMIN D3 400 UNIT TABLET 1 TABLET: at 14:52

## 2023-12-14 RX ADMIN — SODIUM CHLORIDE, PRESERVATIVE FREE 10 ML: 5 INJECTION INTRAVENOUS at 09:00

## 2023-12-14 RX ADMIN — ATORVASTATIN CALCIUM 80 MG: 40 TABLET, FILM COATED ORAL at 21:41

## 2023-12-14 RX ADMIN — ENOXAPARIN SODIUM 40 MG: 100 INJECTION SUBCUTANEOUS at 08:57

## 2023-12-14 RX ADMIN — CALCIUM GLUCONATE 2000 MG: 20 INJECTION, SOLUTION INTRAVENOUS at 09:41

## 2023-12-14 RX ADMIN — POTASSIUM & SODIUM PHOSPHATES POWDER PACK 280-160-250 MG 500 MG: 280-160-250 PACK at 08:58

## 2023-12-14 RX ADMIN — SODIUM CHLORIDE, PRESERVATIVE FREE 10 ML: 5 INJECTION INTRAVENOUS at 21:41

## 2023-12-14 RX ADMIN — PANTOPRAZOLE SODIUM 40 MG: 40 TABLET, DELAYED RELEASE ORAL at 21:41

## 2023-12-14 RX ADMIN — PANTOPRAZOLE SODIUM 40 MG: 40 TABLET, DELAYED RELEASE ORAL at 08:57

## 2023-12-14 RX ADMIN — MAGNESIUM SULFATE HEPTAHYDRATE 2000 MG: 40 INJECTION, SOLUTION INTRAVENOUS at 09:30

## 2023-12-14 RX ADMIN — BUMETANIDE 1 MG: 1 TABLET ORAL at 09:46

## 2023-12-14 NOTE — PLAN OF CARE
Problem: Discharge Planning  Goal: Discharge to home or other facility with appropriate resources  Outcome: Progressing  Flowsheets  Taken 12/14/2023 1624  Discharge to home or other facility with appropriate resources:   Identify barriers to discharge with patient and caregiver   Arrange for needed discharge resources and transportation as appropriate   Identify discharge learning needs (meds, wound care, etc)  Taken 12/14/2023 1200  Discharge to home or other facility with appropriate resources:   Identify barriers to discharge with patient and caregiver   Arrange for needed discharge resources and transportation as appropriate   Identify discharge learning needs (meds, wound care, etc)  Taken 12/14/2023 0830  Discharge to home or other facility with appropriate resources:   Identify barriers to discharge with patient and caregiver   Arrange for needed discharge resources and transportation as appropriate   Identify discharge learning needs (meds, wound care, etc)     Problem: Safety - Adult  Goal: Free from fall injury  Outcome: Progressing     Problem: Skin/Tissue Integrity  Goal: Absence of new skin breakdown  Description: 1. Monitor for areas of redness and/or skin breakdown  2. Assess vascular access sites hourly  3. Every 4-6 hours minimum:  Change oxygen saturation probe site  4. Every 4-6 hours:  If on nasal continuous positive airway pressure, respiratory therapy assess nares and determine need for appliance change or resting period.   Outcome: Progressing     Problem: Chronic Conditions and Co-morbidities  Goal: Patient's chronic conditions and co-morbidity symptoms are monitored and maintained or improved  Outcome: Progressing  Flowsheets  Taken 12/14/2023 1624  Care Plan - Patient's Chronic Conditions and Co-Morbidity Symptoms are Monitored and Maintained or Improved: Monitor and assess patient's chronic conditions and comorbid symptoms for stability, deterioration, or improvement  Taken 12/14/2023 1200  Care Plan - Patient's Chronic Conditions and Co-Morbidity Symptoms are Monitored and Maintained or Improved: Monitor and assess patient's chronic conditions and comorbid symptoms for stability, deterioration, or improvement  Taken 12/14/2023 0830  Care Plan - Patient's Chronic Conditions and Co-Morbidity Symptoms are Monitored and Maintained or Improved: Monitor and assess patient's chronic conditions and comorbid symptoms for stability, deterioration, or improvement     Problem: Metabolic/Fluid and Electrolytes - Adult  Goal: Electrolytes maintained within normal limits  Outcome: Progressing  Flowsheets  Taken 12/14/2023 1624  Electrolytes maintained within normal limits: Monitor labs and assess patient for signs and symptoms of electrolyte imbalances  Taken 12/14/2023 1200  Electrolytes maintained within normal limits: Monitor labs and assess patient for signs and symptoms of electrolyte imbalances  Taken 12/14/2023 0830  Electrolytes maintained within normal limits:   Monitor labs and assess patient for signs and symptoms of electrolyte imbalances   Administer electrolyte replacement as ordered   Monitor response to electrolyte replacements, including repeat lab results as appropriate     Problem: Metabolic/Fluid and Electrolytes - Adult  Goal: Hemodynamic stability and optimal renal function maintained  Outcome: Progressing  Flowsheets  Taken 12/14/2023 1624  Hemodynamic stability and optimal renal function maintained:   Monitor labs and assess for signs and symptoms of volume excess or deficit   Monitor intake, output and patient weight  Taken 12/14/2023 1200  Hemodynamic stability and optimal renal function maintained:   Monitor labs and assess for signs and symptoms of volume excess or deficit   Monitor intake, output and patient weight  Taken 12/14/2023 0830  Hemodynamic stability and optimal renal function maintained:   Monitor labs and assess for signs and symptoms of volume excess or

## 2023-12-14 NOTE — PROGRESS NOTES
[facility]  Office: [phone]  Laura Wagner, DO, Idris Dys, DO, Noemi Rahman, DO, Jo Santiago Blood, DO, Brit Ambrocio MD, Vera Maria MD, Agustín Yañez MD, Linda Malik MD,  Lexi Giles MD, Dominick Conway MD, Wilfrido Lisa MD,  Antoine Moore MD, Ashlee Escobar MD, Francheska Peralta, DO, Barry Roberson MD,  Qasim Silverio, DO, Jeanne Thomas MD, Sonu Del Rosario MD, Nohelia Gomez MD, Viet Salazar MD,  Mary Costello MD, Jaclyn Becerra MD, Benton Hays MD, Divina Franco MD, Hodan Smalls MD, Jefe Cuevas MD, Anant Oakley, DO, Lindsay Barrett, DO, All Last MD,  Lino Gilbert MD, Franco Walsh, CNP,  Orestes Correa, CNP, Jadiel Mina, CNP,  Yokasta Martinez, [facility], Jesenia Alejandre, CNP, Carmen Cantrell, CNP, Martin Jarrett, CNP, Bonnie العراقي, CNP, Alex Ortega, CNP, Tyler Poole PA-C, Mojgan Snyder PA-C, Skylar Pierre, CNP, Romie Lafleur, CNS, Teresa Damon, CNP, Juliette Gonzalez, CNP, Vicki Cherry [facility]    Progress Note    12/14/2023    1:38 PM    Name:   David Rodríguez  MRN:     [id number]     Acct:      [de-identified]   Room:   [address] [place] [address] Day:  2  Admit Date:  12/12/2023  2:37 PM    PCP:   JOSEPH Hook CNP  Code Status:  Full Code    Subjective:     C/C:   Chief Complaint   Patient presents with    Diarrhea     Diarrhea, weakness, dizziness. Onset about 3 days ago. Pt has rectal cancer and was receiving radiation therapy and staff suggested she get checked-out. Interval History Status: improved.      Seen and examined today, continues to report diarrhea, had 2 episodes of nonbloody stool  today, denies any other complaint, C. difficile still pending, calcium remain low but improved compared to yesterday we will continue with calcium supplement, vitamin D is low we will give overall calcium and vitamin D and we will replace electrolyte abnormalities, if

## 2023-12-15 ENCOUNTER — APPOINTMENT (OUTPATIENT)
Dept: RADIATION ONCOLOGY | Age: 68
End: 2023-12-15
Payer: MEDICARE

## 2023-12-15 VITALS
RESPIRATION RATE: 16 BRPM | SYSTOLIC BLOOD PRESSURE: 105 MMHG | HEART RATE: 88 BPM | TEMPERATURE: 97.5 F | OXYGEN SATURATION: 98 % | BODY MASS INDEX: 31.77 KG/M2 | WEIGHT: 186.07 LBS | HEIGHT: 64 IN | DIASTOLIC BLOOD PRESSURE: 58 MMHG

## 2023-12-15 LAB
ANION GAP SERPL CALCULATED.3IONS-SCNC: 13 MMOL/L (ref 9–17)
BUN SERPL-MCNC: 17 MG/DL (ref 8–23)
C DIFF GDH + TOXINS A+B STL QL IA.RAPID: NEGATIVE
CALCIUM SERPL-MCNC: 7 MG/DL (ref 8.6–10.4)
CHLORIDE SERPL-SCNC: 105 MMOL/L (ref 98–107)
CO2 SERPL-SCNC: 17 MMOL/L (ref 20–31)
CREAT SERPL-MCNC: 1.1 MG/DL (ref 0.5–0.9)
GFR SERPL CREATININE-BSD FRML MDRD: 55 ML/MIN/1.73M2
GLUCOSE SERPL-MCNC: 141 MG/DL (ref 70–99)
MAGNESIUM SERPL-MCNC: 1.8 MG/DL (ref 1.6–2.6)
POTASSIUM SERPL-SCNC: 3.3 MMOL/L (ref 3.7–5.3)
SODIUM SERPL-SCNC: 135 MMOL/L (ref 135–144)
SPECIMEN DESCRIPTION: NORMAL

## 2023-12-15 PROCEDURE — 6360000002 HC RX W HCPCS: Performed by: STUDENT IN AN ORGANIZED HEALTH CARE EDUCATION/TRAINING PROGRAM

## 2023-12-15 PROCEDURE — 6370000000 HC RX 637 (ALT 250 FOR IP): Performed by: STUDENT IN AN ORGANIZED HEALTH CARE EDUCATION/TRAINING PROGRAM

## 2023-12-15 PROCEDURE — 97116 GAIT TRAINING THERAPY: CPT

## 2023-12-15 PROCEDURE — 80048 BASIC METABOLIC PNL TOTAL CA: CPT

## 2023-12-15 PROCEDURE — 36415 COLL VENOUS BLD VENIPUNCTURE: CPT

## 2023-12-15 PROCEDURE — 83735 ASSAY OF MAGNESIUM: CPT

## 2023-12-15 PROCEDURE — 97535 SELF CARE MNGMENT TRAINING: CPT

## 2023-12-15 PROCEDURE — 6370000000 HC RX 637 (ALT 250 FOR IP): Performed by: NURSE PRACTITIONER

## 2023-12-15 PROCEDURE — 6360000002 HC RX W HCPCS: Performed by: NURSE PRACTITIONER

## 2023-12-15 RX ORDER — POTASSIUM CHLORIDE 20 MEQ/1
40 TABLET, EXTENDED RELEASE ORAL ONCE
Status: DISCONTINUED | OUTPATIENT
Start: 2023-12-15 | End: 2023-12-15

## 2023-12-15 RX ORDER — POTASSIUM CHLORIDE 20 MEQ/1
40 TABLET, EXTENDED RELEASE ORAL ONCE
Status: COMPLETED | OUTPATIENT
Start: 2023-12-15 | End: 2023-12-15

## 2023-12-15 RX ORDER — LANOLIN ALCOHOL/MO/W.PET/CERES
400 CREAM (GRAM) TOPICAL ONCE
Status: COMPLETED | OUTPATIENT
Start: 2023-12-15 | End: 2023-12-15

## 2023-12-15 RX ORDER — CALCIUM GLUCONATE 20 MG/ML
2000 INJECTION, SOLUTION INTRAVENOUS ONCE
Status: COMPLETED | OUTPATIENT
Start: 2023-12-15 | End: 2023-12-15

## 2023-12-15 RX ADMIN — Medication 400 MG: at 13:01

## 2023-12-15 RX ADMIN — CALCIUM CARBONATE 600 MG (1,500 MG)-VITAMIN D3 400 UNIT TABLET 1 TABLET: at 10:17

## 2023-12-15 RX ADMIN — PANTOPRAZOLE SODIUM 40 MG: 40 TABLET, DELAYED RELEASE ORAL at 10:17

## 2023-12-15 RX ADMIN — BUMETANIDE 1 MG: 1 TABLET ORAL at 10:17

## 2023-12-15 RX ADMIN — POTASSIUM CHLORIDE 40 MEQ: 1500 TABLET, EXTENDED RELEASE ORAL at 11:11

## 2023-12-15 RX ADMIN — CALCIUM GLUCONATE 2000 MG: 20 INJECTION, SOLUTION INTRAVENOUS at 11:41

## 2023-12-15 RX ADMIN — LEVOTHYROXINE SODIUM 50 MCG: 50 TABLET ORAL at 06:53

## 2023-12-15 RX ADMIN — ENOXAPARIN SODIUM 40 MG: 100 INJECTION SUBCUTANEOUS at 10:17

## 2023-12-15 RX ADMIN — POTASSIUM CHLORIDE 40 MEQ: 1500 TABLET, EXTENDED RELEASE ORAL at 10:17

## 2023-12-15 NOTE — DISCHARGE SUMMARY
3-5 days of discharge     Diet: regular diet    Activity: As tolerated    Instructions to Patient: Please continue to take your medication as prescribed, your blood pressure is soft and in the lower side so we will hold your blood pressure medication metoprolol and will hold Bumex, please continue to check your blood pressure at home regularly and continue to hold metoprolol and Bumex if your systolic blood pressure less than 110, please follow-up with your primary doctor within 3 to 5 days of discharge to follow-up on your blood pressure and to resume your medication if needed    During hospital course, your vitamin D is low , You were  started on  calcium and vitamin D supplement, please follow-up with your primary doctor within 3 to 5 days of discharge to repeat your calcium potassium Phos and magnesium level to replace it  if needed    Discharge Medications:      Medication List        START taking these medications      CALCIUM + VIT D (BARIATRIC ADVANTAGE) CHEWABLE TABLET  Take 1 tablet by mouth daily            CONTINUE taking these medications      atorvastatin 80 MG tablet  Commonly known as: LIPITOR  Take 1 tablet by mouth nightly     capecitabine 500 MG chemo tablet  Commonly known as: XELODA     dapagliflozin 10 MG tablet  Commonly known as: Farxiga  Take 1 tablet by mouth every morning     Handicap Placard Misc  by Does not apply route For 1 year     levothyroxine 50 MCG tablet  Commonly known as: SYNTHROID  TAKE 1 TABLET BY MOUTH EVERY DAY     loratadine 10 MG tablet  Commonly known as: CLARITIN  TAKE 1 TABLET BY MOUTH EVERY DAY     losartan 50 MG tablet  Commonly known as: COZAAR     metFORMIN 500 MG extended release tablet  Commonly known as: GLUCOPHAGE-XR  TAKE 1 TABLET BY MOUTH IN THE MORNING with breakfast     nystatin 974285 UNIT/GM powder  Commonly known as: MYCOSTATIN  Apply 3 times daily.      pantoprazole 40 MG tablet  Commonly known as: PROTONIX  TAKE 1 TABLET BY MOUTH 2 TIMES A DAY STOP taking these medications      betamethasone valerate 0.1 % cream  Commonly known as: VALISONE     bumetanide 1 MG tablet  Commonly known as: BUMEX     diphenoxylate-atropine 2.5-0.025 MG per tablet  Commonly known as: Lomotil     docusate sodium 100 MG capsule  Commonly known as: COLACE     Metamucil 28.3 % Powd powder  Generic drug: psyllium     metoprolol succinate 100 MG extended release tablet  Commonly known as: TOPROL XL     potassium bicarbonate 25 MEQ disintegrating tablet  Commonly known as: EFFER-K/K-LYTE     senna 8.6 MG tablet  Commonly known as: Senokot     Trulance 3 MG Tabs  Generic drug: Plecanatide               Where to Get Your Medications        You can get these medications from any pharmacy    Bring a paper prescription for each of these medications  CALCIUM + VIT D (BARIATRIC ADVANTAGE) CHEWABLE TABLET         No discharge procedures on file. Time Spent on discharge is  31 mins in patient examination, evaluation, counseling as well as medication reconciliation, prescriptions for required medications, discharge plan and follow up. Electronically signed by   Viki Cornell MD  12/15/2023  12:06 PM      Thank you Dr. Krys Gibson, APRN - CNP for the opportunity to be involved in this patient's care.

## 2023-12-15 NOTE — PROGRESS NOTES
Discharge instructions reviewed with patient, all questions answered. Personal belongings collected and patient transported via wheelchair to vehicle. Transferred without incident.

## 2023-12-15 NOTE — PROGRESS NOTES
Wade Jessica was evaluated today and a DME order was entered for a wheeled walker because she requires this to successfully complete daily living tasks of ambulating. A wheeled walker is necessary due to the patient's unsteady gait, upper body weakness, and inability to  an ambulation device; and she can ambulate only by pushing a walker instead of a lesser assistive device such as a cane, crutch, or standard walker. The need for this equipment was discussed with the patient and she understands and is in agreement.

## 2023-12-15 NOTE — CARE COORDINATION
[facility] willing to accept patient for home care. Patient's nurse notified.
Call back from [facility] with [facility] and cannot accept pt. Spoke to [place] with [facility] and will verify insurance. Face sheet faxed.
IMM letter provided to patient. Patient offered four hours to make informed decision regarding appeal process; patient agreeable to discharge.
[facility] Spoke to Jordyn Ruvalcaba with [facility] and will fax script and face to face. Informed pt to call when home for delivery of walker. Referral called to [facility] with [facility] and checking on staffing. TIAN initiated.
none  Potential Assistance needed at discharge: N/A            Potential DME:  none  Patient expects to discharge to: [address] for transportation at discharge: Self    Financial    Payor: [id number] / Plan: Nia Truong / Product Type: *No Product type* /     Does insurance require precert for SNF: Yes    Potential assistance Purchasing Medications: No  Meds-to-Beds request:        [facility] [id number] Tasha Diaz, [address] [place] [address]  Phone: [phone] Fax: [phone]      Notes:    Factors facilitating achievement of predicted outcomes: Family support, Cooperative, and Pleasant    Barriers to discharge: Decreased endurance    Additional Case Management Notes: Met with patient at bedside. Lives alone in apt, independent and drives. Drove self to ED per oncology office due to diarrhea and abn labs. Admitted for hypocalcemia and hypokalemia and replacements being given. Diagnosed with rectal cancer in August and getting radiation. Last treatment was 12-8. Has missed 5 treatments due to illness. Follows with Dr. Gio Barlow. Pt declines home care needs. Continue to follow. The Plan for Transition of Care is related to the following treatment goals of Hypocalcemia [E83.51]  Hypokalemia [E87.6]  Rectal cancer (HCC) [C20]  Diarrhea, unspecified type [K64.4]    IF APPLICABLE: The Patient and/or patient representative Alicia Bird and her family were provided with a choice of provider and agrees with the discharge plan.  Freedom of choice list with basic dialogue that supports the patient's individualized plan of care/goals and shares the quality data associated with the providers was provided to:  (N/A)   Patient Representative Name:       The Patient and/or Patient Representative Agree with the Discharge Plan?  yes    Trina Galicia RN  Case Management Department  Ph:  Fax:

## 2023-12-15 NOTE — PROGRESS NOTES
Occupational Therapy  Facility/Department: [address]  Occupational Therapy Daily Treatment Note     Name: Christal Bliss  : 1955  MRN: [id number]  Date of Service: 12/15/2023    Discharge Recommendations:  Patient would benefit from continued therapy after discharge          Patient Diagnosis(es): The primary encounter diagnosis was Hypokalemia. Diagnoses of Hypocalcemia, Diarrhea, unspecified type, and Rectal cancer ([address]) were also pertinent to this visit. Past Medical History:  has a past medical history of COVID-19 virus infection, Hyperlipidemia, and Hypertension. Past Surgical History:  has a past surgical history that includes bladder suspension; bladder suspension (); Colonoscopy (2023); Cholecystectomy, laparoscopic (2023); Colonoscopy (N/A, 2023); and Cholecystectomy, laparoscopic (N/A, 2023). Assessment   Performance deficits / Impairments: Decreased functional mobility ; Decreased coordination;Decreased ADL status; Decreased balance;Decreased strength;Decreased safe awareness;Decreased high-level IADLs  Assessment: Pt presents with decreased ADL status secondary to above noted deficits requiring assistance with functional tasks. Pt is most significantly limited by decreased strength/balance. Pt would benefit from continued skilled therapy while hospitalized to maximize pt's safety and independence with functional tasks. Pt would require 24hr assistance and continued skilled therapy after discharge.   Prognosis: Good  Activity Tolerance  Activity Tolerance: Patient Tolerated treatment well        Plan   Occupational Therapy Plan  Times Per Week: 5-6x/wk  Current Treatment Recommendations: Strengthening, Balance training, Safety education & training, Functional mobility training, Patient/Caregiver education & training, Equipment evaluation, education, & procurement, Positioning, Self-Care / ADL, Coordination training

## 2023-12-15 NOTE — DISCHARGE INSTRUCTIONS
Please continue to take your medication as prescribed, your blood pressure is soft and in the lower side so we will hold your blood pressure medication metoprolol and will hold Bumex, please continue to check your blood pressure at home regularly and continue to hold metoprolol and Bumex if your systolic blood pressure less than 110, please follow-up with your primary doctor within 3 to 5 days of discharge to follow-up on your blood pressure and to resume your medication if needed    During hospital course, your vitamin D is low , You were  started on  calcium and vitamin D supplement, please follow-up with your primary doctor within 3 to 5 days of discharge to repeat your calcium potassium Phos and magnesium level to replace it  if needed

## 2023-12-15 NOTE — PROGRESS NOTES
Physical Therapy  Facility/Department: [address]  Physical Therapy Daily Documentation Note    Name: Olimpia Horton  : 1955  MRN: [id number]  Date of Service: 12/15/2023    Discharge Recommendations:  Continue to assess pending progress, Patient would benefit from continued therapy after discharge   PT Equipment Recommendations  Equipment Needed: Yes  Mobility Devices: [place] Slight: Rolling  Other: medical alert      Patient Diagnosis(es): The primary encounter diagnosis was Hypokalemia. Diagnoses of Hypocalcemia, Diarrhea, unspecified type, and Rectal cancer ([address]) were also pertinent to this visit. Past Medical History:  has a past medical history of COVID-19 virus infection, Hyperlipidemia, and Hypertension. Past Surgical History:  has a past surgical history that includes bladder suspension; bladder suspension (); Colonoscopy (2023); Cholecystectomy, laparoscopic (2023); Colonoscopy (N/A, 2023); and Cholecystectomy, laparoscopic (N/A, 2023). Assessment   Body Structures, Functions, Activity Limitations Requiring Skilled Therapeutic Intervention: Decreased functional mobility ; Decreased endurance;Decreased strength;Decreased balance  Assessment: Pt presents with generalized weakness and decreased activity tolerance due to hypokalemia. Pt currently undergoing treatment for cancer. Pt required Mod assist for bed mobility but sleeps in recliner at home. Modified indep transfers with RW from recliner, and ambulated with RW 70' and 40ft with SBA & limited by fatigue. She was able to go up/down platform step twice with RW but best with SBA. Pt would be safe to return to previous living arrangement if able to be independent in her ADLs and simple IADLs with RW. Pt seems adamant about going home. Recommend RW at all times currently and continued therapy. She needs a RW & medical alert device. Pt will benefit from continued acute care therapy.   Treatment Diagnosis: decreased moboility  Therapy Prognosis: Good  Decision Making: Medium Complexity  Requires PT Follow-Up: Yes  Activity Tolerance  Activity Tolerance: Patient limited by fatigue;Patient limited by endurance     Plan   Physical Therapy Plan  General Plan:  (5-6x/week)  Current Treatment Recommendations: Strengthening, Balance training, Functional mobility training, Transfer training, Gait training, Safety education & training, Patient/Caregiver education & training, Therapeutic activities  Safety Devices  Type of Devices: Call light within reach, Chair alarm in place, Gait belt, Left in chair, Nurse notified  Restraints  Restraints Initially in Place: No     Restrictions  Restrictions/Precautions  Restrictions/Precautions: General Precautions  Required Braces or Orthoses?: No  Position Activity Restriction  Other position/activity restrictions: Up with assistance; chair alarm     Subjective   General  Patient assessed for rehabilitation services?: Yes  Response To Previous Treatment: Patient with no complaints from previous session. Family / Caregiver Present: No  Subjective  Subjective: Pt initially in bathroom upon arrival and report need 5-10min. PT return later and pt up in recliner. Pt report pain buttocks wound with scooting back on recliner. RN aware. Recommend pt to take gray seat cushion home at discharge. Pt reports drove to hospital and plans on returning home. Sister lives in [place] but is retired and could take her home. Talks to sister every other day normally. One step onto porch and 1 step into door should be able to fit RW for apt. entry.          Social/Functional History  Social/Functional History  Lives With: Alone  Type of Home: Apartment  Home Layout: One level  Home Access: Stairs to enter without rails  Entrance Stairs - Number of Steps: 1+1  Bathroom Shower/Tub: Tub/Shower unit  Bathroom Toilet: Standard  Bathroom Equipment: Grab bars in shower  Home Equipment:  (Denies DME)  Has the patient had two

## 2023-12-15 NOTE — PLAN OF CARE
Problem: Discharge Planning  Goal: Discharge to home or other facility with appropriate resources  12/15/2023 0413 by Yael Koch RN  Outcome: Progressing  Flowsheets (Taken 12/14/2023 2015)  Discharge to home or other facility with appropriate resources: Identify barriers to discharge with patient and caregiver     Problem: Safety - Adult  Goal: Free from fall injury  12/15/2023 0413 by Yael Koch RN  Outcome: Progressing  Free from falls throughout shift. Pt is compliant with safety measures in place. Pt seen by care team every hour with purposefully hourly rounding, bed is in the lowest position, call light and personal belongings within reach. Two side rails are up and bed alarm is on. Pt calls out appropriately. Problem: Skin/Tissue Integrity  Goal: Absence of new skin breakdown  Description: 1. Monitor for areas of redness and/or skin breakdown  2. Assess vascular access sites hourly  3. Every 4-6 hours minimum:  Change oxygen saturation probe site  4. Every 4-6 hours:  If on nasal continuous positive airway pressure, respiratory therapy assess nares and determine need for appliance change or resting period.   12/15/2023 0413 by Yael Koch RN  Outcome: Progressing     Problem: Chronic Conditions and Co-morbidities  Goal: Patient's chronic conditions and co-morbidity symptoms are monitored and maintained or improved  12/15/2023 0413 by Yael Koch RN  Outcome: Progressing  Flowsheets (Taken 12/14/2023 2015)  Care Plan - Patient's Chronic Conditions and Co-Morbidity Symptoms are Monitored and Maintained or Improved: Monitor and assess patient's chronic conditions and comorbid symptoms for stability, deterioration, or improvement

## 2023-12-17 LAB
MICROORGANISM SPEC CULT: ABNORMAL
MICROORGANISM SPEC CULT: ABNORMAL
SPECIMEN DESCRIPTION: ABNORMAL

## 2023-12-18 ENCOUNTER — APPOINTMENT (OUTPATIENT)
Dept: RADIATION ONCOLOGY | Age: 68
End: 2023-12-18
Payer: MEDICARE

## 2023-12-22 ENCOUNTER — APPOINTMENT (OUTPATIENT)
Dept: RADIATION ONCOLOGY | Age: 68
End: 2023-12-22
Payer: MEDICARE

## 2023-12-26 ENCOUNTER — HOSPITAL ENCOUNTER (OUTPATIENT)
Dept: RADIATION ONCOLOGY | Age: 68
Discharge: HOME | End: 2023-12-26
Payer: MEDICARE

## 2023-12-26 PROCEDURE — 77412 RADIATION TX DELIVERY LVL 3: CPT | Performed by: RADIOLOGY

## 2023-12-27 ENCOUNTER — HOSPITAL ENCOUNTER (OUTPATIENT)
Dept: RADIATION ONCOLOGY | Age: 68
Discharge: HOME | End: 2023-12-27
Payer: MEDICARE

## 2023-12-27 ENCOUNTER — HOSPITAL ENCOUNTER (OUTPATIENT)
Dept: RADIATION ONCOLOGY | Age: 68
Discharge: HOME | End: 2023-12-27

## 2023-12-27 ENCOUNTER — TELEPHONE (OUTPATIENT)
Dept: ONCOLOGY | Age: 68
End: 2023-12-27

## 2023-12-27 ENCOUNTER — CLINICAL DOCUMENTATION (OUTPATIENT)
Dept: ONCOLOGY | Age: 68
End: 2023-12-27

## 2023-12-27 VITALS
OXYGEN SATURATION: 100 % | RESPIRATION RATE: 16 BRPM | TEMPERATURE: 98 F | WEIGHT: 199.4 LBS | DIASTOLIC BLOOD PRESSURE: 78 MMHG | SYSTOLIC BLOOD PRESSURE: 127 MMHG | HEART RATE: 86 BPM | BODY MASS INDEX: 34.46 KG/M2

## 2023-12-27 PROCEDURE — 77280 THER RAD SIMULAJ FIELD SMPL: CPT | Performed by: RADIOLOGY

## 2023-12-27 PROCEDURE — 77412 RADIATION TX DELIVERY LVL 3: CPT | Performed by: RADIOLOGY

## 2023-12-27 NOTE — PROGRESS NOTES
[facility] NUTRITION THERAPY     Visit Type: Follow-up     NUTRITION RECOMMENDATIONS / MONITORING / EVALUATION  Continue Regular diet + ONS once daily as tolerated. 2. Will continue to monitor PO tolerance, adequacy of intake, weight, and care plans. Subjective/Current Data:  Chart reviewed and met with patient for nutrition follow-up. Pt reports she is tolerating diet well; no c/o N/V/D. States appetite remains fair. Still eating 2 meals/day (L, D), but has added High Protein [place] Breakfast Essentials (with whole or 2% milk) or Slimfast shakes once daily at breakfast time. States weight is up from last week. Noted 189 lb --> 199 lbs in one week; ?accuracy. Will monitor trend.      Objective Data:  Patient Active Problem List    Diagnosis Date Noted    Gastrointestinal hemorrhage associated with peptic ulcer 12/23/2021    Hypokalemia 12/12/2023    Rectal adenocarcinoma ([address]) 08/29/2023    Acute cholecystitis 08/26/2023    Chronic cholecystitis with calculus 08/25/2023    Rectal bleeding 08/24/2023    Single subsegmental pulmonary embolism without acute cor pulmonale ([address]) 01/17/2022    Lymphedema of both lower extremities 01/17/2022    Bilateral pulmonary embolism ([address]) 12/22/2021    Vitamin D deficiency 12/29/2020    Prediabetes 12/29/2020    Hypothyroidism due to Hashimoto's thyroiditis 12/29/2020    Chronic venous insufficiency 12/29/2020    S/P drug eluting coronary stent placement 02/19/2020    STEMI (ST elevation myocardial infarction) ([address]) 05/11/2019    Essential (primary) hypertension 05/11/2019    Obesity, morbid, BMI 40.0-49.9 ([address]) 05/11/2019    Gastrointestinal hemorrhage with melena 05/11/2019    NSVT (nonsustained ventricular tachycardia) ([address]) 05/11/2019     Labs:  Reviewed   Medications: Reviewed     Anthropometric Measures:  Height: 5' 3\"  Weight: 199 lb 6.4 oz (90.4 kg) --?accuracy of weight  Ideal Body Weight: 115 lb (52.2 kg)  BMI: 34.46 kg/m2     Wt Readings from

## 2023-12-27 NOTE — TELEPHONE ENCOUNTER
Name: Chani Gordon  : 1955  MRN: [id number]    Oncology Navigation Follow-Up Note    Contact Type:  Telephone    Notes: Writer called patient to f/u with her regarding next appt with Dr. Maggie Pino as writer notes no appts have been made. No answer, VM left requesting a return call. Writer called Dr. Fiona Mena office to inform them that pt was informed of referral to [facility] for second surgical opinion but pt states she would have a hard time travelling [place] Dr. Fiona Mena office staff to relay above and to obtain appt for first of year to discuss further. Will continue to follow.       Electronically signed by Wade Givens RN on 2023 at 3:41 PM

## 2023-12-28 ENCOUNTER — HOSPITAL ENCOUNTER (OUTPATIENT)
Dept: RADIATION ONCOLOGY | Age: 68
Discharge: HOME | End: 2023-12-28
Payer: MEDICARE

## 2023-12-28 PROCEDURE — 77412 RADIATION TX DELIVERY LVL 3: CPT | Performed by: RADIOLOGY

## 2023-12-29 ENCOUNTER — HOSPITAL ENCOUNTER (OUTPATIENT)
Dept: RADIATION ONCOLOGY | Age: 68
Discharge: HOME | End: 2023-12-29
Payer: MEDICARE

## 2023-12-29 PROCEDURE — 77412 RADIATION TX DELIVERY LVL 3: CPT | Performed by: RADIOLOGY

## 2024-01-02 ENCOUNTER — OFFICE VISIT (OUTPATIENT)
Dept: FAMILY MEDICINE CLINIC | Age: 69
End: 2024-01-02
Payer: MEDICARE

## 2024-01-02 ENCOUNTER — CLINICAL DOCUMENTATION (OUTPATIENT)
Dept: RADIATION ONCOLOGY | Age: 69
End: 2024-01-02

## 2024-01-02 ENCOUNTER — TELEPHONE (OUTPATIENT)
Dept: ONCOLOGY | Age: 69
End: 2024-01-02

## 2024-01-02 VITALS
BODY MASS INDEX: 33.7 KG/M2 | DIASTOLIC BLOOD PRESSURE: 78 MMHG | TEMPERATURE: 97.3 F | SYSTOLIC BLOOD PRESSURE: 130 MMHG | OXYGEN SATURATION: 99 % | WEIGHT: 195 LBS | HEART RATE: 90 BPM

## 2024-01-02 DIAGNOSIS — I83.229 INFECTED STASIS ULCER OF LEFT LOWER EXTREMITY (HCC): ICD-10-CM

## 2024-01-02 DIAGNOSIS — L03.116 CELLULITIS OF LEFT LOWER EXTREMITY: ICD-10-CM

## 2024-01-02 DIAGNOSIS — L03.115 CELLULITIS OF RIGHT LOWER EXTREMITY: Primary | ICD-10-CM

## 2024-01-02 DIAGNOSIS — R60.9 2+ PITTING EDEMA: ICD-10-CM

## 2024-01-02 DIAGNOSIS — L97.929 INFECTED STASIS ULCER OF LEFT LOWER EXTREMITY (HCC): ICD-10-CM

## 2024-01-02 PROCEDURE — 3017F COLORECTAL CA SCREEN DOC REV: CPT | Performed by: NURSE PRACTITIONER

## 2024-01-02 PROCEDURE — G8427 DOCREV CUR MEDS BY ELIG CLIN: HCPCS | Performed by: NURSE PRACTITIONER

## 2024-01-02 PROCEDURE — 1090F PRES/ABSN URINE INCON ASSESS: CPT | Performed by: NURSE PRACTITIONER

## 2024-01-02 PROCEDURE — 1036F TOBACCO NON-USER: CPT | Performed by: NURSE PRACTITIONER

## 2024-01-02 PROCEDURE — 99214 OFFICE O/P EST MOD 30 MIN: CPT | Performed by: NURSE PRACTITIONER

## 2024-01-02 PROCEDURE — 3075F SYST BP GE 130 - 139MM HG: CPT | Performed by: NURSE PRACTITIONER

## 2024-01-02 PROCEDURE — 3078F DIAST BP <80 MM HG: CPT | Performed by: NURSE PRACTITIONER

## 2024-01-02 PROCEDURE — 1123F ACP DISCUSS/DSCN MKR DOCD: CPT | Performed by: NURSE PRACTITIONER

## 2024-01-02 PROCEDURE — G8399 PT W/DXA RESULTS DOCUMENT: HCPCS | Performed by: NURSE PRACTITIONER

## 2024-01-02 PROCEDURE — G8484 FLU IMMUNIZE NO ADMIN: HCPCS | Performed by: NURSE PRACTITIONER

## 2024-01-02 PROCEDURE — 1111F DSCHRG MED/CURRENT MED MERGE: CPT | Performed by: NURSE PRACTITIONER

## 2024-01-02 PROCEDURE — G8417 CALC BMI ABV UP PARAM F/U: HCPCS | Performed by: NURSE PRACTITIONER

## 2024-01-02 RX ORDER — DOXYCYCLINE HYCLATE 100 MG
100 TABLET ORAL 2 TIMES DAILY
Qty: 20 TABLET | Refills: 0 | Status: SHIPPED | OUTPATIENT
Start: 2024-01-02

## 2024-01-02 ASSESSMENT — ENCOUNTER SYMPTOMS
SHORTNESS OF BREATH: 0
NAUSEA: 0
COLOR CHANGE: 1
COUGH: 0
VOMITING: 0

## 2024-01-02 ASSESSMENT — PATIENT HEALTH QUESTIONNAIRE - PHQ9
SUM OF ALL RESPONSES TO PHQ QUESTIONS 1-9: 0
SUM OF ALL RESPONSES TO PHQ9 QUESTIONS 1 & 2: 0
1. LITTLE INTEREST OR PLEASURE IN DOING THINGS: 0
SUM OF ALL RESPONSES TO PHQ QUESTIONS 1-9: 0
2. FEELING DOWN, DEPRESSED OR HOPELESS: 0
SUM OF ALL RESPONSES TO PHQ QUESTIONS 1-9: 0
SUM OF ALL RESPONSES TO PHQ QUESTIONS 1-9: 0

## 2024-01-02 NOTE — PROGRESS NOTES
(A) NEGATIVE    pH, UA 6.0 5.0 - 8.0    Protein, UA 1+ (A) NEGATIVE mg/dL    Urobilinogen, Urine Normal 0.0 - 1.0 EU/dL    Nitrite, Urine NEGATIVE NEGATIVE    Leukocyte Esterase, Urine SMALL (A) NEGATIVE   Troponin   Result Value Ref Range    Troponin, High Sensitivity 49 (H) 0 - 14 ng/L   Magnesium   Result Value Ref Range    Magnesium 0.5 (LL) 1.6 - 2.6 mg/dL   Basic Metabolic Panel w/ Reflex to MG   Result Value Ref Range    Sodium 138 135 - 144 mmol/L    Potassium 3.4 (L) 3.7 - 5.3 mmol/L    Chloride 103 98 - 107 mmol/L    CO2 20 20 - 31 mmol/L    Anion Gap 15 9 - 17 mmol/L    Glucose 112 (H) 70 - 99 mg/dL    BUN 24 (H) 8 - 23 mg/dL    Creatinine 1.3 (H) 0.5 - 0.9 mg/dL    Est, Glom Filt Rate 45 (L) >60 mL/min/1.73m2    Calcium 5.2 (LL) 8.6 - 10.4 mg/dL   Potassium   Result Value Ref Range    Potassium 3.1 (L) 3.7 - 5.3 mmol/L   Calcium, Ionized   Result Value Ref Range    Calcium, Ionized 0.76 (L) 1.13 - 1.33 mmol/L   Magnesium   Result Value Ref Range    Magnesium 1.0 (L) 1.6 - 2.6 mg/dL   Magnesium   Result Value Ref Range    Magnesium 2.3 1.6 - 2.6 mg/dL   Basic Metabolic Panel   Result Value Ref Range    Sodium 138 135 - 144 mmol/L    Potassium 3.7 3.7 - 5.3 mmol/L    Chloride 106 98 - 107 mmol/L    CO2 19 (L) 20 - 31 mmol/L    Anion Gap 13 9 - 17 mmol/L    Glucose 127 (H) 70 - 99 mg/dL    BUN 23 8 - 23 mg/dL    Creatinine 1.2 (H) 0.5 - 0.9 mg/dL    Est, Glom Filt Rate 50 (L) >60 mL/min/1.73m2    Calcium 5.7 (LL) 8.6 - 10.4 mg/dL   Basic Metabolic Panel   Result Value Ref Range    Sodium 138 135 - 144 mmol/L    Potassium 3.9 3.7 - 5.3 mmol/L    Chloride 107 98 - 107 mmol/L    CO2 22 20 - 31 mmol/L    Anion Gap 9 9 - 17 mmol/L    Glucose 135 (H) 70 - 99 mg/dL    BUN 23 8 - 23 mg/dL    Creatinine 1.1 (H) 0.5 - 0.9 mg/dL    Est, Glom Filt Rate 55 (L) >60 mL/min/1.73m2    Calcium 6.5 (L) 8.6 - 10.4 mg/dL   Magnesium   Result Value Ref Range    Magnesium 1.9 1.6 - 2.6 mg/dL   Phosphorus   Result Value Ref Range

## 2024-01-02 NOTE — TELEPHONE ENCOUNTER
After speaking with Dr. Gutierres last week, she wanted to see patient 1/12/2024 at 2:30 pm. Schedule was closed at that time and had to wait for manager to pen schedule. Patient has been called with appointment details and she voiced understanding.

## 2024-01-02 NOTE — TELEPHONE ENCOUNTER
Name: Gisella Corey  : 1955  MRN: [id number]    Oncology Navigation Follow-Up Note    Contact Type:  Telephone    Notes: Writer received a return call from pt stating she still hasn't heard from Dr. Gutierres's office for f/u. Pt finished radiation this past Friday on 23. Will route this note to Dolly in Dr. Alva office. Pt appreciative and will await call for f/u.      Electronically signed by Stacey Colbert RN on 2024 at 12:01 PM

## 2024-01-05 ENCOUNTER — TELEPHONE (OUTPATIENT)
Dept: PRIMARY CARE CLINIC | Age: 69
End: 2024-01-05

## 2024-01-05 NOTE — TELEPHONE ENCOUNTER
That is fine. Ok for PRN visits. Is there anything else I need to do? Does she need to see wound care at the hospital?

## 2024-01-05 NOTE — TELEPHONE ENCOUNTER
Neela from [facility] called into office stating she went to see pt 01/05/24 and noticed an open wound on the right side of her buttock and her gluteus fold.   Pt's Home Health officer said they need a PRN order for nurse to see patient.    Qiana [phone]  Neela: [phone]   Please advise

## 2024-01-05 NOTE — TELEPHONE ENCOUNTER
Qiana was notified and she states they haven't seen pt yet, that it was pt's PT that saw the wound so they will send someone over tomorrow to take a look at it and give us a call.

## 2024-01-09 ENCOUNTER — TELEPHONE (OUTPATIENT)
Dept: WOUND CARE | Age: 69
End: 2024-01-09

## 2024-01-09 ENCOUNTER — HOSPITAL ENCOUNTER (OUTPATIENT)
Dept: WOUND CARE | Age: 69
Discharge: HOME | End: 2024-01-09

## 2024-01-09 NOTE — TELEPHONE ENCOUNTER
Patient calling and canceling her [facility] wound care apt today stating she was not feeling well, she was rescheduled for 1-16-24

## 2024-01-09 NOTE — DISCHARGE INSTRUCTIONS
opportunity to ask questions regarding this information. I have elected to receive;      []After Visit Summary  [x]Comprehensive Discharge Instruction      Patient signature______________________________________Date:________

## 2024-01-10 ENCOUNTER — TELEPHONE (OUTPATIENT)
Dept: ONCOLOGY | Age: 69
End: 2024-01-10

## 2024-01-10 RX ORDER — PANTOPRAZOLE SODIUM 40 MG/1
40 TABLET, DELAYED RELEASE ORAL 2 TIMES DAILY
Qty: 90 TABLET | Refills: 0 | Status: SHIPPED | OUTPATIENT
Start: 2024-01-10

## 2024-01-10 NOTE — TELEPHONE ENCOUNTER
Called patient per doctor's request; patient stated she had called and left message that she needed to rescheduled today's appt and had not had a call back yet. Patient states she was not feeling well and needed to rescheduled. Explained to patient that doctor really needed to see her and she rescheduled for Wednesday 1/17/2024 @ 10:45 am  Per doctor called patient back to make sure she was not still taking her chemo med (xeluda) and that patient has completed radiation treatment (12/29/23). Patient did confirm yes to both.

## 2024-01-12 ENCOUNTER — HOSPITAL ENCOUNTER (OUTPATIENT)
Age: 69
Setting detail: SPECIMEN
Discharge: HOME | End: 2024-01-12

## 2024-01-12 ENCOUNTER — OFFICE VISIT (OUTPATIENT)
Dept: GYNECOLOGIC ONCOLOGY | Age: 69
End: 2024-01-12
Payer: MEDICARE

## 2024-01-12 VITALS
WEIGHT: 204.2 LBS | TEMPERATURE: 97.5 F | BODY MASS INDEX: 35.29 KG/M2 | SYSTOLIC BLOOD PRESSURE: 145 MMHG | DIASTOLIC BLOOD PRESSURE: 79 MMHG | OXYGEN SATURATION: 98 % | HEART RATE: 84 BPM

## 2024-01-12 DIAGNOSIS — D50.0 IRON DEFICIENCY ANEMIA DUE TO CHRONIC BLOOD LOSS: ICD-10-CM

## 2024-01-12 DIAGNOSIS — C20 RECTAL ADENOCARCINOMA (HCC): ICD-10-CM

## 2024-01-12 DIAGNOSIS — Z23 NEED FOR COVID-19 VACCINE: ICD-10-CM

## 2024-01-12 DIAGNOSIS — K59.00 CONSTIPATION, UNSPECIFIED CONSTIPATION TYPE: ICD-10-CM

## 2024-01-12 DIAGNOSIS — Z23 NEED FOR SHINGLES VACCINE: ICD-10-CM

## 2024-01-12 DIAGNOSIS — C54.1 MALIGNANT NEOPLASM OF ENDOMETRIUM (HCC): Primary | ICD-10-CM

## 2024-01-12 DIAGNOSIS — N63.0 BREAST NODULE: ICD-10-CM

## 2024-01-12 DIAGNOSIS — Z23 NEED FOR DIPHTHERIA-TETANUS-PERTUSSIS (TDAP) VACCINE: ICD-10-CM

## 2024-01-12 DIAGNOSIS — E66.01 OBESITY, MORBID, BMI 40.0-49.9 (HCC): ICD-10-CM

## 2024-01-12 DIAGNOSIS — Z23 NEED FOR PNEUMOCOCCAL VACCINE: ICD-10-CM

## 2024-01-12 DIAGNOSIS — R30.0 DYSURIA: ICD-10-CM

## 2024-01-12 DIAGNOSIS — N63.25 UNSPECIFIED LUMP IN THE LEFT BREAST, OVERLAPPING QUADRANTS: ICD-10-CM

## 2024-01-12 PROCEDURE — G8399 PT W/DXA RESULTS DOCUMENT: HCPCS | Performed by: OBSTETRICS & GYNECOLOGY

## 2024-01-12 PROCEDURE — 1090F PRES/ABSN URINE INCON ASSESS: CPT | Performed by: OBSTETRICS & GYNECOLOGY

## 2024-01-12 PROCEDURE — 3078F DIAST BP <80 MM HG: CPT | Performed by: OBSTETRICS & GYNECOLOGY

## 2024-01-12 PROCEDURE — 1123F ACP DISCUSS/DSCN MKR DOCD: CPT | Performed by: OBSTETRICS & GYNECOLOGY

## 2024-01-12 PROCEDURE — G8427 DOCREV CUR MEDS BY ELIG CLIN: HCPCS | Performed by: OBSTETRICS & GYNECOLOGY

## 2024-01-12 PROCEDURE — G8417 CALC BMI ABV UP PARAM F/U: HCPCS | Performed by: OBSTETRICS & GYNECOLOGY

## 2024-01-12 PROCEDURE — 3017F COLORECTAL CA SCREEN DOC REV: CPT | Performed by: OBSTETRICS & GYNECOLOGY

## 2024-01-12 PROCEDURE — 1036F TOBACCO NON-USER: CPT | Performed by: OBSTETRICS & GYNECOLOGY

## 2024-01-12 PROCEDURE — 3077F SYST BP >= 140 MM HG: CPT | Performed by: OBSTETRICS & GYNECOLOGY

## 2024-01-12 PROCEDURE — 99215 OFFICE O/P EST HI 40 MIN: CPT | Performed by: OBSTETRICS & GYNECOLOGY

## 2024-01-12 PROCEDURE — G8484 FLU IMMUNIZE NO ADMIN: HCPCS | Performed by: OBSTETRICS & GYNECOLOGY

## 2024-01-12 PROCEDURE — 1111F DSCHRG MED/CURRENT MED MERGE: CPT | Performed by: OBSTETRICS & GYNECOLOGY

## 2024-01-14 LAB
MICROORGANISM SPEC CULT: NORMAL
SPECIMEN DESCRIPTION: NORMAL

## 2024-01-14 NOTE — DISCHARGE INSTRUCTIONS
breakthrough drainage.     If you need medical attention outside of the business hours of the Wound Care Centers please contact your PCP or go to the nearest emergency room.     The information contained in the After Visit Summary has been reviewed with me, the patient and/or responsible adult, by my health care provider(s). I had the opportunity to ask questions regarding this information. I have elected to receive;      []After Visit Summary  [x]Comprehensive Discharge Instruction      Patient signature______________________________________Date:________    Electronically signed by Marlen Durant RN on 1/16/2024 at 2:09 PM    Electronically signed by Adina Valentino MD on 1/16/2024 at 2:55 PM

## 2024-01-16 ENCOUNTER — HOSPITAL ENCOUNTER (OUTPATIENT)
Dept: WOUND CARE | Age: 69
Discharge: HOME | End: 2024-01-16
Payer: MEDICARE

## 2024-01-16 VITALS — BODY MASS INDEX: 36.14 KG/M2 | HEIGHT: 63 IN | WEIGHT: 204 LBS

## 2024-01-16 DIAGNOSIS — R09.89 DECREASED PEDAL PULSES: ICD-10-CM

## 2024-01-16 DIAGNOSIS — L97.929 VENOUS ULCER OF LEFT LEG (HCC): Primary | ICD-10-CM

## 2024-01-16 DIAGNOSIS — R60.0 BILATERAL LEG EDEMA: ICD-10-CM

## 2024-01-16 DIAGNOSIS — I83.029 VENOUS ULCER OF LEFT LEG (HCC): Primary | ICD-10-CM

## 2024-01-16 PROCEDURE — 99213 OFFICE O/P EST LOW 20 MIN: CPT

## 2024-01-16 PROCEDURE — 97597 DBRDMT OPN WND 1ST 20 CM/<: CPT

## 2024-01-16 PROCEDURE — 99203 OFFICE O/P NEW LOW 30 MIN: CPT | Performed by: SURGERY

## 2024-01-16 PROCEDURE — 97597 DBRDMT OPN WND 1ST 20 CM/<: CPT | Performed by: SURGERY

## 2024-01-16 RX ORDER — LIDOCAINE HYDROCHLORIDE 40 MG/ML
SOLUTION TOPICAL ONCE
OUTPATIENT
Start: 2024-01-16 | End: 2024-01-16

## 2024-01-16 RX ORDER — LIDOCAINE HYDROCHLORIDE 20 MG/ML
JELLY TOPICAL ONCE
OUTPATIENT
Start: 2024-01-16 | End: 2024-01-16

## 2024-01-16 ASSESSMENT — ENCOUNTER SYMPTOMS
EYES NEGATIVE: 1
SHORTNESS OF BREATH: 1
GASTROINTESTINAL NEGATIVE: 1
BACK PAIN: 1

## 2024-01-16 ASSESSMENT — PAIN SCALES - GENERAL: PAINLEVEL_OUTOF10: 0

## 2024-01-16 NOTE — PLAN OF CARE
Problem: Chronic Conditions and Co-morbidities  Goal: Patient's chronic conditions and co-morbidity symptoms are monitored and maintained or improved  Outcome: Progressing     Problem: ABCDS Injury Assessment  Goal: Absence of physical injury  Outcome: Progressing     Problem: Cognitive:  Goal: Knowledge of wound care  Description: Knowledge of wound care  Outcome: Progressing  Goal: Understands risk factors for wounds  Description: Understands risk factors for wounds  Outcome: Progressing     Problem: Venous:  Goal: Signs of wound healing will improve  Description: Signs of wound healing will improve  Outcome: Progressing     Problem: Falls - Risk of:  Goal: Will remain free from falls  Description: Will remain free from falls  Outcome: Progressing

## 2024-01-16 NOTE — PROGRESS NOTES
Wound Care Supplies      Supply Company:     [employer]    Ordering Center:      WOUND CARE  [address] [facility] [address]  [phone]  WOUND CARE Dept: [phone]   FAX NUMBER [phone]    Patient Information:      Mandy Garcia  [address]  [place] [address]   [phone]   : 1955  AGE: 68 y.o.     GENDER: female   EPISODE DATE: 2024    Insurance:      PRIMARY INSURANCE:  Plan: [employer] DUAL COMPLETE  Coverage: [facility] MEDICARE  Effective Date: 2023  Group Number: [unfilled]  Subscriber Number: [id number] - (Medicare Managed)    Payer/Plan Subscr  Sex Relation Sub. Ins. ID Effective Group Num   1. [facility] MEDICARE * MANDY GARCIA 1955 Female Self [id number]                                   [address]   2. [facility] MANDY GARCIA 1955 Female Self [id number]                                    [address]       Patient Wound Information:      Problem List Items Addressed This Visit          Other    Lymphedema of both lower extremities - Primary (Chronic)    Relevant Orders    Vascular duplex reflux venous insufficiency study bilateral    Vascular duplex lower extremity arteries bilateral with CHANNING       WOUNDS REQUIRING DRESSING SUPPLIES:     Wound 24 Leg Left;Lateral #1 Left Lower Lateral Leg (Active)   Wound Image   24 1401   Wound Etiology Venous 24 140   Dressing Status New drainage noted;Old drainage noted 24 1401   Wound Cleansed Soap and water 24 1401   Wound Length (cm) 1 cm 24 1401   Wound Width (cm) 0.7 cm 24 1401   Wound Depth (cm) 0.1 cm 24 1401   Wound Surface Area (cm^2) 0.7 cm^2 24 1401   Wound Volume (cm^3) 0.07 cm^3 24 1401   Post-Procedure Length (cm) 1 cm 24 1401   Post-Procedure Width (cm) 0.7 cm 24 1401   Post-Procedure Depth (cm) 0.1 cm 24 1401   Post-Procedure Surface Area (cm^2) 0.7 cm^2 24 7378 
date: 1979     Quit date: 1989     Years since quittin.6    Smokeless tobacco: Never   Vaping Use    Vaping Use: Never used   Substance Use Topics    Alcohol use: Never    Drug use: Never       ALLERGIES    No Known Allergies    MEDICATIONS    Current Outpatient Medications on File Prior to Encounter   Medication Sig Dispense Refill    pantoprazole (PROTONIX) 40 MG tablet TAKE 1 TABLET BY MOUTH 2 TIMES A DAY 90 tablet 0    doxycycline hyclate (VIBRA-TABS) 100 MG tablet Take 1 tablet by mouth 2 times daily 20 tablet 0    Calcium Citrate-Vitamin D (CALCIUM + VIT D, BARIATRIC ADVANTAGE, CHEWABLE TABLET) Take 1 tablet by mouth daily 30 tablet 1    capecitabine (XELODA) 500 MG chemo tablet Take 2,500 mg/m2 by mouth 2 times daily      atorvastatin (LIPITOR) 80 MG tablet Take 1 tablet by mouth nightly 30 tablet 0    loratadine (CLARITIN) 10 MG tablet TAKE 1 TABLET BY MOUTH EVERY DAY 60 tablet 0    metFORMIN (GLUCOPHAGE-XR) 500 MG extended release tablet TAKE 1 TABLET BY MOUTH IN THE MORNING with breakfast 90 tablet 0    nystatin (MYCOSTATIN) 658081 UNIT/GM powder Apply 3 times daily. 60 g 2    levothyroxine (SYNTHROID) 50 MCG tablet TAKE 1 TABLET BY MOUTH EVERY DAY (Patient not taking: Reported on 2024) 90 tablet 0    dapagliflozin (FARXIGA) 10 MG tablet Take 1 tablet by mouth every morning 90 tablet 1    losartan (COZAAR) 50 MG tablet TAKE 1 TABLET BY MOUTH EVERY DAY      Handicap Placard MISC by Does not apply route For 1 year 1 each 0     No current facility-administered medications on file prior to encounter.       REVIEW OF SYSTEMS    Review of Systems   Constitutional:  Positive for activity change and fatigue.   HENT: Negative.     Eyes: Negative.    Respiratory:  Positive for shortness of breath.    Cardiovascular:  Positive for leg swelling.   Gastrointestinal: Negative.         Known rectal cancer undergoing XRT   Musculoskeletal:  Positive for back pain, gait problem and joint swelling.

## 2024-01-17 DIAGNOSIS — L03.116 CELLULITIS OF LEFT LOWER EXTREMITY: ICD-10-CM

## 2024-01-17 DIAGNOSIS — L03.115 CELLULITIS OF RIGHT LOWER EXTREMITY: ICD-10-CM

## 2024-01-18 DIAGNOSIS — R09.89 DECREASED PEDAL PULSES: Primary | ICD-10-CM

## 2024-01-18 RX ORDER — DOXYCYCLINE HYCLATE 100 MG
100 TABLET ORAL 2 TIMES DAILY
Qty: 20 TABLET | Refills: 0 | OUTPATIENT
Start: 2024-01-18

## 2024-01-18 NOTE — TELEPHONE ENCOUNTER
Patient returned call, she states cellulitis is not any better but she went to wound clinic Tuesday and wrapped her up \"like a mummy\" and has f/u appt.  Writer asked about refill of Doxycycline and she states that she just looks at her medicine bottles and if it doesn't have refills she calls ,so she doesn't know if she should refill or not.  Please advise.

## 2024-01-20 DIAGNOSIS — L03.116 CELLULITIS OF LEFT LOWER EXTREMITY: ICD-10-CM

## 2024-01-20 DIAGNOSIS — L03.115 CELLULITIS OF RIGHT LOWER EXTREMITY: ICD-10-CM

## 2024-01-20 RX ORDER — DOXYCYCLINE HYCLATE 100 MG
100 TABLET ORAL 2 TIMES DAILY
Qty: 20 TABLET | Refills: 0 | OUTPATIENT
Start: 2024-01-20

## 2024-01-22 ENCOUNTER — HOSPITAL ENCOUNTER (OUTPATIENT)
Dept: VASCULAR LAB | Age: 69
Discharge: HOME | End: 2024-01-24
Attending: SURGERY
Payer: MEDICARE

## 2024-01-22 DIAGNOSIS — R09.89 DECREASED PEDAL PULSES: ICD-10-CM

## 2024-01-22 LAB
VAS LEFT ABI: 1.11
VAS LEFT ARM BP: 156 MMHG
VAS LEFT CALF PRESSURE: 165 MMHG
VAS LEFT CFV RFX: 0.2 S
VAS LEFT DORSALIS PEDIS BP: 171 MMHG
VAS LEFT FV RFX: 0.1 S
VAS LEFT GSV AT KNEE DIAM: 3.3 MM
VAS LEFT GSV BK MID DIAM: 3 MM
VAS LEFT GSV BK MID RFX: 0.6 S
VAS LEFT GSV BK PROX DIAM: 3 MM
VAS LEFT GSV BK PROX RFX: 0.4 S
VAS LEFT GSV JUNC DIAM: 5.6 MM
VAS LEFT GSV JUNC RFX: 0.3 S
VAS LEFT GSV THIGH DIST DIAM: 3.7 MM
VAS LEFT GSV THIGH MID DIAM: 3.9 MM
VAS LEFT GSV THIGH PROX DIAM: 4.2 MM
VAS LEFT GSV THIGHT MID RFX: 0.2 S
VAS LEFT LOW THIGH PRESSURE: 175 MMHG
VAS LEFT PTA BP: 173 MMHG
VAS LEFT SSV PROX DIAM: 3.6 MM
VAS RIGHT ABI: 1.12
VAS RIGHT ARM BP: 152 MMHG
VAS RIGHT CALF PRESSURE: 187 MMHG
VAS RIGHT DORSALIS PEDIS BP: 171 MMHG
VAS RIGHT FV RFX: 0.3 S
VAS RIGHT GSV AT KNEE DIAM: 4.6 MM
VAS RIGHT GSV BK MID DIAM: 3.6 MM
VAS RIGHT GSV BK MID RFX: 0.5 S
VAS RIGHT GSV BK PROX DIAM: 3.7 MM
VAS RIGHT GSV BK PROX RFX: 0.8 S
VAS RIGHT GSV JUNC DIAM: 10 MM
VAS RIGHT GSV THIGH DIST DIAM: 4.9 MM
VAS RIGHT GSV THIGH MID DIAM: 4.8 MM
VAS RIGHT GSV THIGH PROX DIAM: 7.6 MM
VAS RIGHT GSV THIGH PROX RFX: 0.2 S
VAS RIGHT LOW THIGH PRESSURE: 163 MMHG
VAS RIGHT PTA BP: 175 MMHG
VAS RIGHT SSV MID DIAM: 2.9 MM
VAS RIGHT SSV MID RFX: 0.5 S
VAS RIGHT SSV PROX DIAM: 3.6 MM
VAS RIGHT SSV PROX RFX: 0.1 S

## 2024-01-22 PROCEDURE — 93970 EXTREMITY STUDY: CPT | Performed by: SURGERY

## 2024-01-22 PROCEDURE — 93923 UPR/LXTR ART STDY 3+ LVLS: CPT

## 2024-01-22 PROCEDURE — 93970 EXTREMITY STUDY: CPT

## 2024-01-22 PROCEDURE — 93923 UPR/LXTR ART STDY 3+ LVLS: CPT | Performed by: SURGERY

## 2024-01-25 DIAGNOSIS — L03.116 CELLULITIS OF LEFT LOWER EXTREMITY: ICD-10-CM

## 2024-01-25 DIAGNOSIS — L03.115 CELLULITIS OF RIGHT LOWER EXTREMITY: ICD-10-CM

## 2024-01-25 RX ORDER — DOXYCYCLINE HYCLATE 100 MG
100 TABLET ORAL 2 TIMES DAILY
Qty: 20 TABLET | Refills: 0 | OUTPATIENT
Start: 2024-01-25

## 2024-01-25 NOTE — TELEPHONE ENCOUNTER
The patient called back asking if there provider has had a chance to send in the requested medication, writer advised that the message has been sent to the provider with the walk in clinic and that the office is currently waiting on a response from the provider.  Writer advised the patient that the provider tries to complete all messages that are sent to them between seeing patient's, but that another message would be sent to the provider.    The patient verbalized understanding and asked to be called once there is a response from the provider.

## 2024-01-26 DIAGNOSIS — L03.115 CELLULITIS OF RIGHT LOWER EXTREMITY: ICD-10-CM

## 2024-01-26 DIAGNOSIS — L03.116 CELLULITIS OF LEFT LOWER EXTREMITY: ICD-10-CM

## 2024-01-26 RX ORDER — DOXYCYCLINE HYCLATE 100 MG
100 TABLET ORAL 2 TIMES DAILY
Qty: 20 TABLET | Refills: 0 | OUTPATIENT
Start: 2024-01-26

## 2024-01-26 RX ORDER — DOXYCYCLINE HYCLATE 100 MG
100 TABLET ORAL 2 TIMES DAILY
Qty: 20 TABLET | Refills: 0 | Status: CANCELLED | OUTPATIENT
Start: 2024-01-26

## 2024-01-26 NOTE — TELEPHONE ENCOUNTER
Called and informed patient that she will need to reach out to wound care for further treatment. Patient verbalized understanding.

## 2024-01-28 NOTE — DISCHARGE INSTRUCTIONS
[facility]                            Visit  Discharge Instructions / Physician Orders  DATE:2/1/24     Home Care: [facility]  SUPPLIES ORDERED THRU:   Tastemaker                  DATE LAST SUPPLIED  1/16/24     Wound Location:  Bilat lower legs     Cleanse with: Liquid antibacterial soap and water, rinse well      Dressing Orders:  wear compression socks apply first thing in am remove at night     Frequency:    Additional Orders: Increase protein to diet (meat, cheese, eggs, fish, peanut butter, nuts and beans)  Multivitamin daily    OFFLOADING [] YES  TYPE:                  [] NA     Weekly wound care visits until determined otherwise.     Antibiotic therapy-wound care related YES [] NO [] NA[]     MY CHART []     Smart Device  []      HYPERBARIC TREATMENT-                TREATMENT #                          Your next appointment with the Wound Care Center jaymie if needed                                                                                                   (Please note your next appointment above and if you are unable to keep, kindly give a 24 hour notice. Thank you.)  If more than 15 min late we cannot guarantee you will be seen due to clinician schedule  Per Policy, Excessive cancellation will call for dismissal from program.  If you experience any of the following, please call the Wound Care Center during business hours:  [phone]     * Increase in Pain  * Temperature over 101  * Increase in drainage from your wound  * Drainage with a foul odor  * Bleeding  * Increase in swelling  * Need for compression bandage changes due to slippage, breakthrough drainage.     If you need medical attention outside of the business hours of the Wound Care Centers please contact your PCP or go to the nearest emergency room.     The information contained in the After Visit Summary has been reviewed with me, the patient and/or responsible adult, by my

## 2024-01-29 DIAGNOSIS — L03.116 CELLULITIS OF LEFT LOWER EXTREMITY: ICD-10-CM

## 2024-01-29 DIAGNOSIS — L03.115 CELLULITIS OF RIGHT LOWER EXTREMITY: ICD-10-CM

## 2024-01-29 RX ORDER — DOXYCYCLINE HYCLATE 100 MG
100 TABLET ORAL 2 TIMES DAILY
Qty: 20 TABLET | Refills: 0 | OUTPATIENT
Start: 2024-01-29

## 2024-01-30 ENCOUNTER — HOSPITAL ENCOUNTER (OUTPATIENT)
Dept: RADIATION ONCOLOGY | Age: 69
Discharge: HOME | End: 2024-01-30
Payer: MEDICARE

## 2024-01-30 VITALS
BODY MASS INDEX: 34.72 KG/M2 | HEART RATE: 84 BPM | OXYGEN SATURATION: 100 % | DIASTOLIC BLOOD PRESSURE: 81 MMHG | TEMPERATURE: 97.8 F | RESPIRATION RATE: 16 BRPM | WEIGHT: 196 LBS | SYSTOLIC BLOOD PRESSURE: 148 MMHG

## 2024-01-30 DIAGNOSIS — C20 RECTAL ADENOCARCINOMA (HCC): Primary | ICD-10-CM

## 2024-01-30 PROCEDURE — 99212 OFFICE O/P EST SF 10 MIN: CPT | Performed by: RADIOLOGY

## 2024-01-30 ASSESSMENT — PAIN SCALES - GENERAL: PAINLEVEL_OUTOF10: 4

## 2024-01-30 ASSESSMENT — PAIN DESCRIPTION - LOCATION: LOCATION: RECTUM

## 2024-01-30 NOTE — PROGRESS NOTES
[facility]            Radiation Oncology          [address] [place] [address]        O: [phone]        F: [phone]       [email or web address]           Date of Service: 2024     Location:  [facility] Radiation Oncology,   [address] [place] [id number]   [phone]       RADIATION ONCOLOGY FOLLOW UP NOTE    Patient ID:   Gisella Corey  : 1955   MRN: [id number]    DIAGNOSIS:  Low-lying rectal adenocarcinoma starting at the anal verge and extending 10 cm proximally, T4b N+ M0     Endometrioid adenocarcinoma FIGO grade 1, pending surgical resection    INTERVAL HISTORY:   Gisella Corey is a 68 y.o. female with a diagnosis of low-lying rectal adenocarcinoma starting at the anal verge and extending 10 cm proximally, T4b N+ M0. Patient also has a synchronous diagnosis of endometrioid adenocarcinoma FIGO grade 1, pending surgical resection. Patient completed a course of neoadjuvant concurrent chemoradiation.  She received a total dose of 5040 cGy completed in 2023.    Patient presents today for her first posttreatment follow-up.  She is recovering as expected.  She continues to report perianal pain although it is improving.  She is having daily bowel movement and no longer reports hematochezia.  She denies dysuria.  She denies vaginal discharge.    MEDICATIONS:    Current Outpatient Medications:     atorvastatin (LIPITOR) 80 MG tablet, Take 1 tablet by mouth nightly PLEASE CONTACT OFFICE TO SCHEDULE AN APT FOR FURTHER REFILLS, Disp: 30 tablet, Rfl: 0    losartan (COZAAR) 50 MG tablet, TAKE 1 TABLET BY MOUTH EVERY DAY, Disp: 30 tablet, Rfl: 0    pantoprazole (PROTONIX) 40 MG tablet, TAKE 1 TABLET BY MOUTH 2 TIMES A DAY, Disp: 90 tablet, Rfl: 0    doxycycline hyclate (VIBRA-TABS) 100 MG tablet, Take 1 tablet by mouth 2 times daily, Disp: 20 tablet, Rfl: 0    Calcium Citrate-Vitamin D (CALCIUM + VIT D, BARIATRIC ADVANTAGE,

## 2024-01-30 NOTE — PROGRESS NOTES
Gisella Corey  1/30/2024  3:18 PM      Vitals:    01/30/24 1507   BP: (!) 148/81   Pulse: 84   Resp: 16   Temp: 97.8 °F (36.6 °C)   SpO2: 100%    :     Pain Assessment: 0-10  Pain Level: 4       Wt Readings from Last 1 Encounters:   01/30/24 88.9 kg (196 lb)                Current Outpatient Medications:     atorvastatin (LIPITOR) 80 MG tablet, Take 1 tablet by mouth nightly PLEASE CONTACT OFFICE TO SCHEDULE AN APT FOR FURTHER REFILLS, Disp: 30 tablet, Rfl: 0    losartan (COZAAR) 50 MG tablet, TAKE 1 TABLET BY MOUTH EVERY DAY, Disp: 30 tablet, Rfl: 0    pantoprazole (PROTONIX) 40 MG tablet, TAKE 1 TABLET BY MOUTH 2 TIMES A DAY, Disp: 90 tablet, Rfl: 0    doxycycline hyclate (VIBRA-TABS) 100 MG tablet, Take 1 tablet by mouth 2 times daily, Disp: 20 tablet, Rfl: 0    Calcium Citrate-Vitamin D (CALCIUM + VIT D, BARIATRIC ADVANTAGE, CHEWABLE TABLET), Take 1 tablet by mouth daily, Disp: 30 tablet, Rfl: 1    capecitabine (XELODA) 500 MG chemo tablet, Take 2,500 mg/m2 by mouth 2 times daily, Disp: , Rfl:     loratadine (CLARITIN) 10 MG tablet, TAKE 1 TABLET BY MOUTH EVERY DAY, Disp: 60 tablet, Rfl: 0    metFORMIN (GLUCOPHAGE-XR) 500 MG extended release tablet, TAKE 1 TABLET BY MOUTH IN THE MORNING with breakfast, Disp: 90 tablet, Rfl: 0    nystatin (MYCOSTATIN) 575938 UNIT/GM powder, Apply 3 times daily., Disp: 60 g, Rfl: 2    levothyroxine (SYNTHROID) 50 MCG tablet, TAKE 1 TABLET BY MOUTH EVERY DAY (Patient not taking: Reported on 1/16/2024), Disp: 90 tablet, Rfl: 0    dapagliflozin (FARXIGA) 10 MG tablet, Take 1 tablet by mouth every morning, Disp: 90 tablet, Rfl: 1    Handicap Placard MISC, by Does not apply route For 1 year, Disp: 1 each, Rfl: 0            FALLS RISK SCREEN  Instructions:  Assess the patient and enter the appropriate indicators that are present for fall risk identification.   Total the numbers entered and assign a fall risk score from Table 2.  Reassess patient at a minimum every 12 weeks or with

## 2024-01-31 ENCOUNTER — TELEPHONE (OUTPATIENT)
Dept: ONCOLOGY | Age: 69
End: 2024-01-31

## 2024-01-31 NOTE — TELEPHONE ENCOUNTER
[facility]- MEDICAL NUTRITION THERAPY     Visit Type: Follow-up     NUTRITION RECOMMENDATIONS / MONITORING / EVALUATION  Continue regular diet; encouraged PO intake as tolerated.   Continue oral nutrition supplements as needed.   Will continue to monitor PO tolerance, adequacy of intake, weight, and care plans.     Subjective/Current Data:  Chart reviewed and called patient for nutrition follow-up. Pt reports she is tolerating diet well, but doesn't have much of an appetite. States she just \"picks\" at meals. Continues to use protein shakes as needed (typically once daily); currently using Slimfast (~190 kcal, 10 g pro/bottle) or Pure Protein (140 kcal, 30 g pro/bottle). States weight has been \"holding steady\".    Objective Data:  Patient Active Problem List    Diagnosis Date Noted    Gastrointestinal hemorrhage associated with peptic ulcer 12/23/2021    Venous ulcer of left leg ([facility]) 01/16/2024    Hypokalemia 12/12/2023    Rectal adenocarcinoma ([facility]) 08/29/2023    Acute cholecystitis 08/26/2023    Chronic cholecystitis with calculus 08/25/2023    Rectal bleeding 08/24/2023    Single subsegmental pulmonary embolism without acute cor pulmonale ([facility]) 01/17/2022    Lymphedema of both lower extremities 01/17/2022    Bilateral pulmonary embolism ([facility]) 12/22/2021    Vitamin D deficiency 12/29/2020    Prediabetes 12/29/2020    Hypothyroidism due to Hashimoto's thyroiditis 12/29/2020    Chronic venous insufficiency 12/29/2020    S/P drug eluting coronary stent placement 02/19/2020    STEMI (ST elevation myocardial infarction) ([facility]) 05/11/2019    Essential (primary) hypertension 05/11/2019    Obesity, morbid, BMI 40.0-49.9 ([facility]) 05/11/2019    Gastrointestinal hemorrhage with melena 05/11/2019    NSVT (nonsustained ventricular tachycardia) ([facility]) 05/11/2019     Labs:  Reviewed   Medications: Reviewed     Anthropometric Measures:  Height: 5' 3\"  Weight: 196 lb (88.9 kg)   Ideal Body Weight: 115 lb (52.2 kg)  BMI: 34.72

## 2024-02-01 ENCOUNTER — HOSPITAL ENCOUNTER (OUTPATIENT)
Dept: WOUND CARE | Age: 69
Discharge: HOME | End: 2024-02-01
Payer: MEDICARE

## 2024-02-01 VITALS — WEIGHT: 196 LBS | HEIGHT: 63 IN | BODY MASS INDEX: 34.73 KG/M2 | RESPIRATION RATE: 18 BRPM

## 2024-02-01 DIAGNOSIS — L97.929 VENOUS ULCER OF LEFT LEG (HCC): Primary | ICD-10-CM

## 2024-02-01 DIAGNOSIS — C20 RECTAL ADENOCARCINOMA (HCC): ICD-10-CM

## 2024-02-01 DIAGNOSIS — R73.03 PREDIABETES: ICD-10-CM

## 2024-02-01 DIAGNOSIS — I89.0 LYMPHEDEMA OF BOTH LOWER EXTREMITIES: ICD-10-CM

## 2024-02-01 DIAGNOSIS — I87.2 CHRONIC VENOUS INSUFFICIENCY: ICD-10-CM

## 2024-02-01 DIAGNOSIS — E66.01 OBESITY, MORBID, BMI 40.0-49.9 (HCC): ICD-10-CM

## 2024-02-01 DIAGNOSIS — I83.029 VENOUS ULCER OF LEFT LEG (HCC): Primary | ICD-10-CM

## 2024-02-01 PROCEDURE — 99212 OFFICE O/P EST SF 10 MIN: CPT | Performed by: NURSE PRACTITIONER

## 2024-02-01 PROCEDURE — 99212 OFFICE O/P EST SF 10 MIN: CPT

## 2024-02-01 RX ORDER — LIDOCAINE HYDROCHLORIDE 40 MG/ML
SOLUTION TOPICAL ONCE
Status: DISCONTINUED | OUTPATIENT
Start: 2024-02-01 | End: 2024-02-01

## 2024-02-01 RX ORDER — LIDOCAINE HYDROCHLORIDE 20 MG/ML
JELLY TOPICAL ONCE
Status: CANCELLED | OUTPATIENT
Start: 2024-02-01 | End: 2024-02-01

## 2024-02-01 RX ORDER — LIDOCAINE HYDROCHLORIDE 40 MG/ML
SOLUTION TOPICAL ONCE
Status: CANCELLED | OUTPATIENT
Start: 2024-02-01 | End: 2024-02-01

## 2024-02-01 ASSESSMENT — PAIN SCALES - GENERAL: PAINLEVEL_OUTOF10: 0

## 2024-02-01 ASSESSMENT — ENCOUNTER SYMPTOMS
SHORTNESS OF BREATH: 1
BACK PAIN: 1
GASTROINTESTINAL NEGATIVE: 1
EYES NEGATIVE: 1

## 2024-02-01 NOTE — PROGRESS NOTES
Primary    Relevant Medications    lidocaine (XYLOCAINE) 4 % external solution (Start on 2/1/2024  2:00 PM)    Other Relevant Orders    Initiate Outpatient Wound Care Protocol        Wound 01/16/24 Leg Left;Lateral #1 Left Lower Lateral Leg (Active)   Wound Image   01/16/24 1401   Wound Etiology Venous 02/01/24 1331   Dressing Status Dry 02/01/24 1331   Wound Cleansed Soap and water 02/01/24 1331   Wound Length (cm) 0 cm 02/01/24 1331   Wound Width (cm) 0 cm 02/01/24 1331   Wound Depth (cm) 0 cm 02/01/24 1331   Wound Surface Area (cm^2) 0 cm^2 02/01/24 1331   Change in Wound Size % (l*w) 100 02/01/24 1331   Wound Volume (cm^3) 0 cm^3 02/01/24 1331   Wound Healing % 100 02/01/24 1331   Post-Procedure Length (cm) 0 cm 02/01/24 1331   Post-Procedure Width (cm) 0 cm 02/01/24 1331   Post-Procedure Depth (cm) 0 cm 02/01/24 1331   Post-Procedure Surface Area (cm^2) 0 cm^2 02/01/24 1331   Post-Procedure Volume (cm^3) 0 cm^3 02/01/24 1331   Wound Assessment Pink/red 01/16/24 1401   Drainage Amount None (dry) 02/01/24 1331   Drainage Description Serosanguinous 02/01/24 1331   Odor None 02/01/24 1331   Ro-wound Assessment Blanchable erythema;Ecchymosis 02/01/24 1331   Margins Defined edges 02/01/24 1331   Wound Thickness Description not for Pressure Injury Full thickness 02/01/24 1331   Number of days: 15          Wound is healed    Plan:     Treatment Note please see Discharge Instructions    Written patient dismissal instructions given to patient and signed by patient or POA.           Electronically signed by JOSEPH THOMSON CNP on 2/1/2024 at 1:38 PM

## 2024-02-01 NOTE — PLAN OF CARE
Problem: Chronic Conditions and Co-morbidities  Goal: Patient's chronic conditions and co-morbidity symptoms are monitored and maintained or improved  Outcome: Progressing     Problem: Pain  Goal: Verbalizes/displays adequate comfort level or baseline comfort level  Outcome: Progressing     Problem: Cognitive:  Goal: Knowledge of wound care  Description: Knowledge of wound care  Outcome: Progressing  Goal: Understands risk factors for wounds  Description: Understands risk factors for wounds  Outcome: Progressing     Problem: Venous:  Goal: Signs of wound healing will improve  Description: Signs of wound healing will improve  Outcome: Progressing     Problem: Falls - Risk of:  Goal: Will remain free from falls  Description: Will remain free from falls  Outcome: Progressing

## 2024-02-05 ENCOUNTER — TELEPHONE (OUTPATIENT)
Dept: ONCOLOGY | Age: 69
End: 2024-02-05

## 2024-02-05 NOTE — TELEPHONE ENCOUNTER
Name: Gisella Corey  : 1955  MRN: [id number]    Oncology Navigation Follow-Up Note    Contact Type:  Telephone    Notes: Writer called pt to check on her and to see how's she's feeling. Pt has cancelled last 3 f/u appt with Dr. Du. No answer, detailed VM left regarding above and requesting a return call.      Electronically signed by Stacey Colbert RN on 2024 at 12:48 PM

## 2024-02-08 DIAGNOSIS — C20 RECTAL ADENOCARCINOMA (HCC): Primary | ICD-10-CM

## 2024-02-09 ENCOUNTER — OFFICE VISIT (OUTPATIENT)
Dept: GYNECOLOGIC ONCOLOGY | Age: 69
End: 2024-02-09

## 2024-02-09 ENCOUNTER — TELEPHONE (OUTPATIENT)
Dept: ONCOLOGY | Age: 69
End: 2024-02-09

## 2024-02-09 ENCOUNTER — TELEPHONE (OUTPATIENT)
Dept: GYNECOLOGIC ONCOLOGY | Age: 69
End: 2024-02-09

## 2024-02-09 ENCOUNTER — TELEPHONE (OUTPATIENT)
Dept: RADIATION ONCOLOGY | Age: 69
End: 2024-02-09

## 2024-02-09 VITALS
TEMPERATURE: 97.2 F | HEART RATE: 85 BPM | BODY MASS INDEX: 35.57 KG/M2 | OXYGEN SATURATION: 97 % | WEIGHT: 200.8 LBS | DIASTOLIC BLOOD PRESSURE: 72 MMHG | SYSTOLIC BLOOD PRESSURE: 138 MMHG

## 2024-02-09 DIAGNOSIS — C20 RECTAL ADENOCARCINOMA (HCC): ICD-10-CM

## 2024-02-09 DIAGNOSIS — Z71.89 COUNSELING REGARDING END OF LIFE DECISION MAKING: ICD-10-CM

## 2024-02-09 DIAGNOSIS — C54.1 ENDOMETRIAL CANCER DETERMINED BY UTERINE BIOPSY (HCC): Primary | ICD-10-CM

## 2024-02-09 RX ORDER — BISACODYL 5 MG/1
TABLET, DELAYED RELEASE ORAL
Qty: 2 TABLET | Refills: 0 | Status: SHIPPED | OUTPATIENT
Start: 2024-02-09

## 2024-02-09 NOTE — TELEPHONE ENCOUNTER
Name: Gisella Corey  : 1955  MRN: [id number]    Oncology Navigation Follow-Up Note    Contact Type:  Telephone    Notes: Writer spoke to pt.  She does voice that she doesn't want any further cancer tx however she states she will proceed with Dr. Du's appt next Wednesday. Pt is aware of the [facility] referral. Writer offered her emotional support and encouraged her to reach out to writer for any needs or questions. Pt expresses appreciation. Will f/u with her Monday in regards to palliative care appt.      Electronically signed by Stacey Colbert RN on 2024 at 12:14 PM

## 2024-02-09 NOTE — TELEPHONE ENCOUNTER
Name: Gisella Corey  : 1955  MRN: [id number]    Oncology Navigation Follow-Up Note    Contact Type:  Telephone    Notes: Writer received a call from Dr. Gutierres stating she seen pt today and pt is expressing that she wants no further tx for her cancer and she doesn't want to proceed with surgery. Dr. Gutierres states she's concerned for her mental health and is asking how quickly we can obtain a palliative care appt. Dr. Gutierres states that pt expresses that its hard for her to even get herself to appts. Writer suggested home palliative care to relieve the burden of going to an additional appt. Mary at Dr. Gutierres's office to call [facility] to see how quickly they can go to pts home for an assessment. Will try to call pt later today for additional support.      Electronically signed by Stacey Colbert RN on 2024 at 10:19 AM

## 2024-02-12 ENCOUNTER — TELEPHONE (OUTPATIENT)
Dept: GYNECOLOGIC ONCOLOGY | Age: 69
End: 2024-02-12

## 2024-02-12 NOTE — TELEPHONE ENCOUNTER
Called [facility] in regards to referral.  Writer informed patient currently getting skilled nursing services.  When asked about HHA services, writer informed that HHA services are only available with therapy services.  Writer voiced understanding and appreciation.

## 2024-02-12 NOTE — TELEPHONE ENCOUNTER
Called and spoke with [facility], was informed that patient has consult appointment tomorrow at 1330.

## 2024-02-12 NOTE — TELEPHONE ENCOUNTER
Patient already has a referral and an appointment, was just informing all pertinent providers.  Thank you!

## 2024-02-13 ENCOUNTER — HOSPITAL ENCOUNTER (OUTPATIENT)
Dept: MRI IMAGING | Age: 69
Discharge: HOME | End: 2024-02-15
Attending: RADIOLOGY
Payer: MEDICARE

## 2024-02-13 DIAGNOSIS — C20 RECTAL ADENOCARCINOMA (HCC): ICD-10-CM

## 2024-02-13 LAB
CREAT SERPL-MCNC: 0.9 MG/DL (ref 0.5–0.9)
GFR SERPL CREATININE-BSD FRML MDRD: >60 ML/MIN/1.73M2

## 2024-02-13 PROCEDURE — 2580000003 HC RX 258: Performed by: RADIOLOGY

## 2024-02-13 PROCEDURE — 6360000004 HC RX CONTRAST MEDICATION: Performed by: RADIOLOGY

## 2024-02-13 PROCEDURE — 82565 ASSAY OF CREATININE: CPT

## 2024-02-13 PROCEDURE — A9579 GAD-BASE MR CONTRAST NOS,1ML: HCPCS | Performed by: RADIOLOGY

## 2024-02-13 PROCEDURE — 72197 MRI PELVIS W/O & W/DYE: CPT

## 2024-02-13 RX ORDER — 0.9 % SODIUM CHLORIDE 0.9 %
40 INTRAVENOUS SOLUTION INTRAVENOUS ONCE
Status: COMPLETED | OUTPATIENT
Start: 2024-02-13 | End: 2024-02-13

## 2024-02-13 RX ORDER — SODIUM CHLORIDE 0.9 % (FLUSH) 0.9 %
5-40 SYRINGE (ML) INJECTION 2 TIMES DAILY
Status: DISCONTINUED | OUTPATIENT
Start: 2024-02-13 | End: 2024-02-16 | Stop reason: HOSPADM

## 2024-02-13 RX ADMIN — SODIUM CHLORIDE, PRESERVATIVE FREE 10 ML: 5 INJECTION INTRAVENOUS at 10:29

## 2024-02-13 RX ADMIN — GADOTERIDOL 19 ML: 279.3 INJECTION, SOLUTION INTRAVENOUS at 10:28

## 2024-02-13 RX ADMIN — SODIUM CHLORIDE 40 ML: 9 INJECTION, SOLUTION INTRAVENOUS at 10:29

## 2024-02-14 ENCOUNTER — OFFICE VISIT (OUTPATIENT)
Dept: PRIMARY CARE CLINIC | Age: 69
End: 2024-02-14
Payer: MEDICARE

## 2024-02-14 VITALS
DIASTOLIC BLOOD PRESSURE: 84 MMHG | HEIGHT: 62 IN | WEIGHT: 193.8 LBS | SYSTOLIC BLOOD PRESSURE: 162 MMHG | RESPIRATION RATE: 26 BRPM | HEART RATE: 90 BPM | BODY MASS INDEX: 35.66 KG/M2 | OXYGEN SATURATION: 100 %

## 2024-02-14 DIAGNOSIS — C20 RECTAL ADENOCARCINOMA (HCC): ICD-10-CM

## 2024-02-14 DIAGNOSIS — I10 ESSENTIAL (PRIMARY) HYPERTENSION: ICD-10-CM

## 2024-02-14 DIAGNOSIS — Z00.00 MEDICARE ANNUAL WELLNESS VISIT, SUBSEQUENT: Primary | ICD-10-CM

## 2024-02-14 DIAGNOSIS — I21.3 ST ELEVATION MYOCARDIAL INFARCTION (STEMI), UNSPECIFIED ARTERY (HCC): ICD-10-CM

## 2024-02-14 DIAGNOSIS — I47.29 NSVT (NONSUSTAINED VENTRICULAR TACHYCARDIA) (HCC): ICD-10-CM

## 2024-02-14 DIAGNOSIS — I26.99 BILATERAL PULMONARY EMBOLISM (HCC): ICD-10-CM

## 2024-02-14 DIAGNOSIS — C54.1 ENDOMETRIAL CANCER DETERMINED BY UTERINE BIOPSY (HCC): ICD-10-CM

## 2024-02-14 DIAGNOSIS — I26.93 SINGLE SUBSEGMENTAL PULMONARY EMBOLISM WITHOUT ACUTE COR PULMONALE (HCC): ICD-10-CM

## 2024-02-14 PROBLEM — E66.01 OBESITY, MORBID, BMI 40.0-49.9 (HCC): Chronic | Status: RESOLVED | Noted: 2019-05-11 | Resolved: 2024-02-14

## 2024-02-14 PROCEDURE — 3017F COLORECTAL CA SCREEN DOC REV: CPT | Performed by: NURSE PRACTITIONER

## 2024-02-14 PROCEDURE — 3077F SYST BP >= 140 MM HG: CPT | Performed by: NURSE PRACTITIONER

## 2024-02-14 PROCEDURE — 3079F DIAST BP 80-89 MM HG: CPT | Performed by: NURSE PRACTITIONER

## 2024-02-14 PROCEDURE — G8484 FLU IMMUNIZE NO ADMIN: HCPCS | Performed by: NURSE PRACTITIONER

## 2024-02-14 PROCEDURE — G0439 PPPS, SUBSEQ VISIT: HCPCS | Performed by: NURSE PRACTITIONER

## 2024-02-14 PROCEDURE — 1123F ACP DISCUSS/DSCN MKR DOCD: CPT | Performed by: NURSE PRACTITIONER

## 2024-02-14 NOTE — PATIENT INSTRUCTIONS

## 2024-02-14 NOTE — PROGRESS NOTES
Medicare Annual Wellness Visit    Gisella Corey is here for Medicare AWV    Assessment & Plan   Medicare annual wellness visit, subsequent  -No labs needed  Rectal adenocarcinoma (HCC)  -Tylenol.  Following with hematology and oncology  Endometrial cancer determined by uterine biopsy  -Mets from rectal cancer.  She did go through chemotherapy and radiation  Bilateral pulmonary embolism (HCC)  -No anticoag  Essential (primary) hypertension  -Blood pressure is elevated today  Will continue with current medication with losartan 50 mg daily  NSVT (nonsustained ventricular tachycardia) (HCC)  -Heart rate is stable today  Single subsegmental pulmonary embolism without acute cor pulmonale (HCC)  -No anticoagulation  ST elevation myocardial infarction (STEMI), unspecified artery (HCC) patient is going to be entering hospice  -    Plan for patient entering hospice.  She was instructed to let me know if there is anything I can do to help keep her comfortable or in her situation.  Recommendations for Preventive Services Due: see orders and patient instructions/AVS.  Recommended screening schedule for the next 5-10 years is provided to the patient in written form: see Patient Instructions/AVS.     Return if symptoms worsen or fail to improve.     Subjective   The following acute and/or chronic problems were also addressed today:    Patient presents for her annual wellness visit  Her blood pressure is elevated  Weight is down    Patient presents for her annual wellness visit.  She has malignant rectal cancer with mets to her uterus.  She does have a wound on her buttocks and her left lower leg.  She is no longer following with wound management.  She does have a home care nurse.  She completed radiation.  No longer doing chemotherapy.  She states that there is nothing that they can do.  She will be entering hospice.  She admits that the end of her life is coming soon.  She states that she is comfortable.  Her family is helpful.

## 2024-02-27 ENCOUNTER — TELEPHONE (OUTPATIENT)
Dept: PRIMARY CARE CLINIC | Age: 69
End: 2024-02-27

## 2024-02-27 DIAGNOSIS — I89.0 LYMPHEDEMA OF BOTH LOWER EXTREMITIES: Chronic | ICD-10-CM

## 2024-02-27 DIAGNOSIS — C20 RECTAL ADENOCARCINOMA (HCC): Primary | ICD-10-CM

## 2024-02-27 DIAGNOSIS — C54.1 ENDOMETRIAL CANCER DETERMINED BY UTERINE BIOPSY (HCC): ICD-10-CM

## 2024-02-27 NOTE — TELEPHONE ENCOUNTER
Received fax from [facility]. Asking to have an order for palliative care for symptom management.    Writer pended for PCP review

## 2024-03-05 DIAGNOSIS — N18.31 STAGE 3A CHRONIC KIDNEY DISEASE (HCC): ICD-10-CM

## 2024-03-06 RX ORDER — METFORMIN HYDROCHLORIDE 500 MG/1
TABLET, EXTENDED RELEASE ORAL
Qty: 90 TABLET | Refills: 0 | Status: SHIPPED | OUTPATIENT
Start: 2024-03-06

## 2024-03-06 RX ORDER — DAPAGLIFLOZIN 10 MG/1
10 TABLET, FILM COATED ORAL EVERY MORNING
Qty: 90 TABLET | Refills: 0 | Status: SHIPPED | OUTPATIENT
Start: 2024-03-06

## 2024-03-19 RX ORDER — PANTOPRAZOLE SODIUM 40 MG/1
40 TABLET, DELAYED RELEASE ORAL 2 TIMES DAILY
Qty: 90 TABLET | Refills: 0 | Status: SHIPPED | OUTPATIENT
Start: 2024-03-19

## 2024-03-27 ENCOUNTER — TELEPHONE (OUTPATIENT)
Dept: PRIMARY CARE CLINIC | Age: 69
End: 2024-03-27

## 2024-03-27 NOTE — TELEPHONE ENCOUNTER
HUMZA Godinez from [facility] called, patient admitted to home hospice yesterday.  Hospice physician will prescribe all meds.  
Noted  
none

## 2024-03-28 ENCOUNTER — CLINICAL DOCUMENTATION (OUTPATIENT)
Dept: ONCOLOGY | Age: 69
End: 2024-03-28

## 2024-03-28 ENCOUNTER — TELEPHONE (OUTPATIENT)
Dept: GYNECOLOGIC ONCOLOGY | Age: 69
End: 2024-03-28

## 2024-03-28 NOTE — TELEPHONE ENCOUNTER
Spoke with Nurse navigator to confirm patient on hospice.  Nurse navigator informed writer patient on Hospice as of yesterday and requested all appointments be cancelled.  Writer updated supervisor and surgeon.  Appointment cancelled.

## 2024-03-28 NOTE — PROGRESS NOTES
[facility] Oncology Nutrition Note:  Chart reviewed for nutrition follow-up. Noted patient is now under Hospice care. Oncology RD will sign off at this time.

## 2024-05-01 DIAGNOSIS — E03.2 DRUG-INDUCED HYPOTHYROIDISM: ICD-10-CM

## 2024-05-01 RX ORDER — LEVOTHYROXINE SODIUM 0.05 MG/1
TABLET ORAL
Qty: 90 TABLET | Refills: 0 | Status: SHIPPED | OUTPATIENT
Start: 2024-05-01

## 2024-05-05 RX ORDER — PANTOPRAZOLE SODIUM 40 MG/1
40 TABLET, DELAYED RELEASE ORAL 2 TIMES DAILY
Qty: 90 TABLET | Refills: 0 | Status: SHIPPED | OUTPATIENT
Start: 2024-05-05

## 2024-05-14 NOTE — TELEPHONE ENCOUNTER
Left voicemail notifying patient and to call office with any questions or concerns Detail Level: Detailed

## 2025-02-10 NOTE — PROGRESS NOTES
[facility] PR[facility]ARE  [facility] [address] [facility] [address]  [address] [address]  Dept: [phone]  Dept Fax: [phone]    Carmen Franco is a 61 y.o. female who presents today for her medical conditions/complaints as noted below. Chief Complaint   Patient presents with   Afshin Lowe New Doctor    Cellulitis       HPI:     61 y.o. y/o female presented today for bilateral lower extremity cellulitis. She is morbidly obese female with chronic venous insufficiency and has frequent episodes of bilateral lower extremity cellulitis. This time her symptoms started a week ago when she noticed redness, swelling and weeping lesions more on the right leg as compared to the left. She had similar episode in May as well. She was advised to follow-up with lymphedema clinic where she went just once. Discussed in details that cessation of chronic venous stasis with cellulitis. Advised all conservative measures to help reduce frequent episodes of cellulitis and reinforced to follow-up at lymphedema clinic. She is also prediabetic. On Metformin. Advised weight loss lifestyle changes. Her vital signs are stable this visit. All other chronic comorbidities are stable. Medications reviewed and refilled as appropriate, problem list updated. Prescribe doxycycline for cellulitis. All concerns discussed in details and all questions answered to patient satisfaction.             Hemoglobin A1C (%)   Date Value   2020 6.3 (H)   2020 6.4 (H)   2019 6.0             ( goal A1C is < 7)   No results found for: LABMICR  LDL Cholesterol (mg/dL)   Date Value   2020 59   2019 187 (H)       (goal LDL is <100)   AST (U/L)   Date Value   2020 14     ALT (U/L)   Date Value   2020 17     BUN (mg/dL)   Date Value   2020 24 (H)     BP Readings from Last 3 Encounters:   21 132/70   21 138/74   20 128/80          (goal 120/80)    Past Medical History:   Diagnosis Date    Hyperlipidemia     Hypertension       Past Surgical History:   Procedure Laterality Date    BLADDER SUSPENSION      BLADDER SUSPENSION         Family History   Problem Relation Age of Onset    Elevated Lipids Mother     Cancer Mother     High Blood Pressure Mother     Cancer Maternal Grandmother        Social History     Tobacco Use    Smoking status: Former Smoker     Packs/day: 1.00     Years: 10.00     Pack years: 10.00     Types: Cigarettes     Quit date: 1989     Years since quittin.1    Smokeless tobacco: Never Used   Substance Use Topics    Alcohol use: Never      Current Outpatient Medications   Medication Sig Dispense Refill    doxycycline hyclate (VIBRA-TABS) 100 MG tablet Take 1 tablet by mouth 2 times daily for 10 days 20 tablet 0    levothyroxine (SYNTHROID) 50 MCG tablet TAKE 1 TABLET BY MOUTH EVERY DAY  90 tablet 0    metFORMIN (GLUCOPHAGE-XR) 500 MG extended release tablet TAKE 1 TABLET BY MOUTH EVERY DAY WITH BREAKFAST 90 tablet 0    vitamin D (ERGOCALCIFEROL) 1.25 MG (86066 UT) CAPS capsule TAKE 1 CAPSULE BY MOUTH ONE TIME A WEEK  12 capsule 0    diphenhydrAMINE HCl, TOPICAL, 2 % GEL Apply twice daily on affected area 57 g 0    losartan (COZAAR) 25 MG tablet Take 50 mg by mouth daily       aspirin 81 MG EC tablet Take 1 tablet by mouth daily 30 tablet 3    atorvastatin (LIPITOR) 80 MG tablet Take 1 tablet by mouth nightly 30 tablet 3    metoprolol succinate (TOPROL XL) 100 MG extended release tablet Take 1 tablet by mouth daily 30 tablet 3    clopidogrel (PLAVIX) 75 MG tablet Take 1 tablet by mouth daily 30 tablet 3    pantoprazole (PROTONIX) 40 MG tablet Take 1 tablet by mouth 2 times daily 30 tablet 3    furosemide (LASIX) 20 MG tablet Take 1 tablet by mouth daily (Patient taking differently: Take 40 mg by mouth daily ) 60 tablet 3     No current facility-administered medications for this visit.      No Known Rate and Rhythm: Normal rate and regular rhythm. Heart sounds: Normal heart sounds. No murmur heard. Pulmonary:      Effort: Pulmonary effort is normal.      Breath sounds: Normal breath sounds. No wheezing. Abdominal:      General: Bowel sounds are normal. There is no distension. Palpations: Abdomen is soft. Tenderness: There is no abdominal tenderness. There is no rebound. Musculoskeletal:         General: No tenderness. Normal range of motion. Cervical back: Normal range of motion and neck supple. Skin:     General: Skin is warm. Findings: Erythema and rash present. Comments: Bilateral lower extremity cellulitis  Rt>left   Neurological:      Mental Status: She is alert and oriented to person, place, and time. Cranial Nerves: No cranial nerve deficit. Psychiatric:         Behavior: Behavior normal.       /70   Pulse 66   Resp 16   Wt 263 lb 9.6 oz (119.6 kg)   SpO2 99%   BMI 46.69 kg/m²     Assessment:          1. Cellulitis of B/L lower extremity  - doxycycline hyclate (VIBRA-TABS) 100 MG tablet; Take 1 tablet by mouth 2 times daily for 10 days  Dispense: 20 tablet; Refill: 0  - Basic Metabolic Panel; Future    2. Chronic venous insufficiency-on Lasix, advised leg elevation. Chronic lymphedema  Advised to follow-up at lymphedema clinic    3. Prediabetes  On Metformin    4. Vitamin D deficiency  On vitamin D supplementation    5. Essential (primary) hypertension  On Cozaar 25 mg daily Lasix 40 mg daily. 6. Hypothyroidism due to Hashimoto's thyroiditis  Levothyroxine    7. S/P drug eluting coronary stent placement  On aspirin and Plavix, Lipitor. Follows up with cardiology    8. Morbid obesity ([address]  Advised lifestyle modifications. Patient has lost weight in the last few months. Diagnosis Orders   1. Cellulitis of both lower extremities     2. Chronic venous insufficiency     3. Prediabetes     4. Vitamin D deficiency     5.  Essential (primary) hypertension     6. Hypothyroidism due to Hashimoto's thyroiditis     7. S/P drug eluting coronary stent placement     8. Morbid obesity ([facility] [address]             Plan:      Return in about 3 months (around 10/12/2021). Orders Placed This Encounter   Procedures    Basic Metabolic Panel     Standing Status:   Future     Standing Expiration Date:   7/12/2022     Orders Placed This Encounter   Medications    DISCONTD: doxycycline hyclate (VIBRA-TABS) 100 MG tablet     Sig: Take 1 tablet by mouth 2 times daily for 7 days     Dispense:  14 tablet     Refill:  0    doxycycline hyclate (VIBRA-TABS) 100 MG tablet     Sig: Take 1 tablet by mouth 2 times daily for 10 days     Dispense:  20 tablet     Refill:  0         Patient given educational materials - see patient instructions. Discussed use, benefit, and side effects of prescribedmedications. All patient questions answered. Pt voiced understanding. Reviewed health maintenance. Instructed to continue current medications, diet and exercise. Patient agreed with treatment plan. Follow up as directed. I spent a total of 20-29 minutes face to face with this patient. Over 50% of that time was spent on counseling and care coordination. Please see assessment and plan for details. Electronically signed by   Tyrell Olmstead MD on 7/12/2021 at 3:25 PM  [facility]. Please note that this chart was generated using voice recognition Dragon dictation software. Although every effort was made to ensure the accuracy of this automatedtranscription, some errors in transcription may have occurred. [[email or web address]] : I had the pleasure of seeing your patient Ms. Shameka Bernard in the office today for cardiovascular evaluation.  As you know, she is a very pleasant 43 year old female with history of HLD and FHx of CAD who presents today for cardiac consultation.  The patient comes in complaining of intermittent non-daily palpitations for several months.  It is described as her heart skipping a beat.  She has no history of syncope.  She also reports occasional dyspnea on exertion when exercising but denies a change to her exercise tolerance.  Otherwise, the patient has no cardiac complaints and denies any chest pain or angina.

## (undated) DEVICE — ANCHOR TISSUE RETRIEVAL SYSTEM, BAG SIZE 125 ML, PORT SIZE 8 MM: Brand: ANCHOR TISSUE RETRIEVAL SYSTEM

## (undated) DEVICE — BASIN EMSIS 700ML GRAPHITE PLAS KID SHP GRAD

## (undated) DEVICE — GLOVE ORANGE PI 7   MSG9070

## (undated) DEVICE — TUBING, SUCTION, 3/16" X 10', STRAIGHT: Brand: MEDLINE

## (undated) DEVICE — SEAL

## (undated) DEVICE — GAUZE,SPONGE,3"X3",4PLY,NS,LF: Brand: MEDLINE

## (undated) DEVICE — SYRINGE, LUER LOCK, 10ML: Brand: MEDLINE

## (undated) DEVICE — HYBRID CO2 TUBING/CAP SET FOR OLYMPUS® SCOPES & UCR: Brand: ERBE

## (undated) DEVICE — PERRYSBURG ENDO PACK: Brand: MEDLINE INDUSTRIES, INC.

## (undated) DEVICE — SOLUTION ANTIFOG VIS SYS CLEARIFY LAPSCP

## (undated) DEVICE — 4-PORT MANIFOLD: Brand: NEPTUNE 2

## (undated) DEVICE — GOWN,SIRUS,NONRNF,SETINSLV,XL,20/CS: Brand: MEDLINE

## (undated) DEVICE — Device: Brand: DEFENDO VALVE AND CONNECTOR KIT

## (undated) DEVICE — SOLUTION IRRIG 1000ML 0.9% SOD CHL USP POUR PLAS BTL

## (undated) DEVICE — CONNECTOR TBNG AUX H2O JET DISP FOR OLY 160/180 SER

## (undated) DEVICE — BLADELESS OBTURATOR: Brand: WECK VISTA

## (undated) DEVICE — MHPB BASIC LAP PACK: Brand: MEDLINE INDUSTRIES, INC.

## (undated) DEVICE — LIQUIBAND RAPID ADHESIVE 36/CS 0.8ML: Brand: MEDLINE

## (undated) DEVICE — STRAP,POSITIONING,KNEE/BODY,FOAM,4X60": Brand: MEDLINE

## (undated) DEVICE — ADAPTER,CATHETER/SYRINGE/LUER,STERILE: Brand: MEDLINE

## (undated) DEVICE — SUTURE MCRYL + SZ 4-0 L27IN ABSRB UD L19MM PS-2 3/8 CIR MCP426H

## (undated) DEVICE — PAD PT POS 36 IN SURGYPAD DISP

## (undated) DEVICE — APPLICATOR MEDICATED 26 CC SOLUTION HI LT ORNG CHLORAPREP

## (undated) DEVICE — FLUFF UNDERPAD,MODERATE: Brand: WINGS

## (undated) DEVICE — SYRINGE MED 50ML LUERLOCK TIP

## (undated) DEVICE — ADAPTER CLEANING PORPOISE CLEANING

## (undated) DEVICE — GOWN,POLY REINFORCED,LG: Brand: MEDLINE

## (undated) DEVICE — ARM DRAPE

## (undated) DEVICE — SUTURE SZ 0 27IN 5/8 CIR UR-6  TAPER PT VIOLET ABSRB VICRYL J603H

## (undated) DEVICE — NEEDLE SYR 18GA L1.5IN RED PLAS HUB S STL BLNT FILL W/O

## (undated) DEVICE — BLANKET WRM W29.9XL79.1IN UP BODY FORC AIR MISTRAL-AIR

## (undated) DEVICE — DEVICE TRCR 12X9X3IN WHT CLSR DISP OMNICLOSE

## (undated) DEVICE — TIP COVER ACCESSORY

## (undated) DEVICE — NEEDLE INSUF L120MM DIA2MM DISP FOR PNEUMOPERI ENDOPATH